# Patient Record
Sex: MALE | Race: WHITE | NOT HISPANIC OR LATINO | Employment: OTHER | URBAN - METROPOLITAN AREA
[De-identification: names, ages, dates, MRNs, and addresses within clinical notes are randomized per-mention and may not be internally consistent; named-entity substitution may affect disease eponyms.]

---

## 2017-01-10 ENCOUNTER — RECORDS - HEALTHEAST (OUTPATIENT)
Dept: LAB | Facility: CLINIC | Age: 82
End: 2017-01-10

## 2017-01-10 LAB
CHOLEST SERPL-MCNC: 151 MG/DL
FASTING STATUS PATIENT QL REPORTED: NO
HDLC SERPL-MCNC: 46 MG/DL
LDLC SERPL CALC-MCNC: 88 MG/DL
TRIGL SERPL-MCNC: 85 MG/DL

## 2018-03-27 ENCOUNTER — RECORDS - HEALTHEAST (OUTPATIENT)
Dept: LAB | Facility: CLINIC | Age: 83
End: 2018-03-27

## 2018-03-27 LAB
ALBUMIN SERPL-MCNC: 3.9 G/DL (ref 3.5–5)
ALP SERPL-CCNC: 76 U/L (ref 45–120)
ALT SERPL W P-5'-P-CCNC: 33 U/L (ref 0–45)
ANION GAP SERPL CALCULATED.3IONS-SCNC: 7 MMOL/L (ref 5–18)
AST SERPL W P-5'-P-CCNC: 23 U/L (ref 0–40)
BILIRUB SERPL-MCNC: 0.5 MG/DL (ref 0–1)
BUN SERPL-MCNC: 20 MG/DL (ref 8–28)
CALCIUM SERPL-MCNC: 9.3 MG/DL (ref 8.5–10.5)
CHLORIDE BLD-SCNC: 109 MMOL/L (ref 98–107)
CHOLEST SERPL-MCNC: 157 MG/DL
CO2 SERPL-SCNC: 25 MMOL/L (ref 22–31)
CREAT SERPL-MCNC: 1.22 MG/DL (ref 0.7–1.3)
FASTING STATUS PATIENT QL REPORTED: NO
GFR SERPL CREATININE-BSD FRML MDRD: 56 ML/MIN/1.73M2
GLUCOSE BLD-MCNC: 103 MG/DL (ref 70–125)
HDLC SERPL-MCNC: 42 MG/DL
LDLC SERPL CALC-MCNC: 87 MG/DL
POTASSIUM BLD-SCNC: 4.8 MMOL/L (ref 3.5–5)
PROT SERPL-MCNC: 6.7 G/DL (ref 6–8)
SODIUM SERPL-SCNC: 141 MMOL/L (ref 136–145)
TRIGL SERPL-MCNC: 138 MG/DL
VIT B12 SERPL-MCNC: 699 PG/ML (ref 213–816)

## 2019-06-13 ENCOUNTER — RECORDS - HEALTHEAST (OUTPATIENT)
Dept: LAB | Facility: HOSPITAL | Age: 84
End: 2019-06-13

## 2019-06-13 LAB
TSH SERPL DL<=0.005 MIU/L-ACNC: 0.59 UIU/ML (ref 0.3–5)
VIT B12 SERPL-MCNC: 629 PG/ML (ref 213–816)

## 2019-06-16 LAB — VIT B1 PYROPHOSHATE BLD-SCNC: 42 NMOL/L (ref 70–180)

## 2019-09-05 ENCOUNTER — HOME CARE/HOSPICE - HEALTHEAST (OUTPATIENT)
Dept: HOME HEALTH SERVICES | Facility: HOME HEALTH | Age: 84
End: 2019-09-05

## 2019-09-06 ENCOUNTER — HOME CARE/HOSPICE - HEALTHEAST (OUTPATIENT)
Dept: HOME HEALTH SERVICES | Facility: HOME HEALTH | Age: 84
End: 2019-09-06

## 2019-10-04 ENCOUNTER — RECORDS - HEALTHEAST (OUTPATIENT)
Dept: LAB | Facility: CLINIC | Age: 84
End: 2019-10-04

## 2019-10-07 LAB — METHYLMALONATE SERPL-SCNC: 0.26 UMOL/L (ref 0–0.4)

## 2019-10-11 ENCOUNTER — RECORDS - HEALTHEAST (OUTPATIENT)
Dept: LAB | Facility: CLINIC | Age: 84
End: 2019-10-11

## 2019-10-15 LAB — VIT B1 PYROPHOSHATE BLD-SCNC: 213 NMOL/L (ref 70–180)

## 2019-12-16 ENCOUNTER — HOSPITAL ENCOUNTER (OUTPATIENT)
Dept: ULTRASOUND IMAGING | Facility: CLINIC | Age: 84
Discharge: HOME | End: 2019-12-16
Attending: FAMILY MEDICINE

## 2019-12-16 ENCOUNTER — OFFICE VISIT - HEALTHEAST (OUTPATIENT)
Dept: FAMILY MEDICINE | Facility: CLINIC | Age: 84
End: 2019-12-16

## 2019-12-16 DIAGNOSIS — M79.89 RIGHT LEG SWELLING: ICD-10-CM

## 2019-12-16 DIAGNOSIS — M79.604 PAIN OF RIGHT LOWER EXTREMITY: ICD-10-CM

## 2019-12-16 DIAGNOSIS — I82.4Y1 DVT, LOWER EXTREMITY, PROXIMAL, ACUTE, RIGHT (H): ICD-10-CM

## 2019-12-20 ENCOUNTER — HOSPITAL ENCOUNTER (OUTPATIENT)
Dept: CT IMAGING | Facility: CLINIC | Age: 84
Discharge: HOME | End: 2019-12-20
Attending: FAMILY MEDICINE

## 2019-12-20 ENCOUNTER — OFFICE VISIT - HEALTHEAST (OUTPATIENT)
Dept: FAMILY MEDICINE | Facility: CLINIC | Age: 84
End: 2019-12-20
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
## 2019-12-20 DIAGNOSIS — S40.022A ARM BRUISE, LEFT, INITIAL ENCOUNTER: ICD-10-CM

## 2020-01-20 ENCOUNTER — DOCUMENTATION ONLY (OUTPATIENT)
Dept: OTHER | Facility: CLINIC | Age: 85
End: 2020-01-20

## 2020-01-20 ENCOUNTER — AMBULATORY - HEALTHEAST (OUTPATIENT)
Dept: OTHER | Facility: CLINIC | Age: 85
End: 2020-01-20

## 2020-01-21 ENCOUNTER — OFFICE VISIT - HEALTHEAST (OUTPATIENT)
Dept: GERIATRICS | Facility: CLINIC | Age: 85
End: 2020-01-21

## 2020-01-21 DIAGNOSIS — G40.909 SEIZURE DISORDER (H): ICD-10-CM

## 2020-01-21 DIAGNOSIS — G93.41 ACUTE METABOLIC ENCEPHALOPATHY: ICD-10-CM

## 2020-01-21 DIAGNOSIS — I82.5Y1 CHRONIC DEEP VEIN THROMBOSIS (DVT) OF PROXIMAL VEIN OF RIGHT LOWER EXTREMITY (H): ICD-10-CM

## 2020-01-21 DIAGNOSIS — N17.9 AKI (ACUTE KIDNEY INJURY) (H): ICD-10-CM

## 2020-01-21 DIAGNOSIS — F03.91 DEMENTIA WITH BEHAVIORAL DISTURBANCE, UNSPECIFIED DEMENTIA TYPE: ICD-10-CM

## 2020-01-23 ENCOUNTER — OFFICE VISIT - HEALTHEAST (OUTPATIENT)
Dept: GERIATRICS | Facility: CLINIC | Age: 85
End: 2020-01-23

## 2020-01-23 DIAGNOSIS — G40.909 SEIZURE DISORDER (H): ICD-10-CM

## 2020-01-23 DIAGNOSIS — F03.91 DEMENTIA WITH BEHAVIORAL DISTURBANCE, UNSPECIFIED DEMENTIA TYPE: ICD-10-CM

## 2020-01-23 DIAGNOSIS — I82.5Y1 CHRONIC DEEP VEIN THROMBOSIS (DVT) OF PROXIMAL VEIN OF RIGHT LOWER EXTREMITY (H): ICD-10-CM

## 2020-01-23 DIAGNOSIS — G93.41 ACUTE METABOLIC ENCEPHALOPATHY: ICD-10-CM

## 2020-01-28 ENCOUNTER — OFFICE VISIT - HEALTHEAST (OUTPATIENT)
Dept: GERIATRICS | Facility: CLINIC | Age: 85
End: 2020-01-28

## 2020-01-28 DIAGNOSIS — I82.5Y1 CHRONIC DEEP VEIN THROMBOSIS (DVT) OF PROXIMAL VEIN OF RIGHT LOWER EXTREMITY (H): ICD-10-CM

## 2020-01-28 DIAGNOSIS — N17.9 AKI (ACUTE KIDNEY INJURY) (H): ICD-10-CM

## 2020-01-28 DIAGNOSIS — F03.91 DEMENTIA WITH BEHAVIORAL DISTURBANCE, UNSPECIFIED DEMENTIA TYPE: ICD-10-CM

## 2020-01-28 DIAGNOSIS — G40.909 SEIZURE DISORDER (H): ICD-10-CM

## 2020-01-30 ENCOUNTER — OFFICE VISIT - HEALTHEAST (OUTPATIENT)
Dept: GERIATRICS | Facility: CLINIC | Age: 85
End: 2020-01-30

## 2020-01-30 DIAGNOSIS — R41.0 TRANSIENT CONFUSION: ICD-10-CM

## 2020-01-30 DIAGNOSIS — Z86.73 HISTORY OF CVA (CEREBROVASCULAR ACCIDENT): ICD-10-CM

## 2020-01-30 DIAGNOSIS — I82.4Y1 DVT, LOWER EXTREMITY, PROXIMAL, ACUTE, RIGHT (H): ICD-10-CM

## 2020-02-03 ENCOUNTER — AMBULATORY - HEALTHEAST (OUTPATIENT)
Dept: GERIATRICS | Facility: CLINIC | Age: 85
End: 2020-02-03

## 2020-02-11 ENCOUNTER — RECORDS - HEALTHEAST (OUTPATIENT)
Dept: LAB | Facility: CLINIC | Age: 85
End: 2020-02-11

## 2020-02-11 LAB
ANION GAP SERPL CALCULATED.3IONS-SCNC: 9 MMOL/L (ref 5–18)
BUN SERPL-MCNC: 23 MG/DL (ref 8–28)
CALCIUM SERPL-MCNC: 9.5 MG/DL (ref 8.5–10.5)
CHLORIDE BLD-SCNC: 112 MMOL/L (ref 98–107)
CO2 SERPL-SCNC: 21 MMOL/L (ref 22–31)
CREAT SERPL-MCNC: 1.15 MG/DL (ref 0.7–1.3)
GFR SERPL CREATININE-BSD FRML MDRD: 60 ML/MIN/1.73M2
GLUCOSE BLD-MCNC: 94 MG/DL (ref 70–125)
POTASSIUM BLD-SCNC: 4.5 MMOL/L (ref 3.5–5)
SODIUM SERPL-SCNC: 142 MMOL/L (ref 136–145)

## 2020-02-12 ENCOUNTER — AMBULATORY - HEALTHEAST (OUTPATIENT)
Dept: ADMINISTRATIVE | Facility: REHABILITATION | Age: 85
End: 2020-02-12

## 2020-02-12 DIAGNOSIS — M25.512 PAIN IN LEFT SHOULDER: ICD-10-CM

## 2020-02-19 ENCOUNTER — OFFICE VISIT - HEALTHEAST (OUTPATIENT)
Dept: PHYSICAL THERAPY | Facility: REHABILITATION | Age: 85
End: 2020-02-19

## 2020-02-19 DIAGNOSIS — M19.011 OSTEOARTHRITIS OF RIGHT SHOULDER, UNSPECIFIED OSTEOARTHRITIS TYPE: ICD-10-CM

## 2020-02-19 DIAGNOSIS — R29.898 WEAKNESS OF SHOULDER: ICD-10-CM

## 2020-02-19 DIAGNOSIS — G89.29 CHRONIC PAIN IN RIGHT SHOULDER: ICD-10-CM

## 2020-02-19 DIAGNOSIS — M25.611 DECREASED RANGE OF MOTION OF RIGHT SHOULDER: ICD-10-CM

## 2020-02-19 DIAGNOSIS — M25.511 CHRONIC PAIN IN RIGHT SHOULDER: ICD-10-CM

## 2020-02-26 ENCOUNTER — OFFICE VISIT - HEALTHEAST (OUTPATIENT)
Dept: PHYSICAL THERAPY | Facility: REHABILITATION | Age: 85
End: 2020-02-26

## 2020-02-26 DIAGNOSIS — G89.29 CHRONIC PAIN IN RIGHT SHOULDER: ICD-10-CM

## 2020-02-26 DIAGNOSIS — M19.011 OSTEOARTHRITIS OF RIGHT SHOULDER, UNSPECIFIED OSTEOARTHRITIS TYPE: ICD-10-CM

## 2020-02-26 DIAGNOSIS — M25.511 CHRONIC PAIN IN RIGHT SHOULDER: ICD-10-CM

## 2020-02-26 DIAGNOSIS — R29.898 WEAKNESS OF SHOULDER: ICD-10-CM

## 2020-02-26 DIAGNOSIS — M25.611 DECREASED RANGE OF MOTION OF RIGHT SHOULDER: ICD-10-CM

## 2020-03-02 ENCOUNTER — OFFICE VISIT - HEALTHEAST (OUTPATIENT)
Dept: PHYSICAL THERAPY | Facility: REHABILITATION | Age: 85
End: 2020-03-02

## 2020-03-02 DIAGNOSIS — G89.29 CHRONIC PAIN IN RIGHT SHOULDER: ICD-10-CM

## 2020-03-02 DIAGNOSIS — M19.011 OSTEOARTHRITIS OF RIGHT SHOULDER, UNSPECIFIED OSTEOARTHRITIS TYPE: ICD-10-CM

## 2020-03-02 DIAGNOSIS — M25.511 CHRONIC PAIN IN RIGHT SHOULDER: ICD-10-CM

## 2020-03-02 DIAGNOSIS — R29.898 WEAKNESS OF SHOULDER: ICD-10-CM

## 2020-03-02 DIAGNOSIS — M25.611 DECREASED RANGE OF MOTION OF RIGHT SHOULDER: ICD-10-CM

## 2020-03-04 ENCOUNTER — OFFICE VISIT - HEALTHEAST (OUTPATIENT)
Dept: PHYSICAL THERAPY | Facility: REHABILITATION | Age: 85
End: 2020-03-04

## 2020-03-04 DIAGNOSIS — R29.898 WEAKNESS OF SHOULDER: ICD-10-CM

## 2020-03-04 DIAGNOSIS — M25.511 CHRONIC PAIN IN RIGHT SHOULDER: ICD-10-CM

## 2020-03-04 DIAGNOSIS — M25.611 DECREASED RANGE OF MOTION OF RIGHT SHOULDER: ICD-10-CM

## 2020-03-04 DIAGNOSIS — M19.011 OSTEOARTHRITIS OF RIGHT SHOULDER, UNSPECIFIED OSTEOARTHRITIS TYPE: ICD-10-CM

## 2020-03-04 DIAGNOSIS — G89.29 CHRONIC PAIN IN RIGHT SHOULDER: ICD-10-CM

## 2020-03-09 ENCOUNTER — OFFICE VISIT - HEALTHEAST (OUTPATIENT)
Dept: PHYSICAL THERAPY | Facility: REHABILITATION | Age: 85
End: 2020-03-09

## 2020-03-09 ENCOUNTER — COMMUNICATION - HEALTHEAST (OUTPATIENT)
Dept: PHYSICAL THERAPY | Facility: REHABILITATION | Age: 85
End: 2020-03-09

## 2020-03-09 DIAGNOSIS — M19.011 OSTEOARTHRITIS OF RIGHT SHOULDER, UNSPECIFIED OSTEOARTHRITIS TYPE: ICD-10-CM

## 2020-03-09 DIAGNOSIS — M25.611 DECREASED RANGE OF MOTION OF RIGHT SHOULDER: ICD-10-CM

## 2020-03-09 DIAGNOSIS — R29.898 WEAKNESS OF SHOULDER: ICD-10-CM

## 2020-03-09 DIAGNOSIS — G89.29 CHRONIC PAIN IN RIGHT SHOULDER: ICD-10-CM

## 2020-03-09 DIAGNOSIS — M25.511 CHRONIC PAIN IN RIGHT SHOULDER: ICD-10-CM

## 2020-03-11 ENCOUNTER — OFFICE VISIT - HEALTHEAST (OUTPATIENT)
Dept: PHYSICAL THERAPY | Facility: REHABILITATION | Age: 85
End: 2020-03-11

## 2020-03-11 DIAGNOSIS — M19.011 OSTEOARTHRITIS OF RIGHT SHOULDER, UNSPECIFIED OSTEOARTHRITIS TYPE: ICD-10-CM

## 2020-03-11 DIAGNOSIS — M25.611 DECREASED RANGE OF MOTION OF RIGHT SHOULDER: ICD-10-CM

## 2020-03-11 DIAGNOSIS — G89.29 CHRONIC PAIN IN RIGHT SHOULDER: ICD-10-CM

## 2020-03-11 DIAGNOSIS — M25.511 CHRONIC PAIN IN RIGHT SHOULDER: ICD-10-CM

## 2020-03-11 DIAGNOSIS — R29.898 WEAKNESS OF SHOULDER: ICD-10-CM

## 2020-03-16 ENCOUNTER — OFFICE VISIT - HEALTHEAST (OUTPATIENT)
Dept: PHYSICAL THERAPY | Facility: REHABILITATION | Age: 85
End: 2020-03-16

## 2020-03-16 DIAGNOSIS — R29.898 WEAKNESS OF SHOULDER: ICD-10-CM

## 2020-03-16 DIAGNOSIS — G89.29 CHRONIC PAIN IN RIGHT SHOULDER: ICD-10-CM

## 2020-03-16 DIAGNOSIS — M25.511 CHRONIC PAIN IN RIGHT SHOULDER: ICD-10-CM

## 2020-03-16 DIAGNOSIS — M25.611 DECREASED RANGE OF MOTION OF RIGHT SHOULDER: ICD-10-CM

## 2020-03-16 DIAGNOSIS — M19.011 OSTEOARTHRITIS OF RIGHT SHOULDER, UNSPECIFIED OSTEOARTHRITIS TYPE: ICD-10-CM

## 2020-03-18 ENCOUNTER — OFFICE VISIT - HEALTHEAST (OUTPATIENT)
Dept: PHYSICAL THERAPY | Facility: REHABILITATION | Age: 85
End: 2020-03-18

## 2020-03-18 DIAGNOSIS — G89.29 CHRONIC PAIN IN RIGHT SHOULDER: ICD-10-CM

## 2020-03-18 DIAGNOSIS — R29.898 WEAKNESS OF SHOULDER: ICD-10-CM

## 2020-03-18 DIAGNOSIS — M25.511 CHRONIC PAIN IN RIGHT SHOULDER: ICD-10-CM

## 2020-03-18 DIAGNOSIS — M25.611 DECREASED RANGE OF MOTION OF RIGHT SHOULDER: ICD-10-CM

## 2020-03-18 DIAGNOSIS — M19.011 OSTEOARTHRITIS OF RIGHT SHOULDER, UNSPECIFIED OSTEOARTHRITIS TYPE: ICD-10-CM

## 2020-04-24 ENCOUNTER — HOSPITAL ENCOUNTER (OUTPATIENT)
Dept: ULTRASOUND IMAGING | Facility: CLINIC | Age: 85
Discharge: HOME | End: 2020-04-24

## 2020-04-24 DIAGNOSIS — I82.4Y1: ICD-10-CM

## 2020-06-05 ENCOUNTER — RECORDS - HEALTHEAST (OUTPATIENT)
Dept: LAB | Facility: CLINIC | Age: 85
End: 2020-06-05

## 2020-06-05 LAB
ANION GAP SERPL CALCULATED.3IONS-SCNC: 9 MMOL/L (ref 5–18)
BUN SERPL-MCNC: 28 MG/DL (ref 8–28)
CALCIUM SERPL-MCNC: 9.2 MG/DL (ref 8.5–10.5)
CHLORIDE BLD-SCNC: 112 MMOL/L (ref 98–107)
CO2 SERPL-SCNC: 21 MMOL/L (ref 22–31)
CREAT SERPL-MCNC: 1.33 MG/DL (ref 0.7–1.3)
GFR SERPL CREATININE-BSD FRML MDRD: 50 ML/MIN/1.73M2
GLUCOSE BLD-MCNC: 93 MG/DL (ref 70–125)
POTASSIUM BLD-SCNC: 4.5 MMOL/L (ref 3.5–5)
SODIUM SERPL-SCNC: 142 MMOL/L (ref 136–145)

## 2020-07-28 ENCOUNTER — RECORDS - HEALTHEAST (OUTPATIENT)
Dept: ADMINISTRATIVE | Facility: OTHER | Age: 85
End: 2020-07-28

## 2020-07-28 ENCOUNTER — RECORDS - HEALTHEAST (OUTPATIENT)
Dept: LAB | Facility: CLINIC | Age: 85
End: 2020-07-28

## 2020-07-28 LAB
ALBUMIN SERPL-MCNC: 3.9 G/DL (ref 3.5–5)
ALP SERPL-CCNC: 81 U/L (ref 45–120)
ALT SERPL W P-5'-P-CCNC: 28 U/L (ref 0–45)
ANION GAP SERPL CALCULATED.3IONS-SCNC: 9 MMOL/L (ref 5–18)
AST SERPL W P-5'-P-CCNC: 22 U/L (ref 0–40)
BILIRUB SERPL-MCNC: 0.5 MG/DL (ref 0–1)
BUN SERPL-MCNC: 21 MG/DL (ref 8–28)
CALCIUM SERPL-MCNC: 9 MG/DL (ref 8.5–10.5)
CHLORIDE BLD-SCNC: 110 MMOL/L (ref 98–107)
CO2 SERPL-SCNC: 22 MMOL/L (ref 22–31)
CREAT SERPL-MCNC: 1.25 MG/DL (ref 0.7–1.3)
GFR SERPL CREATININE-BSD FRML MDRD: 54 ML/MIN/1.73M2
GLUCOSE BLD-MCNC: 95 MG/DL (ref 70–125)
POTASSIUM BLD-SCNC: 4.4 MMOL/L (ref 3.5–5)
PROT SERPL-MCNC: 6.3 G/DL (ref 6–8)
SODIUM SERPL-SCNC: 141 MMOL/L (ref 136–145)
TSH SERPL DL<=0.005 MIU/L-ACNC: 0.82 UIU/ML (ref 0.3–5)

## 2020-08-06 ENCOUNTER — HOSPITAL ENCOUNTER (OUTPATIENT)
Dept: CARDIOLOGY | Facility: CLINIC | Age: 85
Discharge: HOME | End: 2020-08-06

## 2020-08-06 DIAGNOSIS — R60.9 EDEMA: ICD-10-CM

## 2020-08-06 ASSESSMENT — MIFFLIN-ST. JEOR: SCORE: 1282.22

## 2020-08-07 LAB
ASCENDING AORTA: 3.9 CM
BSA FOR ECHO PROCEDURE: 1.77 M2
CV BLOOD PRESSURE: ABNORMAL MMHG
CV ECHO HEIGHT: 68 IN
CV ECHO WEIGHT: 145 LBS
DOP CALC LVOT AREA: 3.14 CM2
DOP CALC LVOT DIAMETER: 2 CM
DOP CALC LVOT PEAK VEL: 124 CM/S
DOP CALC LVOT STROKE VOLUME: 71 CM3
DOP CALCLVOT PEAK VEL VTI: 22.6 CM
EJECTION FRACTION: 61 % (ref 55–75)
FRACTIONAL SHORTENING: 34.6 % (ref 28–44)
INTERVENTRICULAR SEPTUM IN END DIASTOLE: 1.09 CM (ref 0.6–1)
IVS/PW RATIO: 1.1
LA AREA 1: 16.8 CM2
LA AREA 2: 16.3 CM2
LEFT ATRIUM LENGTH: 4.82 CM
LEFT ATRIUM VOLUME INDEX: 27.3 ML/M2
LEFT ATRIUM VOLUME: 48.3 ML
LEFT VENTRICLE DIASTOLIC VOLUME INDEX: 50.8 CM3/M2 (ref 34–74)
LEFT VENTRICLE DIASTOLIC VOLUME: 90 CM3 (ref 62–150)
LEFT VENTRICLE MASS INDEX: 97 G/M2
LEFT VENTRICLE SYSTOLIC VOLUME INDEX: 19.8 CM3/M2 (ref 11–31)
LEFT VENTRICLE SYSTOLIC VOLUME: 35 CM3 (ref 21–61)
LEFT VENTRICULAR INTERNAL DIMENSION IN DIASTOLE: 4.63 CM (ref 4.2–5.8)
LEFT VENTRICULAR INTERNAL DIMENSION IN SYSTOLE: 3.03 CM (ref 2.5–4)
LEFT VENTRICULAR MASS: 171.6 G
LEFT VENTRICULAR OUTFLOW TRACT MEAN GRADIENT: 3 MMHG
LEFT VENTRICULAR OUTFLOW TRACT MEAN VELOCITY: 81.2 CM/S
LEFT VENTRICULAR OUTFLOW TRACT PEAK GRADIENT: 6 MMHG
LEFT VENTRICULAR POSTERIOR WALL IN END DIASTOLE: 1.01 CM (ref 0.6–1)
LV STROKE VOLUME INDEX: 40.1 ML/M2
MITRAL VALVE E/A RATIO: 0.6
MV AVERAGE E/E' RATIO: 7.8 CM/S
MV DECELERATION TIME: 349 MS
MV E'TISSUE VEL-LAT: 7.7 CM/S
MV E'TISSUE VEL-MED: 4.87 CM/S
MV LATERAL E/E' RATIO: 6.4
MV MEDIAL E/E' RATIO: 10
MV PEAK A VELOCITY: 77 CM/S
MV PEAK E VELOCITY: 48.9 CM/S
NUC REST DIASTOLIC VOLUME INDEX: 2320 LBS
NUC REST SYSTOLIC VOLUME INDEX: 68 IN
TRICUSPID REGURGITATION PEAK PRESSURE GRADIENT: 22.3 MMHG
TRICUSPID VALVE ANULAR PLANE SYSTOLIC EXCURSION: 1.7 CM
TRICUSPID VALVE PEAK REGURGITANT VELOCITY: 236 CM/S

## 2020-08-27 ENCOUNTER — RECORDS - HEALTHEAST (OUTPATIENT)
Dept: LAB | Facility: CLINIC | Age: 85
End: 2020-08-27

## 2020-08-27 LAB
CHOLEST SERPL-MCNC: 121 MG/DL
FASTING STATUS PATIENT QL REPORTED: NO
HDLC SERPL-MCNC: 43 MG/DL
LDLC SERPL CALC-MCNC: 60 MG/DL
TRIGL SERPL-MCNC: 90 MG/DL

## 2020-09-08 ENCOUNTER — RECORDS - HEALTHEAST (OUTPATIENT)
Dept: ADMINISTRATIVE | Facility: OTHER | Age: 85
End: 2020-09-08

## 2020-09-08 ENCOUNTER — AMBULATORY - HEALTHEAST (OUTPATIENT)
Dept: CARDIOLOGY | Facility: CLINIC | Age: 85
End: 2020-09-08

## 2020-09-14 ENCOUNTER — COMMUNICATION - HEALTHEAST (OUTPATIENT)
Dept: CARDIOLOGY | Facility: CLINIC | Age: 85
End: 2020-09-14

## 2020-09-15 ENCOUNTER — OFFICE VISIT - HEALTHEAST (OUTPATIENT)
Dept: CARDIOLOGY | Facility: CLINIC | Age: 85
End: 2020-09-15

## 2020-09-15 DIAGNOSIS — I82.5Y1 CHRONIC DEEP VEIN THROMBOSIS (DVT) OF PROXIMAL VEIN OF RIGHT LOWER EXTREMITY (H): ICD-10-CM

## 2020-09-15 ASSESSMENT — MIFFLIN-ST. JEOR: SCORE: 1313.97

## 2020-10-14 ENCOUNTER — RECORDS - HEALTHEAST (OUTPATIENT)
Dept: LAB | Facility: CLINIC | Age: 85
End: 2020-10-14

## 2020-10-14 LAB — LEVETIRACETAM (KEPPRA): 29.3 UG/ML (ref 6–46)

## 2020-12-16 DIAGNOSIS — G40.909 SEIZURE DISORDER (H): Primary | ICD-10-CM

## 2020-12-17 RX ORDER — LEVETIRACETAM 500 MG/1
TABLET ORAL
Qty: 56 TABLET | Refills: 12 | Status: SHIPPED | OUTPATIENT
Start: 2020-12-17 | End: 2023-01-01

## 2021-03-17 ENCOUNTER — RECORDS - HEALTHEAST (OUTPATIENT)
Dept: LAB | Facility: CLINIC | Age: 86
End: 2021-03-17

## 2021-03-17 LAB
ALBUMIN SERPL-MCNC: 3.6 G/DL (ref 3.5–5)
ALP SERPL-CCNC: 100 U/L (ref 45–120)
ALT SERPL W P-5'-P-CCNC: 28 U/L (ref 0–45)
ANION GAP SERPL CALCULATED.3IONS-SCNC: 7 MMOL/L (ref 5–18)
AST SERPL W P-5'-P-CCNC: 23 U/L (ref 0–40)
BILIRUB SERPL-MCNC: 0.3 MG/DL (ref 0–1)
BUN SERPL-MCNC: 20 MG/DL (ref 8–28)
CALCIUM SERPL-MCNC: 8.5 MG/DL (ref 8.5–10.5)
CHLORIDE BLD-SCNC: 109 MMOL/L (ref 98–107)
CHOLEST SERPL-MCNC: 109 MG/DL
CO2 SERPL-SCNC: 23 MMOL/L (ref 22–31)
CREAT SERPL-MCNC: 1.32 MG/DL (ref 0.7–1.3)
FASTING STATUS PATIENT QL REPORTED: NO
GFR SERPL CREATININE-BSD FRML MDRD: 51 ML/MIN/1.73M2
GLUCOSE BLD-MCNC: 103 MG/DL (ref 70–125)
HDLC SERPL-MCNC: 39 MG/DL
LDLC SERPL CALC-MCNC: 55 MG/DL
POTASSIUM BLD-SCNC: 4.8 MMOL/L (ref 3.5–5)
PROT SERPL-MCNC: 6.2 G/DL (ref 6–8)
SODIUM SERPL-SCNC: 139 MMOL/L (ref 136–145)
TRIGL SERPL-MCNC: 73 MG/DL

## 2021-05-24 ENCOUNTER — RECORDS - HEALTHEAST (OUTPATIENT)
Dept: ADMINISTRATIVE | Facility: CLINIC | Age: 86
End: 2021-05-24

## 2021-05-25 ENCOUNTER — RECORDS - HEALTHEAST (OUTPATIENT)
Dept: ADMINISTRATIVE | Facility: CLINIC | Age: 86
End: 2021-05-25

## 2021-05-28 ENCOUNTER — RECORDS - HEALTHEAST (OUTPATIENT)
Dept: ADMINISTRATIVE | Facility: CLINIC | Age: 86
End: 2021-05-28

## 2021-05-29 ENCOUNTER — RECORDS - HEALTHEAST (OUTPATIENT)
Dept: ADMINISTRATIVE | Facility: CLINIC | Age: 86
End: 2021-05-29

## 2021-05-30 ENCOUNTER — RECORDS - HEALTHEAST (OUTPATIENT)
Dept: ADMINISTRATIVE | Facility: CLINIC | Age: 86
End: 2021-05-30

## 2021-06-04 VITALS
SYSTOLIC BLOOD PRESSURE: 119 MMHG | RESPIRATION RATE: 18 BRPM | BODY MASS INDEX: 22.89 KG/M2 | OXYGEN SATURATION: 94 % | HEART RATE: 60 BPM | DIASTOLIC BLOOD PRESSURE: 62 MMHG | TEMPERATURE: 97.7 F | WEIGHT: 155 LBS

## 2021-06-04 VITALS — HEIGHT: 68 IN | WEIGHT: 145 LBS | BODY MASS INDEX: 21.98 KG/M2

## 2021-06-04 VITALS
SYSTOLIC BLOOD PRESSURE: 145 MMHG | BODY MASS INDEX: 22.09 KG/M2 | WEIGHT: 145.3 LBS | DIASTOLIC BLOOD PRESSURE: 76 MMHG | HEART RATE: 64 BPM | TEMPERATURE: 97.1 F

## 2021-06-04 VITALS
WEIGHT: 143 LBS | BODY MASS INDEX: 21.74 KG/M2 | SYSTOLIC BLOOD PRESSURE: 155 MMHG | HEART RATE: 61 BPM | DIASTOLIC BLOOD PRESSURE: 74 MMHG

## 2021-06-04 VITALS
TEMPERATURE: 97.6 F | RESPIRATION RATE: 16 BRPM | DIASTOLIC BLOOD PRESSURE: 64 MMHG | BODY MASS INDEX: 22.45 KG/M2 | WEIGHT: 152 LBS | HEART RATE: 54 BPM | SYSTOLIC BLOOD PRESSURE: 131 MMHG | OXYGEN SATURATION: 98 %

## 2021-06-04 VITALS
BODY MASS INDEX: 23.04 KG/M2 | SYSTOLIC BLOOD PRESSURE: 138 MMHG | DIASTOLIC BLOOD PRESSURE: 60 MMHG | HEIGHT: 68 IN | WEIGHT: 152 LBS | HEART RATE: 57 BPM | RESPIRATION RATE: 12 BRPM

## 2021-06-04 NOTE — PROGRESS NOTES
Assessment and Plan  1. Fall, initial encounter  The patient did have head trauma 2 days ago when he fell and hit his head.  It was not witnessed.  He does not think he had loss of consciousness and has no neurological deficits currently.  However he is on aspirin and Eliquis.  I discussed with him that it is possible that he has a intracranial bleed secondary to his blood thinners in this trauma.  I recommended a head CT.  This was done and is negative.  I discussed with him that he should be seen if he has any further falls.  This fall by report was mechanical in nature.  Would consider following up with his primary care doctor and possibly a referral for Pap fall prevention in the future.  - CT Head Without Contrast; Future    2. Arm bruise, left, initial encounter  The history involving the bruise is not clear.  Per his doctors report from clinic the bruise was not noted.  I reviewed their chart.  An x-ray was done which has not yet been read by a radiologist but the physician who looked at it at [place] thought it was negative.  I think that his bruises likely due to a minor trauma and that he is now on anticoagulation.  His pain is near the insertion of the bicep tendon so I think it is probably a tendinopathy or related to the swelling from the bruise.  I recommended that they use hot or warm compresses over the area 2-3 times per day and return to the clinic if it seems to be getting larger or if he is having more bruising or bleeding.  His circulation to his distal extremity is intact.    Chief complaint:  No chief complaint on file.    HPI:  Kurtis Urban is a 89 y.o. male with a recent history of a DVT that was diagnosed on 12/16.  He was hospitalized and was discharged on Eliquis/apixaban.  After his discharge, he had a fall at home on the evening of 12/17.  He awoke in the middle of the night due to left arm pain.  He thought it would help to stand and walk so he was getting out of bed in the dark  and had a mechanical fall.  He reports that he hit his head on the bed and then was on the ground.  He was unable to stand up for a long time.  This was due to his left arm pain.  He did not have any loss of consciousness.  He did not have headache or neurological change following the fall.  He continued to have pain of his left arm but was able to pull himself to stand.  He continued to have pain of the left arm and was seen at his primary care clinic yesterday.  An x-ray was done at that clinic.  He reports that it was negative and they told him it might of been a rotator cuff.  He was then seen at his dermatologist today and that was the first time he noted a large bruise on his bicep on his left.  He comes in today for evaluation for the bruise.  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  He thinks his movement of the left arm is about at its baseline.  He does not have any weakness or loss of sensation in his hand.  He does not think he had any traumas to the area.  He did have IVs in that arm during his hospitalization for heparin drip.    PMH:   Patient Active Problem List   Diagnosis     Hypercholesteremia     Depression     History of CVA (cerebrovascular accident)     Melanoma of back (H)     Melanoma of face (H)     Hard of hearing     Mild cognitive impairment     Seizure disorder (H)     Transient confusion     TIA (transient ischemic attack)     Dementia with behavioral disturbance (H)     Thiamine deficiency     DVT, lower extremity, proximal, acute, right (H)     Vitamin B12 deficiency (non anemic)       Past Medical History:   Diagnosis Date     Dementia with behavioral disturbance (H) 9/3/2019     Depression 5/28/2014     Hypercholesteremia 5/28/2014     Melanoma of back (H) 5/28/2014    Surgically removed       Melanoma of face (H) 5/28/2014    Surgically removed      Seizure (H) 5/19/2015     Thiamine deficiency 9/3/2019     TIA (transient ischemic  attack)        Current Medications:   Current Outpatient Medications on File Prior to Visit   Medication Sig Dispense Refill     apixaban ANTICOAGULANT 5 mg (74 tabs) DsPk Take 10 mg by mouth 2 (two) times a day for 7 days, THEN 5 mg 2 (two) times a day. 74 tablet 0     aspirin 81 MG EC tablet Take 81 mg by mouth daily.       atorvastatin (LIPITOR) 40 MG tablet Take 40 mg by mouth daily with supper.              CYANOCOBALAMIN, VITAMIN B-12, ORAL Take 2,500 mg by mouth daily.       levETIRAcetam (KEPPRA) 500 MG tablet Take 500 mg by mouth 2 (two) times a day.       No current facility-administered medications on file prior to visit.        Allergies:  is allergic to penicillins; alfuzosin; donepezil; myolin; sulfa (sulfonamide antibiotics); and triazolam.    SH/FH:  Social History and Family History reviewed and updated.   Tobacco Status:  He  reports that he has quit smoking. His smoking use included cigarettes. He has never used smokeless tobacco.    Review of Systems:  A 10 point review of systems was done  No fever  No headache  No visual change  No weakness or numbness  Pain of the left arm      There were no vitals filed for this visit.  Wt Readings from Last 3 Encounters:   12/17/19 151 lb 6 oz (68.7 kg)   12/16/19 155 lb (70.3 kg)   09/03/19 145 lb 9.6 oz (66 kg)       Physical Exam:  GENERAL: Alert, cooperative, well-appearing  HEAD: Normocephalic, atraumatic no abrasion or lesion over his scalp.  EYES: Conjunctiva pink, sclera white, no exudates.  Pupils are equal and reactive.  Extra ocular eye movements are normal.  Neuro: Cranial nerves are intact.  Coordination is slightly impaired to finger-to-nose with some tremor noted on the left side.  Romberg is negative.  Gait is normal.  Speech is normal.  Left upper extremity.  He has swelling and a large bruise over the bicep of his left arm extending from his mid humerus to just proximal of his shoulder.  It is 23 cm in maximum dimension by 8 cm.  He has  normal distal pulses and his hand is warm to palpation.  He has pain on palpation at the bicep insertion.  He does not have other joint tenderness of the shoulder.  He does not have point tenderness over the bruise of the humerus.  His range of motion of abduction is limited to active movement but can be overcome with passive movement.  He has normal strength in his hands.

## 2021-06-04 NOTE — PROGRESS NOTES
[facility] Note                                                        Date of Visit: 12/16/2019     Chief Complaint   Kurtis Urban is a(n) 89 y.o. White or  male who presents to [facility], accompanied by a , with the following complaint(s):  Edema (Right leg) and Rash (Rash all over on the back )       Assessment and Plan   1. DVT, lower extremity, proximal, acute, right (H)    2. Right leg swelling  - US Venous Leg Right; Future    3. Pain of right lower extremity  - US Venous Leg Right; Future    Patient presenting with 5-day history of right lower extremity swelling and pain.  Presentation is concerning for DVT.  No signs/symptoms of pulmonary embolism are noted.  Venous ultrasound was ordered as a STAT study to rule out DVT.  Ultrasound is positive for proximal right lower extremity DVT.  Result was reviewed with the patient, his , and his son Adi by phone (ultrasound was ordered as a hold and call study at [facility]).  Recommend hospitalization for initiation of anticoagulation given patient's advanced age and mild cognitive impairment as well as the fact that he resides alone in his own home.  Patient and his son were agreeable to this.  Case was discussed with Dr. Tapia, Hospitalist at [facility], who graciously accepted the patient as a direct admission for management of DVT.     History of Present Illness   Primary symptom: Edema  Onset: 5 days ago  Location(s): Right leg (from the upper thigh down)  Progression: Persisting  Associated weeping: No  Associated erythema: No  Associated shortness of breath: No  Associated exertional dyspnea: No  Associated orthopnea: No  Associated cough: No  Associated hemoptysis: No  Associated chest pain: No  Associated palpitations: No  History of similar episodes: No  History of congestive heart failure: No  History of coronary artery disease: No, but has cerebrovascular disease.   History of deep vein thrombosis:  No  History of pulmonary embolism: No  Additional pertinent history: Is concerned about lesions on his back, which were treated with cryotherapy by Dermatology last week.      Review of Systems   Review of Systems   All other systems reviewed and are negative.       Physical Exam   Vitals:    12/16/19 1044   BP: 119/62   Patient Site: Right Arm   Patient Position: Sitting   Cuff Size: Adult Regular   Pulse: 60   Resp: 18   Temp: 97.7  F (36.5  C)   TempSrc: Oral   SpO2: 94%   Weight: 155 lb (70.3 kg)     Physical Exam  Vitals signs and nursing note reviewed.   Constitutional:       General: He is not in acute distress.     Appearance: He is well-developed and normal weight. He is not ill-appearing or toxic-appearing.   HENT:      Right Ear: Decreased hearing noted.      Left Ear: Decreased hearing noted.      Mouth/Throat:      Mouth: Mucous membranes are moist.      Pharynx: Oropharynx is clear.   Eyes:      General: Lids are normal.      Conjunctiva/sclera: Conjunctivae normal.   Cardiovascular:      Rate and Rhythm: Normal rate and regular rhythm.      Pulses:           Dorsalis pedis pulses are 2+ on the right side.      Heart sounds: S1 normal and S2 normal. No murmur. No friction rub. No gallop.       Comments: Homans Sign positive on the right, negative on the left.   Pulmonary:      Effort: Pulmonary effort is normal.      Breath sounds: Normal breath sounds. No stridor. No wheezing, rhonchi or rales.   Musculoskeletal:      Right lower leg: He exhibits tenderness. 2+ Edema (from distal thigh to the foot) present.      Left lower leg: No edema.   Skin:     General: Skin is warm and dry.      Coloration: Skin is not pale.      Findings: No rash.      Comments: Multiple scabbed lesions on the back correlating with apparent seborrheic keratoses and / or actinic keratoses that were recently treated with cryotherapy.   Neurological:      General: No focal deficit present.      Mental Status: He is alert and  oriented to person, place, and time.   Psychiatric:         Speech: Speech normal.         Behavior: Behavior is cooperative.          Diagnostic Studies   Laboratory:  N/A    Radiology:  EXAM: US VENOUS LEG RIGHT  LOCATION: [facility]  DATE/TIME: 12/16/2019 12:09 PM     INDICATION: Right leg swelling and pain x 5 days.  COMPARISON: None.  TECHNIQUE: Venous Duplex ultrasound of the right lower extremity with and without compression, augmentation and duplex. Color flow and spectral Doppler with waveform analysis performed.     FINDINGS: Exam includes the common femoral, femoral, popliteal, and contralateral common femoral veins as well as segmentally visualized deep calf veins and greater saphenous vein.      RIGHT: Study is positive for DVT. Specifically, there is occlusive thrombus from the mid right femoral vein through the popliteal vein. The posterior tibial vein is patent. There are nonocclusive and occlusive filling defects and the peroneal veins calf.    No superficial thrombophlebitis. No popliteal cyst.     IMPRESSION:   1.  Study is positive for DVT above the knee in the right lower extremity.     Results called by the technologist at approximately 12:15 PM.     Electrocardiogram:  N/A     Procedure Note   N/A     Pertinent History   The following portions of the patient's history were reviewed and updated as appropriate: allergies, current medications, past family history, past medical history, past social history, past surgical history and problem list.    Patient has Hypercholesteremia; Depression; History of CVA (cerebrovascular accident); Melanoma of back (H); Melanoma of face (H); Hard of hearing; Mild cognitive impairment; Seizure (H); Transient confusion; TIA (transient ischemic attack); Dementia with behavioral disturbance (H); and Thiamine deficiency on their problem list.    Patient has a past medical history of Dementia with behavioral disturbance (H) (9/3/2019), Depression (5/28/2014),  Hypercholesteremia (5/28/2014), Melanoma of back (H) (5/28/2014), Melanoma of face (H) (5/28/2014), Seizure (H) (5/19/2015), Thiamine deficiency (9/3/2019), and TIA (transient ischemic attack).    Patient has a past surgical history that includes Cholecystectomy; Appendectomy; nasal surgeries; meniscal surgery (1993); Hernia repair; Shoulder surgery (Right); and total knee repair (Right).    Patient's family history is not on file but is reportedly negative for DVT/PE.     Patient reports that he has quit smoking. His smoking use included cigarettes. He has never used smokeless tobacco. He reports that he does not drink alcohol or use drugs.     Portions of this note have been dictated using voice recognition software. Any grammatical or context distortions are unintentional and inherent to the software.     Kamran Redmond MD  [facility]

## 2021-06-04 NOTE — PATIENT INSTRUCTIONS - HE
Patient Education   1. The bruise in clinic today is 23 cm by 8 cm.    2. Apply warm compresses on the area at least three times a day  3. If the bruising is getting larger then you should be seen in the clinic  4. If you have any trauma to your head you should be seen in the clinic the day it happens  5.  Follow up with your doctor in clinic in about 1-2 weeks  Bruises (Contusions)  6. I think your shoulder pain is due to an injury to the tendon or muscle.  You can try applying ice to the place of pain, you can take acetaminophen for pain    A contusion is a bruise. A bruise happens when a blow to your body doesn't break the skin but does break blood vessels beneath the skin. Blood leaking from the broken vessels causes redness and swelling. As it heals, your bruise is likely to turn colors like purple, green, and yellow. This is normal. The bruise should fade in 2 or 3 weeks.  Factors that make you more likely to bruise  Almost everyone bruises now and then. Certain people do bruise more easily than others. You're more prone to bruising as you get older. That's because blood vessels become more fragile with age. You're also more likely to bruise if you have a clotting disorder such as hemophilia or take medicines that reduce clotting, including aspirin, non-steriodal anti-inflammatory (NSAIDs) and blood thinning medications. You are also more likely to bruise if you have liver disease and or drink alcohol daily.  When to go to the emergency room (ER)  Bruises almost always heal on their own without special treatment. But for some people, a bad bruise can be serious. Seek medical care if you:    Have a clotting disorder such as hemophilia    Have cirrhosis or other serious liver disease    Take blood-thinning medicines such as heparin or warfarin  What to expect in the ER  A doctor will examine your bruise and ask about any health conditions you have. In some cases, you may have a test to check how well your blood  clots. Other treatment will depend on your needs.  Follow-up care  Sometimes a bruise gets worse instead of better. It may become larger and more swollen. This can occur when your body walls off a small pool of blood under the skin (hematoma). In very rare cases, your doctor may need to drain extra blood from the area.  Tip:  Apply an ice pack or bag of frozen peas to a bruise for 15-20 minutes several times a day. Keep a thin cloth between the ice or frozen peas and your skin. The cold can help reduce redness and swelling.  Date Last Reviewed: 12/1/2016 2000-2019 [employer]. [address] [place] [address], [place] [address]. All rights reserved. This information is not intended as a substitute for professional medical care. Always follow your healthcare professional's instructions.

## 2021-06-05 NOTE — PROGRESS NOTES
[facility] Admission note      Patient: Kurtis Urban  MRN: [id number]  Date of Service: 1/28/2020      [facility] [[id number]]  Reason for Visit     Chief Complaint   Patient presents with     Review Of Multiple Medical Conditions     Code Status     Full Code    Assessment     - acute AMS work-up negative suspected to be part of cognitive decline  - H/O OF MECHANICAL FALL  - H/O OF seizure disorder  - h/o of TIA  - Acute KRIS ; solved with hydration  -History of depression with anxiety    Plan     Patient has been stable.  He has some underlying history of gait instability but is noted to be ambulating.  He does not use any assist device.  He remains pleasantly confused with no short-term recall noted  Cpt 4.4 ; BIMS 15/15  DC blood sugar checks all his blood sugars are under 120  ORAL intake and weights have been adequate as per patient.  Staff is not reporting any significant behaviors however he remains adamant and confused about wanting to leave the facility and using his car  Continue with his current regimen of Zoloft mood has been stable  Awaiting placement in an independent living apartment family working towards that goal    History     Patient is a very pleasant 89 y.o. male who is admitted to TCU after hospitalization for encephalopathy, fall, and KRIS. Despite improvements patient continues to have short term memory issues and trouble with ADL's. PT and OT recommended TCU placement for further evaluation and possible placement.  He has been stable since placement with no significant behaviors he remains pleasantly confused  However short-term recall remains impaired CPT is 4.4      Patient also reports a fall prior to presentation with trauma related to his nasal bridge and face.  He has fallen several times in his home  Unfortunately balance remains poor    He has an underlying history of DVT and continues on Eliquis 5 mg twice a day no bleeding issues reported    He  also has a history of hypertension blood pressures have been stable.  He does have significant gait instability with tremors noted on exam.  This is baseline as per patient.  He is reporting the tremors are impeding his daily life with falls as well as inability to write and sign his documents any longer  He is currently awaiting placement in an independent living apartment    Past Medical History     Active Ambulatory (Non-Hospital) Problems    Diagnosis     Altered mental status, unspecified     Acute metabolic encephalopathy     KRIS (acute kidney injury) (H)     Chronic deep vein thrombosis (DVT) of proximal vein of lower extremity (H)     DVT, lower extremity, proximal, acute, right (H)     Vitamin B12 deficiency (non anemic)     Transient confusion     TIA (transient ischemic attack)     Dementia with behavioral disturbance (H)     Thiamine deficiency     Seizure disorder (H)     Mild cognitive impairment     Hypercholesteremia     Depression     History of CVA (cerebrovascular accident)     Melanoma of back (H)     Melanoma of face (H)     Hard of hearing     Past Medical History:   Diagnosis Date     Dementia with behavioral disturbance (H) 9/3/2019     Depression 2014     Hypercholesteremia 2014     Melanoma of back (H) 2014     Melanoma of face (H) 2014     Seizure (H) 2015     Thiamine deficiency 9/3/2019     TIA (transient ischemic attack)        Past Social History     Reviewed, and he  reports that he has quit smoking. His smoking use included cigarettes. He has never used smokeless tobacco. He reports that he does not drink alcohol or use drugs.    Family History     Reviewed, and includes CA in his mother ; his father  young from MVA    Medication List   Post Discharge Medication Reconciliation Status: discharge medications reconciled, continue medications without change   Current Outpatient Medications on File Prior to Visit   Medication Sig Dispense Refill     aspirin 81  MG EC tablet Take 81 mg by mouth daily.       atorvastatin (LIPITOR) 40 MG tablet Take 40 mg by mouth at bedtime.        cyanocobalamin (VITAMIN B-12) 250 MCG tablet Take 250 mcg by mouth daily.        ELIQUIS 5 mg Tab tablet Take 5 mg by mouth 2 (two) times a day.        levETIRAcetam (KEPPRA) 500 MG tablet Take 500 mg by mouth 2 (two) times a day.       sertraline (ZOLOFT) 50 MG tablet Take 50 mg by mouth daily.       thiamine 50 MG tablet Take 50 mg by mouth daily.       triamcinolone (KENALOG) 0.1 % cream Apply 1 application topically 3 (three) times a day.       No current facility-administered medications on file prior to visit.        Allergies     Allergies   Allergen Reactions     Penicillins Anaphylaxis and Nausea And Vomiting     Alfuzosin Nausea And Vomiting     Donepezil Other (See Comments)     Myolin      Other reaction(s): hives     Sulfa (Sulfonamide Antibiotics) Nausea And Vomiting     Triazolam Nausea And Vomiting       Review of Systems   A comprehensive review of 14 systems was done. Pertinent findings noted here and in history of present illness. All the rest negative.  Constitutional: Negative.  Negative for fever, chills, he has activity change, appetite change and fatigue.   HENT: Negative for congestion   Eyes: Negative for changes in vision ,has glasses  Respiratory: Negative for cough and chest tightness.    Cardiovascular: Negative for chest pain, palpitations and leg swelling.   Gastrointestinal: Negative for nausea, diarrhea, constipation, blood in stool and abdominal distention.   Genitourinary: Negative.    Musculoskeletal: Negative.  Reports falls and difficulty walking due to tremors  Skin: Negative.    Neurological: Negative for dizziness,, syncope, weakness, light-headedness and headaches. Patient has mild tremors   Hematological: Does not bruise/bleed easily.   Psychiatric/Behavioral: Negative.  confusion      Physical Exam     Recent Vitals 1/21/2020   Height -   Weight 143 lbs    BSA (m2) 1.76 m2   /74   Pulse 61   Temp -   Temp src -   SpO2 -   Some recent data might be hidden   T98 saturation 94% on room air    Constitutional: Oriented to person, place, and time and appears well-developed. He is neatly dressed.  HEENT:  Normocephalic and atraumatic.  Eyes: Conjunctivae and EOM are normal. Pupils are equal, round, and reactive to light. No discharge.  No scleral icterus. Nose normal.  [place]    Mouth/Throat: Oropharynx is clear and moist. No oropharyngeal exudate.    NECK: Normal range of motion. Neck supple. No JVD present. No tracheal deviation present. No thyromegaly present.   CARDIOVASCULAR: Normal rate, regular rhythm and intact distal pulses.  Exam reveals no gallop and no friction rub.   PULMONARY: Effort normal and breath sounds normal. No respiratory distress.No Wheezing or rales.  ABDOMEN: Soft. Bowel sounds are normal. No distension and no mass.  There is no tenderness. There is no rebound and no guarding. No HSM.  MUSCULOSKELETAL: Normal range of motion. No edema and no tenderness. Mild kyphosis, no tenderness.  NEUROLOGICAL: Alert and oriented to person, place, and time. Cranial Nerves grossly intact. Mild tremor at baseline. Get up and go normal. Unable to recall recent events  GENITOURINARY: Deferred exam.  SKIN: Skin is warm and dry. No rash noted. No erythema. No pallor.   Facial ecchymoses with a infraorbital ecchymosis and nasal bridge laceration  EXTREMITIES: No cyanosis, no clubbing, no edema. No Deformity.  PSYCHIATRIC: Normal mood, affect and behavior.recall is impaired      Lab Results     Recent Results (from the past 240 hour(s))   POCT Glucose    Collection Time: 01/18/20 12:28 PM   Result Value Ref Range    Glucose 98 70 - 139 mg/dL   ECG 12 lead nursing unit performed    Collection Time: 01/18/20 12:32 PM   Result Value Ref Range    SYSTOLIC BLOOD PRESSURE      DIASTOLIC BLOOD PRESSURE      VENTRICULAR RATE 61 BPM    ATRIAL RATE 61 BPM    P-R INTERVAL 176  ms    QRS DURATION 94 ms    Q-T INTERVAL 426 ms    QTC CALCULATION (BEZET) 428 ms    P Axis 35 degrees    R AXIS -51 degrees    T AXIS 40 degrees    MUSE DIAGNOSIS       Normal sinus rhythm  Left anterior fascicular block  Moderate voltage criteria for LVH, may be normal variant  Abnormal ECG  When compared with ECG of 03-SEP-2019 11:09,  No significant change was found  Confirmed by SEE ED PROVIDER NOTE FOR, ECG INTERPRETATION ([id number]),  JORGE MONTES ([id number]) on 1/18/2020 1:23:50 PM     Comprehensive Metabolic Panel    Collection Time: 01/18/20 12:36 PM   Result Value Ref Range    Sodium 142 136 - 145 mmol/L    Potassium 4.2 3.5 - 5.0 mmol/L    Chloride 110 (H) 98 - 107 mmol/L    CO2 23 22 - 31 mmol/L    Anion Gap, Calculation 9 5 - 18 mmol/L    Glucose 88 70 - 125 mg/dL    BUN 21 8 - 28 mg/dL    Creatinine 1.25 0.70 - 1.30 mg/dL    GFR MDRD Af Amer >60 >60 mL/min/1.73m2    GFR MDRD Non Af Amer 54 (L) >60 mL/min/1.73m2    Bilirubin, Total 0.8 0.0 - 1.0 mg/dL    Calcium 9.6 8.5 - 10.5 mg/dL    Protein, Total 7.1 6.0 - 8.0 g/dL    Albumin 4.2 3.5 - 5.0 g/dL    Alkaline Phosphatase 84 45 - 120 U/L    AST 18 0 - 40 U/L    ALT 27 0 - 45 U/L   Troponin I    Collection Time: 01/18/20 12:36 PM   Result Value Ref Range    Troponin I 0.02 0.00 - 0.29 ng/mL   Thyroid Stimulating Hormone (TSH)    Collection Time: 01/18/20 12:36 PM   Result Value Ref Range    TSH 0.48 0.30 - 5.00 uIU/mL   INR    Collection Time: 01/18/20 12:36 PM   Result Value Ref Range    INR 1.07 0.90 - 1.10   HM1 (CBC with Diff)    Collection Time: 01/18/20 12:36 PM   Result Value Ref Range    WBC 11.8 (H) 4.0 - 11.0 thou/uL    RBC 4.02 (L) 4.40 - 6.20 mill/uL    Hemoglobin 12.7 (L) 14.0 - 18.0 g/dL    Hematocrit 39.8 (L) 40.0 - 54.0 %    MCV 99 80 - 100 fL    MCH 31.6 27.0 - 34.0 pg    MCHC 31.9 (L) 32.0 - 36.0 g/dL    RDW 12.8 11.0 - 14.5 %    Platelets 100 (L) 140 - 440 thou/uL    MPV 13.1 (H) 8.5 - 12.5 fL    Neutrophils % 84 (H) 50 - 70 %     Lymphocytes % 8 (L) 20 - 40 %    Monocytes % 7 2 - 10 %    Eosinophils % 0 0 - 6 %    Basophils % 0 0 - 2 %    Neutrophils Absolute 9.9 (H) 2.0 - 7.7 thou/uL    Lymphocytes Absolute 1.0 0.8 - 4.4 thou/uL    Monocytes Absolute 0.9 0.0 - 0.9 thou/uL    Eosinophils Absolute 0.0 0.0 - 0.4 thou/uL    Basophils Absolute 0.0 0.0 - 0.2 thou/uL   Thyroid Cascade    Collection Time: 01/18/20 12:36 PM   Result Value Ref Range    TSH 0.51 0.30 - 5.00 uIU/mL   Vitamin B12    Collection Time: 01/18/20  4:06 PM   Result Value Ref Range    Vitamin B-12 387 213 - 816 pg/mL   Folate, Serum    Collection Time: 01/18/20  4:06 PM   Result Value Ref Range    Folate 14.3 >=3.5 ng/mL   Ammonia    Collection Time: 01/18/20  4:06 PM   Result Value Ref Range    Ammonia 19 11 - 35 umol/L   Urinalysis-UC if Indicated    Collection Time: 01/18/20  4:50 PM   Result Value Ref Range    Color, UA Yellow Colorless, Yellow, Straw, Light Yellow    Clarity, UA Clear Clear    Glucose, UA Negative Negative    Bilirubin, UA Negative Negative    Ketones, UA Negative Negative    Specific Gravity, UA 1.015 1.001 - 1.030    Blood, UA Negative Negative    pH, UA 6.0 4.5 - 8.0    Protein, UA Negative Negative mg/dL    Urobilinogen, UA <2.0 E.U./dL <2.0 E.U./dL, 2.0 E.U./dL    Nitrite, UA Negative Negative    Leukocytes, UA Negative Negative   Basic metabolic panel    Collection Time: 01/19/20  5:49 AM   Result Value Ref Range    Sodium 140 136 - 145 mmol/L    Potassium 4.4 3.5 - 5.0 mmol/L    Chloride 110 (H) 98 - 107 mmol/L    CO2 23 22 - 31 mmol/L    Anion Gap, Calculation 7 5 - 18 mmol/L    Glucose 97 70 - 125 mg/dL    Calcium 8.8 8.5 - 10.5 mg/dL    BUN 21 8 - 28 mg/dL    Creatinine 1.03 0.70 - 1.30 mg/dL    GFR MDRD Af Amer >60 >60 mL/min/1.73m2    GFR MDRD Non Af Amer >60 >60 mL/min/1.73m2   HM1 (CBC with Diff)    Collection Time: 01/19/20  5:49 AM   Result Value Ref Range    WBC 9.5 4.0 - 11.0 thou/uL    RBC 3.62 (L) 4.40 - 6.20 mill/uL    Hemoglobin 11.5  (L) 14.0 - 18.0 g/dL    Hematocrit 35.3 (L) 40.0 - 54.0 %    MCV 98 80 - 100 fL    MCH 31.8 27.0 - 34.0 pg    MCHC 32.6 32.0 - 36.0 g/dL    RDW 12.6 11.0 - 14.5 %    Platelets 94 (L) 140 - 440 thou/uL    MPV 12.7 (H) 8.5 - 12.5 fL    Neutrophils % 79 (H) 50 - 70 %    Lymphocytes % 11 (L) 20 - 40 %    Monocytes % 9 2 - 10 %    Eosinophils % 1 0 - 6 %    Basophils % 0 0 - 2 %    Neutrophils Absolute 7.5 2.0 - 7.7 thou/uL    Lymphocytes Absolute 1.1 0.8 - 4.4 thou/uL    Monocytes Absolute 0.8 0.0 - 0.9 thou/uL    Eosinophils Absolute 0.1 0.0 - 0.4 thou/uL    Basophils Absolute 0.0 0.0 - 0.2 thou/uL            DEB Mendiola

## 2021-06-05 NOTE — PROGRESS NOTES
[facility] Admission note      Patient: Kurtis Urban  MRN: [id number]  Date of Service: 1/21/2020      [facility] [[id number]]  Reason for Visit     Chief Complaint   Patient presents with     H & P     Code Status     Full Code    Assessment     - acute AMS work-up negative suspected to be part of cognitive decline  - H/O OF MECHANICAL FALL  - H/O OF seizure disorder  - h/o of TIA  - Acute KRIS ; solved with hydration  -History of depression with anxiety    Plan     Dementia  Mild Cognitive Decline: Patient struggling with short term memory. Patient is oriented but unable to recall recent events. Prior to hospitalization patient lived alone and drove.   -Medical work up has been negative including imaging as well as extensive lab work  -Aviod deliriogenic medications/polypharmacy.   -PT and OT  -Continue to monitor; remains pleasantly confused but with limited short-term recall     Seizure Disorder  -Continue levetiracetam 500 mg two times a day. ;  No breakthrough seizures      MDD/Anxiety  -Continue Sertraline 50 mg daily.      HLD  -Continue statin daily.     DVT  -Continue Eliquis 5 mg two times a day.   Patient remained stable though pleasantly confused with limited short-term memory.  He does have gait and balance issues because of baseline tremors and gait instability.  He does seem somewhat of elopement risk and has been requesting that his car be brought to the nursing home so he can keep his multiple appointments.  In spite of his multiple falls and confusion he continues to drive and live independently and plans to do the same.  We will wait for his formal cognitive assessment.  Continue with his PT OT and rehab monitor mood and behaviors closely  Patient was examined independently and in the presence of resident's memory and functional status was assessed.  Suspect he may no longer be capable of living independently and may need alternative placement    Additional  16-minute spent face-to-face reviewing his CODE STATUS with him he is requesting a full code.  His full status has been signed by his son because he states that he cannot sign his paperwork because of tremors but he does agree with it  DEB Mendiola    History     Patient is a very pleasant 89 y.o. male who is admitted to TCU after hospitalization for encephalopathy, fall, and KRIS. Despite improvements patient continues to have short term memory issues and trouble with ADL's. PT and OT recommended TCU placement for further evaluation and possible placement.     Patient states he is here because his son made him come. Patient states he currently lives at home in a one story ranch alone. He is  as of 10 years. Patient has hired help with cleaning and other home chores. He often eats out or heats TV dinners. Patient states he rarely cooks. Patient currently drives but does admits to frequently getting lost. Patient state he eventually finds his way or utilizes his GPS. Patient performs all of his own ADLs. Patient manages all of his own medical care and finances. Patients family has been telling him that its not good to live in a large home alone and are pushing him to consider a new living situation. Patient is frustrated with this. Patient states he likes his home and plans to live there till he passes.   He presented with a altered mental status extensive work-up for infectious and metabolic etiologies were negative.  Labs were normal.  Imaging was negative it is felt to be part of his cognitive decline.  He has been discharged to the TCU    Patient also reports a fall prior to presentation with trauma related to his nasal bridge and face.  He is unable to clearly state how he fell and he told me he slipped and fell and he woke himself up he is not sure if he passed out  When asked about the fall. Patient states he does not know why he fell but felt everyone falls and he is not phased by it. He does not  understand the concern. Patient continued to theorize that he may have slipped on water. Patient denies frequent falls. Patient denies any pain.    Patient was an  at .      History obtained though chart review an via patient. History is limited due to dementia.  He has an underlying history of DVT and continues on Eliquis 5 mg twice a day no bleeding issues reported  He also has a history of hypertension blood pressures have been stable.  He does have significant gait instability with tremors noted on exam.  This is baseline as per patient.  He is reporting the tremors are impeding his daily life with falls as well as inability to write and sign his documents any longer    Past Medical History     Active Ambulatory (Non-Hospital) Problems    Diagnosis     Altered mental status, unspecified     Acute metabolic encephalopathy     KRIS (acute kidney injury) (H)     Chronic deep vein thrombosis (DVT) of proximal vein of lower extremity (H)     DVT, lower extremity, proximal, acute, right (H)     Vitamin B12 deficiency (non anemic)     Transient confusion     TIA (transient ischemic attack)     Dementia with behavioral disturbance (H)     Thiamine deficiency     Seizure disorder (H)     Mild cognitive impairment     Hypercholesteremia     Depression     History of CVA (cerebrovascular accident)     Melanoma of back (H)     Melanoma of face (H)     Hard of hearing     Past Medical History:   Diagnosis Date     Dementia with behavioral disturbance (H) 9/3/2019     Depression 5/28/2014     Hypercholesteremia 5/28/2014     Melanoma of back (H) 5/28/2014     Melanoma of face (H) 5/28/2014     Seizure (H) 5/19/2015     Thiamine deficiency 9/3/2019     TIA (transient ischemic attack)        Past Social History     Reviewed, and he  reports that he has quit smoking. His smoking use included cigarettes. He has never used smokeless tobacco. He reports that he does not drink alcohol or use drugs.    Family History      Reviewed, and includes CA in his mother ; his father  young from MVA    Medication List   Post Discharge Medication Reconciliation Status: discharge medications reconciled, continue medications without change   Current Outpatient Medications on File Prior to Visit   Medication Sig Dispense Refill     aspirin 81 MG EC tablet Take 81 mg by mouth daily.       atorvastatin (LIPITOR) 40 MG tablet Take 40 mg by mouth at bedtime.        cyanocobalamin (VITAMIN B-12) 250 MCG tablet Take 250 mcg by mouth daily.        ELIQUIS 5 mg Tab tablet Take 5 mg by mouth 2 (two) times a day.        levETIRAcetam (KEPPRA) 500 MG tablet Take 500 mg by mouth 2 (two) times a day.       sertraline (ZOLOFT) 50 MG tablet Take 50 mg by mouth daily.       thiamine 50 MG tablet Take 50 mg by mouth daily.       triamcinolone (KENALOG) 0.1 % cream Apply 1 application topically 3 (three) times a day.       No current facility-administered medications on file prior to visit.        Allergies     Allergies   Allergen Reactions     Penicillins Anaphylaxis and Nausea And Vomiting     Alfuzosin Nausea And Vomiting     Donepezil Other (See Comments)     Myolin      Other reaction(s): hives     Sulfa (Sulfonamide Antibiotics) Nausea And Vomiting     Triazolam Nausea And Vomiting       Review of Systems   A comprehensive review of 14 systems was done. Pertinent findings noted here and in history of present illness. All the rest negative.  Constitutional: Negative.  Negative for fever, chills, he has activity change, appetite change and fatigue.   HENT: Negative for congestion   Eyes: Negative for changes in vision ,has glasses  Respiratory: Negative for cough and chest tightness.    Cardiovascular: Negative for chest pain, palpitations and leg swelling.   Gastrointestinal: Negative for nausea, diarrhea, constipation, blood in stool and abdominal distention.   Genitourinary: Negative.    Musculoskeletal: Negative.  Reports falls and difficulty  walking due to tremors  Skin: Negative.    Neurological: Negative for dizziness,, syncope, weakness, light-headedness and headaches. Patient has mild tremors   Hematological: Does not bruise/bleed easily.   Psychiatric/Behavioral: Negative.  confusion      Physical Exam     Recent Vitals 1/21/2020   Height -   Weight 143 lbs   BSA (m2) 1.76 m2   /74   Pulse 61   Temp -   Temp src -   SpO2 -   Some recent data might be hidden       Constitutional: Oriented to person, place, and time and appears well-developed. He is neatly dressed.  HEENT:  Normocephalic and atraumatic.  Eyes: Conjunctivae and EOM are normal. Pupils are equal, round, and reactive to light. No discharge.  No scleral icterus. Nose normal.  [place]    Mouth/Throat: Oropharynx is clear and moist. No oropharyngeal exudate.    NECK: Normal range of motion. Neck supple. No JVD present. No tracheal deviation present. No thyromegaly present.   CARDIOVASCULAR: Normal rate, regular rhythm and intact distal pulses.  Exam reveals no gallop and no friction rub.   PULMONARY: Effort normal and breath sounds normal. No respiratory distress.No Wheezing or rales.  ABDOMEN: Soft. Bowel sounds are normal. No distension and no mass.  There is no tenderness. There is no rebound and no guarding. No HSM.  MUSCULOSKELETAL: Normal range of motion. No edema and no tenderness. Mild kyphosis, no tenderness.  NEUROLOGICAL: Alert and oriented to person, place, and time. Cranial Nerves grossly intact. Mild tremor at baseline. Get up and go normal. Unable to recall recent events  GENITOURINARY: Deferred exam.  SKIN: Skin is warm and dry. No rash noted. No erythema. No pallor.   Facial ecchymoses with a infraorbital ecchymosis and nasal bridge laceration  EXTREMITIES: No cyanosis, no clubbing, no edema. No Deformity.  PSYCHIATRIC: Normal mood, affect and behavior.recall is impaired      Lab Results     Recent Results (from the past 240 hour(s))   POCT Glucose    Collection Time:  01/18/20 12:28 PM   Result Value Ref Range    Glucose 98 70 - 139 mg/dL   ECG 12 lead nursing unit performed    Collection Time: 01/18/20 12:32 PM   Result Value Ref Range    SYSTOLIC BLOOD PRESSURE      DIASTOLIC BLOOD PRESSURE      VENTRICULAR RATE 61 BPM    ATRIAL RATE 61 BPM    P-R INTERVAL 176 ms    QRS DURATION 94 ms    Q-T INTERVAL 426 ms    QTC CALCULATION (BEZET) 428 ms    P Axis 35 degrees    R AXIS -51 degrees    T AXIS 40 degrees    MUSE DIAGNOSIS       Normal sinus rhythm  Left anterior fascicular block  Moderate voltage criteria for LVH, may be normal variant  Abnormal ECG  When compared with ECG of 03-SEP-2019 11:09,  No significant change was found  Confirmed by SEE ED PROVIDER NOTE FOR, ECG INTERPRETATION ([id number]),  JORGE MONTES ([id number]) on 1/18/2020 1:23:50 PM     Comprehensive Metabolic Panel    Collection Time: 01/18/20 12:36 PM   Result Value Ref Range    Sodium 142 136 - 145 mmol/L    Potassium 4.2 3.5 - 5.0 mmol/L    Chloride 110 (H) 98 - 107 mmol/L    CO2 23 22 - 31 mmol/L    Anion Gap, Calculation 9 5 - 18 mmol/L    Glucose 88 70 - 125 mg/dL    BUN 21 8 - 28 mg/dL    Creatinine 1.25 0.70 - 1.30 mg/dL    GFR MDRD Af Amer >60 >60 mL/min/1.73m2    GFR MDRD Non Af Amer 54 (L) >60 mL/min/1.73m2    Bilirubin, Total 0.8 0.0 - 1.0 mg/dL    Calcium 9.6 8.5 - 10.5 mg/dL    Protein, Total 7.1 6.0 - 8.0 g/dL    Albumin 4.2 3.5 - 5.0 g/dL    Alkaline Phosphatase 84 45 - 120 U/L    AST 18 0 - 40 U/L    ALT 27 0 - 45 U/L   Troponin I    Collection Time: 01/18/20 12:36 PM   Result Value Ref Range    Troponin I 0.02 0.00 - 0.29 ng/mL   Thyroid Stimulating Hormone (TSH)    Collection Time: 01/18/20 12:36 PM   Result Value Ref Range    TSH 0.48 0.30 - 5.00 uIU/mL   INR    Collection Time: 01/18/20 12:36 PM   Result Value Ref Range    INR 1.07 0.90 - 1.10   HM1 (CBC with Diff)    Collection Time: 01/18/20 12:36 PM   Result Value Ref Range    WBC 11.8 (H) 4.0 - 11.0 thou/uL    RBC 4.02 (L) 4.40 - 6.20  mill/uL    Hemoglobin 12.7 (L) 14.0 - 18.0 g/dL    Hematocrit 39.8 (L) 40.0 - 54.0 %    MCV 99 80 - 100 fL    MCH 31.6 27.0 - 34.0 pg    MCHC 31.9 (L) 32.0 - 36.0 g/dL    RDW 12.8 11.0 - 14.5 %    Platelets 100 (L) 140 - 440 thou/uL    MPV 13.1 (H) 8.5 - 12.5 fL    Neutrophils % 84 (H) 50 - 70 %    Lymphocytes % 8 (L) 20 - 40 %    Monocytes % 7 2 - 10 %    Eosinophils % 0 0 - 6 %    Basophils % 0 0 - 2 %    Neutrophils Absolute 9.9 (H) 2.0 - 7.7 thou/uL    Lymphocytes Absolute 1.0 0.8 - 4.4 thou/uL    Monocytes Absolute 0.9 0.0 - 0.9 thou/uL    Eosinophils Absolute 0.0 0.0 - 0.4 thou/uL    Basophils Absolute 0.0 0.0 - 0.2 thou/uL   Thyroid Cascade    Collection Time: 01/18/20 12:36 PM   Result Value Ref Range    TSH 0.51 0.30 - 5.00 uIU/mL   Vitamin B12    Collection Time: 01/18/20  4:06 PM   Result Value Ref Range    Vitamin B-12 387 213 - 816 pg/mL   Folate, Serum    Collection Time: 01/18/20  4:06 PM   Result Value Ref Range    Folate 14.3 >=3.5 ng/mL   Ammonia    Collection Time: 01/18/20  4:06 PM   Result Value Ref Range    Ammonia 19 11 - 35 umol/L   Urinalysis-UC if Indicated    Collection Time: 01/18/20  4:50 PM   Result Value Ref Range    Color, UA Yellow Colorless, Yellow, Straw, Light Yellow    Clarity, UA Clear Clear    Glucose, UA Negative Negative    Bilirubin, UA Negative Negative    Ketones, UA Negative Negative    Specific Gravity, UA 1.015 1.001 - 1.030    Blood, UA Negative Negative    pH, UA 6.0 4.5 - 8.0    Protein, UA Negative Negative mg/dL    Urobilinogen, UA <2.0 E.U./dL <2.0 E.U./dL, 2.0 E.U./dL    Nitrite, UA Negative Negative    Leukocytes, UA Negative Negative   Basic metabolic panel    Collection Time: 01/19/20  5:49 AM   Result Value Ref Range    Sodium 140 136 - 145 mmol/L    Potassium 4.4 3.5 - 5.0 mmol/L    Chloride 110 (H) 98 - 107 mmol/L    CO2 23 22 - 31 mmol/L    Anion Gap, Calculation 7 5 - 18 mmol/L    Glucose 97 70 - 125 mg/dL    Calcium 8.8 8.5 - 10.5 mg/dL    BUN 21 8 - 28  mg/dL    Creatinine 1.03 0.70 - 1.30 mg/dL    GFR MDRD Af Amer >60 >60 mL/min/1.73m2    GFR MDRD Non Af Amer >60 >60 mL/min/1.73m2   HM1 (CBC with Diff)    Collection Time: 01/19/20  5:49 AM   Result Value Ref Range    WBC 9.5 4.0 - 11.0 thou/uL    RBC 3.62 (L) 4.40 - 6.20 mill/uL    Hemoglobin 11.5 (L) 14.0 - 18.0 g/dL    Hematocrit 35.3 (L) 40.0 - 54.0 %    MCV 98 80 - 100 fL    MCH 31.8 27.0 - 34.0 pg    MCHC 32.6 32.0 - 36.0 g/dL    RDW 12.6 11.0 - 14.5 %    Platelets 94 (L) 140 - 440 thou/uL    MPV 12.7 (H) 8.5 - 12.5 fL    Neutrophils % 79 (H) 50 - 70 %    Lymphocytes % 11 (L) 20 - 40 %    Monocytes % 9 2 - 10 %    Eosinophils % 1 0 - 6 %    Basophils % 0 0 - 2 %    Neutrophils Absolute 7.5 2.0 - 7.7 thou/uL    Lymphocytes Absolute 1.1 0.8 - 4.4 thou/uL    Monocytes Absolute 0.8 0.0 - 0.9 thou/uL    Eosinophils Absolute 0.1 0.0 - 0.4 thou/uL    Basophils Absolute 0.0 0.0 - 0.2 thou/uL            Imaging Results     Ct Head Without Contrast    Result Date: 1/18/2020  EXAM: CT HEAD WITHOUT CONTRAST LOCATION: [facility] DATE/TIME: 01/18/2020, 1:27 PM INDICATION: Fall with facial trauma. COMPARISON: 12/20/2019 head CT. TECHNIQUE: Routine without IV contrast. Multiplanar reformats. Dose reduction techniques were used. FINDINGS: INTRACRANIAL CONTENTS: No acute intracranial hemorrhage. Stable retrocerebellar arachnoid cyst. No findings to suggest acute infarction. Redemonstrated old left frontal and bilateral parietal cortical/subcortical infarcts. Mild to moderate presumed chronic small vessel ischemic changes. Mild generalized volume loss. No hydrocephalus. VISUALIZED ORBITS/SINUSES/MASTOIDS: No intraorbital abnormality. Mucosal thickening primarily involving the ethmoid air cells. No middle ear or mastoid effusion. BONES/SOFT TISSUES: No acute abnormality. Bilateral temporomandibular joint osteoarthrosis.     1.  Senescent and chronic cerebral small vessel changes and old bilateral high frontoparietal  cortical infarcts. 2.  No acute intracranial process. Specifically, no evidence for acute intracranial hemorrhage.     Ct Cervical Spine Without Contrast    Result Date: 1/18/2020  EXAM: CT CERVICAL SPINE WITHOUT CONTRAST LOCATION: [facility] DATE/TIME: 01/18/2020, 1:27 PM INDICATION: Fall, head injury, confusion. COMPARISON: 09/03/2019 CT angiogram. TECHNIQUE: Routine without IV contrast. Multiplanar reformats. Dose reduction techniques were used. FINDINGS: VERTEBRA: Normal vertebral body heights and alignment. No fracture or posttraumatic subluxation. CANAL/FORAMINA: There is multilevel cervical spondylosis throughout the cervical spine. A posterior disc osteophyte and left uncovertebral spurring at C2-C3 results in mild left foraminal stenosis. Moderate bilateral foraminal stenosis at C3-C4 due to right greater than left uncovertebral spurring and facet hypertrophy. Severe left and moderate right foraminal stenosis at C4-C5 due to uncovertebral spurring and facet hypertrophy. Severe left foraminal stenosis at C5-C6 due to uncovertebral spurring and facet hypertrophy. There is moderate right-sided foraminal stenosis at this level. Moderate right foraminal stenosis at C6-C7 due to marginal disc osteophyte and uncovertebral spurring. PARASPINAL: Heterogeneous nodule within the left thyroid lobe measures 3.8 x 2.3 x 2.8 cm.     1.  No fracture or posttraumatic subluxation. 2.  Multilevel cervical spondylosis with foraminal compromise as described. No high-grade bony spinal canal stenosis. 3.  Redemonstrated heterogeneous left thyroid lobe nodule.       Patient discussed with Dr. Franklin  who are in agreement with the assessment and plan.     Patient examined independently and then in the presence of resident I agree with above assessment plan.  DEB Mendiola

## 2021-06-05 NOTE — PROGRESS NOTES
[facility] Admission note      Patient: Kurtis Urban  MRN: [id number]  Date of Service: 1/23/2020      [facility] [[id number]]  Reason for Visit     Chief Complaint   Patient presents with     Review Of Multiple Medical Conditions     Code Status     Full Code    Assessment     - acute AMS work-up negative suspected to be part of cognitive decline  - H/O OF MECHANICAL FALL  - H/O OF seizure disorder  - h/o of TIA  - Acute KRIS ; solved with hydration  -History of depression with anxiety    Plan     Patient has been stable.  He has some underlying history of gait instability but is noted to be ambulating.  Cpt 4.4 ; BIMS 15/15  Patient examined in the presence of his family friends.  They are taking him out for a family outing.  As per patient he has multiple appointments that he wants to keep and he is again insisting that his car be brought in.  He is finally agreed to move to independent living apartment with services family is pushing for him to move out of his home.  He remains adamant that he will continue driving and his friends are aware of his cognitive decline they told me that he has had several episodes in the last 1 year where he has gotten confused and lost track of his surroundings  Continue with his PT OT and rehab.  Family care conference to discuss placement    History     Patient is a very pleasant 89 y.o. male who is admitted to TCU after hospitalization for encephalopathy, fall, and KRIS. Despite improvements patient continues to have short term memory issues and trouble with ADL's. PT and OT recommended TCU placement for further evaluation and possible placement.  He has been stable since placement with no significant behaviors he remains pleasantly confused    Patient states he is here because his son made him come. Patient states he currently lives at home in a one story ranch alone. He is  as of 10 years. Patient has hired help with cleaning and other  home chores. He often eats out or heats TV dinners. Patient states he rarely cooks. Patient currently drives but does admits to frequently getting lost. Patient state he eventually finds his way or utilizes his GPS. Patient performs all of his own ADLs. Patient manages all of his own medical care and finances. Patients family has been telling him that its not good to live in a large home alone and are pushing him to consider a new living situation. Patient is frustrated with this. Patient states he likes his home and plans to live there till he passes.   He presented with a altered mental status extensive work-up for infectious and metabolic etiologies were negative.  Labs were normal.  Imaging was negative it is felt to be part of his cognitive decline.  He has been discharged to the TCU      Patient also reports a fall prior to presentation with trauma related to his nasal bridge and face.  He was examined today in the presence of family friends who told me that he has fallen several times in his home    He has an underlying history of DVT and continues on Eliquis 5 mg twice a day no bleeding issues reported  He also has a history of hypertension blood pressures have been stable.  He does have significant gait instability with tremors noted on exam.  This is baseline as per patient.  He is reporting the tremors are impeding his daily life with falls as well as inability to write and sign his documents any longer  He was examined in the presence of his family friends who supplemented his history essentially is had acute episodes of confusion in the setting of chronic confusion with progressive decline.    Past Medical History     Active Ambulatory (Non-Hospital) Problems    Diagnosis     Altered mental status, unspecified     Acute metabolic encephalopathy     KRIS (acute kidney injury) (H)     Chronic deep vein thrombosis (DVT) of proximal vein of lower extremity (H)     DVT, lower extremity, proximal, acute, right  (H)     Vitamin B12 deficiency (non anemic)     Transient confusion     TIA (transient ischemic attack)     Dementia with behavioral disturbance (H)     Thiamine deficiency     Seizure disorder (H)     Mild cognitive impairment     Hypercholesteremia     Depression     History of CVA (cerebrovascular accident)     Melanoma of back (H)     Melanoma of face (H)     Hard of hearing     Past Medical History:   Diagnosis Date     Dementia with behavioral disturbance (H) 9/3/2019     Depression 2014     Hypercholesteremia 2014     Melanoma of back (H) 2014     Melanoma of face (H) 2014     Seizure (H) 2015     Thiamine deficiency 9/3/2019     TIA (transient ischemic attack)        Past Social History     Reviewed, and he  reports that he has quit smoking. His smoking use included cigarettes. He has never used smokeless tobacco. He reports that he does not drink alcohol or use drugs.    Family History     Reviewed, and includes CA in his mother ; his father  young from MVA    Medication List   Post Discharge Medication Reconciliation Status: discharge medications reconciled, continue medications without change   Current Outpatient Medications on File Prior to Visit   Medication Sig Dispense Refill     aspirin 81 MG EC tablet Take 81 mg by mouth daily.       atorvastatin (LIPITOR) 40 MG tablet Take 40 mg by mouth at bedtime.        cyanocobalamin (VITAMIN B-12) 250 MCG tablet Take 250 mcg by mouth daily.        ELIQUIS 5 mg Tab tablet Take 5 mg by mouth 2 (two) times a day.        levETIRAcetam (KEPPRA) 500 MG tablet Take 500 mg by mouth 2 (two) times a day.       sertraline (ZOLOFT) 50 MG tablet Take 50 mg by mouth daily.       thiamine 50 MG tablet Take 50 mg by mouth daily.       triamcinolone (KENALOG) 0.1 % cream Apply 1 application topically 3 (three) times a day.       No current facility-administered medications on file prior to visit.        Allergies     Allergies   Allergen Reactions      Penicillins Anaphylaxis and Nausea And Vomiting     Alfuzosin Nausea And Vomiting     Donepezil Other (See Comments)     Myolin      Other reaction(s): hives     Sulfa (Sulfonamide Antibiotics) Nausea And Vomiting     Triazolam Nausea And Vomiting       Review of Systems   A comprehensive review of 14 systems was done. Pertinent findings noted here and in history of present illness. All the rest negative.  Constitutional: Negative.  Negative for fever, chills, he has activity change, appetite change and fatigue.   HENT: Negative for congestion   Eyes: Negative for changes in vision ,has glasses  Respiratory: Negative for cough and chest tightness.    Cardiovascular: Negative for chest pain, palpitations and leg swelling.   Gastrointestinal: Negative for nausea, diarrhea, constipation, blood in stool and abdominal distention.   Genitourinary: Negative.    Musculoskeletal: Negative.  Reports falls and difficulty walking due to tremors  Skin: Negative.    Neurological: Negative for dizziness,, syncope, weakness, light-headedness and headaches. Patient has mild tremors   Hematological: Does not bruise/bleed easily.   Psychiatric/Behavioral: Negative.  confusion      Physical Exam     Recent Vitals 1/21/2020   Height -   Weight 143 lbs   BSA (m2) 1.76 m2   /74   Pulse 61   Temp -   Temp src -   SpO2 -   Some recent data might be hidden       Constitutional: Oriented to person, place, and time and appears well-developed. He is neatly dressed.  HEENT:  Normocephalic and atraumatic.  Eyes: Conjunctivae and EOM are normal. Pupils are equal, round, and reactive to light. No discharge.  No scleral icterus. Nose normal.  [place]    Mouth/Throat: Oropharynx is clear and moist. No oropharyngeal exudate.    NECK: Normal range of motion. Neck supple. No JVD present. No tracheal deviation present. No thyromegaly present.   CARDIOVASCULAR: Normal rate, regular rhythm and intact distal pulses.  Exam reveals no gallop and no  friction rub.   PULMONARY: Effort normal and breath sounds normal. No respiratory distress.No Wheezing or rales.  ABDOMEN: Soft. Bowel sounds are normal. No distension and no mass.  There is no tenderness. There is no rebound and no guarding. No HSM.  MUSCULOSKELETAL: Normal range of motion. No edema and no tenderness. Mild kyphosis, no tenderness.  NEUROLOGICAL: Alert and oriented to person, place, and time. Cranial Nerves grossly intact. Mild tremor at baseline. Get up and go normal. Unable to recall recent events  GENITOURINARY: Deferred exam.  SKIN: Skin is warm and dry. No rash noted. No erythema. No pallor.   Facial ecchymoses with a infraorbital ecchymosis and nasal bridge laceration  EXTREMITIES: No cyanosis, no clubbing, no edema. No Deformity.  PSYCHIATRIC: Normal mood, affect and behavior.recall is impaired      Lab Results     Recent Results (from the past 240 hour(s))   POCT Glucose    Collection Time: 01/18/20 12:28 PM   Result Value Ref Range    Glucose 98 70 - 139 mg/dL   ECG 12 lead nursing unit performed    Collection Time: 01/18/20 12:32 PM   Result Value Ref Range    SYSTOLIC BLOOD PRESSURE      DIASTOLIC BLOOD PRESSURE      VENTRICULAR RATE 61 BPM    ATRIAL RATE 61 BPM    P-R INTERVAL 176 ms    QRS DURATION 94 ms    Q-T INTERVAL 426 ms    QTC CALCULATION (BEZET) 428 ms    P Axis 35 degrees    R AXIS -51 degrees    T AXIS 40 degrees    MUSE DIAGNOSIS       Normal sinus rhythm  Left anterior fascicular block  Moderate voltage criteria for LVH, may be normal variant  Abnormal ECG  When compared with ECG of 03-SEP-2019 11:09,  No significant change was found  Confirmed by SEE ED PROVIDER NOTE FOR, ECG INTERPRETATION ([id number]),  JORGE MONTES ([id number]) on 1/18/2020 1:23:50 PM     Comprehensive Metabolic Panel    Collection Time: 01/18/20 12:36 PM   Result Value Ref Range    Sodium 142 136 - 145 mmol/L    Potassium 4.2 3.5 - 5.0 mmol/L    Chloride 110 (H) 98 - 107 mmol/L    CO2 23 22 - 31 mmol/L     Anion Gap, Calculation 9 5 - 18 mmol/L    Glucose 88 70 - 125 mg/dL    BUN 21 8 - 28 mg/dL    Creatinine 1.25 0.70 - 1.30 mg/dL    GFR MDRD Af Amer >60 >60 mL/min/1.73m2    GFR MDRD Non Af Amer 54 (L) >60 mL/min/1.73m2    Bilirubin, Total 0.8 0.0 - 1.0 mg/dL    Calcium 9.6 8.5 - 10.5 mg/dL    Protein, Total 7.1 6.0 - 8.0 g/dL    Albumin 4.2 3.5 - 5.0 g/dL    Alkaline Phosphatase 84 45 - 120 U/L    AST 18 0 - 40 U/L    ALT 27 0 - 45 U/L   Troponin I    Collection Time: 01/18/20 12:36 PM   Result Value Ref Range    Troponin I 0.02 0.00 - 0.29 ng/mL   Thyroid Stimulating Hormone (TSH)    Collection Time: 01/18/20 12:36 PM   Result Value Ref Range    TSH 0.48 0.30 - 5.00 uIU/mL   INR    Collection Time: 01/18/20 12:36 PM   Result Value Ref Range    INR 1.07 0.90 - 1.10   HM1 (CBC with Diff)    Collection Time: 01/18/20 12:36 PM   Result Value Ref Range    WBC 11.8 (H) 4.0 - 11.0 thou/uL    RBC 4.02 (L) 4.40 - 6.20 mill/uL    Hemoglobin 12.7 (L) 14.0 - 18.0 g/dL    Hematocrit 39.8 (L) 40.0 - 54.0 %    MCV 99 80 - 100 fL    MCH 31.6 27.0 - 34.0 pg    MCHC 31.9 (L) 32.0 - 36.0 g/dL    RDW 12.8 11.0 - 14.5 %    Platelets 100 (L) 140 - 440 thou/uL    MPV 13.1 (H) 8.5 - 12.5 fL    Neutrophils % 84 (H) 50 - 70 %    Lymphocytes % 8 (L) 20 - 40 %    Monocytes % 7 2 - 10 %    Eosinophils % 0 0 - 6 %    Basophils % 0 0 - 2 %    Neutrophils Absolute 9.9 (H) 2.0 - 7.7 thou/uL    Lymphocytes Absolute 1.0 0.8 - 4.4 thou/uL    Monocytes Absolute 0.9 0.0 - 0.9 thou/uL    Eosinophils Absolute 0.0 0.0 - 0.4 thou/uL    Basophils Absolute 0.0 0.0 - 0.2 thou/uL   Thyroid Cascade    Collection Time: 01/18/20 12:36 PM   Result Value Ref Range    TSH 0.51 0.30 - 5.00 uIU/mL   Vitamin B12    Collection Time: 01/18/20  4:06 PM   Result Value Ref Range    Vitamin B-12 387 213 - 816 pg/mL   Folate, Serum    Collection Time: 01/18/20  4:06 PM   Result Value Ref Range    Folate 14.3 >=3.5 ng/mL   Ammonia    Collection Time: 01/18/20  4:06 PM   Result  Value Ref Range    Ammonia 19 11 - 35 umol/L   Urinalysis-UC if Indicated    Collection Time: 01/18/20  4:50 PM   Result Value Ref Range    Color, UA Yellow Colorless, Yellow, Straw, Light Yellow    Clarity, UA Clear Clear    Glucose, UA Negative Negative    Bilirubin, UA Negative Negative    Ketones, UA Negative Negative    Specific Gravity, UA 1.015 1.001 - 1.030    Blood, UA Negative Negative    pH, UA 6.0 4.5 - 8.0    Protein, UA Negative Negative mg/dL    Urobilinogen, UA <2.0 E.U./dL <2.0 E.U./dL, 2.0 E.U./dL    Nitrite, UA Negative Negative    Leukocytes, UA Negative Negative   Basic metabolic panel    Collection Time: 01/19/20  5:49 AM   Result Value Ref Range    Sodium 140 136 - 145 mmol/L    Potassium 4.4 3.5 - 5.0 mmol/L    Chloride 110 (H) 98 - 107 mmol/L    CO2 23 22 - 31 mmol/L    Anion Gap, Calculation 7 5 - 18 mmol/L    Glucose 97 70 - 125 mg/dL    Calcium 8.8 8.5 - 10.5 mg/dL    BUN 21 8 - 28 mg/dL    Creatinine 1.03 0.70 - 1.30 mg/dL    GFR MDRD Af Amer >60 >60 mL/min/1.73m2    GFR MDRD Non Af Amer >60 >60 mL/min/1.73m2   HM1 (CBC with Diff)    Collection Time: 01/19/20  5:49 AM   Result Value Ref Range    WBC 9.5 4.0 - 11.0 thou/uL    RBC 3.62 (L) 4.40 - 6.20 mill/uL    Hemoglobin 11.5 (L) 14.0 - 18.0 g/dL    Hematocrit 35.3 (L) 40.0 - 54.0 %    MCV 98 80 - 100 fL    MCH 31.8 27.0 - 34.0 pg    MCHC 32.6 32.0 - 36.0 g/dL    RDW 12.6 11.0 - 14.5 %    Platelets 94 (L) 140 - 440 thou/uL    MPV 12.7 (H) 8.5 - 12.5 fL    Neutrophils % 79 (H) 50 - 70 %    Lymphocytes % 11 (L) 20 - 40 %    Monocytes % 9 2 - 10 %    Eosinophils % 1 0 - 6 %    Basophils % 0 0 - 2 %    Neutrophils Absolute 7.5 2.0 - 7.7 thou/uL    Lymphocytes Absolute 1.1 0.8 - 4.4 thou/uL    Monocytes Absolute 0.8 0.0 - 0.9 thou/uL    Eosinophils Absolute 0.1 0.0 - 0.4 thou/uL    Basophils Absolute 0.0 0.0 - 0.2 thou/uL            Imaging Results     Ct Head Without Contrast    Result Date: 1/18/2020  EXAM: CT HEAD WITHOUT CONTRAST  LOCATION: [facility] DATE/TIME: 01/18/2020, 1:27 PM INDICATION: Fall with facial trauma. COMPARISON: 12/20/2019 head CT. TECHNIQUE: Routine without IV contrast. Multiplanar reformats. Dose reduction techniques were used. FINDINGS: INTRACRANIAL CONTENTS: No acute intracranial hemorrhage. Stable retrocerebellar arachnoid cyst. No findings to suggest acute infarction. Redemonstrated old left frontal and bilateral parietal cortical/subcortical infarcts. Mild to moderate presumed chronic small vessel ischemic changes. Mild generalized volume loss. No hydrocephalus. VISUALIZED ORBITS/SINUSES/MASTOIDS: No intraorbital abnormality. Mucosal thickening primarily involving the ethmoid air cells. No middle ear or mastoid effusion. BONES/SOFT TISSUES: No acute abnormality. Bilateral temporomandibular joint osteoarthrosis.     1.  Senescent and chronic cerebral small vessel changes and old bilateral high frontoparietal cortical infarcts. 2.  No acute intracranial process. Specifically, no evidence for acute intracranial hemorrhage.     Ct Cervical Spine Without Contrast    Result Date: 1/18/2020  EXAM: CT CERVICAL SPINE WITHOUT CONTRAST LOCATION: [facility] DATE/TIME: 01/18/2020, 1:27 PM INDICATION: Fall, head injury, confusion. COMPARISON: 09/03/2019 CT angiogram. TECHNIQUE: Routine without IV contrast. Multiplanar reformats. Dose reduction techniques were used. FINDINGS: VERTEBRA: Normal vertebral body heights and alignment. No fracture or posttraumatic subluxation. CANAL/FORAMINA: There is multilevel cervical spondylosis throughout the cervical spine. A posterior disc osteophyte and left uncovertebral spurring at C2-C3 results in mild left foraminal stenosis. Moderate bilateral foraminal stenosis at C3-C4 due to right greater than left uncovertebral spurring and facet hypertrophy. Severe left and moderate right foraminal stenosis at C4-C5 due to uncovertebral spurring and facet hypertrophy. Severe left  foraminal stenosis at C5-C6 due to uncovertebral spurring and facet hypertrophy. There is moderate right-sided foraminal stenosis at this level. Moderate right foraminal stenosis at C6-C7 due to marginal disc osteophyte and uncovertebral spurring. PARASPINAL: Heterogeneous nodule within the left thyroid lobe measures 3.8 x 2.3 x 2.8 cm.     1.  No fracture or posttraumatic subluxation. 2.  Multilevel cervical spondylosis with foraminal compromise as described. No high-grade bony spinal canal stenosis. 3.  Redemonstrated heterogeneous left thyroid lobe nodule.       Patient discussed with Dr. Franklin  who are in agreement with the assessment and plan.     Patient examined independently and then in the presence of resident I agree with above assessment plan.  DEB Mendiola

## 2021-06-06 NOTE — PROGRESS NOTES
[facility] Discharge Summary    Patient Name: Kurtis Urban  Date: 4/20/2020  Referring provider: Jemal Zuniga MD  Visit Diagnosis:   1. Chronic pain in right shoulder     2. Osteoarthritis of right shoulder, unspecified osteoarthritis type     3. Decreased range of motion of right shoulder     4. Weakness of shoulder         Goals:  Pt. will demonstrate/verbalize independence in self-management of condition in : 6 weeks;Met  Pt. will be independent with home exercise program in : 6 weeks;Met    Pt will: improve his shoulder flexion and abduction range of motion on the right side to 100 degrees so that he is able to lift his arm higher for functional reaching within 6 weeks; Progressing toward (Progressing towards.)  Pt will: improve his shoulder flexion and abduction strength to 4/5 on the right so that he can lift and reach for objects at home within 6 weeks; Progressing toward (Progressing towards.)      Patient was seen for 8 visits with the last visit on 3/18/20.   The patient met goals and has demonstrated understanding of and independence in the home program for self-care, and progression to next steps.  He will initiate contact if questions or concerns arise.    Therapy will be discontinued at this time.  The patient will need a new referral to resume.    Thank you for your referral.  Olinda JARA Anna  4/20/2020   9:18 AM      [facility] Daily Progress Note    Patient Name: Kurtis Urban  Date: 3/18/2020  Visit #: 8/12  Referring provider: Jemal Zuniga MD  Visit Diagnosis:     ICD-10-CM    1. Chronic pain in right shoulder  M25.511     G89.29    2. Osteoarthritis of right shoulder, unspecified osteoarthritis type  M19.011    3. Decreased range of motion of right shoulder  M25.611    4. Weakness of shoulder  R29.898          Assessment:   Patient returns to PT for his final follow-up for right shoulder and arm pain that has resolved.  Patient has limited active range of  motion ROM and more PROM in both shoulders, but in the right it appears the lack of AROM could potentially be from a rotator cuff tear.  However, pt not reports that despite his limited AROM, with less shoulder pain has improved ability to perform his daily functional tasks.  Patient demonstrates readiness for independent sx management and HEP.      Patient tolerated the exercises with no pain and moderate cueing for technique.    PT also performed MT to the R shoulder musculature for pain mitigation.  Pt notes feeling great after MT,.     PT continued with manual therapy (MT) to the right shoulder for decreased muscle tightness and improved mobility along with pain mitigation.  Pt stated that this felt good.  He had no tenderness or pain today.    HEP/POC compliance is  good .  Patient demonstrates understanding/independence with home program.      Goal Status:  Pt. will demonstrate/verbalize independence in self-management of condition in : 6 weeks;Met  Pt. will be independent with home exercise program in : 6 weeks;Met    Pt will: improve his shoulder flexion and abduction range of motion on the right side to 100 degrees so that he is able to lift his arm higher for functional reaching within 6 weeks; Progressing toward (Progressing towards.)  Pt will: improve his shoulder flexion and abduction strength to 4/5 on the right so that he can lift and reach for objects at home within 6 weeks; Progressing toward (Progressing towards.)      Plan / Patient Education:     Patient to try independent sx management and HEP.  PT will hold the chart for 30 days and then discharge the patient if he does not need to return.     Subjective:     Pain Ratin    Patient reports that his left shoulder is feeling good.  Not giving him much trouble anymore.  He still has some difficulty with reaching, but no pain.      Pt reports that he has been doing his PT exercises.      Pt reports feeling ready for independent sx management and  "HEP.      Objective:     Pt has made the following improvements:    Shoulder/Elbow ROM    Date: 2/19/2020 3/18/20     Shoulder and Elbow ROM ( )    AROM in degrees AROM in degrees    Right Left Right Left   Shoulder Flexion (0-180 ) 65 with mod-sever pain  86 70, no p  120   Shoulder Abduction (0-180 ) 60 with mod pain  85 60, no p   90   Shoulder Extension (0-60 )           Shoulder ER (0-90 ) 45 50 50  50   Shoulder IR (0-70 ) 50 50       Shoulder IR/Ext     T8 T8   Elbow Flexion (150 ) wnl wnl       Elbow Extension (0 ) wnl wnl         PROM in degrees PROM in degrees     Right Left Right Left   Shoulder Flexion (0-180 ) 150 160  160  160   Shoulder Abduction (0-180 ) 130 130 160  160    Shoulder Extension (0-60 )     75  75   Shoulder ER (0-90 )           Shoulder IR (0-70 )     70  70   Elbow Flexion (150 )           Elbow Extension (0 )              Shoulder/Elbow Strength   Date: 2/19/2020  3/18/20   Shoulder/Elbow Strength (/5)  Manual Muscle Test (MMT) MMT MMT    Right Left Right Left   Shoulder Flexion 3/5 3/5  3  4   Supraspinatus           Shoulder Abduction 3/5 no pain 3+/5  3  4   Shoulder Extension           Shoulder External Rotation 3/5 3/5  3 4   Shoulder Internal Rotation 4/5 4/5  5  5    Elbow Flexion           Elbow Extension           Other:           Other:          Palpation:  No palpatory tenderness   Decreased shoulder muscle tightness      Treatment Today       Manual Therapy:   See below    Exercises:  Exercise #1: codmans - all 4 ways  Comment #1: Active shoulder flexion, adbuction, and extension  Exercise #2: Shoulder isometrics into a wall, flexion, ext, and abd (elbow bent) R - reviewed verbally  Comment #2: Shoulder AAROM slides onto a table top with towel  Exercise #3: Scapular retractions x10 with 5\" holds  Comment #3: x5 with 5\" in between pulley sets   Exercise #4: UAE 3 mins gently   Comment #4: pulleys - flexion x 2 min and ABD x2 min   Exercise #5: supine AROM-AAROM of shoulder " flexion Rx5 and ABD in S/L Rx5  Comment #5: Standing bilateral shoulder flexion/adbuction AAROM      TREATMENT MINUTES COMMENTS   Evaluation     Self-care/ Home management     Manual therapy 8 STM/TP to the R UT and general shoulder; Pt supine   Neuromuscular Re-education     Therapeutic Activity     Therapeutic Exercises 20 See above   Gait training     Modality__________________                Total 28    Blank areas are intentional and mean the treatment did not include these items.       Olinda Castillo, PT   3/18/2020

## 2021-06-06 NOTE — PROGRESS NOTES
[facility] Daily Progress Note    Patient Name: Kurtis Urban  Date: 3/2/2020  Visit #: 3/12  Referring provider: Jemal Zuniga MD  Visit Diagnosis:     ICD-10-CM    1. Chronic pain in right shoulder M25.511     G89.29    2. Osteoarthritis of right shoulder, unspecified osteoarthritis type M19.011    3. Decreased range of motion of right shoulder M25.611    4. Weakness of shoulder R29.898          Assessment:   Patient returns to PT for follow-up for right shoulder and arm pain. Patient has very limited active range of motion (ROM and more PROM in both shoulders and this limited range of motion impairs his ability to perform daily functional tasks, such as dressing and combing his hair. Patient presents with impairments in range of motion, strength, and pain. Patient will benefit from skilled therapy in order to improve his range of motion, strength, and decrease his pain. Patient is willing and motivated for PT.     Patient tolerated the exercises with minimal pain and moderate cueing for technique.  Pt cued to decrease the amount of push on the isometrics due to c/o some pain.      Initiated some mild manual therapy (MT) to the right shoulder for decreased muscle tightness and improved mobility along with pain mitigation.  Pt stated that this felt good.      HEP/POC compliance is  good .  Patient demonstrates understanding/independence with home program.  Patient is benefitting from skilled physical therapy and is making steady progress toward functional goals.    Goal Status:  Pt. will demonstrate/verbalize independence in self-management of condition in : 6 weeks  Pt. will be independent with home exercise program in : 6 weeks    Pt will: improve his shoulder flexion and abduction range of motion on the right side to 100 degrees so that he is able to lift his arm higher for functional reaching within 6 weeks.  Pt will: improve his shoulder flexion and abduction strength to 4/5 on the right so  "that he can lift and reach for objects at home within 6 weeks.       Plan / Patient Education:     Progress with home program as tolerated.   Plan for next time: Progress with shoulder AROM, exercises to strengthen the shoulder, and may add a pulley system exercise.     Subjective:     Pain Ratin    Patient reports that his left shoulder is feeling some better.  Pt reports that he has no pain at rest, but pain increases with reaching tasks.      Pt reports that he is busy right now because he is getting ready to move.      Pt reports that he has been doing his PT exercises.      Objective:     Patient with mod- severely impaired shoulder AROM bilaterally into flexion, scaption, and abduction. Patient had decreased overall shoulder pain today.     Treatment Today       Manual Therapy: Bilateral Shoulder PROM-AAROM with patient in supine (shoulder flexion, abduction, ER/IR)    Exercises:  Exercise #1: Shoulder pendulums sideways and circles and forward/backwards in standing with table support  Comment #1: 2 sets of 30 seconds  Exercise #2: Shoulder isometrics into a wall, flexion, ext, and abd (elbow bent) Rx10 with 5\" hold   Comment #2: Shoulder AAROM slides onto a table top with towel Rx10   Exercise #3: Scapular retractions   Comment #3: x5 with 5\" in between pulley sets   Exercise #4: UAE 3 mins gently   Comment #4: pulleys - flexion x 2 min and ABD x2 min       TREATMENT MINUTES COMMENTS   Evaluation     Self-care/ Home management     Manual therapy 8 STM to the R UT and general shoulder; R shoulder flex, ABD, ER and IR ROM and GH distraction   Pt supine    Neuromuscular Re-education     Therapeutic Activity     Therapeutic Exercises 20 See above   Gait training     Modality__________________                Total 28    Blank areas are intentional and mean the treatment did not include these items.       Olinda Castillo, PT   3/2/2020  "

## 2021-06-06 NOTE — PROGRESS NOTES
[facility] Daily Progress Note    Patient Name: Kurtis Urban  Date: 3/16/2020  Visit #: 6/12  Referring provider: Jemal Zuniga MD  Visit Diagnosis:     ICD-10-CM    1. Chronic pain in right shoulder  M25.511     G89.29    2. Osteoarthritis of right shoulder, unspecified osteoarthritis type  M19.011    3. Decreased range of motion of right shoulder  M25.611    4. Weakness of shoulder  R29.898          Assessment:   Patient returns to PT for follow-up for right shoulder and arm pain. Patient has very limited active range of motion (ROM and more PROM in both shoulders and this limited range of motion impairs his ability to perform daily functional tasks, such as dressing and combing his hair. Patient presents with impairments in range of motion, strength, and pain. Patient will benefit from skilled therapy in order to improve his range of motion, strength, and decrease his pain. Patient is willing and motivated for PT.     Patient tolerated the exercises with minimal pain and moderate cueing for technique.  Patient had more active range of motion today with therapy as he was able to move his shoulder through a greater pain free arc in supine than at previous visits.     PT also performed MT to the R shoulder musculature for pain mitigation.  Pt notes feeling great after MT,.      Pt was a little more confused today while in the clinic.  He reported that he had a hard time finding the building today and this was also evident in his exercise performance and need for significant and repeated verbal and manual cueing.       Progressed mild manual therapy (MT) to the right shoulder for decreased muscle tightness and improved mobility along with pain mitigation.  Pt stated that this felt good.  He was mildly tender to STM over the right upper trapezius, but this decreased over time today.      HEP/POC compliance is  good .  Patient demonstrates understanding/independence with home program.  Patient is  "benefitting from skilled physical therapy and is making steady progress toward functional goals.    Goal Status:  Pt. will demonstrate/verbalize independence in self-management of condition in : 6 weeks;Progressing toward  Pt. will be independent with home exercise program in : 6 weeks;Progressing toward    Pt will: improve his shoulder flexion and abduction range of motion on the right side to 100 degrees so that he is able to lift his arm higher for functional reaching within 6 weeks. (Progressing towards.)  Pt will: improve his shoulder flexion and abduction strength to 4/5 on the right so that he can lift and reach for objects at home within 6 weeks.  (Progressing towards.)      Plan / Patient Education:     Progress with home program as tolerated.   Plan for next time: Progress with shoulder AROM, exercises to strengthen the shoulder, and progress pulley system exercise.     Continue x 1 more and then consider independence.     Subjective:     Pain Ratin    Patient reports that his left shoulder is feeling good.  Not giving him much trouble anymore.     Pt reports that he has been doing his PT exercises.  However, he reports that he lost his pictures in the move.    Objective:   Right shoulder flexion AROM measured last session at 70 dgs  Patient with mod- severely impaired , but improving, shoulder AROM bilaterally into flexion, scaption, and abduction. Patient had decreased overall shoulder pain today.     Treatment Today       Manual Therapy:   Patient supine:   Gentle STM and TP release to the upper trapezius and right shoulder musculature.    Exercises:  Exercise #1: codmans - all 4 ways  Comment #1: Active shoulder flexion, adbuction, and extension  Exercise #2: Shoulder isometrics into a wall, flexion, ext, and abd (elbow bent) Rx10 with 5\" hold - not today  Comment #2: Shoulder AAROM slides onto a table top with towel  Exercise #3: Scapular retractions x5 with 5\" holds  Comment #3: x5 with 5\" in " between pulley sets   Exercise #4: UAE 3 mins gently   Comment #4: pulleys - flexion x 2 min and ABD x2 min   Exercise #5: supine AROM-AAROM of shoulder ER/IR and flexion/abduction x5 R  Comment #5: Standing bilateral shoulder flexion/adbuction AAROM      TREATMENT MINUTES COMMENTS   Evaluation     Self-care/ Home management     Manual therapy 8 STM/TP to the R UT and general shoulder;Pt supine    Neuromuscular Re-education     Therapeutic Activity     Therapeutic Exercises 20 See above  Pt given a new handout of his HEP   Gait training     Modality__________________                Total 28    Blank areas are intentional and mean the treatment did not include these items.       Olinda Castillo, PT   3/16/2020

## 2021-06-11 NOTE — TELEPHONE ENCOUNTER
Wellness Screening Tool  Symptom Screening:   Do you have one of the following NEW symptoms:    Fever (subjective or >100.0)?  No    A new cough?  No    Shortness of breath?  No     Chills? No     New loss of taste or smell? No     Generalized body aches? No     New persistent headache? No     New sore throat? No     Nausea, vomiting, or diarrhea?  No    Within the past 2 weeks, have you been exposed to someone with a known positive illness below:    COVID-19 (known or suspected)?  No    Chicken pox?  No    Mealses?  No    Pertussis?  No    Patient notified of visitor policy- They may have one person accompany them to their appointment, but they will need to wear a mask and will be screened upon arrival for symptoms: Yes  Pt informed to wear a mask: Yes  Pt notified if they develop any symptoms listed above, prior to their appointment, they are to call the clinic directly at [phone] for further instructions.  Yes  Patient's appointment status: Patient will be seen in clinic as scheduled on tomorrow with OLIVER

## 2021-06-11 NOTE — PATIENT INSTRUCTIONS - HE
Kurtis Urban,    It was a pleasure to see you today at the [facility].     My recommendations after this visit include:    Continue Eliquis and compressive stockings for lower extremity edema    RYLIE Garrett MD, FACC, [facility]

## 2021-06-16 PROBLEM — I82.5Y9 CHRONIC DEEP VEIN THROMBOSIS (DVT) OF PROXIMAL VEIN OF LOWER EXTREMITY (H): Status: ACTIVE | Noted: 2020-01-18

## 2021-06-16 PROBLEM — I82.4Y1 DVT, LOWER EXTREMITY, PROXIMAL, ACUTE, RIGHT (H): Status: ACTIVE | Noted: 2019-12-16

## 2021-06-16 PROBLEM — R41.82 ALTERED MENTAL STATUS, UNSPECIFIED: Status: ACTIVE | Noted: 2020-01-18

## 2021-06-16 PROBLEM — E53.8 VITAMIN B12 DEFICIENCY (NON ANEMIC): Status: ACTIVE | Noted: 2019-12-16

## 2021-06-16 PROBLEM — G93.41 ACUTE METABOLIC ENCEPHALOPATHY: Status: ACTIVE | Noted: 2020-01-18

## 2021-06-16 PROBLEM — R41.0 TRANSIENT CONFUSION: Status: ACTIVE | Noted: 2019-09-03

## 2021-06-16 PROBLEM — E51.9 THIAMINE DEFICIENCY: Status: ACTIVE | Noted: 2019-09-03

## 2021-06-16 PROBLEM — F03.918 DEMENTIA WITH BEHAVIORAL DISTURBANCE (H): Status: ACTIVE | Noted: 2019-09-03

## 2021-06-16 PROBLEM — G45.9 TIA (TRANSIENT ISCHEMIC ATTACK): Status: ACTIVE | Noted: 2019-09-03

## 2021-06-20 NOTE — LETTER
"Letter by Rose Camarillo CNP at      Author: Rose Camarillo CNP Service: -- Author Type: --    Filed:  Encounter Date: 2020 Status: (Other)         Patient: Kurtis Urban   MR Number: [id number]   YOB: 1929   Date of Visit: 2020     [facility]      NAME:  Kurtis Urban             :  1929  MRN: [id number]  CODE STATUS:  FULL CODE    VISIT TYPE: DISCHARGE SUMMARY  FACILYTY: [facility] [[id number]]   HOSPITALIZATION: [facility]     TO 2020                PRIMARY CARE PROVIDER: Jameson Dukes MD    DISCHARGE DIAGNOSIS:      1. History of CVA (cerebrovascular accident)    2. DVT, lower extremity, proximal, acute, right (H)    3. Transient confusion         DISCHARGE MEDICATIONS:         Medication List          Accurate as of 2020  4:31 PM. If you have any questions, ask your nurse or doctor.            CONTINUE taking these medications    aspirin 81 MG EC tablet     atorvastatin 40 MG tablet  Commonly known as:  LIPITOR     Eliquis 5 mg Tab tablet  Generic drug:  apixaban ANTICOAGULANT     levETIRAcetam 500 MG tablet  Commonly known as:  KEPPRA     sertraline 50 MG tablet  Commonly known as:  ZOLOFT     thiamine 50 MG tablet     triamcinolone 0.1 % cream  Commonly known as:  KENALOG     vitamin B-12 250 MCG tablet  Generic drug:  cyanocobalamin            HISTORY OF PRESENT ILLNESS: Kurtis Urban is a 89 y.o. male being seen for a face to face visit for an anticipated dc on 20. He was on the TCU after hospitalization in a deconditioned state. He is planning on going home with HHA for a month than entering an AL at [place].  with Per  EMR at [facility] \" history of CVA and TIA, seizure disorder, HLD, dementia, and DVT on Eliquis presents with 1-day of acute metabolic encephalopathy after sustaining a mechanical fall 4-days [id number] (2020) and also found to have mild KRIS compared to his " baseline . Seen up ambulatory. He is [place] but able to discuss his needs. Plan is to dc home x1 month and then move into an AL apt at [place]. Continue with [facility]HA services at home for PT/OT.  SKILLED NURSING FACILITY COURSE:  During this TCU stay, patient completed all anticipated goals of therapy.      PHYSICAL EXAMINATION:    Vitals:    01/30/20 1619   BP: 145/76   Pulse: 64   Temp: 97.1  F (36.2  C)   Weight: 145 lb 4.8 oz (65.9 kg)         GENERAL: Awake, Alert, oriented x3, not in any form of acute distress, answers questions appropriately, follows simple commands, conversant  HEENT: Head is normocephalic with balding hair distribution. No evidence of trauma. Ears: No acute purulent discharge. Eyes: Conjunctivae pink with no scleral jaundice. Nose: Normal mucosa and septum. NECK: Supple with no cervical or supraclavicular lymphadenopathy. Trachea is midline.   CHEST: No tenderness or deformity, no crepitus  LUNG: Clear to auscultation with good chest expansion. There are no crackles or wheezes, normal AP diameter.  BACK: No kyphosis of the thoracic spine. Symmetric, no curvature, ROM normal, no CVA tenderness, no spinal tenderness   CVS: There is good S1  S2, there are no murmurs, rubs, gallops, or heaves, rhythm is regular.  ABDOMEN: Globular and soft, nontender to palpation, non distended, no masses, no organomegaly, good bowel sounds, no rebound or guarding, no peritoneal signs.   EXTREMITIES: Atraumatic. Full range of motion on both upper and lower extremities, there is no tenderness to palpation, no pedal edema, no cyanosis or clubbing, no calf tenderness, normal cap refill, no joint swelling.  SKIN: Warm and dry, no erythema noted, no rashes or lesions.  NEUROLOGICAL: The patient is oriented to person, place and time. Strength and sensation are grossly intact. Face is symmetric.      LABS:  All labs reviewed in the nursing home record.        DISCHARGE PLAN: I certify that this patient is under Dr. Franklin's  care, seen by the NP, and had a face-to-face encounter that meets the physician face-to-face encounter requirements.  The encounter was in whole, or part related to the primary reason for home health.  The Patient is homebound due to: Deconditioned state sp hospitalization and  it is  taxing and it will take a considerable amount of effort for patient to leave the home.  She is dependent on others for transportation.  The patient is confined to her home and needs intermittent skilled nursing, PT,OT, RN.  The patient has been under the care of Dr. Franklin/NP and Dr. Franklin  initiated the establishment of the plan of care.        Patient to be followed by home care for physical therapy to eval and treat for strengthening, balance, endurance, and safety with mobility, and ambulation.  Patient to be followed by home care for occupational therapy to eval and treat for strengthening, ADL needs, adaptive equipment, and safety.  Patient to be followed by home care for nursing services for medication set up and teaching, symptom and disease processes monitoring and education.    Patient to be followed by home care for home health aid services for bathing and ADL needs.  Planned discharge.  All therapy goals have been met.  Family will assist with discharge and transportation.        Patient will follow up with PCP within 7 days after discharge for medication mangagment and appropriate lab studies.      Post Discharge Medication Reconciliation Status: discharge medications reconciled and changed, per note/orders (see AVS)      Electronically signed by:  Rose Camarillo CNP  This progress note was completed using Dragon software and there may be grammatical errors.      For documentation purposes, chart review, medication management, and discharge coordination of care was greater than 35 minutes     Attestation signed by Bela Franklin MBBS at 1/30/2020  9:04 PM:  Agree with dc plan

## 2021-06-20 NOTE — LETTER
Letter by Bela Franklin MBBS at      Author: Bela Franklin MBBS Service: -- Author Type: --    Filed:  Encounter Date: 1/21/2020 Status: (Other)         Patient: Kurtis Urban   MR Number: [id number]   YOB: 1929   Date of Visit: 1/21/2020       [facility] Admission note      Patient: Kurtis Urban  MRN: [id number]  Date of Service: 1/21/2020      [facility] [[id number]]  Reason for Visit     Chief Complaint   Patient presents with   ? H & P     Code Status     Full Code    Assessment     - acute AMS work-up negative suspected to be part of cognitive decline  - H/O OF MECHANICAL FALL  - H/O OF seizure disorder  - h/o of TIA  - Acute KRIS ; solved with hydration  -History of depression with anxiety    Plan     Dementia  Mild Cognitive Decline: Patient struggling with short term memory. Patient is oriented but unable to recall recent events. Prior to hospitalization patient lived alone and drove.   -Medical work up has been negative including imaging as well as extensive lab work  -Aviod deliriogenic medications/polypharmacy.   -PT and OT  -Continue to monitor; remains pleasantly confused but with limited short-term recall     Seizure Disorder  -Continue levetiracetam 500 mg two times a day. ;  No breakthrough seizures      MDD/Anxiety  -Continue Sertraline 50 mg daily.      HLD  -Continue statin daily.     DVT  -Continue Eliquis 5 mg two times a day.   Patient remained stable though pleasantly confused with limited short-term memory.  He does have gait and balance issues because of baseline tremors and gait instability.  He does seem somewhat of elopement risk and has been requesting that his car be brought to the nursing home so he can keep his multiple appointments.  In spite of his multiple falls and confusion he continues to drive and live independently and plans to do the same.  We will wait for his formal cognitive assessment.  Continue with his PT OT and rehab  monitor mood and behaviors closely  Patient was examined independently and in the presence of resident's memory and functional status was assessed.  Suspect he may no longer be capable of living independently and may need alternative placement    Additional 16-minute spent face-to-face reviewing his CODE STATUS with him he is requesting a full code.  His full status has been signed by his son because he states that he cannot sign his paperwork because of tremors but he does agree with it  DEB Mendiola    History     Patient is a very pleasant 89 y.o. male who is admitted to TCU after hospitalization for encephalopathy, fall, and KRIS. Despite improvements patient continues to have short term memory issues and trouble with ADL's. PT and OT recommended TCU placement for further evaluation and possible placement.     Patient states he is here because his son made him come. Patient states he currently lives at home in a one story ranch alone. He is  as of 10 years. Patient has hired help with cleaning and other home chores. He often eats out or heats TV dinners. Patient states he rarely cooks. Patient currently drives but does admits to frequently getting lost. Patient state he eventually finds his way or utilizes his GPS. Patient performs all of his own ADLs. Patient manages all of his own medical care and finances. Patients family has been telling him that its not good to live in a large home alone and are pushing him to consider a new living situation. Patient is frustrated with this. Patient states he likes his home and plans to live there till he passes.   He presented with a altered mental status extensive work-up for infectious and metabolic etiologies were negative.  Labs were normal.  Imaging was negative it is felt to be part of his cognitive decline.  He has been discharged to the TCU    Patient also reports a fall prior to presentation with trauma related to his nasal bridge and face.  He is  unable to clearly state how he fell and he told me he slipped and fell and he woke himself up he is not sure if he passed out  When asked about the fall. Patient states he does not know why he fell but felt everyone falls and he is not phased by it. He does not understand the concern. Patient continued to theorize that he may have slipped on water. Patient denies frequent falls. Patient denies any pain.    Patient was an  at .      History obtained though chart review an via patient. History is limited due to dementia.  He has an underlying history of DVT and continues on Eliquis 5 mg twice a day no bleeding issues reported  He also has a history of hypertension blood pressures have been stable.  He does have significant gait instability with tremors noted on exam.  This is baseline as per patient.  He is reporting the tremors are impeding his daily life with falls as well as inability to write and sign his documents any longer    Past Medical History     Active Ambulatory (Non-Hospital) Problems    Diagnosis   ? Altered mental status, unspecified   ? Acute metabolic encephalopathy   ? KRIS (acute kidney injury) (H)   ? Chronic deep vein thrombosis (DVT) of proximal vein of lower extremity (H)   ? DVT, lower extremity, proximal, acute, right (H)   ? Vitamin B12 deficiency (non anemic)   ? Transient confusion   ? TIA (transient ischemic attack)   ? Dementia with behavioral disturbance (H)   ? Thiamine deficiency   ? Seizure disorder (H)   ? Mild cognitive impairment   ? Hypercholesteremia   ? Depression   ? History of CVA (cerebrovascular accident)   ? Melanoma of back (H)   ? Melanoma of face (H)   ? Hard of hearing     Past Medical History:   Diagnosis Date   ? Dementia with behavioral disturbance (H) 9/3/2019   ? Depression 5/28/2014   ? Hypercholesteremia 5/28/2014   ? Melanoma of back (H) 5/28/2014   ? Melanoma of face (H) 5/28/2014   ? Seizure (H) 5/19/2015   ? Thiamine deficiency 9/3/2019   ? TIA  (transient ischemic attack)        Past Social History     Reviewed, and he  reports that he has quit smoking. His smoking use included cigarettes. He has never used smokeless tobacco. He reports that he does not drink alcohol or use drugs.    Family History     Reviewed, and includes CA in his mother ; his father  young from MVA    Medication List   Post Discharge Medication Reconciliation Status: discharge medications reconciled, continue medications without change   Current Outpatient Medications on File Prior to Visit   Medication Sig Dispense Refill   ? aspirin 81 MG EC tablet Take 81 mg by mouth daily.     ? atorvastatin (LIPITOR) 40 MG tablet Take 40 mg by mouth at bedtime.      ? cyanocobalamin (VITAMIN B-12) 250 MCG tablet Take 250 mcg by mouth daily.      ? ELIQUIS 5 mg Tab tablet Take 5 mg by mouth 2 (two) times a day.      ? levETIRAcetam (KEPPRA) 500 MG tablet Take 500 mg by mouth 2 (two) times a day.     ? sertraline (ZOLOFT) 50 MG tablet Take 50 mg by mouth daily.     ? thiamine 50 MG tablet Take 50 mg by mouth daily.     ? triamcinolone (KENALOG) 0.1 % cream Apply 1 application topically 3 (three) times a day.       No current facility-administered medications on file prior to visit.        Allergies     Allergies   Allergen Reactions   ? Penicillins Anaphylaxis and Nausea And Vomiting   ? Alfuzosin Nausea And Vomiting   ? Donepezil Other (See Comments)   ? Myolin      Other reaction(s): hives   ? Sulfa (Sulfonamide Antibiotics) Nausea And Vomiting   ? Triazolam Nausea And Vomiting       Review of Systems   A comprehensive review of 14 systems was done. Pertinent findings noted here and in history of present illness. All the rest negative.  Constitutional: Negative.  Negative for fever, chills, he has activity change, appetite change and fatigue.   HENT: Negative for congestion   Eyes: Negative for changes in vision ,has glasses  Respiratory: Negative for cough and chest tightness.     Cardiovascular: Negative for chest pain, palpitations and leg swelling.   Gastrointestinal: Negative for nausea, diarrhea, constipation, blood in stool and abdominal distention.   Genitourinary: Negative.    Musculoskeletal: Negative.  Reports falls and difficulty walking due to tremors  Skin: Negative.    Neurological: Negative for dizziness,, syncope, weakness, light-headedness and headaches. Patient has mild tremors   Hematological: Does not bruise/bleed easily.   Psychiatric/Behavioral: Negative.  confusion      Physical Exam     Recent Vitals 1/21/2020   Height -   Weight 143 lbs   BSA (m2) 1.76 m2   /74   Pulse 61   Temp -   Temp src -   SpO2 -   Some recent data might be hidden       Constitutional: Oriented to person, place, and time and appears well-developed. He is neatly dressed.  HEENT:  Normocephalic and atraumatic.  Eyes: Conjunctivae and EOM are normal. Pupils are equal, round, and reactive to light. No discharge.  No scleral icterus. Nose normal.  [place] IRA    Mouth/Throat: Oropharynx is clear and moist. No oropharyngeal exudate.    NECK: Normal range of motion. Neck supple. No JVD present. No tracheal deviation present. No thyromegaly present.   CARDIOVASCULAR: Normal rate, regular rhythm and intact distal pulses.  Exam reveals no gallop and no friction rub.   PULMONARY: Effort normal and breath sounds normal. No respiratory distress.No Wheezing or rales.  ABDOMEN: Soft. Bowel sounds are normal. No distension and no mass.  There is no tenderness. There is no rebound and no guarding. No HSM.  MUSCULOSKELETAL: Normal range of motion. No edema and no tenderness. Mild kyphosis, no tenderness.  NEUROLOGICAL: Alert and oriented to person, place, and time. Cranial Nerves grossly intact. Mild tremor at baseline. Get up and go normal. Unable to recall recent events  GENITOURINARY: Deferred exam.  SKIN: Skin is warm and dry. No rash noted. No erythema. No pallor.   Facial ecchymoses with a infraorbital  ecchymosis and nasal bridge laceration  EXTREMITIES: No cyanosis, no clubbing, no edema. No Deformity.  PSYCHIATRIC: Normal mood, affect and behavior.recall is impaired      Lab Results     Recent Results (from the past 240 hour(s))   POCT Glucose    Collection Time: 01/18/20 12:28 PM   Result Value Ref Range    Glucose 98 70 - 139 mg/dL   ECG 12 lead nursing unit performed    Collection Time: 01/18/20 12:32 PM   Result Value Ref Range    SYSTOLIC BLOOD PRESSURE      DIASTOLIC BLOOD PRESSURE      VENTRICULAR RATE 61 BPM    ATRIAL RATE 61 BPM    P-R INTERVAL 176 ms    QRS DURATION 94 ms    Q-T INTERVAL 426 ms    QTC CALCULATION (BEZET) 428 ms    P Axis 35 degrees    R AXIS -51 degrees    T AXIS 40 degrees    MUSE DIAGNOSIS       Normal sinus rhythm  Left anterior fascicular block  Moderate voltage criteria for LVH, may be normal variant  Abnormal ECG  When compared with ECG of 03-SEP-2019 11:09,  No significant change was found  Confirmed by SEE ED PROVIDER NOTE FOR, ECG INTERPRETATION ([id number]),  JORGE MONTES ([id number]) on 1/18/2020 1:23:50 PM     Comprehensive Metabolic Panel    Collection Time: 01/18/20 12:36 PM   Result Value Ref Range    Sodium 142 136 - 145 mmol/L    Potassium 4.2 3.5 - 5.0 mmol/L    Chloride 110 (H) 98 - 107 mmol/L    CO2 23 22 - 31 mmol/L    Anion Gap, Calculation 9 5 - 18 mmol/L    Glucose 88 70 - 125 mg/dL    BUN 21 8 - 28 mg/dL    Creatinine 1.25 0.70 - 1.30 mg/dL    GFR MDRD Af Amer >60 >60 mL/min/1.73m2    GFR MDRD Non Af Amer 54 (L) >60 mL/min/1.73m2    Bilirubin, Total 0.8 0.0 - 1.0 mg/dL    Calcium 9.6 8.5 - 10.5 mg/dL    Protein, Total 7.1 6.0 - 8.0 g/dL    Albumin 4.2 3.5 - 5.0 g/dL    Alkaline Phosphatase 84 45 - 120 U/L    AST 18 0 - 40 U/L    ALT 27 0 - 45 U/L   Troponin I    Collection Time: 01/18/20 12:36 PM   Result Value Ref Range    Troponin I 0.02 0.00 - 0.29 ng/mL   Thyroid Stimulating Hormone (TSH)    Collection Time: 01/18/20 12:36 PM   Result Value Ref Range    TSH  0.48 0.30 - 5.00 uIU/mL   INR    Collection Time: 01/18/20 12:36 PM   Result Value Ref Range    INR 1.07 0.90 - 1.10   HM1 (CBC with Diff)    Collection Time: 01/18/20 12:36 PM   Result Value Ref Range    WBC 11.8 (H) 4.0 - 11.0 thou/uL    RBC 4.02 (L) 4.40 - 6.20 mill/uL    Hemoglobin 12.7 (L) 14.0 - 18.0 g/dL    Hematocrit 39.8 (L) 40.0 - 54.0 %    MCV 99 80 - 100 fL    MCH 31.6 27.0 - 34.0 pg    MCHC 31.9 (L) 32.0 - 36.0 g/dL    RDW 12.8 11.0 - 14.5 %    Platelets 100 (L) 140 - 440 thou/uL    MPV 13.1 (H) 8.5 - 12.5 fL    Neutrophils % 84 (H) 50 - 70 %    Lymphocytes % 8 (L) 20 - 40 %    Monocytes % 7 2 - 10 %    Eosinophils % 0 0 - 6 %    Basophils % 0 0 - 2 %    Neutrophils Absolute 9.9 (H) 2.0 - 7.7 thou/uL    Lymphocytes Absolute 1.0 0.8 - 4.4 thou/uL    Monocytes Absolute 0.9 0.0 - 0.9 thou/uL    Eosinophils Absolute 0.0 0.0 - 0.4 thou/uL    Basophils Absolute 0.0 0.0 - 0.2 thou/uL   Thyroid Cascade    Collection Time: 01/18/20 12:36 PM   Result Value Ref Range    TSH 0.51 0.30 - 5.00 uIU/mL   Vitamin B12    Collection Time: 01/18/20  4:06 PM   Result Value Ref Range    Vitamin B-12 387 213 - 816 pg/mL   Folate, Serum    Collection Time: 01/18/20  4:06 PM   Result Value Ref Range    Folate 14.3 >=3.5 ng/mL   Ammonia    Collection Time: 01/18/20  4:06 PM   Result Value Ref Range    Ammonia 19 11 - 35 umol/L   Urinalysis-UC if Indicated    Collection Time: 01/18/20  4:50 PM   Result Value Ref Range    Color, UA Yellow Colorless, Yellow, Straw, Light Yellow    Clarity, UA Clear Clear    Glucose, UA Negative Negative    Bilirubin, UA Negative Negative    Ketones, UA Negative Negative    Specific Gravity, UA 1.015 1.001 - 1.030    Blood, UA Negative Negative    pH, UA 6.0 4.5 - 8.0    Protein, UA Negative Negative mg/dL    Urobilinogen, UA <2.0 E.U./dL <2.0 E.U./dL, 2.0 E.U./dL    Nitrite, UA Negative Negative    Leukocytes, UA Negative Negative   Basic metabolic panel    Collection Time: 01/19/20  5:49 AM    Result Value Ref Range    Sodium 140 136 - 145 mmol/L    Potassium 4.4 3.5 - 5.0 mmol/L    Chloride 110 (H) 98 - 107 mmol/L    CO2 23 22 - 31 mmol/L    Anion Gap, Calculation 7 5 - 18 mmol/L    Glucose 97 70 - 125 mg/dL    Calcium 8.8 8.5 - 10.5 mg/dL    BUN 21 8 - 28 mg/dL    Creatinine 1.03 0.70 - 1.30 mg/dL    GFR MDRD Af Amer >60 >60 mL/min/1.73m2    GFR MDRD Non Af Amer >60 >60 mL/min/1.73m2   HM1 (CBC with Diff)    Collection Time: 01/19/20  5:49 AM   Result Value Ref Range    WBC 9.5 4.0 - 11.0 thou/uL    RBC 3.62 (L) 4.40 - 6.20 mill/uL    Hemoglobin 11.5 (L) 14.0 - 18.0 g/dL    Hematocrit 35.3 (L) 40.0 - 54.0 %    MCV 98 80 - 100 fL    MCH 31.8 27.0 - 34.0 pg    MCHC 32.6 32.0 - 36.0 g/dL    RDW 12.6 11.0 - 14.5 %    Platelets 94 (L) 140 - 440 thou/uL    MPV 12.7 (H) 8.5 - 12.5 fL    Neutrophils % 79 (H) 50 - 70 %    Lymphocytes % 11 (L) 20 - 40 %    Monocytes % 9 2 - 10 %    Eosinophils % 1 0 - 6 %    Basophils % 0 0 - 2 %    Neutrophils Absolute 7.5 2.0 - 7.7 thou/uL    Lymphocytes Absolute 1.1 0.8 - 4.4 thou/uL    Monocytes Absolute 0.8 0.0 - 0.9 thou/uL    Eosinophils Absolute 0.1 0.0 - 0.4 thou/uL    Basophils Absolute 0.0 0.0 - 0.2 thou/uL            Imaging Results     Ct Head Without Contrast    Result Date: 1/18/2020  EXAM: CT HEAD WITHOUT CONTRAST LOCATION: [facility] DATE/TIME: 01/18/2020, 1:27 PM INDICATION: Fall with facial trauma. COMPARISON: 12/20/2019 head CT. TECHNIQUE: Routine without IV contrast. Multiplanar reformats. Dose reduction techniques were used. FINDINGS: INTRACRANIAL CONTENTS: No acute intracranial hemorrhage. Stable retrocerebellar arachnoid cyst. No findings to suggest acute infarction. Redemonstrated old left frontal and bilateral parietal cortical/subcortical infarcts. Mild to moderate presumed chronic small vessel ischemic changes. Mild generalized volume loss. No hydrocephalus. VISUALIZED ORBITS/SINUSES/MASTOIDS: No intraorbital abnormality. Mucosal thickening  primarily involving the ethmoid air cells. No middle ear or mastoid effusion. BONES/SOFT TISSUES: No acute abnormality. Bilateral temporomandibular joint osteoarthrosis.     1.  Senescent and chronic cerebral small vessel changes and old bilateral high frontoparietal cortical infarcts. 2.  No acute intracranial process. Specifically, no evidence for acute intracranial hemorrhage.     Ct Cervical Spine Without Contrast    Result Date: 1/18/2020  EXAM: CT CERVICAL SPINE WITHOUT CONTRAST LOCATION: [facility] DATE/TIME: 01/18/2020, 1:27 PM INDICATION: Fall, head injury, confusion. COMPARISON: 09/03/2019 CT angiogram. TECHNIQUE: Routine without IV contrast. Multiplanar reformats. Dose reduction techniques were used. FINDINGS: VERTEBRA: Normal vertebral body heights and alignment. No fracture or posttraumatic subluxation. CANAL/FORAMINA: There is multilevel cervical spondylosis throughout the cervical spine. A posterior disc osteophyte and left uncovertebral spurring at C2-C3 results in mild left foraminal stenosis. Moderate bilateral foraminal stenosis at C3-C4 due to right greater than left uncovertebral spurring and facet hypertrophy. Severe left and moderate right foraminal stenosis at C4-C5 due to uncovertebral spurring and facet hypertrophy. Severe left foraminal stenosis at C5-C6 due to uncovertebral spurring and facet hypertrophy. There is moderate right-sided foraminal stenosis at this level. Moderate right foraminal stenosis at C6-C7 due to marginal disc osteophyte and uncovertebral spurring. PARASPINAL: Heterogeneous nodule within the left thyroid lobe measures 3.8 x 2.3 x 2.8 cm.     1.  No fracture or posttraumatic subluxation. 2.  Multilevel cervical spondylosis with foraminal compromise as described. No high-grade bony spinal canal stenosis. 3.  Redemonstrated heterogeneous left thyroid lobe nodule.       Patient discussed with Dr. Franklin  who are in agreement with the assessment and plan.      Patient examined independently and then in the presence of resident I agree with above assessment plan.  DEB Mendiola

## 2021-06-20 NOTE — LETTER
Letter by Bela Franklin MBBS at      Author: Bela Franklin MBBS Service: -- Author Type: --    Filed:  Encounter Date: 1/23/2020 Status: (Other)         Patient: Kurtis Urban   MR Number: [id number]   YOB: 1929   Date of Visit: 1/23/2020       [facility] Admission note      Patient: Kurtis Urban  MRN: [id number]  Date of Service: 1/23/2020      [facility] [[id number]]  Reason for Visit     Chief Complaint   Patient presents with   ? Review Of Multiple Medical Conditions     Code Status     Full Code    Assessment     - acute AMS work-up negative suspected to be part of cognitive decline  - H/O OF MECHANICAL FALL  - H/O OF seizure disorder  - h/o of TIA  - Acute KRIS ; solved with hydration  -History of depression with anxiety    Plan     Patient has been stable.  He has some underlying history of gait instability but is noted to be ambulating.  Cpt 4.4 ; BIMS 15/15  Patient examined in the presence of his family friends.  They are taking him out for a family outing.  As per patient he has multiple appointments that he wants to keep and he is again insisting that his car be brought in.  He is finally agreed to move to independent living apartment with services family is pushing for him to move out of his home.  He remains adamant that he will continue driving and his friends are aware of his cognitive decline they told me that he has had several episodes in the last 1 year where he has gotten confused and lost track of his surroundings  Continue with his PT OT and rehab.  Family care conference to discuss placement    History     Patient is a very pleasant 89 y.o. male who is admitted to TCU after hospitalization for encephalopathy, fall, and KRIS. Despite improvements patient continues to have short term memory issues and trouble with ADL's. PT and OT recommended TCU placement for further evaluation and possible placement.  He has been stable since placement with  no significant behaviors he remains pleasantly confused    Patient states he is here because his son made him come. Patient states he currently lives at home in a one story ranch alone. He is  as of 10 years. Patient has hired help with cleaning and other home chores. He often eats out or heats TV dinners. Patient states he rarely cooks. Patient currently drives but does admits to frequently getting lost. Patient state he eventually finds his way or utilizes his GPS. Patient performs all of his own ADLs. Patient manages all of his own medical care and finances. Patients family has been telling him that its not good to live in a large home alone and are pushing him to consider a new living situation. Patient is frustrated with this. Patient states he likes his home and plans to live there till he passes.   He presented with a altered mental status extensive work-up for infectious and metabolic etiologies were negative.  Labs were normal.  Imaging was negative it is felt to be part of his cognitive decline.  He has been discharged to the TCU      Patient also reports a fall prior to presentation with trauma related to his nasal bridge and face.  He was examined today in the presence of family friends who told me that he has fallen several times in his home    He has an underlying history of DVT and continues on Eliquis 5 mg twice a day no bleeding issues reported  He also has a history of hypertension blood pressures have been stable.  He does have significant gait instability with tremors noted on exam.  This is baseline as per patient.  He is reporting the tremors are impeding his daily life with falls as well as inability to write and sign his documents any longer  He was examined in the presence of his family friends who supplemented his history essentially is had acute episodes of confusion in the setting of chronic confusion with progressive decline.    Past Medical History     Active Ambulatory  (Non-Hospital) Problems    Diagnosis   ? Altered mental status, unspecified   ? Acute metabolic encephalopathy   ? KRIS (acute kidney injury) (H)   ? Chronic deep vein thrombosis (DVT) of proximal vein of lower extremity (H)   ? DVT, lower extremity, proximal, acute, right (H)   ? Vitamin B12 deficiency (non anemic)   ? Transient confusion   ? TIA (transient ischemic attack)   ? Dementia with behavioral disturbance (H)   ? Thiamine deficiency   ? Seizure disorder (H)   ? Mild cognitive impairment   ? Hypercholesteremia   ? Depression   ? History of CVA (cerebrovascular accident)   ? Melanoma of back (H)   ? Melanoma of face (H)   ? Hard of hearing     Past Medical History:   Diagnosis Date   ? Dementia with behavioral disturbance (H) 9/3/2019   ? Depression 2014   ? Hypercholesteremia 2014   ? Melanoma of back (H) 2014   ? Melanoma of face (H) 2014   ? Seizure (H) 2015   ? Thiamine deficiency 9/3/2019   ? TIA (transient ischemic attack)        Past Social History     Reviewed, and he  reports that he has quit smoking. His smoking use included cigarettes. He has never used smokeless tobacco. He reports that he does not drink alcohol or use drugs.    Family History     Reviewed, and includes CA in his mother ; his father  young from MVA    Medication List   Post Discharge Medication Reconciliation Status: discharge medications reconciled, continue medications without change   Current Outpatient Medications on File Prior to Visit   Medication Sig Dispense Refill   ? aspirin 81 MG EC tablet Take 81 mg by mouth daily.     ? atorvastatin (LIPITOR) 40 MG tablet Take 40 mg by mouth at bedtime.      ? cyanocobalamin (VITAMIN B-12) 250 MCG tablet Take 250 mcg by mouth daily.      ? ELIQUIS 5 mg Tab tablet Take 5 mg by mouth 2 (two) times a day.      ? levETIRAcetam (KEPPRA) 500 MG tablet Take 500 mg by mouth 2 (two) times a day.     ? sertraline (ZOLOFT) 50 MG tablet Take 50 mg by mouth daily.      ? thiamine 50 MG tablet Take 50 mg by mouth daily.     ? triamcinolone (KENALOG) 0.1 % cream Apply 1 application topically 3 (three) times a day.       No current facility-administered medications on file prior to visit.        Allergies     Allergies   Allergen Reactions   ? Penicillins Anaphylaxis and Nausea And Vomiting   ? Alfuzosin Nausea And Vomiting   ? Donepezil Other (See Comments)   ? Myolin      Other reaction(s): hives   ? Sulfa (Sulfonamide Antibiotics) Nausea And Vomiting   ? Triazolam Nausea And Vomiting       Review of Systems   A comprehensive review of 14 systems was done. Pertinent findings noted here and in history of present illness. All the rest negative.  Constitutional: Negative.  Negative for fever, chills, he has activity change, appetite change and fatigue.   HENT: Negative for congestion   Eyes: Negative for changes in vision ,has glasses  Respiratory: Negative for cough and chest tightness.    Cardiovascular: Negative for chest pain, palpitations and leg swelling.   Gastrointestinal: Negative for nausea, diarrhea, constipation, blood in stool and abdominal distention.   Genitourinary: Negative.    Musculoskeletal: Negative.  Reports falls and difficulty walking due to tremors  Skin: Negative.    Neurological: Negative for dizziness,, syncope, weakness, light-headedness and headaches. Patient has mild tremors   Hematological: Does not bruise/bleed easily.   Psychiatric/Behavioral: Negative.  confusion      Physical Exam     Recent Vitals 1/21/2020   Height -   Weight 143 lbs   BSA (m2) 1.76 m2   /74   Pulse 61   Temp -   Temp src -   SpO2 -   Some recent data might be hidden       Constitutional: Oriented to person, place, and time and appears well-developed. He is neatly dressed.  HEENT:  Normocephalic and atraumatic.  Eyes: Conjunctivae and EOM are normal. Pupils are equal, round, and reactive to light. No discharge.  No scleral icterus. Nose normal.  [place]    Mouth/Throat:  Oropharynx is clear and moist. No oropharyngeal exudate.    NECK: Normal range of motion. Neck supple. No JVD present. No tracheal deviation present. No thyromegaly present.   CARDIOVASCULAR: Normal rate, regular rhythm and intact distal pulses.  Exam reveals no gallop and no friction rub.   PULMONARY: Effort normal and breath sounds normal. No respiratory distress.No Wheezing or rales.  ABDOMEN: Soft. Bowel sounds are normal. No distension and no mass.  There is no tenderness. There is no rebound and no guarding. No HSM.  MUSCULOSKELETAL: Normal range of motion. No edema and no tenderness. Mild kyphosis, no tenderness.  NEUROLOGICAL: Alert and oriented to person, place, and time. Cranial Nerves grossly intact. Mild tremor at baseline. Get up and go normal. Unable to recall recent events  GENITOURINARY: Deferred exam.  SKIN: Skin is warm and dry. No rash noted. No erythema. No pallor.   Facial ecchymoses with a infraorbital ecchymosis and nasal bridge laceration  EXTREMITIES: No cyanosis, no clubbing, no edema. No Deformity.  PSYCHIATRIC: Normal mood, affect and behavior.recall is impaired      Lab Results     Recent Results (from the past 240 hour(s))   POCT Glucose    Collection Time: 01/18/20 12:28 PM   Result Value Ref Range    Glucose 98 70 - 139 mg/dL   ECG 12 lead nursing unit performed    Collection Time: 01/18/20 12:32 PM   Result Value Ref Range    SYSTOLIC BLOOD PRESSURE      DIASTOLIC BLOOD PRESSURE      VENTRICULAR RATE 61 BPM    ATRIAL RATE 61 BPM    P-R INTERVAL 176 ms    QRS DURATION 94 ms    Q-T INTERVAL 426 ms    QTC CALCULATION (BEZET) 428 ms    P Axis 35 degrees    R AXIS -51 degrees    T AXIS 40 degrees    MUSE DIAGNOSIS       Normal sinus rhythm  Left anterior fascicular block  Moderate voltage criteria for LVH, may be normal variant  Abnormal ECG  When compared with ECG of 03-SEP-2019 11:09,  No significant change was found  Confirmed by SEE ED PROVIDER NOTE FOR, ECG INTERPRETATION ([id number]),   JORGE MONTES ([id number]) on 1/18/2020 1:23:50 PM     Comprehensive Metabolic Panel    Collection Time: 01/18/20 12:36 PM   Result Value Ref Range    Sodium 142 136 - 145 mmol/L    Potassium 4.2 3.5 - 5.0 mmol/L    Chloride 110 (H) 98 - 107 mmol/L    CO2 23 22 - 31 mmol/L    Anion Gap, Calculation 9 5 - 18 mmol/L    Glucose 88 70 - 125 mg/dL    BUN 21 8 - 28 mg/dL    Creatinine 1.25 0.70 - 1.30 mg/dL    GFR MDRD Af Amer >60 >60 mL/min/1.73m2    GFR MDRD Non Af Amer 54 (L) >60 mL/min/1.73m2    Bilirubin, Total 0.8 0.0 - 1.0 mg/dL    Calcium 9.6 8.5 - 10.5 mg/dL    Protein, Total 7.1 6.0 - 8.0 g/dL    Albumin 4.2 3.5 - 5.0 g/dL    Alkaline Phosphatase 84 45 - 120 U/L    AST 18 0 - 40 U/L    ALT 27 0 - 45 U/L   Troponin I    Collection Time: 01/18/20 12:36 PM   Result Value Ref Range    Troponin I 0.02 0.00 - 0.29 ng/mL   Thyroid Stimulating Hormone (TSH)    Collection Time: 01/18/20 12:36 PM   Result Value Ref Range    TSH 0.48 0.30 - 5.00 uIU/mL   INR    Collection Time: 01/18/20 12:36 PM   Result Value Ref Range    INR 1.07 0.90 - 1.10   HM1 (CBC with Diff)    Collection Time: 01/18/20 12:36 PM   Result Value Ref Range    WBC 11.8 (H) 4.0 - 11.0 thou/uL    RBC 4.02 (L) 4.40 - 6.20 mill/uL    Hemoglobin 12.7 (L) 14.0 - 18.0 g/dL    Hematocrit 39.8 (L) 40.0 - 54.0 %    MCV 99 80 - 100 fL    MCH 31.6 27.0 - 34.0 pg    MCHC 31.9 (L) 32.0 - 36.0 g/dL    RDW 12.8 11.0 - 14.5 %    Platelets 100 (L) 140 - 440 thou/uL    MPV 13.1 (H) 8.5 - 12.5 fL    Neutrophils % 84 (H) 50 - 70 %    Lymphocytes % 8 (L) 20 - 40 %    Monocytes % 7 2 - 10 %    Eosinophils % 0 0 - 6 %    Basophils % 0 0 - 2 %    Neutrophils Absolute 9.9 (H) 2.0 - 7.7 thou/uL    Lymphocytes Absolute 1.0 0.8 - 4.4 thou/uL    Monocytes Absolute 0.9 0.0 - 0.9 thou/uL    Eosinophils Absolute 0.0 0.0 - 0.4 thou/uL    Basophils Absolute 0.0 0.0 - 0.2 thou/uL   Thyroid Cascade    Collection Time: 01/18/20 12:36 PM   Result Value Ref Range    TSH 0.51 0.30 - 5.00  uIU/mL   Vitamin B12    Collection Time: 01/18/20  4:06 PM   Result Value Ref Range    Vitamin B-12 387 213 - 816 pg/mL   Folate, Serum    Collection Time: 01/18/20  4:06 PM   Result Value Ref Range    Folate 14.3 >=3.5 ng/mL   Ammonia    Collection Time: 01/18/20  4:06 PM   Result Value Ref Range    Ammonia 19 11 - 35 umol/L   Urinalysis-UC if Indicated    Collection Time: 01/18/20  4:50 PM   Result Value Ref Range    Color, UA Yellow Colorless, Yellow, Straw, Light Yellow    Clarity, UA Clear Clear    Glucose, UA Negative Negative    Bilirubin, UA Negative Negative    Ketones, UA Negative Negative    Specific Gravity, UA 1.015 1.001 - 1.030    Blood, UA Negative Negative    pH, UA 6.0 4.5 - 8.0    Protein, UA Negative Negative mg/dL    Urobilinogen, UA <2.0 E.U./dL <2.0 E.U./dL, 2.0 E.U./dL    Nitrite, UA Negative Negative    Leukocytes, UA Negative Negative   Basic metabolic panel    Collection Time: 01/19/20  5:49 AM   Result Value Ref Range    Sodium 140 136 - 145 mmol/L    Potassium 4.4 3.5 - 5.0 mmol/L    Chloride 110 (H) 98 - 107 mmol/L    CO2 23 22 - 31 mmol/L    Anion Gap, Calculation 7 5 - 18 mmol/L    Glucose 97 70 - 125 mg/dL    Calcium 8.8 8.5 - 10.5 mg/dL    BUN 21 8 - 28 mg/dL    Creatinine 1.03 0.70 - 1.30 mg/dL    GFR MDRD Af Amer >60 >60 mL/min/1.73m2    GFR MDRD Non Af Amer >60 >60 mL/min/1.73m2   HM1 (CBC with Diff)    Collection Time: 01/19/20  5:49 AM   Result Value Ref Range    WBC 9.5 4.0 - 11.0 thou/uL    RBC 3.62 (L) 4.40 - 6.20 mill/uL    Hemoglobin 11.5 (L) 14.0 - 18.0 g/dL    Hematocrit 35.3 (L) 40.0 - 54.0 %    MCV 98 80 - 100 fL    MCH 31.8 27.0 - 34.0 pg    MCHC 32.6 32.0 - 36.0 g/dL    RDW 12.6 11.0 - 14.5 %    Platelets 94 (L) 140 - 440 thou/uL    MPV 12.7 (H) 8.5 - 12.5 fL    Neutrophils % 79 (H) 50 - 70 %    Lymphocytes % 11 (L) 20 - 40 %    Monocytes % 9 2 - 10 %    Eosinophils % 1 0 - 6 %    Basophils % 0 0 - 2 %    Neutrophils Absolute 7.5 2.0 - 7.7 thou/uL    Lymphocytes  Absolute 1.1 0.8 - 4.4 thou/uL    Monocytes Absolute 0.8 0.0 - 0.9 thou/uL    Eosinophils Absolute 0.1 0.0 - 0.4 thou/uL    Basophils Absolute 0.0 0.0 - 0.2 thou/uL            Imaging Results     Ct Head Without Contrast    Result Date: 1/18/2020  EXAM: CT HEAD WITHOUT CONTRAST LOCATION: [facility] DATE/TIME: 01/18/2020, 1:27 PM INDICATION: Fall with facial trauma. COMPARISON: 12/20/2019 head CT. TECHNIQUE: Routine without IV contrast. Multiplanar reformats. Dose reduction techniques were used. FINDINGS: INTRACRANIAL CONTENTS: No acute intracranial hemorrhage. Stable retrocerebellar arachnoid cyst. No findings to suggest acute infarction. Redemonstrated old left frontal and bilateral parietal cortical/subcortical infarcts. Mild to moderate presumed chronic small vessel ischemic changes. Mild generalized volume loss. No hydrocephalus. VISUALIZED ORBITS/SINUSES/MASTOIDS: No intraorbital abnormality. Mucosal thickening primarily involving the ethmoid air cells. No middle ear or mastoid effusion. BONES/SOFT TISSUES: No acute abnormality. Bilateral temporomandibular joint osteoarthrosis.     1.  Senescent and chronic cerebral small vessel changes and old bilateral high frontoparietal cortical infarcts. 2.  No acute intracranial process. Specifically, no evidence for acute intracranial hemorrhage.     Ct Cervical Spine Without Contrast    Result Date: 1/18/2020  EXAM: CT CERVICAL SPINE WITHOUT CONTRAST LOCATION: [facility] DATE/TIME: 01/18/2020, 1:27 PM INDICATION: Fall, head injury, confusion. COMPARISON: 09/03/2019 CT angiogram. TECHNIQUE: Routine without IV contrast. Multiplanar reformats. Dose reduction techniques were used. FINDINGS: VERTEBRA: Normal vertebral body heights and alignment. No fracture or posttraumatic subluxation. CANAL/FORAMINA: There is multilevel cervical spondylosis throughout the cervical spine. A posterior disc osteophyte and left uncovertebral spurring at C2-C3 results in mild  left foraminal stenosis. Moderate bilateral foraminal stenosis at C3-C4 due to right greater than left uncovertebral spurring and facet hypertrophy. Severe left and moderate right foraminal stenosis at C4-C5 due to uncovertebral spurring and facet hypertrophy. Severe left foraminal stenosis at C5-C6 due to uncovertebral spurring and facet hypertrophy. There is moderate right-sided foraminal stenosis at this level. Moderate right foraminal stenosis at C6-C7 due to marginal disc osteophyte and uncovertebral spurring. PARASPINAL: Heterogeneous nodule within the left thyroid lobe measures 3.8 x 2.3 x 2.8 cm.     1.  No fracture or posttraumatic subluxation. 2.  Multilevel cervical spondylosis with foraminal compromise as described. No high-grade bony spinal canal stenosis. 3.  Redemonstrated heterogeneous left thyroid lobe nodule.       Patient discussed with Dr. Franklin  who are in agreement with the assessment and plan.     Patient examined independently and then in the presence of resident I agree with above assessment plan.  DEB Mendiola

## 2021-06-20 NOTE — LETTER
Letter by Bela Franklin MBBS at      Author: Bela Franklin MBBS Service: -- Author Type: --    Filed:  Encounter Date: 1/28/2020 Status: (Other)         Patient: Kurtis Urban   MR Number: [id number]   YOB: 1929   Date of Visit: 1/28/2020       [facility] Admission note      Patient: Kurtis Urban  MRN: [id number]  Date of Service: 1/28/2020      [facility] [[id number]]  Reason for Visit     Chief Complaint   Patient presents with   ? Review Of Multiple Medical Conditions     Code Status     Full Code    Assessment     - acute AMS work-up negative suspected to be part of cognitive decline  - H/O OF MECHANICAL FALL  - H/O OF seizure disorder  - h/o of TIA  - Acute KRIS ; solved with hydration  -History of depression with anxiety    Plan     Patient has been stable.  He has some underlying history of gait instability but is noted to be ambulating.  He does not use any assist device.  He remains pleasantly confused with no short-term recall noted  Cpt 4.4 ; BIMS 15/15  DC blood sugar checks all his blood sugars are under 120  ORAL intake and weights have been adequate as per patient.  Staff is not reporting any significant behaviors however he remains adamant and confused about wanting to leave the facility and using his car  Continue with his current regimen of Zoloft mood has been stable  Awaiting placement in an independent living apartment family working towards that goal    History     Patient is a very pleasant 89 y.o. male who is admitted to TCU after hospitalization for encephalopathy, fall, and KRIS. Despite improvements patient continues to have short term memory issues and trouble with ADL's. PT and OT recommended TCU placement for further evaluation and possible placement.  He has been stable since placement with no significant behaviors he remains pleasantly confused  However short-term recall remains impaired CPT is 4.4      Patient also reports a fall  prior to presentation with trauma related to his nasal bridge and face.  He has fallen several times in his home  Unfortunately balance remains poor    He has an underlying history of DVT and continues on Eliquis 5 mg twice a day no bleeding issues reported    He also has a history of hypertension blood pressures have been stable.  He does have significant gait instability with tremors noted on exam.  This is baseline as per patient.  He is reporting the tremors are impeding his daily life with falls as well as inability to write and sign his documents any longer  He is currently awaiting placement in an independent living apartment    Past Medical History     Active Ambulatory (Non-Hospital) Problems    Diagnosis   ? Altered mental status, unspecified   ? Acute metabolic encephalopathy   ? RKIS (acute kidney injury) (H)   ? Chronic deep vein thrombosis (DVT) of proximal vein of lower extremity (H)   ? DVT, lower extremity, proximal, acute, right (H)   ? Vitamin B12 deficiency (non anemic)   ? Transient confusion   ? TIA (transient ischemic attack)   ? Dementia with behavioral disturbance (H)   ? Thiamine deficiency   ? Seizure disorder (H)   ? Mild cognitive impairment   ? Hypercholesteremia   ? Depression   ? History of CVA (cerebrovascular accident)   ? Melanoma of back (H)   ? Melanoma of face (H)   ? Hard of hearing     Past Medical History:   Diagnosis Date   ? Dementia with behavioral disturbance (H) 9/3/2019   ? Depression 5/28/2014   ? Hypercholesteremia 5/28/2014   ? Melanoma of back (H) 5/28/2014   ? Melanoma of face (H) 5/28/2014   ? Seizure (H) 5/19/2015   ? Thiamine deficiency 9/3/2019   ? TIA (transient ischemic attack)        Past Social History     Reviewed, and he  reports that he has quit smoking. His smoking use included cigarettes. He has never used smokeless tobacco. He reports that he does not drink alcohol or use drugs.    Family History     Reviewed, and includes CA in his mother ; his father   young from MVA    Medication List   Post Discharge Medication Reconciliation Status: discharge medications reconciled, continue medications without change   Current Outpatient Medications on File Prior to Visit   Medication Sig Dispense Refill   ? aspirin 81 MG EC tablet Take 81 mg by mouth daily.     ? atorvastatin (LIPITOR) 40 MG tablet Take 40 mg by mouth at bedtime.      ? cyanocobalamin (VITAMIN B-12) 250 MCG tablet Take 250 mcg by mouth daily.      ? ELIQUIS 5 mg Tab tablet Take 5 mg by mouth 2 (two) times a day.      ? levETIRAcetam (KEPPRA) 500 MG tablet Take 500 mg by mouth 2 (two) times a day.     ? sertraline (ZOLOFT) 50 MG tablet Take 50 mg by mouth daily.     ? thiamine 50 MG tablet Take 50 mg by mouth daily.     ? triamcinolone (KENALOG) 0.1 % cream Apply 1 application topically 3 (three) times a day.       No current facility-administered medications on file prior to visit.        Allergies     Allergies   Allergen Reactions   ? Penicillins Anaphylaxis and Nausea And Vomiting   ? Alfuzosin Nausea And Vomiting   ? Donepezil Other (See Comments)   ? Myolin      Other reaction(s): hives   ? Sulfa (Sulfonamide Antibiotics) Nausea And Vomiting   ? Triazolam Nausea And Vomiting       Review of Systems   A comprehensive review of 14 systems was done. Pertinent findings noted here and in history of present illness. All the rest negative.  Constitutional: Negative.  Negative for fever, chills, he has activity change, appetite change and fatigue.   HENT: Negative for congestion   Eyes: Negative for changes in vision ,has glasses  Respiratory: Negative for cough and chest tightness.    Cardiovascular: Negative for chest pain, palpitations and leg swelling.   Gastrointestinal: Negative for nausea, diarrhea, constipation, blood in stool and abdominal distention.   Genitourinary: Negative.    Musculoskeletal: Negative.  Reports falls and difficulty walking due to tremors  Skin: Negative.    Neurological:  Negative for dizziness,, syncope, weakness, light-headedness and headaches. Patient has mild tremors   Hematological: Does not bruise/bleed easily.   Psychiatric/Behavioral: Negative.  confusion      Physical Exam     Recent Vitals 1/21/2020   Height -   Weight 143 lbs   BSA (m2) 1.76 m2   /74   Pulse 61   Temp -   Temp src -   SpO2 -   Some recent data might be hidden   T98 saturation 94% on room air    Constitutional: Oriented to person, place, and time and appears well-developed. He is neatly dressed.  HEENT:  Normocephalic and atraumatic.  Eyes: Conjunctivae and EOM are normal. Pupils are equal, round, and reactive to light. No discharge.  No scleral icterus. Nose normal.  [place]    Mouth/Throat: Oropharynx is clear and moist. No oropharyngeal exudate.    NECK: Normal range of motion. Neck supple. No JVD present. No tracheal deviation present. No thyromegaly present.   CARDIOVASCULAR: Normal rate, regular rhythm and intact distal pulses.  Exam reveals no gallop and no friction rub.   PULMONARY: Effort normal and breath sounds normal. No respiratory distress.No Wheezing or rales.  ABDOMEN: Soft. Bowel sounds are normal. No distension and no mass.  There is no tenderness. There is no rebound and no guarding. No HSM.  MUSCULOSKELETAL: Normal range of motion. No edema and no tenderness. Mild kyphosis, no tenderness.  NEUROLOGICAL: Alert and oriented to person, place, and time. Cranial Nerves grossly intact. Mild tremor at baseline. Get up and go normal. Unable to recall recent events  GENITOURINARY: Deferred exam.  SKIN: Skin is warm and dry. No rash noted. No erythema. No pallor.   Facial ecchymoses with a infraorbital ecchymosis and nasal bridge laceration  EXTREMITIES: No cyanosis, no clubbing, no edema. No Deformity.  PSYCHIATRIC: Normal mood, affect and behavior.recall is impaired      Lab Results     Recent Results (from the past 240 hour(s))   POCT Glucose    Collection Time: 01/18/20 12:28 PM   Result  Value Ref Range    Glucose 98 70 - 139 mg/dL   ECG 12 lead nursing unit performed    Collection Time: 01/18/20 12:32 PM   Result Value Ref Range    SYSTOLIC BLOOD PRESSURE      DIASTOLIC BLOOD PRESSURE      VENTRICULAR RATE 61 BPM    ATRIAL RATE 61 BPM    P-R INTERVAL 176 ms    QRS DURATION 94 ms    Q-T INTERVAL 426 ms    QTC CALCULATION (BEZET) 428 ms    P Axis 35 degrees    R AXIS -51 degrees    T AXIS 40 degrees    MUSE DIAGNOSIS       Normal sinus rhythm  Left anterior fascicular block  Moderate voltage criteria for LVH, may be normal variant  Abnormal ECG  When compared with ECG of 03-SEP-2019 11:09,  No significant change was found  Confirmed by SEE ED PROVIDER NOTE FOR, ECG INTERPRETATION ([id number]),  JORGE MONTES ([id number]) on 1/18/2020 1:23:50 PM     Comprehensive Metabolic Panel    Collection Time: 01/18/20 12:36 PM   Result Value Ref Range    Sodium 142 136 - 145 mmol/L    Potassium 4.2 3.5 - 5.0 mmol/L    Chloride 110 (H) 98 - 107 mmol/L    CO2 23 22 - 31 mmol/L    Anion Gap, Calculation 9 5 - 18 mmol/L    Glucose 88 70 - 125 mg/dL    BUN 21 8 - 28 mg/dL    Creatinine 1.25 0.70 - 1.30 mg/dL    GFR MDRD Af Amer >60 >60 mL/min/1.73m2    GFR MDRD Non Af Amer 54 (L) >60 mL/min/1.73m2    Bilirubin, Total 0.8 0.0 - 1.0 mg/dL    Calcium 9.6 8.5 - 10.5 mg/dL    Protein, Total 7.1 6.0 - 8.0 g/dL    Albumin 4.2 3.5 - 5.0 g/dL    Alkaline Phosphatase 84 45 - 120 U/L    AST 18 0 - 40 U/L    ALT 27 0 - 45 U/L   Troponin I    Collection Time: 01/18/20 12:36 PM   Result Value Ref Range    Troponin I 0.02 0.00 - 0.29 ng/mL   Thyroid Stimulating Hormone (TSH)    Collection Time: 01/18/20 12:36 PM   Result Value Ref Range    TSH 0.48 0.30 - 5.00 uIU/mL   INR    Collection Time: 01/18/20 12:36 PM   Result Value Ref Range    INR 1.07 0.90 - 1.10   HM1 (CBC with Diff)    Collection Time: 01/18/20 12:36 PM   Result Value Ref Range    WBC 11.8 (H) 4.0 - 11.0 thou/uL    RBC 4.02 (L) 4.40 - 6.20 mill/uL    Hemoglobin 12.7 (L)  14.0 - 18.0 g/dL    Hematocrit 39.8 (L) 40.0 - 54.0 %    MCV 99 80 - 100 fL    MCH 31.6 27.0 - 34.0 pg    MCHC 31.9 (L) 32.0 - 36.0 g/dL    RDW 12.8 11.0 - 14.5 %    Platelets 100 (L) 140 - 440 thou/uL    MPV 13.1 (H) 8.5 - 12.5 fL    Neutrophils % 84 (H) 50 - 70 %    Lymphocytes % 8 (L) 20 - 40 %    Monocytes % 7 2 - 10 %    Eosinophils % 0 0 - 6 %    Basophils % 0 0 - 2 %    Neutrophils Absolute 9.9 (H) 2.0 - 7.7 thou/uL    Lymphocytes Absolute 1.0 0.8 - 4.4 thou/uL    Monocytes Absolute 0.9 0.0 - 0.9 thou/uL    Eosinophils Absolute 0.0 0.0 - 0.4 thou/uL    Basophils Absolute 0.0 0.0 - 0.2 thou/uL   Thyroid Cascade    Collection Time: 01/18/20 12:36 PM   Result Value Ref Range    TSH 0.51 0.30 - 5.00 uIU/mL   Vitamin B12    Collection Time: 01/18/20  4:06 PM   Result Value Ref Range    Vitamin B-12 387 213 - 816 pg/mL   Folate, Serum    Collection Time: 01/18/20  4:06 PM   Result Value Ref Range    Folate 14.3 >=3.5 ng/mL   Ammonia    Collection Time: 01/18/20  4:06 PM   Result Value Ref Range    Ammonia 19 11 - 35 umol/L   Urinalysis-UC if Indicated    Collection Time: 01/18/20  4:50 PM   Result Value Ref Range    Color, UA Yellow Colorless, Yellow, Straw, Light Yellow    Clarity, UA Clear Clear    Glucose, UA Negative Negative    Bilirubin, UA Negative Negative    Ketones, UA Negative Negative    Specific Gravity, UA 1.015 1.001 - 1.030    Blood, UA Negative Negative    pH, UA 6.0 4.5 - 8.0    Protein, UA Negative Negative mg/dL    Urobilinogen, UA <2.0 E.U./dL <2.0 E.U./dL, 2.0 E.U./dL    Nitrite, UA Negative Negative    Leukocytes, UA Negative Negative   Basic metabolic panel    Collection Time: 01/19/20  5:49 AM   Result Value Ref Range    Sodium 140 136 - 145 mmol/L    Potassium 4.4 3.5 - 5.0 mmol/L    Chloride 110 (H) 98 - 107 mmol/L    CO2 23 22 - 31 mmol/L    Anion Gap, Calculation 7 5 - 18 mmol/L    Glucose 97 70 - 125 mg/dL    Calcium 8.8 8.5 - 10.5 mg/dL    BUN 21 8 - 28 mg/dL    Creatinine 1.03 0.70 -  1.30 mg/dL    GFR MDRD Af Amer >60 >60 mL/min/1.73m2    GFR MDRD Non Af Amer >60 >60 mL/min/1.73m2   HM1 (CBC with Diff)    Collection Time: 01/19/20  5:49 AM   Result Value Ref Range    WBC 9.5 4.0 - 11.0 thou/uL    RBC 3.62 (L) 4.40 - 6.20 mill/uL    Hemoglobin 11.5 (L) 14.0 - 18.0 g/dL    Hematocrit 35.3 (L) 40.0 - 54.0 %    MCV 98 80 - 100 fL    MCH 31.8 27.0 - 34.0 pg    MCHC 32.6 32.0 - 36.0 g/dL    RDW 12.6 11.0 - 14.5 %    Platelets 94 (L) 140 - 440 thou/uL    MPV 12.7 (H) 8.5 - 12.5 fL    Neutrophils % 79 (H) 50 - 70 %    Lymphocytes % 11 (L) 20 - 40 %    Monocytes % 9 2 - 10 %    Eosinophils % 1 0 - 6 %    Basophils % 0 0 - 2 %    Neutrophils Absolute 7.5 2.0 - 7.7 thou/uL    Lymphocytes Absolute 1.1 0.8 - 4.4 thou/uL    Monocytes Absolute 0.8 0.0 - 0.9 thou/uL    Eosinophils Absolute 0.1 0.0 - 0.4 thou/uL    Basophils Absolute 0.0 0.0 - 0.2 thou/uL            DEB Mendiola

## 2021-06-28 NOTE — PROGRESS NOTES
Progress Notes by Joselito Nielson PT Student at 3/9/2020 10:30 AM     Author: Joselito Nielson PT Student Service: -- Author Type: PT Student    Filed: 3/9/2020 11:50 AM Encounter Date: 3/9/2020 Status: Attested    : Joselito Nielson PT Student (PT Student) Cosigner: Olinda Castillo PT at 3/9/2020 12:02 PM    Attestation signed by Olinda Castillo PT at 3/9/2020 12:02 PM    I attest that I was present in the room, making skilled assessments and directing the service provided today.  I was responsible for the assessment and treatment of the patient.  During this time, I was not engaged in treating another patient or doing other tasks.    Olinda Castillo PT                [facility] Daily Progress Note    Patient Name: Kurtis Marinohstaedcarlos  Date: 3/9/2020  Visit #: 5/12  Referring provider: Jemal Zuniga MD  Visit Diagnosis:     ICD-10-CM    1. Chronic pain in right shoulder  M25.511     G89.29    2. Osteoarthritis of right shoulder, unspecified osteoarthritis type  M19.011    3. Decreased range of motion of right shoulder  M25.611    4. Weakness of shoulder  R29.898          Assessment:   Patient returns to PT for follow-up for right shoulder and arm pain. Patient has very limited active range of motion (ROM and more PROM in both shoulders and this limited range of motion impairs his ability to perform daily functional tasks, such as dressing and combing his hair. Patient presents with impairments in range of motion, strength, and pain. Patient will benefit from skilled therapy in order to improve his range of motion, strength, and decrease his pain. Patient is willing and motivated for PT.     Patient tolerated the exercises with minimal pain and moderate cueing for technique.  Patient had more active range of motion today with therapy as he was able to move his shoulder through a greater pain free arc in supine than at previous visits. PT measured AROM of right shoulder to be 70 dgs.      Progressed mild manual therapy (MT) to the right shoulder for decreased muscle tightness and improved mobility along with pain mitigation.  Pt stated that this felt good.  He was mildly tender to STM over the right upper trapezius, but this decreased over time today. Patient had a TP of his right infraspinatus today.     HEP/POC compliance is  good .  Patient demonstrates understanding/independence with home program.  Patient is benefitting from skilled physical therapy and is making steady progress toward functional goals.    Goal Status:  Pt. will demonstrate/verbalize independence in self-management of condition in : 6 weeks;Progressing toward  Pt. will be independent with home exercise program in : 6 weeks;Progressing toward    Pt will: improve his shoulder flexion and abduction range of motion on the right side to 100 degrees so that he is able to lift his arm higher for functional reaching within 6 weeks. (Progressing towards.)  Pt will: improve his shoulder flexion and abduction strength to 4/5 on the right so that he can lift and reach for objects at home within 6 weeks.  (Progressing towards.)      Plan / Patient Education:     Progress with home program as tolerated.   Plan for next time: Progress with shoulder AROM, exercises to strengthen the shoulder, and progress pulley system exercise.     Subjective:     Pain Ratin    Patient reports that his left shoulder is feeling some better.  Pt reports that he is doing much better. Patient moved last week and he had trouble walking the next day. Patient felt good too following the soft tissue massage last session.      Pt reports that he has been doing his PT exercises.      Objective:   Right shoulder flexion AROM measured today at 70 dgs  Patient with mod- severely impaired , but improving, shoulder AROM bilaterally into flexion, scaption, and abduction. Patient had decreased overall shoulder pain today.     Treatment Today       Manual Therapy:  "  Patient supine:   Gentle STM and TP release to the upper trapezius and right shoulder musculature.    Exercises:  Exercise #1: Shoulder pendulums sideways and circles and forward/backwards in standing with table support  Comment #1: Active shoulder flexion, adbuction, and extension 2 sets x5 each way each arm.  Exercise #2: Shoulder isometrics into a wall, flexion, ext, and abd (elbow bent) Rx10 with 5\" hold   Comment #2: Shoulder AAROM slides onto a table top with towel Rx10   Exercise #3: Scapular retractions 1x10  Comment #3: x5 with 5\" in between pulley sets   Exercise #4: UAE 3 mins gently   Comment #4: pulleys - flexion x 2 min and ABD x2 min       TREATMENT MINUTES COMMENTS   Evaluation     Self-care/ Home management     Manual therapy 8 STM/TP to the R UT and general shoulder;Pt supine    Neuromuscular Re-education     Therapeutic Activity     Therapeutic Exercises 20 See above   Gait training     Modality__________________                Total 28    Blank areas are intentional and mean the treatment did not include these items.       Joselito Nielson, PT Student   3/9/2020       "

## 2021-06-28 NOTE — PROGRESS NOTES
Progress Notes by Joselito Nielson PT Student at 2/19/2020 11:30 AM     Author: Joselito Nielson PT Student Service: -- Author Type: PT Student    Filed: 2/19/2020  2:55 PM Encounter Date: 2/19/2020 Status: Attested    : Joselito Nielson PT Student (PT Student) Cosigner: Olinda Castillo PT at 2/19/2020  3:00 PM    Attestation signed by Olinda Castillo PT at 2/19/2020  3:00 PM    I attest that I was present in the room, making skilled assessments and directing the service provided today.  I was responsible for the assessment and treatment of the patient.  During this time, I was not engaged in treating another patient or doing other tasks.    Olinda Castillo PT                    [facility] Certification Request    February 19, 2020      Patient: Kurtis Urban  MR Number: [id number]  YOB: 1929  Date of Visit: 2/19/2020      Dear Dr. Zuniga:    Thank you for this referral.   We are seeing Kurtis Urban for Physical Therapy of his right shoulder.    Medicare and/or Medicaid requires physician review and approval of the treatment plan. Please review the plan of care and verify that you agree with the therapy plan of care by co-signing this note.      Plan of Care  Authorization / Certification Start Date: 02/19/20  Authorization / Certification End Date: 05/19/20  Authorization / Certification Number of Visits: Cert required no visit limit  Communication with: Referral Source;Patient Caregiver  Patient Related Instruction: Nature of Condition;Body mechanics;Treatment plan and rationale;Posture;Self Care instruction;Basis of treatment;Expected outcome;Precautions  Times per Week: 2  Number of Weeks: 6  Number of Visits: 12  Discharge Planning: Patient will discharge to his home with independence in home exercise program and having his goals met.   Therapeutic Exercise: ROM;Stretching;Strengthening  Neuromuscular Reeducation: kinesio  tape;posture;balance/proprioception  Manual Therapy: soft tissue mobilization;myofascial release;joint mobilization  Modalities: hot pack;electrical stimulation;TENS;cold pack;iontophoresis      Goals:  Pt. will demonstrate/verbalize independence in self-management of condition in : 6 weeks  Pt. will be independent with home exercise program in : 6 weeks    Pt will: improve his shoulder flexion and abduction range of motion on the right side to 100 degrees so that he is able to lift his arm higher for functional reaching within 6 weeks.  Pt will: improve his shoulder flexion and abduction strength to 4/5 on the right so that he can lift and reach for objects at home within 6 weeks.         If you have any questions or concerns, please don't hesitate to call.    Sincerely,      Olinda Castillo, PT        Physician recommendation:     ___ Follow therapist's recommendation        ___ Modify therapy    X__________________________________________________________________  *Physician co-signature indicates they certify the need for these services furnished within this plan and while under their care.          [facility]   Shoulder Initial Evaluation    Patient Name: Kurtis Urban  Date of evaluation: 2/19/2020  Referral Diagnosis: Pain in left shoulder  Referring provider: Jemal Zuniga MD  Visit Diagnosis:     ICD-10-CM    1. Chronic pain in right shoulder M25.511     G89.29    2. Osteoarthritis of right shoulder, unspecified osteoarthritis type M19.011    3. Decreased range of motion of right shoulder M25.611    4. Weakness of shoulder R29.898        Assessment:    Patient is a 89 year old gentleman who presents to physical therapy for right shoulder and arm pain. Patient has very limited range of motion in both shoulders and this limited range of motion impairs his ability to perform daily functional tasks, such as dressing and combing his hair. Patient presents with impairments in range of motion,  strength, and pain. Patient will benefit from skilled therapy in order to improve his range of motion, strength, and decrease his pain. Patient is willing and motivated for PT.     Pt. is appropriate for skilled PT intervention as outlined in the Plan of Care (POC).    Goals:  Pt. will demonstrate/verbalize independence in self-management of condition in : 6 weeks  Pt. will be independent with home exercise program in : 6 weeks    Pt will: improve his shoulder flexion and abduction range of motion on the right side to 100 degrees so that he is able to lift his arm higher for functional reaching within 6 weeks.  Pt will: improve his shoulder flexion and abduction strength to 4/5 on the right so that he can lift and reach for objects at home within 6 weeks.       Patient's expectations/goals are realistic.    Barriers to Learning or Achieving Goals:  No Barriers.       Plan / Patient Instructions:        Plan of Care:   Authorization / Certification Start Date: 02/19/20  Authorization / Certification End Date: 05/19/20  Authorization / Certification Number of Visits: Cert required no visit limit  Communication with: Referral Source;Patient Caregiver  Patient Related Instruction: Nature of Condition;Body mechanics;Treatment plan and rationale;Posture;Self Care instruction;Basis of treatment;Expected outcome;Precautions  Times per Week: 2  Number of Weeks: 6  Number of Visits: 12  Discharge Planning: Patient will discharge to his home with independence in home exercise program and having his goals met.   Therapeutic Exercise: ROM;Stretching;Strengthening  Neuromuscular Reeducation: kinesio tape;posture;balance/proprioception  Manual Therapy: soft tissue mobilization;myofascial release;joint mobilization  Modalities: hot pack;electrical stimulation;TENS;cold pack;iontophoresis      POC and pathology of condition were reviewed with patient.  Pt. is in agreement with the Plan of Care  A Home Exercise Program (HEP) was  initiated today.  Pt. was instructed in exercises by PT and patient was given a handout with detailed instructions.  Plan for next visit: Progress gentle range of motion and gentle shoulder strengthening exercises in order to reduce the patient's pain, improve his strength, and improve his mobility.   .   Subjective:        History of Present Illness:      Kurtis is a 89 y.o. male who presents to therapy today with complaints of right shoulder pain. Date of onset/duration of symptoms is about 4 weeks ago. Onset was gradual. Symptoms are intermittent. He reports  a previous  history of similar symptoms. He describes their previous level of function as limited with dressing and combing his hair. Patient has a large hematoma on right biceps and stated it occurred because he is on blood thinners. Patient has fallen many times recently but has not injured himself. He describes his pain as being sharp and referring into his elbow and biceps as well.     Pain Ratin  Pain rating at best: 0  Pain rating at worst: 7  Pain description: sharp    Functional limitations are described as occurring with:   lifting    Patient reports benefit from:  rest         Objective:      Note: Items left blank indicates the item was not performed or not indicated at the time of the evaluation.    Patient Outcome Measures :    Shoulder Pain and Disability Index (SPADI) in %: 47     Scores range from 0-100%, where a score of 0% represents minimal pain and maximal function. The minimal clincically important difference is a score reduction of 10%.    Shoulder Examination  1. Chronic pain in right shoulder     2. Osteoarthritis of right shoulder, unspecified osteoarthritis type     3. Decreased range of motion of right shoulder     4. Weakness of shoulder       Involved side: Right  Posture Observation:      General sitting posture is  fair with mild forward shoulders.     Shoulder/Elbow ROM    Date: 2020     Shoulder and Elbow ROM ( )    AROM in degrees AROM in degrees AROM in degrees    Right Left Right Left Right Left   Shoulder Flexion (0-180 ) 65 with mod-sever pain  86       Shoulder Abduction (0-180 ) 60 with mod pain  85       Shoulder Extension (0-60 )         Shoulder ER (0-90 ) 45 50       Shoulder IR (0-70 ) 50 50       Shoulder IR/Ext         Elbow Flexion (150 ) wnl wnl       Elbow Extension (0 ) wnl wnl        PROM in degrees PROM in degrees PROM in degrees    Right Left Right Left Right Left   Shoulder Flexion (0-180 ) 150 160       Shoulder Abduction (0-180 ) 130 130       Shoulder Extension (0-60 )         Shoulder ER (0-90 )         Shoulder IR (0-70 )         Elbow Flexion (150 )         Elbow Extension (0 )           Shoulder/Elbow Strength   Date: 2/19/2020     Shoulder/Elbow Strength (/5)  Manual Muscle Test (MMT) MMT MMT MMT    Right Left Right Left Right Left   Shoulder Flexion 3/5 3/5       Supraspinatus         Shoulder Abduction 3/5 no pain 3+/5       Shoulder Extension         Shoulder External Rotation 3/5 3/5       Shoulder Internal Rotation 4/5 4/5       Elbow Flexion         Elbow Extension         Other:         Other:           Palpation: Mod. tender over the bilateral biceps tendon. Patient reports posterior deltoid pain, but is not tender to palpation over that region. Patient is not tender to the touch over the right elbow.     Joint Assessment:    Acromioclavicular Joint Assessment: WNL  Glenohumeral Joint Assessment:Hypomobile.    Shoulder Special Tests     Impingement Cluster Right (+/-) Left (+/-) Rotator Cuff Tests Right (+/-) Left (+/-)   [place] + NT Drop Arm Sign     Painful Arc   Hornblowers     Infraspinatus Test   ERLS - -   AC Tests Right (+/-) Left (+/-) IRLS     Active Compression   Labral Tests Right (+/-) Left (+/-)   Crossbody Adduction + NT Biceps Load Test II     AC Resisted Extension   Jerk Test     GH Instability Tests Right (+/-) Left (+/-) Selene Test     Sulcus Sign   Biceps Tests  Right (+/-) Left (+/-)   Apprehension   Speed     Relocation   Cherelle     Other:   Other:     Other:   Other:         Treatment Today      Exercises:  Exercise #1: Shoulder pendulums sideways and circles and forward/backwards in standing with table support  Comment #1: 2 sets of 30 seconds  Exercise #2: Shoulder isometrics into a wall, flexion, ext, and abd 1 set of 10 each way (elbow bent)  Comment #2: Shoulder AAROM slides onto a table top with towel 1x10  Exercise #3: Scapular retractions 1x10    TREATMENT MINUTES COMMENTS   Evaluation 30  Low complexity    Self-care/ Home management     Manual therapy     Neuromuscular Re-education     Therapeutic Activity     Therapeutic Exercises 25 See above   Gait training     Modality__________________                Total 55    Blank areas are intentional and mean the treatment did not include these items.     PT Evaluation Code: (Please list factors)  Patient History/Comorbidities:   Patient Active Problem List   Diagnosis   ? Hypercholesteremia   ? Depression   ? History of CVA (cerebrovascular accident)   ? Melanoma of back (H)   ? Melanoma of face (H)   ? Hard of hearing   ? Mild cognitive impairment   ? Seizure disorder (H)   ? Transient confusion   ? TIA (transient ischemic attack)   ? Dementia with behavioral disturbance (H)   ? Thiamine deficiency   ? DVT, lower extremity, proximal, acute, right (H)   ? Vitamin B12 deficiency (non anemic)   ? Altered mental status, unspecified   ? Acute metabolic encephalopathy   ? KRIS (acute kidney injury) (H)   ? Chronic deep vein thrombosis (DVT) of proximal vein of lower extremity (H)       Examination: Included assessment of range of motion, strength, irritability, and functional mobility.   Clinical Presentation: stable and uncomplicated   Clinical Decision Making: low     Patient History/  Comorbidities Examination  (body structures and functions, activity limitations, and/or participation restrictions) Clinical  Presentation Clinical Decision Making (Complexity)   No documented Comorbidities or personal factors 1-2 Elements Stable and/or uncomplicated Low   1-2 documented comorbidities or personal factor 3 Elements Evolving clinical presentation with changing characteristics Moderate   3-4 documented comorbidities or personal factors 4 or more Unstable and unpredictable High        Joselito Nielson, SPT  2/19/2020  11:42 AM

## 2021-06-28 NOTE — PROGRESS NOTES
Progress Notes by Joselito Nielson PT Student at 3/4/2020  9:00 AM     Author: Joselito Nielson PT Student Service: -- Author Type: PT Student    Filed: 3/4/2020 11:05 AM Encounter Date: 3/4/2020 Status: Attested    : Joselito Nielson PT Student (PT Student) Cosigner: Olinda Castillo PT at 3/4/2020 11:18 AM    Attestation signed by Olinda Castillo PT at 3/4/2020 11:18 AM    I attest that I was present in the room, making skilled assessments and directing the service provided today.  I was responsible for the assessment and treatment of the patient.  During this time, I was not engaged in treating another patient or doing other tasks.    Olinda Castillo PT                [facility] Daily Progress Note    Patient Name: Kurtis Cardenasedcarlos  Date: 3/4/2020  Visit #: 4/12  Referring provider: Jemal Zuniga MD  Visit Diagnosis:     ICD-10-CM    1. Chronic pain in right shoulder M25.511     G89.29    2. Osteoarthritis of right shoulder, unspecified osteoarthritis type M19.011    3. Decreased range of motion of right shoulder M25.611    4. Weakness of shoulder R29.898          Assessment:   Patient returns to PT for follow-up for right shoulder and arm pain. Patient has very limited active range of motion (ROM and more PROM in both shoulders and this limited range of motion impairs his ability to perform daily functional tasks, such as dressing and combing his hair. Patient presents with impairments in range of motion, strength, and pain. Patient will benefit from skilled therapy in order to improve his range of motion, strength, and decrease his pain. Patient is willing and motivated for PT.     Patient tolerated the exercises with minimal pain and moderate cueing for technique.  Pt cued to decrease the amount of push on the isometrics due to c/o some pain.  Patient had more active range of motion today with therapy as he was able to move his shoulder through a greater pain free arc in supine than  at previous visits.     Progressed  mild manual therapy (MT) to the right shoulder for decreased muscle tightness and improved mobility along with pain mitigation.  Pt stated that this felt good.  He was mildly tender to STM over the right upper trapezius, but this decreased over time today.    HEP/POC compliance is  good .  Patient demonstrates understanding/independence with home program.  Patient is benefitting from skilled physical therapy and is making steady progress toward functional goals.    Goal Status:  Pt. will demonstrate/verbalize independence in self-management of condition in : 6 weeks;Progressing toward  Pt. will be independent with home exercise program in : 6 weeks;Progressing toward    Pt will: improve his shoulder flexion and abduction range of motion on the right side to 100 degrees so that he is able to lift his arm higher for functional reaching within 6 weeks. (Progressing towards.)  Pt will: improve his shoulder flexion and abduction strength to 4/5 on the right so that he can lift and reach for objects at home within 6 weeks.  (Progressing towards.)      Plan / Patient Education:     Progress with home program as tolerated.   Plan for next time: Progress with shoulder AROM, exercises to strengthen the shoulder, and may add a pulley system exercise.     Subjective:     Pain Ratin    Patient reports that his left shoulder is feeling some better.  Pt reports that he has no pain at rest.     Pt reports that he is moving Friday. Patient states that he felt much better following the soft tissue massage last time.     Pt reports that he has been doing his PT exercises.      Objective:     Patient with mod- severely impaired , but improving, shoulder AROM bilaterally into flexion, scaption, and abduction. Patient had decreased overall shoulder pain today.     Treatment Today       Manual Therapy:   Patient supine:   Gentle STM to the upper trapezius and right shoulder  "musculature.    Exercises:  Exercise #1: Shoulder pendulums sideways and circles and forward/backwards in standing with table support  Comment #1: 2 sets of 30 seconds  Exercise #2: Shoulder isometrics into a wall, flexion, ext, and abd (elbow bent) Rx10 with 5\" hold   Comment #2: Shoulder AAROM slides onto a table top with towel Rx10   Exercise #3: Scapular retractions   Comment #3: x5 with 5\" in between pulley sets   Exercise #4: UAE 3 mins gently   Comment #4: pulleys - flexion x 2 min and ABD x2 min       TREATMENT MINUTES COMMENTS   Evaluation     Self-care/ Home management     Manual therapy 8 STM to the R UT and general shoulder;Pt supine    Neuromuscular Re-education     Therapeutic Activity     Therapeutic Exercises 20 See above   Gait training     Modality__________________                Total 28    Blank areas are intentional and mean the treatment did not include these items.       Joselito Nielson, PT Student   3/4/2020       "

## 2021-06-28 NOTE — PROGRESS NOTES
Progress Notes by Joselito Nielson PT Student at 2/26/2020  8:30 AM     Author: Joselito Nielson PT Student Service: -- Author Type: PT Student    Filed: 2/26/2020 10:50 AM Encounter Date: 2/26/2020 Status: Attested    : Joselito Nielson PT Student (PT Student) Cosigner: Olinda Castillo PT at 2/26/2020 11:16 AM    Attestation signed by Olinda Castillo PT at 2/26/2020 11:16 AM    I attest that I was present in the room, making skilled assessments and directing the service provided today.  I was responsible for the assessment and treatment of the patient.  During this time, I was not engaged in treating another patient or doing other tasks.    Olinda Castillo PT                 [facility] Daily Progress Note    Patient Name: Kurtis Marinohstaedcarlos  Date: 2/26/2020  Visit #: 2/12  Referring provider: Jemal Zuniga MD  Visit Diagnosis:     ICD-10-CM    1. Chronic pain in right shoulder M25.511     G89.29    2. Osteoarthritis of right shoulder, unspecified osteoarthritis type M19.011    3. Decreased range of motion of right shoulder M25.611    4. Weakness of shoulder R29.898          Assessment:     Patient tolerated the exercises well and did not have an increase in pain during the exercises. We went over the shoulder wall isometric holds and corrected the patient's form. Patient also tolerated the PROM-AAROM exercises in supine and was able to actively move his arm through a greater pain free range of motion following this part of the therapy session. Patient is progressing well with therapy.     HEP/POC compliance is  good .  Patient demonstrates understanding/independence with home program.  Patient is benefitting from skilled physical therapy and is making steady progress toward functional goals.    Goal Status:  Pt. will demonstrate/verbalize independence in self-management of condition in : 6 weeks  Pt. will be independent with home exercise program in : 6 weeks    Pt will: improve his  shoulder flexion and abduction range of motion on the right side to 100 degrees so that he is able to lift his arm higher for functional reaching within 6 weeks.  Pt will: improve his shoulder flexion and abduction strength to 4/5 on the right so that he can lift and reach for objects at home within 6 weeks.       Plan / Patient Education:     Progress with home program as tolerated.   Plan for next time: Progress with shoulder AROM, exercises to strengthen the shoulder, and may add a pulley system exercise.     Subjective:     Pain Ratin    Patient states that he does not have pain today. Patient has been completing his exercises everyday and he is moving, and states he does not have time to be in pain. He says the exercises are helping him.       Objective:     Patient with mod- severely impaired shoulder AROM bilaterally into flexion, scaption, and abduction. Patient had decreased overall shoulder pain today.     Treatment Today       Manual Therapy: Bilateral Shoulder PROM-AAROM with patient in supine (shoulder flexion, abduction, ER/IR)    Exercises:  Exercise #1: Shoulder pendulums sideways and circles and forward/backwards in standing with table support  Comment #1: 2 sets of 30 seconds  Exercise #2: Shoulder isometrics into a wall, flexion, ext, and abd 1 set of 10 each way (elbow bent)  Comment #2: Shoulder AAROM slides onto a table top with towel 1x10  Exercise #3: Scapular retractions 1x10    - Reviewed exercises today with patient    TREATMENT MINUTES COMMENTS   Evaluation     Self-care/ Home management     Manual therapy     Neuromuscular Re-education     Therapeutic Activity     Therapeutic Exercises 28 See above   Gait training     Modality__________________                Total     Blank areas are intentional and mean the treatment did not include these items.       Joselito Nielson, PT Student   2020

## 2021-06-28 NOTE — PROGRESS NOTES
Progress Notes by Joselito Nielson PT Student at 3/11/2020  9:30 AM     Author: Joselito Nielson PT Student Service: -- Author Type: PT Student    Filed: 3/11/2020 10:45 AM Encounter Date: 3/11/2020 Status: Attested    : Joselito Nielson PT Student (PT Student) Cosigner: Olinda Castillo PT at 3/11/2020 10:52 AM    Attestation signed by Olinda Castillo PT at 3/11/2020 10:52 AM    I attest that I was present in the room, making skilled assessments and directing the service provided today.  I was responsible for the assessment and treatment of the patient.  During this time, I was not engaged in treating another patient or doing other tasks.    Olinda Castillo PT                [facility] Daily Progress Note    Patient Name: Kurtis Marinohstaedcarlos  Date: 3/11/2020  Visit #: 6/12  Referring provider: Jemal Zuniga MD  Visit Diagnosis:     ICD-10-CM    1. Chronic pain in right shoulder  M25.511     G89.29    2. Osteoarthritis of right shoulder, unspecified osteoarthritis type  M19.011    3. Decreased range of motion of right shoulder  M25.611    4. Weakness of shoulder  R29.898          Assessment:   Patient returns to PT for follow-up for right shoulder and arm pain. Patient has very limited active range of motion (ROM and more PROM in both shoulders and this limited range of motion impairs his ability to perform daily functional tasks, such as dressing and combing his hair. Patient presents with impairments in range of motion, strength, and pain. Patient will benefit from skilled therapy in order to improve his range of motion, strength, and decrease his pain. Patient is willing and motivated for PT.     Patient tolerated the exercises with minimal pain and moderate cueing for technique.  Patient had more active range of motion today with therapy as he was able to move his shoulder through a greater pain free arc in supine than at previous visits. PT assisted patient with AAROM-AROM shoulder  flexion/abduction, and IR/ER in supine. Patient was able to move his right shoulder/arm throughout nearly the entire ROM with gravity eliminated and very mild assistance from PT. Patient is making progress with shoulder mobility and strength with therapy.     Progressed mild manual therapy (MT) to the right shoulder for decreased muscle tightness and improved mobility along with pain mitigation.  Pt stated that this felt good.  He was mildly tender to STM over the right upper trapezius, but this decreased over time today.      HEP/POC compliance is  good .  Patient demonstrates understanding/independence with home program.  Patient is benefitting from skilled physical therapy and is making steady progress toward functional goals.    Goal Status:  Pt. will demonstrate/verbalize independence in self-management of condition in : 6 weeks;Progressing toward  Pt. will be independent with home exercise program in : 6 weeks;Progressing toward    Pt will: improve his shoulder flexion and abduction range of motion on the right side to 100 degrees so that he is able to lift his arm higher for functional reaching within 6 weeks. (Progressing towards.)  Pt will: improve his shoulder flexion and abduction strength to 4/5 on the right so that he can lift and reach for objects at home within 6 weeks.  (Progressing towards.)      Plan / Patient Education:     Progress with home program as tolerated.   Plan for next time: Progress with shoulder AROM, exercises to strengthen the shoulder, and progress pulley system exercise.     Subjective:     Pain Ratin    Patient reports that his left shoulder is feeling fine today. His legs are hurting because he walked a lot the other day. Patient felt well following the soft tissue massage last session.     Pt reports that he has been doing his PT exercises.      Objective:   Right shoulder flexion AROM measured last session at 70 dgs  Patient with mod- severely impaired , but improving,  "shoulder AROM bilaterally into flexion, scaption, and abduction. Patient had decreased overall shoulder pain today.     Treatment Today       Manual Therapy:   Patient supine:   Gentle STM and TP release to the upper trapezius and right shoulder musculature.    Exercises:  Exercise #1: Shoulder pendulums sideways and circles and forward/backwards in standing with table support- not today  Comment #1: Active shoulder flexion, adbuction, and extension 2 sets x5 each way each arm.  Exercise #2: Shoulder isometrics into a wall, flexion, ext, and abd (elbow bent) Rx10 with 5\" hold - not today  Comment #2: Shoulder AAROM slides onto a table top with towel Rx10   Exercise #3: Scapular retractions 1x10- not today  Comment #3: x5 with 5\" in between pulley sets   Exercise #4: UAE 3 mins gently   Comment #4: pulleys - flexion x 2 min and ABD x2 min   Exercise #5: supine AROM-AAROM of shoulder ER/IR and flexion/abduction 1x5-10 each way for right arm - cued to try AROM in supine at home  Comment #5: Standing bilateral shoulder flexion/adbuction AAROM 5 each way      TREATMENT MINUTES COMMENTS   Evaluation     Self-care/ Home management     Manual therapy 8 STM/TP to the R UT and general shoulder;Pt supine    Neuromuscular Re-education     Therapeutic Activity     Therapeutic Exercises 20 See above   Gait training     Modality__________________                Total 28    Blank areas are intentional and mean the treatment did not include these items.       Joselito Nielson, PT Student   3/11/2020       "

## 2021-06-28 NOTE — PROGRESS NOTES
"Progress Notes by Rose Camarillo CNP at 2020  8:36 AM     Author: Rose Camarillo CNP Service: -- Author Type: Nurse Practitioner    Filed: 2020  5:07 PM Encounter Date: 2020 Status: Attested    : Rose Camarillo CNP (Nurse Practitioner) Cosigner: Bela Franklin MBBS at 2020  9:04 PM    Attestation signed by Bela Franklin MBBS at 2020  9:04 PM    Agree with dc plan                [facility]      NAME:  Kurtis Urban             :  1929  MRN: [id number]  CODE STATUS:  FULL CODE    VISIT TYPE: DISCHARGE SUMMARY  FACILYTY: [facility] [[id number]]   HOSPITALIZATION: [facility]     TO 2020                PRIMARY CARE PROVIDER: Jameson Dukes MD    DISCHARGE DIAGNOSIS:      1. History of CVA (cerebrovascular accident)    2. DVT, lower extremity, proximal, acute, right (H)    3. Transient confusion         DISCHARGE MEDICATIONS:         Medication List          Accurate as of 2020  4:31 PM. If you have any questions, ask your nurse or doctor.            CONTINUE taking these medications    aspirin 81 MG EC tablet     atorvastatin 40 MG tablet  Commonly known as:  LIPITOR     Eliquis 5 mg Tab tablet  Generic drug:  apixaban ANTICOAGULANT     levETIRAcetam 500 MG tablet  Commonly known as:  KEPPRA     sertraline 50 MG tablet  Commonly known as:  ZOLOFT     thiamine 50 MG tablet     triamcinolone 0.1 % cream  Commonly known as:  KENALOG     vitamin B-12 250 MCG tablet  Generic drug:  cyanocobalamin            HISTORY OF PRESENT ILLNESS: Kurtis Urban is a 89 y.o. male being seen for a face to face visit for an anticipated dc on 20. He was on the TCU after hospitalization in a deconditioned state. He is planning on going home with A for a month than entering an AL at [place].  with Per  EMR at [facility] \" history of CVA and TIA, seizure disorder, HLD, dementia, and DVT on Eliquis presents with 1-day of acute " metabolic encephalopathy after sustaining a mechanical fall 4-days [id number] (1/14/2020) and also found to have mild KRIS compared to his baseline . Seen up ambulatory. He is [place] but able to discuss his needs. Plan is to dc home x1 month and then move into an AL apt at [place]. Continue with [facility] services at home for PT/OT.  SKILLED NURSING FACILITY COURSE:  During this TCU stay, patient completed all anticipated goals of therapy.      PHYSICAL EXAMINATION:    Vitals:    01/30/20 1619   BP: 145/76   Pulse: 64   Temp: 97.1  F (36.2  C)   Weight: 145 lb 4.8 oz (65.9 kg)         GENERAL: Awake, Alert, oriented x3, not in any form of acute distress, answers questions appropriately, follows simple commands, conversant  HEENT: Head is normocephalic with balding hair distribution. No evidence of trauma. Ears: No acute purulent discharge. Eyes: Conjunctivae pink with no scleral jaundice. Nose: Normal mucosa and septum. NECK: Supple with no cervical or supraclavicular lymphadenopathy. Trachea is midline.   CHEST: No tenderness or deformity, no crepitus  LUNG: Clear to auscultation with good chest expansion. There are no crackles or wheezes, normal AP diameter.  BACK: No kyphosis of the thoracic spine. Symmetric, no curvature, ROM normal, no CVA tenderness, no spinal tenderness   CVS: There is good S1  S2, there are no murmurs, rubs, gallops, or heaves, rhythm is regular.  ABDOMEN: Globular and soft, nontender to palpation, non distended, no masses, no organomegaly, good bowel sounds, no rebound or guarding, no peritoneal signs.   EXTREMITIES: Atraumatic. Full range of motion on both upper and lower extremities, there is no tenderness to palpation, no pedal edema, no cyanosis or clubbing, no calf tenderness, normal cap refill, no joint swelling.  SKIN: Warm and dry, no erythema noted, no rashes or lesions.  NEUROLOGICAL: The patient is oriented to person, place and time. Strength and sensation are grossly intact. Face is  symmetric.      LABS:  All labs reviewed in the nursing home record.        DISCHARGE PLAN: I certify that this patient is under Dr. Franklin's care, seen by the NP, and had a face-to-face encounter that meets the physician face-to-face encounter requirements.  The encounter was in whole, or part related to the primary reason for home health.  The Patient is homebound due to: Deconditioned state sp hospitalization and  it is  taxing and it will take a considerable amount of effort for patient to leave the home.  She is dependent on others for transportation.  The patient is confined to her home and needs intermittent skilled nursing, PT,OT, RN.  The patient has been under the care of Dr. Franklin/NP and Dr. Franklin  initiated the establishment of the plan of care.        Patient to be followed by home care for physical therapy to eval and treat for strengthening, balance, endurance, and safety with mobility, and ambulation.  Patient to be followed by home care for occupational therapy to eval and treat for strengthening, ADL needs, adaptive equipment, and safety.  Patient to be followed by home care for nursing services for medication set up and teaching, symptom and disease processes monitoring and education.    Patient to be followed by home care for home health aid services for bathing and ADL needs.  Planned discharge.  All therapy goals have been met.  Family will assist with discharge and transportation.        Patient will follow up with PCP within 7 days after discharge for medication mangagment and appropriate lab studies.      Post Discharge Medication Reconciliation Status: discharge medications reconciled and changed, per note/orders (see AVS)      Electronically signed by:  Rose Camarillo CNP  This progress note was completed using Dragon software and there may be grammatical errors.      For documentation purposes, chart review, medication management, and discharge coordination of care was greater than 35  minutes

## 2021-06-29 NOTE — PROGRESS NOTES
Progress Notes by Thierno Garrett MD (Ted) at 9/15/2020  1:50 PM     Author: Thierno Garrett MD (Ted) Service: -- Author Type: Physician    Filed: 9/15/2020  2:20 PM Encounter Date: 9/15/2020 Status: Signed    : Thierno Garrett MD (Ted) (Physician)             Thank you, Dr. Zuniga, for asking [facility] to evaluate Mr. Kurtis Urban.      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  Lower extremity edema is related to venous insufficiency only.  He does not have any significant valvular abnormalities on the echo.  Considering his advanced age, dementia and lack of clear-cut cardiac symptoms, no further cardiac evaluation is recommended.       History of Present Illness    Mr. Kurtis Urban is a 89 y.o. male who comes in for cardiac evaluation.  He has been noticing increasing swelling of lower extremities.  He has history of DVT and right knee replacement many years ago.  He was given compressive stockings and Eliquis.  He reports improvement of lower extremity edema.  He denies chest pains or increasing shortness of breath.  He can walk a fair distance without any chest symptoms.  He has no history of known heart disease.  He underwent outpatient echocardiogram and he is here to further discuss the results.    ECG: Personally reviewed.  Normal sinus rhythm left anterior fascicular block    Echocardiogram:     Left ventricle ejection fraction is normal. The calculated left ventricular ejection fraction is 61% without wall motion abnormality.    Normal right ventricular size and systolic function.    Aortic valve sclerosis with mild aortic  insufficiency.    Trace mitral and tricuspid insufficiency. No evidence of pulmonary hypertension.       Physical Examination Review of Systems   Vitals:    09/15/20 1353   BP: 138/60   Pulse: (!) 57   Resp: 12     Body mass index is 23.11 kg/m .  Wt Readings from Last 3 Encounters:   09/15/20 152 lb (68.9 kg)   08/06/20 145 lb (65.8 kg)   01/30/20 145 lb 4.8 oz (65.9 kg)     General Appearance:   Alert, cooperative, no distress, appears stated age   Head/ENT: Normocephalic, without obvious abnormality. Membranes moist      EYES:  no scleral icterus, normal conjunctivae   Neck: Supple, symmetrical, trachea midline, no adenopathy, thyroid: not enlarged, symmetric, no carotid bruit or JVD   Chest/Lungs:   Lungs are clear to auscultation, respirations unlabored. No tenderness or deformity    Cardiovascular:   Regular rhythm, S1, S2 normal, no murmur, rub or gallop.   Abdomen:  Soft, non-tender, bowel sounds active all four quadrants,  no masses, no organomegaly   Extremities: no cyanosis or clubbing. No edema   Skin: Skin color, texture, turgor normal, no rashes or lesions.    Psychiatric: Normal affect, calm   Neurologic: Alert and oriented x 3, moving all four extremities.     General: WNL  Eyes: WNL  Ears/Nose/Throat: Hearing Loss  Lungs: WNL  Heart: Leg Swelling  Stomach: WNL  Bladder: WNL  Muscle/Joints: WNL  Skin: Poor Wound Healing  Nervous System: Dizziness, Loss of Balance  Mental Health: Confusion     Blood: Easy Bleeding, Easy Bruising     Medical History  Surgical History Family History Social History   Past Medical History:   Diagnosis Date   ? Dementia with behavioral disturbance (H) 9/3/2019   ? Depression 5/28/2014   ? Hypercholesteremia 5/28/2014   ? Melanoma of back (H) 5/28/2014    Surgically removed     ? Melanoma of face (H) 5/28/2014    Surgically removed    ? Seizure (H) 5/19/2015   ? Thiamine deficiency 9/3/2019   ? TIA (transient ischemic attack)     Past Surgical History:   Procedure  Laterality Date   ? APPENDECTOMY     ? CHOLECYSTECTOMY     ? HERNIA REPAIR     ? meniscal surgery  1993   ? nasal surgeries     ? SHOULDER SURGERY Right    ? total knee repair Right     no family history of premature coronary artery disease Social History     Socioeconomic History   ? Marital status:      Spouse name: Not on file   ? Number of children: Not on file   ? Years of education: Not on file   ? Highest education level: Not on file   Occupational History   ? Not on file   Social Needs   ? Financial resource strain: Not on file   ? Food insecurity     Worry: Not on file     Inability: Not on file   ? Transportation needs     Medical: Not on file     Non-medical: Not on file   Tobacco Use   ? Smoking status: Former Smoker     Types: Cigarettes   ? Smokeless tobacco: Never Used   Substance and Sexual Activity   ? Alcohol use: Never     Frequency: Never   ? Drug use: Never   ? Sexual activity: Not on file   Lifestyle   ? Physical activity     Days per week: Not on file     Minutes per session: Not on file   ? Stress: Not on file   Relationships   ? Social connections     Talks on phone: Not on file     Gets together: Not on file     Attends Yazidi service: Not on file     Active member of club or organization: Not on file     Attends meetings of clubs or organizations: Not on file     Relationship status: Not on file   ? Intimate partner violence     Fear of current or ex partner: Not on file     Emotionally abused: Not on file     Physically abused: Not on file     Forced sexual activity: Not on file   Other Topics Concern   ? Not on file   Social History Narrative   ? Not on file          Medications  Allergies   Current Outpatient Medications   Medication Sig Dispense Refill   ? aspirin 81 MG EC tablet Take 81 mg by mouth daily.     ? atorvastatin (LIPITOR) 40 MG tablet Take 40 mg by mouth at bedtime.      ? cyanocobalamin (VITAMIN B-12) 250 MCG tablet Take 250 mcg by mouth daily.      ? ELIQUIS 5  mg Tab tablet Take 5 mg by mouth 2 (two) times a day.      ? levETIRAcetam (KEPPRA) 500 MG tablet Take 500 mg by mouth 2 (two) times a day.     ? sertraline (ZOLOFT) 50 MG tablet Take 50 mg by mouth daily.     ? thiamine 50 MG tablet Take 50 mg by mouth daily.     ? triamcinolone (KENALOG) 0.1 % cream Apply 1 application topically 3 (three) times a day.       No current facility-administered medications for this visit.       Allergies   Allergen Reactions   ? Penicillins Anaphylaxis and Nausea And Vomiting   ? Alfuzosin Nausea And Vomiting   ? Donepezil Other (See Comments)   ? Myolin      Other reaction(s): hives   ? Sulfa (Sulfonamide Antibiotics) Nausea And Vomiting   ? Triazolam Nausea And Vomiting         Lab Results    Chemistry/lipid CBC Cardiac Enzymes/BNP/TSH/INR   Lab Results   Component Value Date    CHOL 121 08/27/2020    HDL 43 08/27/2020    LDLCALC 60 08/27/2020    TRIG 90 08/27/2020    CREATININE 1.25 07/28/2020    BUN 21 07/28/2020    K 4.4 07/28/2020     07/28/2020     (H) 07/28/2020    CO2 22 07/28/2020    Lab Results   Component Value Date    WBC 9.5 01/19/2020    HGB 11.5 (L) 01/19/2020    HCT 35.3 (L) 01/19/2020    MCV 98 01/19/2020    PLT 94 (L) 01/19/2020    Lab Results   Component Value Date    CKMB 1 05/19/2015    TROPONINI 0.02 01/18/2020    TSH 0.82 07/28/2020    INR 1.07 01/18/2020

## 2021-07-26 ENCOUNTER — LAB REQUISITION (OUTPATIENT)
Dept: LAB | Facility: CLINIC | Age: 86
End: 2021-07-26
Payer: COMMERCIAL

## 2021-07-26 ENCOUNTER — TRANSFERRED RECORDS (OUTPATIENT)
Dept: HEALTH INFORMATION MANAGEMENT | Facility: CLINIC | Age: 86
End: 2021-07-26
Payer: COMMERCIAL

## 2021-07-26 DIAGNOSIS — R60.0 LOCALIZED EDEMA: ICD-10-CM

## 2021-07-26 DIAGNOSIS — Z01.812 ENCOUNTER FOR PREPROCEDURAL LABORATORY EXAMINATION: ICD-10-CM

## 2021-07-26 LAB
ANION GAP SERPL CALCULATED.3IONS-SCNC: 10 MMOL/L (ref 5–18)
BUN SERPL-MCNC: 16 MG/DL (ref 8–28)
CALCIUM SERPL-MCNC: 9.4 MG/DL (ref 8.5–10.5)
CHLORIDE BLD-SCNC: 107 MMOL/L (ref 98–107)
CO2 SERPL-SCNC: 22 MMOL/L (ref 22–31)
CREAT SERPL-MCNC: 1.15 MG/DL (ref 0.7–1.3)
GFR SERPL CREATININE-BSD FRML MDRD: 55 ML/MIN/1.73M2
GLUCOSE BLD-MCNC: 94 MG/DL (ref 70–125)
POTASSIUM BLD-SCNC: 4.4 MMOL/L (ref 3.5–5)
SODIUM SERPL-SCNC: 139 MMOL/L (ref 136–145)

## 2021-07-26 PROCEDURE — U0005 INFEC AGEN DETEC AMPLI PROBE: HCPCS | Mod: ORL | Performed by: FAMILY MEDICINE

## 2021-07-26 PROCEDURE — 80048 BASIC METABOLIC PNL TOTAL CA: CPT | Mod: ORL | Performed by: FAMILY MEDICINE

## 2021-07-27 LAB — SARS-COV-2 RNA RESP QL NAA+PROBE: NEGATIVE

## 2021-08-03 ENCOUNTER — TRANSITIONAL CARE UNIT VISIT (OUTPATIENT)
Dept: GERIATRICS | Facility: CLINIC | Age: 86
End: 2021-08-03
Payer: COMMERCIAL

## 2021-08-03 VITALS
RESPIRATION RATE: 18 BRPM | TEMPERATURE: 98.4 F | DIASTOLIC BLOOD PRESSURE: 70 MMHG | SYSTOLIC BLOOD PRESSURE: 154 MMHG | WEIGHT: 145 LBS | HEART RATE: 76 BPM | OXYGEN SATURATION: 94 % | HEIGHT: 68 IN | BODY MASS INDEX: 21.98 KG/M2

## 2021-08-03 DIAGNOSIS — R41.89 COGNITIVE IMPAIRMENT: ICD-10-CM

## 2021-08-03 DIAGNOSIS — E78.5 HYPERLIPIDEMIA, UNSPECIFIED HYPERLIPIDEMIA TYPE: ICD-10-CM

## 2021-08-03 DIAGNOSIS — Z96.612 STATUS POST REVERSE TOTAL REPLACEMENT OF LEFT SHOULDER: Primary | ICD-10-CM

## 2021-08-03 DIAGNOSIS — G40.909 SEIZURE DISORDER (H): ICD-10-CM

## 2021-08-03 PROCEDURE — 99305 1ST NF CARE MODERATE MDM 35: CPT | Performed by: FAMILY MEDICINE

## 2021-08-03 RX ORDER — ATORVASTATIN CALCIUM 40 MG/1
40 TABLET, FILM COATED ORAL AT BEDTIME
Status: ON HOLD | COMMUNITY
End: 2023-01-01

## 2021-08-03 RX ORDER — PRIMIDONE 50 MG/1
50 TABLET ORAL 2 TIMES DAILY
COMMUNITY
End: 2023-01-01

## 2021-08-03 RX ORDER — AMOXICILLIN 250 MG
1 CAPSULE ORAL 2 TIMES DAILY PRN
COMMUNITY
End: 2023-01-01

## 2021-08-03 RX ORDER — TRAMADOL HYDROCHLORIDE 50 MG/1
25 TABLET ORAL EVERY 4 HOURS PRN
COMMUNITY
End: 2022-07-08

## 2021-08-03 RX ORDER — ACETAMINOPHEN 500 MG
1000 TABLET ORAL 3 TIMES DAILY
COMMUNITY
Start: 2021-07-30 | End: 2021-08-29

## 2021-08-03 RX ORDER — ASPIRIN 81 MG/1
81 TABLET ORAL 2 TIMES DAILY WITH MEALS
COMMUNITY
Start: 2021-07-30 | End: 2021-08-28

## 2021-08-03 RX ORDER — TRAMADOL HYDROCHLORIDE 50 MG/1
25-50 TABLET ORAL EVERY 4 HOURS PRN
COMMUNITY
End: 2022-07-08

## 2021-08-03 RX ORDER — ASPIRIN 81 MG/1
81 TABLET ORAL DAILY
Status: ON HOLD | COMMUNITY
Start: 2021-08-29 | End: 2023-01-01

## 2021-08-03 ASSESSMENT — MIFFLIN-ST. JEOR: SCORE: 1282.22

## 2021-08-03 NOTE — LETTER
8/5/2021        RE: Kurtis Urban  [address]  [facility] [address]          [facility] SERVICES    [place] Medical Record Number:  [id number]  Place of Service where encounter took place: [facility] (TCU) [[id number]]  CODE STATUS:   CPR/Full code       Chief Complaint/Reason for Visit:  Chief Complaint   Patient presents with     Hospital F/U     Admit note to TCU after left shoulder surgery.        HPI:    Kurtis Urban is a 89 year old male with hx of seizure disorder, HLD, cognitive impairment, hx of DVT, completed course of eliquis, chronic anemia, admitted electively to the hospital on 7/28/2021 for left shoulder reverse total shoulder arthroplasty for OA with rotator cuff arthropathy.     [facility] Discharge Summary    Admit Date: 7/28/2021   Discharge Date: 07/30/2021  Reason for admission: The patient was admitted for the following:Procedure(s) (LRB):  REVERSE TOTAL SHOULDER JOINT REPLACEMENT (Left)     Pre-Op Diagnosis Codes:  * Rotator cuff arthropathy of left shoulder     Following the procedure noted above the patient was transferred to the post-op floor and started on:   Therapy: Physical Therapy  Anticoagulation Medications: ASA 81 mg twice daily x 30 days  Pain Management: Tylenol and Ultram  Weight bearing status: Partial weight bearing LUE  Complications: None  Consultations: PT/OT    Weight Bearing Restrictions   Partial weight bearing with left arm; may remove sling for exercises, daily hygiene, skin inspection and dressing changes. Do not resume full weight bearing on the affected limb(s) until your care provider tells you to do so.     Overall stabilized and discharged to TCU on 7/30/2021 for PT, OT, nursing cares, medical management and monitoring.     Today:  He is in a sling on left, partial weight bearing allowed. He will see ortho in 10-14 days. He reports pain is well controlled. Denies shortness of breath, chest  pain, dizziness. There wear no complications when he was in the hospital. He has seizure disorder on keppra and primidone. He is on aspirin 81 mg BID for 30 days for DVT prevention. It is noted in his record he has a hx of DVT and was on eliquis. It appears this was stopped at his pre-op visit per PMD as he has completed the course and will not need anticoagulation than aspirin. He has good appetite, No abdominal pain, nausea, vomiting, diarrhea or constipation. He is hard of hearing, no new vision concerns. He stated he lives here, [facility] either [facility] or independent living, unclear.       PAST MEDICAL HISTORY:  Past Medical History:   Diagnosis Date     Dementia with behavioral disturbance (H) 9/3/2019     Depression 5/28/2014     Hypercholesteremia 5/28/2014     Melanoma of back (H) 5/28/2014    Surgically removed       Melanoma of face (H) 5/28/2014    Surgically removed      Seizure (H) 5/19/2015     Thiamine deficiency 9/3/2019     TIA (transient ischemic attack)        PAST SURGICAL HISTORY:  Past Surgical History:   Procedure Laterality Date     APPENDECTOMY       CHOLECYSTECTOMY       HERNIA REPAIR       OTHER SURGICAL HISTORY      nasal surgeries     OTHER SURGICAL HISTORY  1993    meniscal surgery     OTHER SURGICAL HISTORY Right     total knee repair     SHOULDER SURGERY Right        FAMILY HISTORY:  Family History   Problem Relation Age of Onset     Cancer Mother      Cerebrovascular Disease Maternal Grandfather        SOCIAL HISTORY:  Social History     Socioeconomic History     Marital status:      Spouse name: Not on file     Number of children: Not on file     Years of education: Not on file     Highest education level: Not on file   Occupational History     Not on file   Tobacco Use     Smoking status: Former Smoker     Types: Cigarettes     Smokeless tobacco: Never Used   Substance and Sexual Activity     Alcohol use: Never     Drug use: Never     Sexual activity: Not on file   Other  Topics Concern     Not on file   Social History Narrative     Not on file     Social Determinants of Health     Financial Resource Strain:      Difficulty of Paying Living Expenses:    Food Insecurity:      Worried About Running Out of Food in the Last Year:      Ran Out of Food in the Last Year:    Transportation Needs:      Lack of Transportation (Medical):      Lack of Transportation (Non-Medical):    Physical Activity:      Days of Exercise per Week:      Minutes of Exercise per Session:    Stress:      Feeling of Stress :    Social Connections:      Frequency of Communication with Friends and Family:      Frequency of Social Gatherings with Friends and Family:      Attends Faith Services:      Active Member of Clubs or Organizations:      Attends Club or Organization Meetings:      Marital Status:    Intimate Partner Violence:      Fear of Current or Ex-Partner:      Emotionally Abused:      Physically Abused:      Sexually Abused:        MEDICATIONS:  Current Outpatient Medications   Medication Sig Dispense Refill     acetaminophen (TYLENOL) 500 MG tablet Take 1,000 mg by mouth 3 times daily FOR 30 DAYS       aspirin 81 MG EC tablet Take 81 mg by mouth 2 times daily (with meals) FOR 30 DAYS. Resume Daily 81 mg aspirin on 8/29/2021       [START ON 8/29/2021] aspirin 81 MG EC tablet Take 81 mg by mouth daily Resume Daily 81 mg aspirin on 8/29/2021       atorvastatin (LIPITOR) 40 MG tablet Take 40 mg by mouth At Bedtime       Cyanocobalamin 2500 MCG TABS Take 2,500 mcg by mouth daily       levETIRAcetam (KEPPRA) 500 MG tablet TAKE 1 TABLET BY MOUTH TWICE DAILY 56 tablet 12     primidone (MYSOLINE) 50 MG tablet Take 50 mg by mouth 2 times daily       senna-docusate (SENOKOT-S/PERICOLACE) 8.6-50 MG tablet Take 1 tablet by mouth 2 times daily as needed for constipation       thiamine 50 MG TABS Take 50 mg by mouth daily       traMADol (ULTRAM) 50 MG tablet Take 25 mg by mouth every 4 hours as needed for severe  "pain For Pain pain level 1 to 5       traMADol (ULTRAM) 50 MG tablet Take 25-50 mg by mouth every 4 hours as needed for severe pain Pain pain level 6 to 10           ALLERGIES:  Allergies   Allergen Reactions     Penicillins Anaphylaxis and Nausea and Vomiting     Alfuzosin Nausea and Vomiting     Donepezil Other (See Comments)     Myolin [Norflex] Unknown     Other reaction(s): hives     Sulfa (Sulfonamide Antibiotics) [Sulfa Drugs] Nausea and Vomiting     Triazolam Nausea and Vomiting       REVIEW OF SYSTEMS:  Pertinent items as noted in HPI.      PHYSICAL EXAM:  General: Patient is alert elderly [place] male, no distress.   Vitals: BP (!) 154/70   Pulse 76   Temp 98.4  F (36.9  C)   Resp 18   Ht 1.727 m (5' 8\")   Wt 65.8 kg (145 lb)   SpO2 94%   BMI 22.05 kg/m    HEENT: Head is NCAT. Eyes show no injection or icterus. Nares negative. Oropharynx well hydrated.  Neck: Supple. No tenderness or adenopathy. No JVD.  Lungs: Non labored respirations.   Back: No spinal or CVA tenderness.  Abdomen: Soft, no tenderness on exam. Bowel sounds present. No guarding rebound or rigidity.  : Deferred.  Extremities: No LE edema is noted.  Musculoskeletal: Sling LUE.  Skin: Surgical bandage left shoulder.   Psych: Mood appears good.      LABS/DIAGNOSTIC DATA:  Ref Range & Units  7/29/2021    Hemoglobin 13.5 - 17.5 g/dL 11.1Low        Ref Range & Units  7/29/2021   Sodium 136 - 145 mmol/L 138        Potassium 3.5 - 5.1 mmol/L 4.4        Chloride 98 - 109 mmol/L 111High         CO2 20 - 29 mmol/L 20        Anion Gap 7 - 16 mmol/L 7        Calcium 8.4 - 10.4 mg/dL 9.0        BUN 7 - 26 mg/dL 21        Creatinine 0.73 - 1.18 mg/dL 1.42High         GFR, Estimated >60 mL/min/1.73m2 43Low         Glucose 70 - 100 mg/dL 84          ASSESSMENT/PLAN:  1. Left shoulder reverse arthroplasty. 7/28/2021. On aspirin BID for 30 days for DVT prevention. Has tramadol prn for pain management, adequate. Follow up with ortho.  2. Seizure disorder. " On Keppra and primidone. Stable.  3. HLD. Continue PTA atorvastatin.  4. Cognitive impairment. At baseline.  5. Hx of DVT. Eliquis had been stopped at his preop visit, he will complete aspirin for DVT prevention.  6. Anemia. Chronic with baseline around 11. Post op hgb at 11.1.  7. Renal insufficiency. Last creatinine 1.42. Recheck with PMD at follow up appt post TCU.  8. Code status is full code.           Electronically signed by: Roselia Vega MD             Sincerely,        Roselia Vega MD

## 2021-08-06 ENCOUNTER — TRANSITIONAL CARE UNIT VISIT (OUTPATIENT)
Dept: GERIATRICS | Facility: CLINIC | Age: 86
End: 2021-08-06
Payer: COMMERCIAL

## 2021-08-06 VITALS
BODY MASS INDEX: 23.3 KG/M2 | DIASTOLIC BLOOD PRESSURE: 57 MMHG | TEMPERATURE: 97.9 F | RESPIRATION RATE: 20 BRPM | SYSTOLIC BLOOD PRESSURE: 135 MMHG | OXYGEN SATURATION: 95 % | HEART RATE: 61 BPM | HEIGHT: 66 IN | WEIGHT: 145 LBS

## 2021-08-06 DIAGNOSIS — M79.89 SWELLING OF HAND, UNSPECIFIED LATERALITY: Primary | ICD-10-CM

## 2021-08-06 PROCEDURE — 99309 SBSQ NF CARE MODERATE MDM 30: CPT | Performed by: NURSE PRACTITIONER

## 2021-08-06 ASSESSMENT — MIFFLIN-ST. JEOR: SCORE: 1250.47

## 2021-08-06 NOTE — PROGRESS NOTES
[facility] GERIATRIC SERVICES  Chief Complaint   Patient presents with     CHALINO     [place] Medical Record Number:  [id number]  Place of Service where encounter took place:  No question data found.  Code Status: Full    HISTORY:      HPI:  Kurtis Urban  is 89 year old (2/23/1929) undergoing physical and occupational therapy.  He is with past medical history dementia, seizure disorder, hypercholesteremia, depression, chronic pain, DVT right lower extremity, TIA, who was admitted to [facility] 7/28/2021 to 7/30/2021 where he underwent a left reverse total shoulder.    Today he has been asked to be seen by the nurse for increased swelling left hand.  Left hand/fingers was noted to have 3+ edema.  He had a 3+ radial pulse.  Full opposition of fingers, cap refill less than 3 seconds.  He reports his pain is controlled.  Scheduled ice was ordered for his hand along with an OT consult for evaluation of an Isotoner glove.  He denied chest pain shortness of breath cough congestion constipation or diarrhea.    ALLERGIES:Penicillins, Alfuzosin, Donepezil, Myolin [norflex], Sulfa (sulfonamide antibiotics) [sulfa drugs], and Triazolam    PAST MEDICAL HISTORY:   Past Medical History:   Diagnosis Date     Dementia with behavioral disturbance (H) 9/3/2019     Depression 5/28/2014     Hypercholesteremia 5/28/2014     Melanoma of back (H) 5/28/2014    Surgically removed       Melanoma of face (H) 5/28/2014    Surgically removed      Seizure (H) 5/19/2015     Thiamine deficiency 9/3/2019     TIA (transient ischemic attack)        PAST SURGICAL HISTORY:   has a past surgical history that includes Cholecystectomy; appendectomy; other surgical history; other surgical history (1993); hernia repair; shoulder surgery (Right); and other surgical history (Right).    FAMILY HISTORY: family history is not on file.    SOCIAL HISTORY:  reports that he has quit smoking. His smoking use included cigarettes. He has never used  "smokeless tobacco. He reports that he does not drink alcohol and does not use drugs.    ROS:  Constitutional: Negative for activity change, appetite change, fatigue and fever.   HENT: Negative for congestion.    Respiratory: Negative for cough, shortness of breath and wheezing.    Cardiovascular: Negative for chest pain and leg swelling.   Gastrointestinal: Negative for abdominal distention, abdominal pain, constipation, diarrhea and nausea.   Genitourinary: Negative for dysuria.   Musculoskeletal: Negative for arthralgia. Negative for back pain.  3+ swelling left fingers and hand, surgical incision covered with Tegaderm left shoulder.  Per nurse Steri-Strips intact no redness or drainage noted  Skin: Negative for color change and wound.   Neurological: Negative for dizziness.   Psychiatric/Behavioral: Negative for agitation, behavioral problems and confusion.     Physical Exam:  Constitutional:       Appearance: Patient is well-developed.   HENT:      Head: Normocephalic.   Eyes:      Conjunctiva/sclera: Conjunctivae normal.   Neck:      Musculoskeletal: Normal range of motion.   Cardiovascular:      Rate and Rhythm: Normal rate and regular rhythm.      Heart sounds: Normal heart sounds. No murmur.   Pulmonary:      Effort: No respiratory distress.      Breath sounds: Normal breath sounds. No wheezing or rales.   Abdominal:      General: Bowel sounds are normal. There is no distension.      Palpations: Abdomen is soft.      Tenderness: There is no abdominal tenderness.   Musculoskeletal:       Normal range of motion.    3+ edema left fingers and hand.  Surgical incision left shoulder covered with a Tegaderm   Skin:General:        Skin is warm.   Neurological:         Mental Status: Patient is alert and oriented to person, place, and time.   Psychiatric:         Behavior: Behavior normal.     Vitals:/57   Pulse 61   Temp 97.9  F (36.6  C)   Resp 20   Ht 1.676 m (5' 6\")   Wt 65.8 kg (145 lb)   SpO2 95%   " BMI 23.40 kg/m   and Body mass index is 23.4 kg/m .    Lab/Diagnostic data:   No results found for this or any previous visit (from the past 240 hour(s)).    MEDICATIONS:     Review of your medicines          Accurate as of August 6, 2021 11:14 AM. If you have any questions, ask your nurse or doctor.            CONTINUE these medicines which have NOT CHANGED      Dose / Directions   acetaminophen 500 MG tablet  Commonly known as: TYLENOL      Dose: 1,000 mg  Take 1,000 mg by mouth 3 times daily FOR 30 DAYS  Refills: 0     * aspirin 81 MG EC tablet      Dose: 81 mg  Take 81 mg by mouth 2 times daily (with meals) FOR 30 DAYS. Resume Daily 81 mg aspirin on 8/29/2021  Refills: 0     * aspirin 81 MG EC tablet      Dose: 81 mg  Start taking on: August 29, 2021  Take 81 mg by mouth daily Resume Daily 81 mg aspirin on 8/29/2021  Refills: 0     atorvastatin 40 MG tablet  Commonly known as: LIPITOR      Dose: 40 mg  Take 40 mg by mouth At Bedtime  Refills: 0     Cyanocobalamin 2500 MCG Tabs      Dose: 2,500 mcg  Take 2,500 mcg by mouth daily  Refills: 0     levETIRAcetam 500 MG tablet  Commonly known as: KEPPRA  Used for: Seizure disorder (H)      TAKE 1 TABLET BY MOUTH TWICE DAILY  Quantity: 56 tablet  Refills: 12     primidone 50 MG tablet  Commonly known as: MYSOLINE      Dose: 50 mg  Take 50 mg by mouth 2 times daily  Refills: 0     senna-docusate 8.6-50 MG tablet  Commonly known as: SENOKOT-S/PERICOLACE      Dose: 1 tablet  Take 1 tablet by mouth 2 times daily as needed for constipation  Refills: 0     thiamine 50 MG Tabs      Dose: 50 mg  Take 50 mg by mouth daily  Refills: 0     * traMADol 50 MG tablet  Commonly known as: ULTRAM      Dose: 25 mg  Take 25 mg by mouth every 4 hours as needed for severe pain For Pain pain level 1 to 5  Refills: 0     * traMADol 50 MG tablet  Commonly known as: ULTRAM      Dose: 50 mg  Take 50 mg by mouth every 4 hours as needed for severe pain Pain pain level 6 to 10  Refills: 0         *  This list has 4 medication(s) that are the same as other medications prescribed for you. Read the directions carefully, and ask your doctor or other care provider to review them with you.                ASSESSMENT/PLAN  Encounter Diagnosis   Name Primary?     Swelling of hand, unspecified laterality Yes     Swelling left fingers and hand OT to evaluate for Isotoner glove, scheduled ice, pain control    Pain control as needed tramadol, scheduled Tylenol    Seizure disorder on Keppra    Anticoagulation management 81 mg aspirin twice daily x30 days then daily  Electronically signed by: Radha Ayoub

## 2021-08-11 ENCOUNTER — LAB REQUISITION (OUTPATIENT)
Dept: LAB | Facility: CLINIC | Age: 86
End: 2021-08-11
Payer: COMMERCIAL

## 2021-08-11 DIAGNOSIS — Z20.828 CONTACT WITH AND (SUSPECTED) EXPOSURE TO OTHER VIRAL COMMUNICABLE DISEASES: ICD-10-CM

## 2021-08-11 PROCEDURE — U0005 INFEC AGEN DETEC AMPLI PROBE: HCPCS | Mod: ORL | Performed by: FAMILY MEDICINE

## 2021-08-12 ENCOUNTER — DISCHARGE SUMMARY NURSING HOME (OUTPATIENT)
Dept: GERIATRICS | Facility: CLINIC | Age: 86
End: 2021-08-12
Payer: COMMERCIAL

## 2021-08-12 VITALS
BODY MASS INDEX: 25.3 KG/M2 | SYSTOLIC BLOOD PRESSURE: 146 MMHG | OXYGEN SATURATION: 94 % | HEIGHT: 66 IN | TEMPERATURE: 97.3 F | HEART RATE: 54 BPM | DIASTOLIC BLOOD PRESSURE: 70 MMHG | RESPIRATION RATE: 18 BRPM | WEIGHT: 157.4 LBS

## 2021-08-12 DIAGNOSIS — R41.82 ALTERED MENTAL STATUS, UNSPECIFIED ALTERED MENTAL STATUS TYPE: ICD-10-CM

## 2021-08-12 DIAGNOSIS — M79.89 SWELLING OF HAND, UNSPECIFIED LATERALITY: Primary | ICD-10-CM

## 2021-08-12 DIAGNOSIS — Z96.612 STATUS POST REVERSE ARTHROPLASTY OF LEFT SHOULDER: ICD-10-CM

## 2021-08-12 LAB — SARS-COV-2 RNA RESP QL NAA+PROBE: NEGATIVE

## 2021-08-12 PROCEDURE — 99315 NF DSCHRG MGMT 30 MIN/LESS: CPT | Performed by: NURSE PRACTITIONER

## 2021-08-12 ASSESSMENT — MIFFLIN-ST. JEOR: SCORE: 1306.71

## 2021-08-12 NOTE — LETTER
8/12/2021        RE: Kurtis Urban  [address]  [facility] [address]        [facility] Geriatric Services    Chief Complaint   Patient presents with     Discharge Summary Nursing Home     [place] Medical Record Number:  [id number]  Place of Service where encounter took place:  [facility] (TCU) [[id number]]  Code Status:  FULL     HISTORY:      HPI:  Kurtis Urban  is 89 year old (2/23/1929) who was undergoing physical and occupational therapy. He is with past medical history dementia, seizure disorder, hypercholesteremia, depression, chronic pain, DVT right lower extremity, TIA, who was admitted to [facility] 7/28/2021 to 7/30/2021 where he underwent a left reverse total shoulder.     Today he is seen to review vital signs, labs, follow-up left shoulder arthroplasty and for a face-to-face for discharge.  Patient will discharge back to [facility] assisted living on Monday August 16, 2021 with current medications and treatments.  He will have [facility] home care services PT OT and home health aide. He reports his pain is controlled.  His left shoulder incision is healing well with no signs or symptoms of infection. He denied chest pain shortness of breath cough congestion constipation or diarrhea.    ALLERGIES:Penicillins, Alfuzosin, Donepezil, Myolin [norflex], Sulfa (sulfonamide antibiotics) [sulfa drugs], and Triazolam    PAST MEDICAL HISTORY:   Past Medical History:   Diagnosis Date     Dementia with behavioral disturbance (H) 9/3/2019     Depression 5/28/2014     Hypercholesteremia 5/28/2014     Melanoma of back (H) 5/28/2014    Surgically removed       Melanoma of face (H) 5/28/2014    Surgically removed      Seizure (H) 5/19/2015     Thiamine deficiency 9/3/2019     TIA (transient ischemic attack)        PAST SURGICAL HISTORY:   has a past surgical history that includes Cholecystectomy; appendectomy; other surgical history; other surgical history  (1993); hernia repair; shoulder surgery (Right); and other surgical history (Right).    FAMILY HISTORY: family history is not on file.    SOCIAL HISTORY:  reports that he has quit smoking. His smoking use included cigarettes. He has never used smokeless tobacco. He reports that he does not drink alcohol and does not use drugs.    ROS:  Constitutional: Decreased activity left upper extremity appetite change, fatigue and fever.   HENT: Negative for congestion.    Respiratory: Negative for cough, shortness of breath and wheezing.    Cardiovascular: Negative for chest pain and leg swelling.   Gastrointestinal: Negative for abdominal distention, abdominal pain, constipation, diarrhea and nausea.   Genitourinary: Negative for dysuria.   Musculoskeletal: Negative for arthralgia. Negative for back pain.   Skin: Negative for color change  surgical incision left shoulder  Neurological: Negative for dizziness.   Psychiatric/Behavioral: Negative for agitation, behavioral problems and confusion.     Physical Exam:  Constitutional:       Appearance: Patient is well-developed.   HENT:      Head: Normocephalic.   Eyes:      Conjunctiva/sclera: Conjunctivae normal.   Neck:      Musculoskeletal: Normal range of motion.   Cardiovascular:      Rate and Rhythm: Normal rate and regular rhythm.      Heart sounds: Normal heart sounds. No murmur.   Pulmonary:      Effort: No respiratory distress.      Breath sounds: Normal breath sounds. No wheezing or rales.   Abdominal:      General: Bowel sounds are normal. There is no distension.      Palpations: Abdomen is soft.      Tenderness: There is no abdominal tenderness.   Musculoskeletal:      Decreased range of motion left upper extremity, left hand swelling   Skin:General:        Skin is warm.   Neurological:         Mental Status: Patient is alert and oriented to person, place, and time.   Psychiatric:         Behavior: Behavior normal.     Vitals:  BP (!) 146/70   Pulse 54   Temp 97.3  F  "(36.3  C)   Resp 18   Ht 1.676 m (5' 6\")   Wt 71.4 kg (157 lb 6.4 oz)   SpO2 94%   BMI 25.41 kg/m    Body mass index is 25.41 kg/m .      MEDICATIONS:     Review of your medicines          Accurate as of August 12, 2021  3:07 PM. If you have any questions, ask your nurse or doctor.            CONTINUE these medicines which have NOT CHANGED      Dose / Directions   acetaminophen 500 MG tablet  Commonly known as: TYLENOL      Dose: 1,000 mg  Take 1,000 mg by mouth 3 times daily FOR 30 DAYS  Refills: 0     * aspirin 81 MG EC tablet      Dose: 81 mg  Take 81 mg by mouth 2 times daily (with meals) FOR 30 DAYS. Resume Daily 81 mg aspirin on 8/29/2021  Refills: 0     * aspirin 81 MG EC tablet      Dose: 81 mg  Start taking on: August 29, 2021  Take 81 mg by mouth daily Resume Daily 81 mg aspirin on 8/29/2021  Refills: 0     atorvastatin 40 MG tablet  Commonly known as: LIPITOR      Dose: 40 mg  Take 40 mg by mouth At Bedtime  Refills: 0     Cyanocobalamin 2500 MCG Tabs      Dose: 2,500 mcg  Take 2,500 mcg by mouth daily  Refills: 0     levETIRAcetam 500 MG tablet  Commonly known as: KEPPRA  Used for: Seizure disorder (H)      TAKE 1 TABLET BY MOUTH TWICE DAILY  Quantity: 56 tablet  Refills: 12     primidone 50 MG tablet  Commonly known as: MYSOLINE      Dose: 50 mg  Take 50 mg by mouth 2 times daily  Refills: 0     senna-docusate 8.6-50 MG tablet  Commonly known as: SENOKOT-S/PERICOLACE      Dose: 1 tablet  Take 1 tablet by mouth 2 times daily as needed for constipation  Refills: 0     thiamine 50 MG Tabs      Dose: 50 mg  Take 50 mg by mouth daily  Refills: 0     * traMADol 50 MG tablet  Commonly known as: ULTRAM      Dose: 25 mg  Take 25 mg by mouth every 4 hours as needed for severe pain For Pain pain level 1 to 5  Refills: 0     * traMADol 50 MG tablet  Commonly known as: ULTRAM      Dose: 25-50 mg  Take 25-50 mg by mouth every 4 hours as needed for severe pain Pain pain level 6 to 10  Refills: 0         * This " list has 4 medication(s) that are the same as other medications prescribed for you. Read the directions carefully, and ask your doctor or other care provider to review them with you.                 DISCHARGE DIAGNOSIS:  Encounter Diagnoses   Name Primary?     Swelling of hand, unspecified laterality Yes     Altered mental status, unspecified altered mental status type      Status post reverse arthroplasty of left shoulder        MEDICAL EQUIPMENT NEEDS:  Patient has own equipment    DISCHARGE PLAN/FACE TO FACE:  I certify that services are/were furnished while this patient was under the care of a physician and that a physician or an allowed non-physician practitioner (NPP), had a face-to-face encounter that meets the physician face-to-face encounter requirements. The encounter was in whole, or in part, related to the primary reason for home health. The patient is confined to his/her home and needs intermittent skilled nursing, physical therapy, speech-language pathology, or the continued need for occupational therapy. A plan of care has been established by a physician and is periodically reviewed by a physician.  Date of Face-to-Face Encounter: 8/12/2021    I certify that, based on my findings, the following services are medically necessary home health services: PT OT home health aide    My clinical findings support the need for the above skilled services because: PT OT for continued strength and endurance following recent hospitalization for left shoulder reverse arthroplasty, home health aide to assist with activities of daily living.    This patient is homebound because: He is deconditioned easily fatigued following recent hospitalization for left shoulder reverse arthroplasty.  Patient is also with cognitive impairment making him unsafe to leave the home unassisted.    The patient is, or has been, under my care and I have initiated the establishment of the plan of care. This patient will be followed by a  physician who will periodically review the plan of care.    Schedule follow up visit with primary care provider within 7 days to reestablish care.    Electronically signed by: Juana Martinez CNP          Sincerely,        Juana Martinez CNP

## 2021-08-12 NOTE — PROGRESS NOTES
[facility]    Chief Complaint   Patient presents with     Discharge Summary Nursing Home     [place] Medical Record Number:  [id number]  Place of Service where encounter took place:  [facility] (TCU) [[id number]]  Code Status:  FULL     HISTORY:      HPI:  Kurtis Urban  is 89 year old (2/23/1929) who was undergoing physical and occupational therapy. He is with past medical history dementia, seizure disorder, hypercholesteremia, depression, chronic pain, DVT right lower extremity, TIA, who was admitted to [facility] 7/28/2021 to 7/30/2021 where he underwent a left reverse total shoulder.     Today he is seen to review vital signs, labs, follow-up left shoulder arthroplasty and for a face-to-face for discharge.  Patient will discharge back to [facility] assisted living on Monday August 16, 2021 with current medications and treatments.  He will have [facility] home care services PT OT and home health aide. He reports his pain is controlled.  His left shoulder incision is healing well with no signs or symptoms of infection. He denied chest pain shortness of breath cough congestion constipation or diarrhea.    ALLERGIES:Penicillins, Alfuzosin, Donepezil, Myolin [norflex], Sulfa (sulfonamide antibiotics) [sulfa drugs], and Triazolam    PAST MEDICAL HISTORY:   Past Medical History:   Diagnosis Date     Dementia with behavioral disturbance (H) 9/3/2019     Depression 5/28/2014     Hypercholesteremia 5/28/2014     Melanoma of back (H) 5/28/2014    Surgically removed       Melanoma of face (H) 5/28/2014    Surgically removed      Seizure (H) 5/19/2015     Thiamine deficiency 9/3/2019     TIA (transient ischemic attack)        PAST SURGICAL HISTORY:   has a past surgical history that includes Cholecystectomy; appendectomy; other surgical history; other surgical history (1993); hernia repair; shoulder surgery (Right); and other surgical history (Right).    FAMILY  "HISTORY: family history is not on file.    SOCIAL HISTORY:  reports that he has quit smoking. His smoking use included cigarettes. He has never used smokeless tobacco. He reports that he does not drink alcohol and does not use drugs.    ROS:  Constitutional: Decreased activity left upper extremity appetite change, fatigue and fever.   HENT: Negative for congestion.    Respiratory: Negative for cough, shortness of breath and wheezing.    Cardiovascular: Negative for chest pain and leg swelling.   Gastrointestinal: Negative for abdominal distention, abdominal pain, constipation, diarrhea and nausea.   Genitourinary: Negative for dysuria.   Musculoskeletal: Negative for arthralgia. Negative for back pain.   Skin: Negative for color change  surgical incision left shoulder  Neurological: Negative for dizziness.   Psychiatric/Behavioral: Negative for agitation, behavioral problems and confusion.     Physical Exam:  Constitutional:       Appearance: Patient is well-developed.   HENT:      Head: Normocephalic.   Eyes:      Conjunctiva/sclera: Conjunctivae normal.   Neck:      Musculoskeletal: Normal range of motion.   Cardiovascular:      Rate and Rhythm: Normal rate and regular rhythm.      Heart sounds: Normal heart sounds. No murmur.   Pulmonary:      Effort: No respiratory distress.      Breath sounds: Normal breath sounds. No wheezing or rales.   Abdominal:      General: Bowel sounds are normal. There is no distension.      Palpations: Abdomen is soft.      Tenderness: There is no abdominal tenderness.   Musculoskeletal:      Decreased range of motion left upper extremity, left hand swelling   Skin:General:        Skin is warm.   Neurological:         Mental Status: Patient is alert and oriented to person, place, and time.   Psychiatric:         Behavior: Behavior normal.     Vitals:  BP (!) 146/70   Pulse 54   Temp 97.3  F (36.3  C)   Resp 18   Ht 1.676 m (5' 6\")   Wt 71.4 kg (157 lb 6.4 oz)   SpO2 94%   BMI " 25.41 kg/m    Body mass index is 25.41 kg/m .      MEDICATIONS:     Review of your medicines          Accurate as of August 12, 2021  3:07 PM. If you have any questions, ask your nurse or doctor.            CONTINUE these medicines which have NOT CHANGED      Dose / Directions   acetaminophen 500 MG tablet  Commonly known as: TYLENOL      Dose: 1,000 mg  Take 1,000 mg by mouth 3 times daily FOR 30 DAYS  Refills: 0     * aspirin 81 MG EC tablet      Dose: 81 mg  Take 81 mg by mouth 2 times daily (with meals) FOR 30 DAYS. Resume Daily 81 mg aspirin on 8/29/2021  Refills: 0     * aspirin 81 MG EC tablet      Dose: 81 mg  Start taking on: August 29, 2021  Take 81 mg by mouth daily Resume Daily 81 mg aspirin on 8/29/2021  Refills: 0     atorvastatin 40 MG tablet  Commonly known as: LIPITOR      Dose: 40 mg  Take 40 mg by mouth At Bedtime  Refills: 0     Cyanocobalamin 2500 MCG Tabs      Dose: 2,500 mcg  Take 2,500 mcg by mouth daily  Refills: 0     levETIRAcetam 500 MG tablet  Commonly known as: KEPPRA  Used for: Seizure disorder (H)      TAKE 1 TABLET BY MOUTH TWICE DAILY  Quantity: 56 tablet  Refills: 12     primidone 50 MG tablet  Commonly known as: MYSOLINE      Dose: 50 mg  Take 50 mg by mouth 2 times daily  Refills: 0     senna-docusate 8.6-50 MG tablet  Commonly known as: SENOKOT-S/PERICOLACE      Dose: 1 tablet  Take 1 tablet by mouth 2 times daily as needed for constipation  Refills: 0     thiamine 50 MG Tabs      Dose: 50 mg  Take 50 mg by mouth daily  Refills: 0     * traMADol 50 MG tablet  Commonly known as: ULTRAM      Dose: 25 mg  Take 25 mg by mouth every 4 hours as needed for severe pain For Pain pain level 1 to 5  Refills: 0     * traMADol 50 MG tablet  Commonly known as: ULTRAM      Dose: 25-50 mg  Take 25-50 mg by mouth every 4 hours as needed for severe pain Pain pain level 6 to 10  Refills: 0         * This list has 4 medication(s) that are the same as other medications prescribed for you. Read the  directions carefully, and ask your doctor or other care provider to review them with you.                 DISCHARGE DIAGNOSIS:  Encounter Diagnoses   Name Primary?     Swelling of hand, unspecified laterality Yes     Altered mental status, unspecified altered mental status type      Status post reverse arthroplasty of left shoulder        MEDICAL EQUIPMENT NEEDS:  Patient has own equipment    DISCHARGE PLAN/FACE TO FACE:  I certify that services are/were furnished while this patient was under the care of a physician and that a physician or an allowed non-physician practitioner (NPP), had a face-to-face encounter that meets the physician face-to-face encounter requirements. The encounter was in whole, or in part, related to the primary reason for home health. The patient is confined to his/her home and needs intermittent skilled nursing, physical therapy, speech-language pathology, or the continued need for occupational therapy. A plan of care has been established by a physician and is periodically reviewed by a physician.  Date of Face-to-Face Encounter: 8/12/2021    I certify that, based on my findings, the following services are medically necessary home health services: PT OT home health aide    My clinical findings support the need for the above skilled services because: PT OT for continued strength and endurance following recent hospitalization for left shoulder reverse arthroplasty, home health aide to assist with activities of daily living.    This patient is homebound because: He is deconditioned easily fatigued following recent hospitalization for left shoulder reverse arthroplasty.  Patient is also with cognitive impairment making him unsafe to leave the home unassisted.    The patient is, or has been, under my care and I have initiated the establishment of the plan of care. This patient will be followed by a physician who will periodically review the plan of care.    Schedule follow up visit with primary  care provider within 7 days to reestablish care.    Electronically signed by: Juana Martinez CNP

## 2021-08-23 NOTE — PROGRESS NOTES
[facility] Medical Record Number:  [id number]  Place of Service where encounter took place: [facility] (TCU) [[id number]]  CODE STATUS:   CPR/Full code       Chief Complaint/Reason for Visit:  Chief Complaint   Patient presents with     Hospital F/U     Admit note to TCU after left shoulder surgery.        HPI:    Kurtis Urban is a 89 year old male with hx of seizure disorder, HLD, cognitive impairment, hx of DVT, completed course of eliquis, chronic anemia, admitted electively to the hospital on 7/28/2021 for left shoulder reverse total shoulder arthroplasty for OA with rotator cuff arthropathy.     [facility] Discharge Summary    Admit Date: 7/28/2021   Discharge Date: 07/30/2021  Reason for admission: The patient was admitted for the following:Procedure(s) (LRB):  REVERSE TOTAL SHOULDER JOINT REPLACEMENT (Left)     Pre-Op Diagnosis Codes:  * Rotator cuff arthropathy of left shoulder     Following the procedure noted above the patient was transferred to the post-op floor and started on:   Therapy: Physical Therapy  Anticoagulation Medications: ASA 81 mg twice daily x 30 days  Pain Management: Tylenol and Ultram  Weight bearing status: Partial weight bearing LUE  Complications: None  Consultations: PT/OT    Weight Bearing Restrictions   Partial weight bearing with left arm; may remove sling for exercises, daily hygiene, skin inspection and dressing changes. Do not resume full weight bearing on the affected limb(s) until your care provider tells you to do so.     Overall stabilized and discharged to TCU on 7/30/2021 for PT, OT, nursing cares, medical management and monitoring.     Today:  He is in a sling on left, partial weight bearing allowed. He will see ortho in 10-14 days. He reports pain is well controlled. Denies shortness of breath, chest pain, dizziness. There wear no complications when he was in the hospital. He has seizure  disorder on keppra and primidone. He is on aspirin 81 mg BID for 30 days for DVT prevention. It is noted in his record he has a hx of DVT and was on eliquis. It appears this was stopped at his pre-op visit per PMD as he has completed the course and will not need anticoagulation than aspirin. He has good appetite, No abdominal pain, nausea, vomiting, diarrhea or constipation. He is hard of hearing, no new vision concerns. He stated he lives here, [facility] either [facility] or independent living, unclear.       PAST MEDICAL HISTORY:  Past Medical History:   Diagnosis Date     Dementia with behavioral disturbance (H) 9/3/2019     Depression 5/28/2014     Hypercholesteremia 5/28/2014     Melanoma of back (H) 5/28/2014    Surgically removed       Melanoma of face (H) 5/28/2014    Surgically removed      Seizure (H) 5/19/2015     Thiamine deficiency 9/3/2019     TIA (transient ischemic attack)        PAST SURGICAL HISTORY:  Past Surgical History:   Procedure Laterality Date     APPENDECTOMY       CHOLECYSTECTOMY       HERNIA REPAIR       OTHER SURGICAL HISTORY      nasal surgeries     OTHER SURGICAL HISTORY  1993    meniscal surgery     OTHER SURGICAL HISTORY Right     total knee repair     SHOULDER SURGERY Right        FAMILY HISTORY:  Family History   Problem Relation Age of Onset     Cancer Mother      Cerebrovascular Disease Maternal Grandfather        SOCIAL HISTORY:  Social History     Socioeconomic History     Marital status:      Spouse name: Not on file     Number of children: Not on file     Years of education: Not on file     Highest education level: Not on file   Occupational History     Not on file   Tobacco Use     Smoking status: Former Smoker     Types: Cigarettes     Smokeless tobacco: Never Used   Substance and Sexual Activity     Alcohol use: Never     Drug use: Never     Sexual activity: Not on file   Other Topics Concern     Not on file   Social History Narrative     Not on file     Social  Determinants of Health     Financial Resource Strain:      Difficulty of Paying Living Expenses:    Food Insecurity:      Worried About Running Out of Food in the Last Year:      Ran Out of Food in the Last Year:    Transportation Needs:      Lack of Transportation (Medical):      Lack of Transportation (Non-Medical):    Physical Activity:      Days of Exercise per Week:      Minutes of Exercise per Session:    Stress:      Feeling of Stress :    Social Connections:      Frequency of Communication with Friends and Family:      Frequency of Social Gatherings with Friends and Family:      Attends [employer] Services:      Active Member of Clubs or Organizations:      Attends Club or Organization Meetings:      Marital Status:    Intimate Partner Violence:      Fear of Current or Ex-Partner:      Emotionally Abused:      Physically Abused:      Sexually Abused:        MEDICATIONS:  Current Outpatient Medications   Medication Sig Dispense Refill     acetaminophen (TYLENOL) 500 MG tablet Take 1,000 mg by mouth 3 times daily FOR 30 DAYS       aspirin 81 MG EC tablet Take 81 mg by mouth 2 times daily (with meals) FOR 30 DAYS. Resume Daily 81 mg aspirin on 8/29/2021       [START ON 8/29/2021] aspirin 81 MG EC tablet Take 81 mg by mouth daily Resume Daily 81 mg aspirin on 8/29/2021       atorvastatin (LIPITOR) 40 MG tablet Take 40 mg by mouth At Bedtime       Cyanocobalamin 2500 MCG TABS Take 2,500 mcg by mouth daily       levETIRAcetam (KEPPRA) 500 MG tablet TAKE 1 TABLET BY MOUTH TWICE DAILY 56 tablet 12     primidone (MYSOLINE) 50 MG tablet Take 50 mg by mouth 2 times daily       senna-docusate (SENOKOT-S/PERICOLACE) 8.6-50 MG tablet Take 1 tablet by mouth 2 times daily as needed for constipation       thiamine 50 MG TABS Take 50 mg by mouth daily       traMADol (ULTRAM) 50 MG tablet Take 25 mg by mouth every 4 hours as needed for severe pain For Pain pain level 1 to 5       traMADol (ULTRAM) 50 MG tablet Take 25-50 mg by  "mouth every 4 hours as needed for severe pain Pain pain level 6 to 10           ALLERGIES:  Allergies   Allergen Reactions     Penicillins Anaphylaxis and Nausea and Vomiting     Alfuzosin Nausea and Vomiting     Donepezil Other (See Comments)     Myolin [Norflex] Unknown     Other reaction(s): hives     Sulfa (Sulfonamide Antibiotics) [Sulfa Drugs] Nausea and Vomiting     Triazolam Nausea and Vomiting       REVIEW OF SYSTEMS:  Pertinent items as noted in HPI.      PHYSICAL EXAM:  General: Patient is alert elderly [place] male, no distress.   Vitals: BP (!) 154/70   Pulse 76   Temp 98.4  F (36.9  C)   Resp 18   Ht 1.727 m (5' 8\")   Wt 65.8 kg (145 lb)   SpO2 94%   BMI 22.05 kg/m    HEENT: Head is NCAT. Eyes show no injection or icterus. Nares negative. Oropharynx well hydrated.  Neck: Supple. No tenderness or adenopathy. No JVD.  Lungs: Non labored respirations.   Back: No spinal or CVA tenderness.  Abdomen: Soft, no tenderness on exam. Bowel sounds present. No guarding rebound or rigidity.  : Deferred.  Extremities: No LE edema is noted.  Musculoskeletal: Sling LUE.  Skin: Surgical bandage left shoulder.   Psych: Mood appears good.      LABS/DIAGNOSTIC DATA:  Ref Range & Units  7/29/2021    Hemoglobin 13.5 - 17.5 g/dL 11.1Low        Ref Range & Units  7/29/2021   Sodium 136 - 145 mmol/L 138        Potassium 3.5 - 5.1 mmol/L 4.4        Chloride 98 - 109 mmol/L 111High         CO2 20 - 29 mmol/L 20        Anion Gap 7 - 16 mmol/L 7        Calcium 8.4 - 10.4 mg/dL 9.0        BUN 7 - 26 mg/dL 21        Creatinine 0.73 - 1.18 mg/dL 1.42High         GFR, Estimated >60 mL/min/1.73m2 43Low         Glucose 70 - 100 mg/dL 84          ASSESSMENT/PLAN:  1. Left shoulder reverse arthroplasty. 7/28/2021. On aspirin BID for 30 days for DVT prevention. Has tramadol prn for pain management, adequate. Follow up with ortho.  2. Seizure disorder. On Keppra and primidone. Stable.  3. HLD. Continue PTA atorvastatin.  4. Cognitive " impairment. At baseline.  5. Hx of DVT. Eliquis had been stopped at his preop visit, he will complete aspirin for DVT prevention.  6. Anemia. Chronic with baseline around 11. Post op hgb at 11.1.  7. Renal insufficiency. Last creatinine 1.42. Recheck with PMD at follow up appt post TCU.  8. Code status is full code.           Electronically signed by: Roselia Vega MD

## 2022-01-13 ENCOUNTER — LAB REQUISITION (OUTPATIENT)
Dept: LAB | Facility: CLINIC | Age: 87
End: 2022-01-13
Payer: COMMERCIAL

## 2022-01-13 DIAGNOSIS — Z20.828 CONTACT WITH AND (SUSPECTED) EXPOSURE TO OTHER VIRAL COMMUNICABLE DISEASES: ICD-10-CM

## 2022-01-14 PROCEDURE — U0003 INFECTIOUS AGENT DETECTION BY NUCLEIC ACID (DNA OR RNA); SEVERE ACUTE RESPIRATORY SYNDROME CORONAVIRUS 2 (SARS-COV-2) (CORONAVIRUS DISEASE [COVID-19]), AMPLIFIED PROBE TECHNIQUE, MAKING USE OF HIGH THROUGHPUT TECHNOLOGIES AS DESCRIBED BY CMS-2020-01-R: HCPCS | Mod: ORL | Performed by: FAMILY MEDICINE

## 2022-01-15 LAB — SARS-COV-2 RNA RESP QL NAA+PROBE: NEGATIVE

## 2022-01-19 ENCOUNTER — LAB REQUISITION (OUTPATIENT)
Dept: LAB | Facility: CLINIC | Age: 87
End: 2022-01-19
Payer: COMMERCIAL

## 2022-01-19 DIAGNOSIS — Z20.828 CONTACT WITH AND (SUSPECTED) EXPOSURE TO OTHER VIRAL COMMUNICABLE DISEASES: ICD-10-CM

## 2022-05-05 ENCOUNTER — TRANSFERRED RECORDS (OUTPATIENT)
Dept: HEALTH INFORMATION MANAGEMENT | Facility: CLINIC | Age: 87
End: 2022-05-05

## 2022-05-12 ENCOUNTER — TRANSFERRED RECORDS (OUTPATIENT)
Dept: HEALTH INFORMATION MANAGEMENT | Facility: CLINIC | Age: 87
End: 2022-05-12

## 2022-05-12 ENCOUNTER — TRANSFERRED RECORDS (OUTPATIENT)
Dept: HEALTH INFORMATION MANAGEMENT | Facility: CLINIC | Age: 87
End: 2022-05-12
Payer: COMMERCIAL

## 2022-05-12 LAB — PHQ9 SCORE: 0

## 2022-05-20 ENCOUNTER — TRANSFERRED RECORDS (OUTPATIENT)
Dept: HEALTH INFORMATION MANAGEMENT | Facility: CLINIC | Age: 87
End: 2022-05-20

## 2022-05-20 ENCOUNTER — TRANSFERRED RECORDS (OUTPATIENT)
Dept: HEALTH INFORMATION MANAGEMENT | Facility: CLINIC | Age: 87
End: 2022-05-20
Payer: COMMERCIAL

## 2022-05-23 ENCOUNTER — MEDICAL CORRESPONDENCE (OUTPATIENT)
Dept: HEALTH INFORMATION MANAGEMENT | Facility: CLINIC | Age: 87
End: 2022-05-23
Payer: COMMERCIAL

## 2022-05-24 ENCOUNTER — TRANSCRIBE ORDERS (OUTPATIENT)
Dept: OTHER | Age: 87
End: 2022-05-24
Payer: COMMERCIAL

## 2022-05-24 DIAGNOSIS — R41.3 MEMORY LOSS: Primary | ICD-10-CM

## 2022-06-22 ENCOUNTER — LAB REQUISITION (OUTPATIENT)
Dept: LAB | Facility: CLINIC | Age: 87
End: 2022-06-22

## 2022-06-22 DIAGNOSIS — Z01.818 ENCOUNTER FOR OTHER PREPROCEDURAL EXAMINATION: ICD-10-CM

## 2022-06-22 LAB
ANION GAP SERPL CALCULATED.3IONS-SCNC: 13 MMOL/L (ref 5–18)
BUN SERPL-MCNC: 17 MG/DL (ref 8–28)
CALCIUM SERPL-MCNC: 9.5 MG/DL (ref 8.5–10.5)
CHLORIDE BLD-SCNC: 107 MMOL/L (ref 98–107)
CO2 SERPL-SCNC: 18 MMOL/L (ref 22–31)
CREAT SERPL-MCNC: 1.18 MG/DL (ref 0.7–1.3)
GFR SERPL CREATININE-BSD FRML MDRD: 58 ML/MIN/1.73M2
GLUCOSE BLD-MCNC: 87 MG/DL (ref 70–125)
POTASSIUM BLD-SCNC: 5 MMOL/L (ref 3.5–5)
SODIUM SERPL-SCNC: 138 MMOL/L (ref 136–145)

## 2022-06-22 PROCEDURE — 80048 BASIC METABOLIC PNL TOTAL CA: CPT | Performed by: STUDENT IN AN ORGANIZED HEALTH CARE EDUCATION/TRAINING PROGRAM

## 2022-06-27 ENCOUNTER — LAB REQUISITION (OUTPATIENT)
Dept: LAB | Facility: CLINIC | Age: 87
End: 2022-06-27
Payer: COMMERCIAL

## 2022-06-27 DIAGNOSIS — Z01.812 ENCOUNTER FOR PREPROCEDURAL LABORATORY EXAMINATION: ICD-10-CM

## 2022-06-27 LAB — SARS-COV-2 RNA RESP QL NAA+PROBE: NEGATIVE

## 2022-06-27 PROCEDURE — U0005 INFEC AGEN DETEC AMPLI PROBE: HCPCS | Mod: ORL | Performed by: STUDENT IN AN ORGANIZED HEALTH CARE EDUCATION/TRAINING PROGRAM

## 2022-07-05 ENCOUNTER — TRANSITIONAL CARE UNIT VISIT (OUTPATIENT)
Dept: GERIATRICS | Facility: CLINIC | Age: 87
End: 2022-07-05
Payer: COMMERCIAL

## 2022-07-05 VITALS
WEIGHT: 149 LBS | BODY MASS INDEX: 23.95 KG/M2 | OXYGEN SATURATION: 95 % | RESPIRATION RATE: 18 BRPM | TEMPERATURE: 97.5 F | DIASTOLIC BLOOD PRESSURE: 71 MMHG | HEART RATE: 83 BPM | SYSTOLIC BLOOD PRESSURE: 132 MMHG | HEIGHT: 66 IN

## 2022-07-05 DIAGNOSIS — D62 ANEMIA DUE TO BLOOD LOSS, ACUTE: ICD-10-CM

## 2022-07-05 DIAGNOSIS — R53.81 PHYSICAL DECONDITIONING: ICD-10-CM

## 2022-07-05 DIAGNOSIS — M12.811 ROTATOR CUFF ARTHROPATHY OF RIGHT SHOULDER: Primary | ICD-10-CM

## 2022-07-05 DIAGNOSIS — G40.909 SEIZURE DISORDER (H): ICD-10-CM

## 2022-07-05 DIAGNOSIS — F03.91 DEMENTIA WITH BEHAVIORAL DISTURBANCE, UNSPECIFIED DEMENTIA TYPE: ICD-10-CM

## 2022-07-05 DIAGNOSIS — E78.5 HYPERLIPIDEMIA, UNSPECIFIED HYPERLIPIDEMIA TYPE: ICD-10-CM

## 2022-07-05 DIAGNOSIS — Z96.611 STATUS POST REVERSE TOTAL REPLACEMENT OF RIGHT SHOULDER: ICD-10-CM

## 2022-07-05 DIAGNOSIS — K59.00 CONSTIPATION, UNSPECIFIED CONSTIPATION TYPE: ICD-10-CM

## 2022-07-05 PROCEDURE — 99310 SBSQ NF CARE HIGH MDM 45: CPT | Performed by: NURSE PRACTITIONER

## 2022-07-05 RX ORDER — ACETAMINOPHEN 500 MG
1000 TABLET ORAL 3 TIMES DAILY
COMMUNITY
End: 2023-01-01

## 2022-07-05 RX ORDER — TRIAMCINOLONE ACETONIDE 1 MG/G
CREAM TOPICAL DAILY PRN
COMMUNITY
End: 2023-01-01

## 2022-07-05 NOTE — PROGRESS NOTES
[facility] GERIATRICS  PRIMARY CARE PROVIDER AND CLINIC:  Juan West MD, [address] [place]   Chief Complaint   Patient presents with     Hospital F/U      [place] Medical Record Number:  [id number]  Place of Service where encounter took place:  [facility] ([facility]) [[id number]]    Kurtis Urban  is a 89 year old  (2/23/1929), admitted to the above facility from  [facility]. Hospital stay 6/29/22 through 7/1/22.     HPI:    This is a 89-year-old male, with a past medical history significant for right DVT, chronic pain, dementia with behavioral disturbance, depression, CVA, Meniere's disease, seizure disorder and rotator cuff arthropathy, who was admitted to [facility] 6/29/22 through 7/1/22 for a right total reverse shoulder joint replacement on 6/29/22. Hemoglobin 12.9 on 6/29/22 -> 10.9 on 6/30/22. A TCU stay was recommended for ongoing physical rehabilitation.    Today, patient is walking out of his bathroom without his pants on. Has right sling in place and is trying to put his shorts on. Requests help pulling shorts all the way up. Says he is going home tomorrow. When asked how he will pull his pants on at home, does not have an answer. Denies pain.     CODE STATUS/ADVANCE DIRECTIVES DISCUSSION:  No Order  CPR/Full code   ALLERGIES:   Allergies   Allergen Reactions     Penicillins Anaphylaxis and Nausea and Vomiting     Alfuzosin Nausea and Vomiting     Donepezil Other (See Comments)     Myolin [Norflex] Unknown     Other reaction(s): hives     Sulfa (Sulfonamide Antibiotics) [Sulfa Drugs] Nausea and Vomiting     Triazolam Nausea and Vomiting      PAST MEDICAL HISTORY:   Past Medical History:   Diagnosis Date     Dementia with behavioral disturbance (H) 9/3/2019     Depression 5/28/2014     Hypercholesteremia 5/28/2014     Melanoma of back (H) 5/28/2014    Surgically removed       Melanoma of face (H) 5/28/2014    Surgically removed       Seizure (H) 5/19/2015     Thiamine deficiency 9/3/2019     TIA (transient ischemic attack)       PAST SURGICAL HISTORY:   has a past surgical history that includes Cholecystectomy; appendectomy; other surgical history; other surgical history (1993); hernia repair; shoulder surgery (Right); and other surgical history (Right).  FAMILY HISTORY: family history includes Cancer in his mother; Cerebrovascular Disease in his maternal grandfather.  SOCIAL HISTORY:   reports that he has quit smoking. His smoking use included cigarettes. He has never used smokeless tobacco. He reports that he does not drink alcohol and does not use drugs.  Patient's living condition: lives alone in independent living    Post Discharge Medication Reconciliation Status: discharge medications reconciled, continue medications without change  Current Outpatient Medications   Medication Sig     acetaminophen (TYLENOL) 500 MG tablet Take 1,000 mg by mouth 3 times daily     aspirin 81 MG EC tablet Take 81 mg by mouth daily Resume Daily 81 mg aspirin on 8/29/2021     atorvastatin (LIPITOR) 40 MG tablet Take 40 mg by mouth At Bedtime     Cyanocobalamin 2500 MCG TABS Take 2,500 mcg by mouth daily     levETIRAcetam (KEPPRA) 500 MG tablet TAKE 1 TABLET BY MOUTH TWICE DAILY     primidone (MYSOLINE) 50 MG tablet Take 50 mg by mouth 2 times daily     senna-docusate (SENOKOT-S/PERICOLACE) 8.6-50 MG tablet Take 1 tablet by mouth 2 times daily as needed for constipation     thiamine 50 MG TABS Take 50 mg by mouth daily     traMADol (ULTRAM) 50 MG tablet Take 25 mg by mouth every 4 hours as needed for severe pain For Pain pain level 1 to 5     traMADol (ULTRAM) 50 MG tablet Take 25-50 mg by mouth every 4 hours as needed for severe pain Pain pain level 6 to 10      triamcinolone (KENALOG) 0.1 % external cream Apply topically 2 times daily     No current facility-administered medications for this visit.     ROS:  4 point ROS including Respiratory, CV, GI and ,  "other than that noted in the HPI,  is negative    Vitals:  /71   Pulse 83   Temp 97.5  F (36.4  C)   Resp 18   Ht 1.676 m (5' 6\")   Wt 67.6 kg (149 lb)   SpO2 95%   BMI 24.05 kg/m    Exam:  GENERAL APPEARANCE:  Alert, in no distress  ENT:  Mouth and posterior oropharynx normal, moist mucous membranes  EYES:  EOM, conjunctivae, lids, pupils and irises normal  RESP:  respiratory effort and palpation of chest normal, lungs clear to auscultation , no respiratory distress  CV:  Palpation and auscultation of heart done , regular rate and rhythm, no murmur, rub, or gallop  ABDOMEN:  normal bowel sounds, soft, nontender, no hepatosplenomegaly or other masses  M/S:   Right upper extremity in sling. CWMS intact in right hand  SKIN:  Did not visualize surgical incision  NEURO:   Cranial nerves 2-12 are normal tested and grossly at patient's baseline  PSYCH:  oriented to person    Lab/Diagnostic data:  Labs done in SNF are in [facility]. Please refer to them using [facility] Everywhere.    ASSESSMENT/PLAN:  Right Rotator Cuff Arthropathy S/P Right Total Reverse Shoulder Replacement on 6/29/22. Follow-up with Ortho in 10-14 days.  Partial weight bearing to RUE. Sling for comfort. Acetaminophen and Tramadol ordered for pain control. Aspirin 81 mg BID x 30 days until 7/29/22. Physical and Occupational Therapy ordered.    Acute Blood Loss Anemia. Secondary to above. Hemoglobin 12.9 on 6/29/22 -> 10.9 on 6/30/22. Repeat CBC on 7/7/22 to ensure stability.     Dementia with Behavioral Disturbance. Occupational Therapy to evaluate cognitive status. Lived in [place] at [facility].    Seizure Disorder. Continue Levetiracetam and Primidone as ordered.    Hyperlipidemia. Continue Atorvastatin as ordered.    Constipation. Continue Senna-S as ordered.    Physical Deconditioning. Secondary to recent surgery and co-morbidities. Physical and Occupational Therapy ordered.    Orders:  CBC on 7/7/22    Total time spent with patient " visit at the skilled nursing facility was 35 min including patient visit and review of past records. Greater than 50% of total time spent with counseling and coordinating care due to review of hospitalization, review of facility orders, review of code status, review of TCU stay, orders written at facility, and review of discharge plan with staff.     Electronically signed by:  EDILMA Junior CNP

## 2022-07-05 NOTE — LETTER
7/5/2022        RE: Kurtis Urban  [address]  [facility] [id number]        [facility] GERIATRICS  PRIMARY CARE PROVIDER AND CLINIC:  Juan West MD, [place] [id number] [facility] [address] [place]   Chief Complaint   Patient presents with     Hospital F/U      [place] Medical Record Number:  [id number]  Place of Service where encounter took place:  [facility] ([facility]) [[id number]]    Kurtis Urban  is a 89 year old  (2/23/1929), admitted to the above facility from  [facility]. Hospital stay 6/29/22 through 7/1/22.     HPI:    This is a 89-year-old male, with a past medical history significant for right DVT, chronic pain, dementia with behavioral disturbance, depression, CVA, Meniere's disease, seizure disorder and rotator cuff arthropathy, who was admitted to [facility] 6/29/22 through 7/1/22 for a right total reverse shoulder joint replacement on 6/29/22. Hemoglobin 12.9 on 6/29/22 -> 10.9 on 6/30/22. A TCU stay was recommended for ongoing physical rehabilitation.    Today, patient is walking out of his bathroom without his pants on. Has right sling in place and is trying to put his shorts on. Requests help pulling shorts all the way up. Says he is going home tomorrow. When asked how he will pull his pants on at home, does not have an answer. Denies pain.     CODE STATUS/ADVANCE DIRECTIVES DISCUSSION:  No Order  CPR/Full code   ALLERGIES:   Allergies   Allergen Reactions     Penicillins Anaphylaxis and Nausea and Vomiting     Alfuzosin Nausea and Vomiting     Donepezil Other (See Comments)     Myolin [Norflex] Unknown     Other reaction(s): hives     Sulfa (Sulfonamide Antibiotics) [Sulfa Drugs] Nausea and Vomiting     Triazolam Nausea and Vomiting      PAST MEDICAL HISTORY:   Past Medical History:   Diagnosis Date     Dementia with behavioral disturbance (H) 9/3/2019     Depression 5/28/2014     Hypercholesteremia 5/28/2014     Melanoma of back (H)  5/28/2014    Surgically removed       Melanoma of face (H) 5/28/2014    Surgically removed      Seizure (H) 5/19/2015     Thiamine deficiency 9/3/2019     TIA (transient ischemic attack)       PAST SURGICAL HISTORY:   has a past surgical history that includes Cholecystectomy; appendectomy; other surgical history; other surgical history (1993); hernia repair; shoulder surgery (Right); and other surgical history (Right).  FAMILY HISTORY: family history includes Cancer in his mother; Cerebrovascular Disease in his maternal grandfather.  SOCIAL HISTORY:   reports that he has quit smoking. His smoking use included cigarettes. He has never used smokeless tobacco. He reports that he does not drink alcohol and does not use drugs.  Patient's living condition: lives alone in independent living    Post Discharge Medication Reconciliation Status: discharge medications reconciled, continue medications without change  Current Outpatient Medications   Medication Sig     acetaminophen (TYLENOL) 500 MG tablet Take 1,000 mg by mouth 3 times daily     aspirin 81 MG EC tablet Take 81 mg by mouth daily Resume Daily 81 mg aspirin on 8/29/2021     atorvastatin (LIPITOR) 40 MG tablet Take 40 mg by mouth At Bedtime     Cyanocobalamin 2500 MCG TABS Take 2,500 mcg by mouth daily     levETIRAcetam (KEPPRA) 500 MG tablet TAKE 1 TABLET BY MOUTH TWICE DAILY     primidone (MYSOLINE) 50 MG tablet Take 50 mg by mouth 2 times daily     senna-docusate (SENOKOT-S/PERICOLACE) 8.6-50 MG tablet Take 1 tablet by mouth 2 times daily as needed for constipation     thiamine 50 MG TABS Take 50 mg by mouth daily     traMADol (ULTRAM) 50 MG tablet Take 25 mg by mouth every 4 hours as needed for severe pain For Pain pain level 1 to 5     traMADol (ULTRAM) 50 MG tablet Take 25-50 mg by mouth every 4 hours as needed for severe pain Pain pain level 6 to 10      triamcinolone (KENALOG) 0.1 % external cream Apply topically 2 times daily     No current  "facility-administered medications for this visit.     ROS:  4 point ROS including Respiratory, CV, GI and , other than that noted in the HPI,  is negative    Vitals:  /71   Pulse 83   Temp 97.5  F (36.4  C)   Resp 18   Ht 1.676 m (5' 6\")   Wt 67.6 kg (149 lb)   SpO2 95%   BMI 24.05 kg/m    Exam:  GENERAL APPEARANCE:  Alert, in no distress  ENT:  Mouth and posterior oropharynx normal, moist mucous membranes  EYES:  EOM, conjunctivae, lids, pupils and irises normal  RESP:  respiratory effort and palpation of chest normal, lungs clear to auscultation , no respiratory distress  CV:  Palpation and auscultation of heart done , regular rate and rhythm, no murmur, rub, or gallop  ABDOMEN:  normal bowel sounds, soft, nontender, no hepatosplenomegaly or other masses  M/S:   Right upper extremity in sling. CWMS intact in right hand  SKIN:  Did not visualize surgical incision  NEURO:   Cranial nerves 2-12 are normal tested and grossly at patient's baseline  PSYCH:  oriented to person    Lab/Diagnostic data:  Labs done in SNF are in [facility]. Please refer to them using [employer]/Care Everywhere.    ASSESSMENT/PLAN:  Right Rotator Cuff Arthropathy S/P Right Total Reverse Shoulder Replacement on 6/29/22. Follow-up with Ortho in 10-14 days.  Partial weight bearing to RUE. Sling for comfort. Acetaminophen and Tramadol ordered for pain control. Aspirin 81 mg BID x 30 days until 7/29/22. Physical and Occupational Therapy ordered.    Acute Blood Loss Anemia. Secondary to above. Hemoglobin 12.9 on 6/29/22 -> 10.9 on 6/30/22. Repeat CBC on 7/7/22 to ensure stability.     Dementia with Behavioral Disturbance. Occupational Therapy to evaluate cognitive status. Lived [place] [facility].    Seizure Disorder. Continue Levetiracetam and Primidone as ordered.    Hyperlipidemia. Continue Atorvastatin as ordered.    Constipation. Continue Senna-S as ordered.    Physical Deconditioning. Secondary to recent surgery and " co-morbidities. Physical and Occupational Therapy ordered.    Orders:  CBC on 7/7/22    Total time spent with patient visit at the skilled nursing facility was 35 min including patient visit and review of past records. Greater than 50% of total time spent with counseling and coordinating care due to review of hospitalization, review of facility orders, review of code status, review of TCU stay, orders written at facility, and review of discharge plan with staff.     Electronically signed by:  EDILMA Junior CNP                     Sincerely,        EDILMA Junior CNP

## 2022-07-06 ENCOUNTER — LAB REQUISITION (OUTPATIENT)
Dept: LAB | Facility: CLINIC | Age: 87
End: 2022-07-06
Payer: COMMERCIAL

## 2022-07-06 DIAGNOSIS — D64.9 ANEMIA, UNSPECIFIED: ICD-10-CM

## 2022-07-07 ENCOUNTER — TELEPHONE (OUTPATIENT)
Dept: GERIATRICS | Facility: CLINIC | Age: 87
End: 2022-07-07

## 2022-07-07 LAB
ANION GAP SERPL CALCULATED.3IONS-SCNC: 8 MMOL/L (ref 7–15)
BUN SERPL-MCNC: 20.8 MG/DL (ref 8–23)
CALCIUM SERPL-MCNC: 9.6 MG/DL (ref 8.2–9.6)
CHLORIDE SERPL-SCNC: 102 MMOL/L (ref 98–107)
CREAT SERPL-MCNC: 1.09 MG/DL (ref 0.67–1.17)
DEPRECATED HCO3 PLAS-SCNC: 25 MMOL/L (ref 22–29)
ERYTHROCYTE [DISTWIDTH] IN BLOOD BY AUTOMATED COUNT: 12.8 % (ref 10–15)
GFR SERPL CREATININE-BSD FRML MDRD: 63 ML/MIN/1.73M2
GLUCOSE SERPL-MCNC: 135 MG/DL (ref 70–99)
HCT VFR BLD AUTO: 37.8 % (ref 40–53)
HGB BLD-MCNC: 12.1 G/DL (ref 13.3–17.7)
MCH RBC QN AUTO: 31.3 PG (ref 26.5–33)
MCHC RBC AUTO-ENTMCNC: 32 G/DL (ref 31.5–36.5)
MCV RBC AUTO: 98 FL (ref 78–100)
PLATELET # BLD AUTO: 181 10E3/UL (ref 150–450)
POTASSIUM SERPL-SCNC: 4.4 MMOL/L (ref 3.4–5.3)
RBC # BLD AUTO: 3.86 10E6/UL (ref 4.4–5.9)
SODIUM SERPL-SCNC: 135 MMOL/L (ref 136–145)
WBC # BLD AUTO: 9.1 10E3/UL (ref 4–11)

## 2022-07-07 PROCEDURE — 85027 COMPLETE CBC AUTOMATED: CPT | Mod: ORL | Performed by: FAMILY MEDICINE

## 2022-07-07 PROCEDURE — 80048 BASIC METABOLIC PNL TOTAL CA: CPT | Mod: ORL | Performed by: FAMILY MEDICINE

## 2022-07-07 PROCEDURE — 36415 COLL VENOUS BLD VENIPUNCTURE: CPT | Mod: ORL | Performed by: FAMILY MEDICINE

## 2022-07-07 PROCEDURE — P9604 ONE-WAY ALLOW PRORATED TRIP: HCPCS | Mod: ORL | Performed by: FAMILY MEDICINE

## 2022-07-07 NOTE — TELEPHONE ENCOUNTER
[facility] Geriatrics Triage Nurse Telephone Encounter    Provider: Bela Franklin MD  Facility: [facility]  Facility Type:  TCU    Caller: Amira  Call Back Number: [phone]    Allergies:    Allergies   Allergen Reactions     Penicillins Anaphylaxis and Nausea and Vomiting     Alfuzosin Nausea and Vomiting     Donepezil Other (See Comments)     Myolin [Norflex] Unknown     Other reaction(s): hives     Sulfa (Sulfonamide Antibiotics) [Sulfa Drugs] Nausea and Vomiting     Triazolam Nausea and Vomiting        Reason for call: Nurse is calling to report Heme 2 and BMP results.  Notable meds:  B12 2500mcg daily, EC ASA 81mg BID, Primidone 50mg BID, Tramadol 25-50mg Q 4 hours PRN.      Verbal Order/Direction given by Provider: No new orders.      Provider giving Order:  EDILMA Davila CNP    Verbal Order given to: Amira Evans RN

## 2022-07-08 RX ORDER — TRAMADOL HYDROCHLORIDE 50 MG/1
25-50 TABLET ORAL EVERY 4 HOURS PRN
Qty: 45 TABLET | Refills: 0 | Status: SHIPPED | OUTPATIENT
Start: 2022-07-08 | End: 2022-07-12

## 2022-07-11 ENCOUNTER — TRANSITIONAL CARE UNIT VISIT (OUTPATIENT)
Dept: GERIATRICS | Facility: CLINIC | Age: 87
End: 2022-07-11
Payer: COMMERCIAL

## 2022-07-11 VITALS
HEIGHT: 66 IN | HEART RATE: 71 BPM | TEMPERATURE: 96.8 F | RESPIRATION RATE: 20 BRPM | BODY MASS INDEX: 23.46 KG/M2 | SYSTOLIC BLOOD PRESSURE: 158 MMHG | OXYGEN SATURATION: 95 % | DIASTOLIC BLOOD PRESSURE: 74 MMHG | WEIGHT: 146 LBS

## 2022-07-11 DIAGNOSIS — R53.81 PHYSICAL DECONDITIONING: ICD-10-CM

## 2022-07-11 DIAGNOSIS — R52 PAIN MANAGEMENT: ICD-10-CM

## 2022-07-11 DIAGNOSIS — M12.811 ROTATOR CUFF ARTHROPATHY OF RIGHT SHOULDER: Primary | ICD-10-CM

## 2022-07-11 PROCEDURE — 99315 NF DSCHRG MGMT 30 MIN/LESS: CPT | Performed by: NURSE PRACTITIONER

## 2022-07-11 NOTE — LETTER
7/11/2022        RE: Kurtis Urban  [address]  [facility] [address]        [facility] Geriatric Services    Chief Complaint   Patient presents with     Discharge Summary Nursing Home     INITIAL     [place] Medical Record Number:  [id number]  Place of Service where encounter took place:  [facility] (SNF) [[id number]]  Code Status:  Full Code     HISTORY:      HPI:  Kurtis Urban  is 89 year old (2/23/1929) undergoing physical and occupational therapy. He is  with a past medical history significant for right DVT, chronic pain, dementia with behavioral disturbance, depression, CVA, Meniere's disease, seizure disorder and rotator cuff arthropathy, who was admitted to [facility] 6/29/22 through 7/1/22 for a right total reverse shoulder joint replacement on 6/29/22. Hemoglobin 12.9 on 6/29/22 -> 10.9 on 6/30/22.     Today he was seen in his room to review vital signs, labs, establish care, follow-up right shoulder surgery and a face-to-face for discharge.  He will discharge back to [facility] assisted living on 7/13/2022 with current medications and treatments.  He will have home health care PT OT.  He denied chest pain shortness of breath cough congestion constipation or diarrhea.  He is with a healed right shoulder incision.  He was found to ambulate in his room independently.  Labs reviewed and hemoglobin on 7/7/2022 was 12.1    ALLERGIES:Penicillins, Alfuzosin, Donepezil, Myolin [norflex], Sulfa (sulfonamide antibiotics) [sulfa drugs], and Triazolam    PAST MEDICAL HISTORY:   Past Medical History:   Diagnosis Date     Dementia with behavioral disturbance (H) 9/3/2019     Depression 5/28/2014     Hypercholesteremia 5/28/2014     Melanoma of back (H) 5/28/2014    Surgically removed       Melanoma of face (H) 5/28/2014    Surgically removed      Seizure (H) 5/19/2015     Thiamine deficiency 9/3/2019     TIA (transient ischemic attack)        PAST SURGICAL  HISTORY:   has a past surgical history that includes Cholecystectomy; appendectomy; other surgical history; other surgical history (1993); hernia repair; shoulder surgery (Right); and other surgical history (Right).    FAMILY HISTORY: family history includes Cancer in his mother; Cerebrovascular Disease in his maternal grandfather.    SOCIAL HISTORY:  reports that he has quit smoking. His smoking use included cigarettes. He has never used smokeless tobacco. He reports that he does not drink alcohol and does not use drugs.    ROS:  Constitutional: Negative for activity change, appetite change, fatigue and fever.   HENT: Negative for congestion.    Respiratory: Negative for cough, shortness of breath and wheezing.    Cardiovascular: Negative for chest pain and leg swelling.   Gastrointestinal: Negative for abdominal distention, abdominal pain, constipation, diarrhea and nausea.   Genitourinary: Negative for dysuria.   Musculoskeletal: Negative for arthralgia. Negative for back pain.   Skin: Negative for color change and wound.   Neurological: Negative for dizziness.   Psychiatric/Behavioral: Negative for agitation, behavioral problems and confusion.     Physical Exam:  Constitutional:       Appearance: Patient is well-developed.   HENT:      Head: Normocephalic.   Eyes:      Conjunctiva/sclera: Conjunctivae normal.   Neck:      Musculoskeletal: Normal range of motion.   Cardiovascular:      Rate and Rhythm: Normal rate and regular rhythm.      Heart sounds: Normal heart sounds. No murmur.   Pulmonary:      Effort: No respiratory distress.      Breath sounds: Normal breath sounds. No wheezing or rales.   Abdominal:      General: Bowel sounds are normal. There is no distension.      Palpations: Abdomen is soft.      Tenderness: There is no abdominal tenderness.   Musculoskeletal:       Normal range of motion.     Healed right shoulder incision  Skin:General:        Skin is warm.   Neurological:         Mental Status:  "Patient is alert and oriented to person, place, and time.   Psychiatric:         Behavior: Behavior normal.     Vitals:  BP (!) 158/74   Pulse 71   Temp 96.8  F (36  C)   Resp 20   Ht 1.676 m (5' 6\")   Wt 66.2 kg (146 lb)   SpO2 95%   BMI 23.57 kg/m    Body mass index is 23.57 kg/m .      MEDICATIONS:     Review of your medicines          Accurate as of July 11, 2022  3:37 PM. If you have any questions, ask your nurse or doctor.            CONTINUE these medicines which have NOT CHANGED      Dose / Directions   acetaminophen 500 MG tablet  Commonly known as: TYLENOL      Dose: 1,000 mg  Take 1,000 mg by mouth 3 times daily  Refills: 0     aspirin 81 MG EC tablet      Dose: 81 mg  Take 81 mg by mouth daily Resume Daily 81 mg aspirin on 8/29/2021  Refills: 0     atorvastatin 40 MG tablet  Commonly known as: LIPITOR      Dose: 40 mg  Take 40 mg by mouth At Bedtime  Refills: 0     Cyanocobalamin 2500 MCG Tabs      Dose: 2,500 mcg  Take 2,500 mcg by mouth daily  Refills: 0     levETIRAcetam 500 MG tablet  Commonly known as: KEPPRA  Used for: Seizure disorder (H)      TAKE 1 TABLET BY MOUTH TWICE DAILY  Quantity: 56 tablet  Refills: 12     primidone 50 MG tablet  Commonly known as: MYSOLINE      Dose: 50 mg  Take 50 mg by mouth 2 times daily  Refills: 0     senna-docusate 8.6-50 MG tablet  Commonly known as: SENOKOT-S/PERICOLACE      Dose: 1 tablet  Take 1 tablet by mouth 2 times daily as needed for constipation  Refills: 0     thiamine 50 MG Tabs      Dose: 50 mg  Take 50 mg by mouth daily  Refills: 0     traMADol 50 MG tablet  Commonly known as: ULTRAM  Used for: Rotator cuff arthropathy of right shoulder      Dose: 25-50 mg  Take 0.5-1 tablets (25-50 mg) by mouth every 4 hours as needed for severe pain Pain pain level 6 to 10  Quantity: 45 tablet  Refills: 0     triamcinolone 0.1 % external cream  Commonly known as: KENALOG      Apply topically daily as needed  Refills: 0             DISCHARGE " DIAGNOSIS:  Encounter Diagnoses   Name Primary?     Rotator cuff arthropathy of right shoulder Yes     Physical deconditioning      Pain management        MEDICAL EQUIPMENT NEEDS:  None    DISCHARGE PLAN/FACE TO FACE:  I certify that services are/were furnished while this patient was under the care of a physician and that a physician or an allowed non-physician practitioner (NPP), had a face-to-face encounter that meets the physician face-to-face encounter requirements. The encounter was in whole, or in part, related to the primary reason for home health. The patient is confined to his/her home and needs intermittent skilled nursing, physical therapy, speech-language pathology, or the continued need for occupational therapy. A plan of care has been established by a physician and is periodically reviewed by a physician.  Date of Face-to-Face Encounter: 7/11/2022    I certify that, based on my findings, the following services are medically necessary home health services: PT OT    My clinical findings support the need for the above skilled services because: PT OT for continued strength and endurance following a recent reverse right shoulder surgery    This patient is homebound because: Patient deconditioned lady due to recent right shoulder surgery and he is also with partial weightbearing right upper extremity    The patient is, or has been, under my care and I have initiated the establishment of the plan of care. This patient will be followed by a physician who will periodically review the plan of care.    Schedule follow up visit with primary care provider within 7 days to reestablish care.    Electronically signed by: Juana Martinez CNP        Sincerely,        Juana Martinez CNP

## 2022-07-11 NOTE — PROGRESS NOTES
[facility] GERIATRIC SERVICES       Patient Kurtis BOBBY Eichstaedt  MRN: [id number]        Reason for Visit     Chief Complaint   Patient presents with     RECHECK     INITIAL       Code Status     CPR/Full code     Assessment     Right rotator cuff arthropathy status post right total shoulder reverse arthroplasty on 6/29/2022.  Pain management  AB LA  Dementia with behavioral disturbances  Seizure disorder  Generalized weakness    Plan     Pt is admitted to TCU for strengthening and rehab.  Patient admitted postprocedure to the TCU.  Nonweightbearing/partial weightbearing on the right upper extremity  Sling for comfort  Pain management optimized using Tylenol along with tramadol.  Denies any pain  For DVT prophylaxis will be on aspirin 81 mg twice daily for 30 days  Recheck hemoglobin in TCU 12.1  At baseline has dementia some continue to monitor mood and behaviors.  SLUMS 23/30  Continue Keppra and primidone for seizure disorder with no breakthrough seizures reported  Recheck labs were done in the nursing home and showed improvement with a hemoglobin of 12.  BMP was normal with mild hyponatremia and a sodium of 135  Continue with PT/OT- NOW AMBULATING WELL AND PLANS TO GO HOME SOON    History     Patient is a very pleasant 89 year old male who is admitted to TCU  Patient was initially admitted to the hospital with underlying history of rotator cuff arthropathy on the right.  He underwent a right total reverse shoulder arthroplasty on 6/29/2022.  Pain management optimized using Tylenol with tramadol.  He had some blood loss anemia but hemoglobin stabilized at 10.9.  At baseline he also has dementia and has some confusion.  He also has a history of seizure disorder with no breakthrough seizures.  Discharge to the TCU for strengthening    Past Medical & Surgical History     PAST MEDICAL HISTORY:   Past Medical History:   Diagnosis Date     Dementia with behavioral disturbance (H) 9/3/2019     Depression 5/28/2014      Hypercholesteremia 5/28/2014     Melanoma of back (H) 5/28/2014    Surgically removed       Melanoma of face (H) 5/28/2014    Surgically removed      Seizure (H) 5/19/2015     Thiamine deficiency 9/3/2019     TIA (transient ischemic attack)       PAST SURGICAL HISTORY:   has a past surgical history that includes Cholecystectomy; appendectomy; other surgical history; other surgical history (1993); hernia repair; shoulder surgery (Right); and other surgical history (Right).      Past Social History     Reviewed,  reports that he has quit smoking. His smoking use included cigarettes. He has never used smokeless tobacco. He reports that he does not drink alcohol and does not use drugs.    Family History     Reviewed, and family history includes Cancer in his mother; Cerebrovascular Disease in his maternal grandfather.    Medication List   Post Discharge Medication Reconciliation Status: Post Discharge Medication Reconciliation Status: discharge medications reconciled and changed, per note/orders.  Current Outpatient Medications   Medication     acetaminophen (TYLENOL) 500 MG tablet     aspirin 81 MG EC tablet     atorvastatin (LIPITOR) 40 MG tablet     Cyanocobalamin 2500 MCG TABS     levETIRAcetam (KEPPRA) 500 MG tablet     primidone (MYSOLINE) 50 MG tablet     senna-docusate (SENOKOT-S/PERICOLACE) 8.6-50 MG tablet     thiamine 50 MG TABS     traMADol (ULTRAM) 50 MG tablet     triamcinolone (KENALOG) 0.1 % external cream     No current facility-administered medications for this visit.          Allergies     Allergies   Allergen Reactions     Penicillins Anaphylaxis and Nausea and Vomiting     Alfuzosin Nausea and Vomiting     Donepezil Other (See Comments)     Myolin [Norflex] Unknown     Other reaction(s): hives     Sulfa (Sulfonamide Antibiotics) [Sulfa Drugs] Nausea and Vomiting     Triazolam Nausea and Vomiting       Review of Systems   A comprehensive review of 14 systems was done. Pertinent findings noted here  "and in history of present illness. All the rest negative.  Constitutional: Negative.  Negative for fever, chills, he has  activity change, appetite change and fatigue.   HENT: Negative for congestion and facial swelling.    Eyes: Negative for photophobia, redness and visual disturbance.   Respiratory: Negative for cough and chest tightness.    Cardiovascular: Negative for chest pain, palpitations and leg swelling.   Gastrointestinal: Negative for nausea, diarrhea, constipation, blood in stool and abdominal distention.   Genitourinary: Negative.    Musculoskeletal: Negative.  He is denying any pain in his shoulder and overall doing well noted to be ambulating.  Skin: Negative.    Neurological: Negative for dizziness, tremors, syncope, weakness, light-headedness and headaches.   Hematological: Does not bruise/bleed easily.   Psychiatric/Behavioral: Negative.  Mood is stable with no confusion episodes      Physical Exam   /72   Pulse 64   Temp (!) 96.3  F (35.7  C)   Resp 18   Ht 1.676 m (5' 6\")   Wt 66.2 kg (146 lb)   SpO2 95%   BMI 23.57 kg/m       Constitutional: Oriented to person, place, and time and appears well-developed.   HEENT:  Normocephalic and atraumatic.  Eyes: Conjunctivae and EOM are normal. Pupils are equal, round, and reactive to light. No discharge.  No scleral icterus. Nose normal. Mouth/Throat: Oropharynx is clear and moist. No oropharyngeal exudate.    NECK: Normal range of motion. Neck supple. No JVD present. No tracheal deviation present. No thyromegaly present.   CARDIOVASCULAR: Normal rate, regular rhythm and intact distal pulses.  Exam reveals no gallop and no friction rub.  Systolic murmur present.  PULMONARY: Effort normal and breath sounds normal. No respiratory distress.No Wheezing or rales.  ABDOMEN: Soft. Bowel sounds are normal. No distension and no mass.  There is no tenderness. There is no rebound and no guarding. No HSM.  MUSCULOSKELETAL: Normal range of motion. No " edema and no tenderness. Mild kyphosis, no tenderness.RT shoulder incision is healed  LYMPH NODES: Has no cervical, supraclavicular, axillary and groin adenopathy.   NEUROLOGICAL: Alert and oriented to person, place, and time. No cranial nerve deficit.  Normal muscle tone. Coordination normal.   GENITOURINARY: Deferred exam.  SKIN: Skin is warm and dry. No rash noted. No erythema. No pallor.   EXTREMITIES: No cyanosis, no clubbing, no edema. No Deformity.  PSYCHIATRIC: Normal mood, affect and behavior.      Lab Results     Recent Results (from the past 240 hour(s))   Basic metabolic panel    Collection Time: 07/07/22  9:26 AM   Result Value Ref Range    Creatinine 1.09 0.67 - 1.17 mg/dL    Sodium 135 (L) 136 - 145 mmol/L    Potassium 4.4 3.4 - 5.3 mmol/L    Urea Nitrogen 20.8 8.0 - 23.0 mg/dL    Chloride 102 98 - 107 mmol/L    Carbon Dioxide (CO2) 25 22 - 29 mmol/L    Anion Gap 8 7 - 15 mmol/L    Glucose 135 (H) 70 - 99 mg/dL    GFR Estimate 63 >60 mL/min/1.73m2    Calcium 9.6 8.2 - 9.6 mg/dL   CBC with platelets    Collection Time: 07/07/22  9:26 AM   Result Value Ref Range    WBC Count 9.1 4.0 - 11.0 10e3/uL    RBC Count 3.86 (L) 4.40 - 5.90 10e6/uL    Hemoglobin 12.1 (L) 13.3 - 17.7 g/dL    Hematocrit 37.8 (L) 40.0 - 53.0 %    MCV 98 78 - 100 fL    MCH 31.3 26.5 - 33.0 pg    MCHC 32.0 31.5 - 36.5 g/dL    RDW 12.8 10.0 - 15.0 %    Platelet Count 181 150 - 450 10e3/uL             Imaging Results     No results found.        Electronically signed by    Bela Franklin MD

## 2022-07-11 NOTE — PROGRESS NOTES
[facility]    Chief Complaint   Patient presents with     Discharge Summary Nursing Home     INITIAL     [place] Medical Record Number:  [id number]  Place of Service where encounter took place:  [facility] (SNF) [[id number]]  Code Status:  Full Code     HISTORY:      HPI:  Kurtis Urban  is 89 year old (2/23/1929) undergoing physical and occupational therapy. He is  with a past medical history significant for right DVT, chronic pain, dementia with behavioral disturbance, depression, CVA, Meniere's disease, seizure disorder and rotator cuff arthropathy, who was admitted to [facility] 6/29/22 through 7/1/22 for a right total reverse shoulder joint replacement on 6/29/22. Hemoglobin 12.9 on 6/29/22 -> 10.9 on 6/30/22.     Today he was seen in his room to review vital signs, labs, establish care, follow-up right shoulder surgery and a face-to-face for discharge.  He will discharge back to [facility] assisted living on 7/13/2022 with current medications and treatments.  He will have home health care PT OT.  He denied chest pain shortness of breath cough congestion constipation or diarrhea.  He is with a healed right shoulder incision.  He was found to ambulate in his room independently.  Labs reviewed and hemoglobin on 7/7/2022 was 12.1    ALLERGIES:Penicillins, Alfuzosin, Donepezil, Myolin [norflex], Sulfa (sulfonamide antibiotics) [sulfa drugs], and Triazolam    PAST MEDICAL HISTORY:   Past Medical History:   Diagnosis Date     Dementia with behavioral disturbance (H) 9/3/2019     Depression 5/28/2014     Hypercholesteremia 5/28/2014     Melanoma of back (H) 5/28/2014    Surgically removed       Melanoma of face (H) 5/28/2014    Surgically removed      Seizure (H) 5/19/2015     Thiamine deficiency 9/3/2019     TIA (transient ischemic attack)        PAST SURGICAL HISTORY:   has a past surgical history that includes Cholecystectomy; appendectomy; other surgical  history; other surgical history (1993); hernia repair; shoulder surgery (Right); and other surgical history (Right).    FAMILY HISTORY: family history includes Cancer in his mother; Cerebrovascular Disease in his maternal grandfather.    SOCIAL HISTORY:  reports that he has quit smoking. His smoking use included cigarettes. He has never used smokeless tobacco. He reports that he does not drink alcohol and does not use drugs.    ROS:  Constitutional: Negative for activity change, appetite change, fatigue and fever.   HENT: Negative for congestion.    Respiratory: Negative for cough, shortness of breath and wheezing.    Cardiovascular: Negative for chest pain and leg swelling.   Gastrointestinal: Negative for abdominal distention, abdominal pain, constipation, diarrhea and nausea.   Genitourinary: Negative for dysuria.   Musculoskeletal: Negative for arthralgia. Negative for back pain.   Skin: Negative for color change and wound.   Neurological: Negative for dizziness.   Psychiatric/Behavioral: Negative for agitation, behavioral problems and confusion.     Physical Exam:  Constitutional:       Appearance: Patient is well-developed.   HENT:      Head: Normocephalic.   Eyes:      Conjunctiva/sclera: Conjunctivae normal.   Neck:      Musculoskeletal: Normal range of motion.   Cardiovascular:      Rate and Rhythm: Normal rate and regular rhythm.      Heart sounds: Normal heart sounds. No murmur.   Pulmonary:      Effort: No respiratory distress.      Breath sounds: Normal breath sounds. No wheezing or rales.   Abdominal:      General: Bowel sounds are normal. There is no distension.      Palpations: Abdomen is soft.      Tenderness: There is no abdominal tenderness.   Musculoskeletal:       Normal range of motion.     Healed right shoulder incision  Skin:General:        Skin is warm.   Neurological:         Mental Status: Patient is alert and oriented to person, place, and time.   Psychiatric:         Behavior: Behavior  "normal.     Vitals:  BP (!) 158/74   Pulse 71   Temp 96.8  F (36  C)   Resp 20   Ht 1.676 m (5' 6\")   Wt 66.2 kg (146 lb)   SpO2 95%   BMI 23.57 kg/m    Body mass index is 23.57 kg/m .      MEDICATIONS:     Review of your medicines          Accurate as of July 11, 2022  3:37 PM. If you have any questions, ask your nurse or doctor.            CONTINUE these medicines which have NOT CHANGED      Dose / Directions   acetaminophen 500 MG tablet  Commonly known as: TYLENOL      Dose: 1,000 mg  Take 1,000 mg by mouth 3 times daily  Refills: 0     aspirin 81 MG EC tablet      Dose: 81 mg  Take 81 mg by mouth daily Resume Daily 81 mg aspirin on 8/29/2021  Refills: 0     atorvastatin 40 MG tablet  Commonly known as: LIPITOR      Dose: 40 mg  Take 40 mg by mouth At Bedtime  Refills: 0     Cyanocobalamin 2500 MCG Tabs      Dose: 2,500 mcg  Take 2,500 mcg by mouth daily  Refills: 0     levETIRAcetam 500 MG tablet  Commonly known as: KEPPRA  Used for: Seizure disorder (H)      TAKE 1 TABLET BY MOUTH TWICE DAILY  Quantity: 56 tablet  Refills: 12     primidone 50 MG tablet  Commonly known as: MYSOLINE      Dose: 50 mg  Take 50 mg by mouth 2 times daily  Refills: 0     senna-docusate 8.6-50 MG tablet  Commonly known as: SENOKOT-S/PERICOLACE      Dose: 1 tablet  Take 1 tablet by mouth 2 times daily as needed for constipation  Refills: 0     thiamine 50 MG Tabs      Dose: 50 mg  Take 50 mg by mouth daily  Refills: 0     traMADol 50 MG tablet  Commonly known as: ULTRAM  Used for: Rotator cuff arthropathy of right shoulder      Dose: 25-50 mg  Take 0.5-1 tablets (25-50 mg) by mouth every 4 hours as needed for severe pain Pain pain level 6 to 10  Quantity: 45 tablet  Refills: 0     triamcinolone 0.1 % external cream  Commonly known as: KENALOG      Apply topically daily as needed  Refills: 0             DISCHARGE DIAGNOSIS:  Encounter Diagnoses   Name Primary?     Rotator cuff arthropathy of right shoulder Yes     Physical " deconditioning      Pain management        MEDICAL EQUIPMENT NEEDS:  None    DISCHARGE PLAN/FACE TO FACE:  I certify that services are/were furnished while this patient was under the care of a physician and that a physician or an allowed non-physician practitioner (NPP), had a face-to-face encounter that meets the physician face-to-face encounter requirements. The encounter was in whole, or in part, related to the primary reason for home health. The patient is confined to his/her home and needs intermittent skilled nursing, physical therapy, speech-language pathology, or the continued need for occupational therapy. A plan of care has been established by a physician and is periodically reviewed by a physician.  Date of Face-to-Face Encounter: 7/11/2022    I certify that, based on my findings, the following services are medically necessary home health services: PT OT    My clinical findings support the need for the above skilled services because: PT OT for continued strength and endurance following a recent reverse right shoulder surgery    This patient is homebound because: Patient deconditioned lady due to recent right shoulder surgery and he is also with partial weightbearing right upper extremity    The patient is, or has been, under my care and I have initiated the establishment of the plan of care. This patient will be followed by a physician who will periodically review the plan of care.    Schedule follow up visit with primary care provider within 7 days to reestablish care.    Electronically signed by: Juana Martinez CNP

## 2022-07-12 ENCOUNTER — TRANSITIONAL CARE UNIT VISIT (OUTPATIENT)
Dept: GERIATRICS | Facility: CLINIC | Age: 87
End: 2022-07-12
Payer: COMMERCIAL

## 2022-07-12 VITALS
HEIGHT: 66 IN | HEART RATE: 64 BPM | BODY MASS INDEX: 23.46 KG/M2 | WEIGHT: 146 LBS | DIASTOLIC BLOOD PRESSURE: 72 MMHG | SYSTOLIC BLOOD PRESSURE: 135 MMHG | TEMPERATURE: 96.3 F | RESPIRATION RATE: 18 BRPM | OXYGEN SATURATION: 95 %

## 2022-07-12 DIAGNOSIS — Z96.611 STATUS POST REVERSE TOTAL REPLACEMENT OF RIGHT SHOULDER: ICD-10-CM

## 2022-07-12 DIAGNOSIS — G40.909 SEIZURE DISORDER (H): ICD-10-CM

## 2022-07-12 DIAGNOSIS — R52 PAIN MANAGEMENT: ICD-10-CM

## 2022-07-12 DIAGNOSIS — F03.91 DEMENTIA WITH BEHAVIORAL DISTURBANCE, UNSPECIFIED DEMENTIA TYPE: ICD-10-CM

## 2022-07-12 DIAGNOSIS — R53.81 PHYSICAL DECONDITIONING: ICD-10-CM

## 2022-07-12 DIAGNOSIS — M12.811 ROTATOR CUFF ARTHROPATHY OF RIGHT SHOULDER: Primary | ICD-10-CM

## 2022-07-12 PROCEDURE — 99305 1ST NF CARE MODERATE MDM 35: CPT | Performed by: FAMILY MEDICINE

## 2022-07-12 NOTE — LETTER
7/12/2022        RE: Kurtis Urban  [address]  [place] [address]        [facility]IATR[facility]       Patient Kurtis Urban  MRN: [id number]        Reason for Visit     Chief Complaint   Patient presents with     RECHECK     INITIAL       Code Status     CPR/Full code     Assessment     Right rotator cuff arthropathy status post right total shoulder reverse arthroplasty on 6/29/2022.  Pain management  AB LA  Dementia with behavioral disturbances  Seizure disorder  Generalized weakness    Plan     Pt is admitted to TCU for strengthening and rehab.  Patient admitted postprocedure to the TCU.  Nonweightbearing/partial weightbearing on the right upper extremity  Sling for comfort  Pain management optimized using Tylenol along with tramadol.  Denies any pain  For DVT prophylaxis will be on aspirin 81 mg twice daily for 30 days  Recheck hemoglobin in TCU 12.1  At baseline has dementia some continue to monitor mood and behaviors.  SLUMS 23/30  Continue Keppra and primidone for seizure disorder with no breakthrough seizures reported  Recheck labs were done in the nursing home and showed improvement with a hemoglobin of 12.  BMP was normal with mild hyponatremia and a sodium of 135  Continue with PT/OT- NOW AMBULATING WELL AND PLANS TO GO HOME SOON    History     Patient is a very pleasant 89 year old male who is admitted to TCU  Patient was initially admitted to the hospital with underlying history of rotator cuff arthropathy on the right.  He underwent a right total reverse shoulder arthroplasty on 6/29/2022.  Pain management optimized using Tylenol with tramadol.  He had some blood loss anemia but hemoglobin stabilized at 10.9.  At baseline he also has dementia and has some confusion.  He also has a history of seizure disorder with no breakthrough seizures.  Discharge to the TCU for strengthening    Past Medical & Surgical History     PAST MEDICAL HISTORY:   Past Medical History:   Diagnosis  Date     Dementia with behavioral disturbance (H) 9/3/2019     Depression 5/28/2014     Hypercholesteremia 5/28/2014     Melanoma of back (H) 5/28/2014    Surgically removed       Melanoma of face (H) 5/28/2014    Surgically removed      Seizure (H) 5/19/2015     Thiamine deficiency 9/3/2019     TIA (transient ischemic attack)       PAST SURGICAL HISTORY:   has a past surgical history that includes Cholecystectomy; appendectomy; other surgical history; other surgical history (1993); hernia repair; shoulder surgery (Right); and other surgical history (Right).      Past Social History     Reviewed,  reports that he has quit smoking. His smoking use included cigarettes. He has never used smokeless tobacco. He reports that he does not drink alcohol and does not use drugs.    Family History     Reviewed, and family history includes Cancer in his mother; Cerebrovascular Disease in his maternal grandfather.    Medication List   Post Discharge Medication Reconciliation Status: Post Discharge Medication Reconciliation Status: discharge medications reconciled and changed, per note/orders.  Current Outpatient Medications   Medication     acetaminophen (TYLENOL) 500 MG tablet     aspirin 81 MG EC tablet     atorvastatin (LIPITOR) 40 MG tablet     Cyanocobalamin 2500 MCG TABS     levETIRAcetam (KEPPRA) 500 MG tablet     primidone (MYSOLINE) 50 MG tablet     senna-docusate (SENOKOT-S/PERICOLACE) 8.6-50 MG tablet     thiamine 50 MG TABS     traMADol (ULTRAM) 50 MG tablet     triamcinolone (KENALOG) 0.1 % external cream     No current facility-administered medications for this visit.          Allergies     Allergies   Allergen Reactions     Penicillins Anaphylaxis and Nausea and Vomiting     Alfuzosin Nausea and Vomiting     Donepezil Other (See Comments)     Myolin [Norflex] Unknown     Other reaction(s): hives     Sulfa (Sulfonamide Antibiotics) [Sulfa Drugs] Nausea and Vomiting     Triazolam Nausea and Vomiting       Review of  "Systems   A comprehensive review of 14 systems was done. Pertinent findings noted here and in history of present illness. All the rest negative.  Constitutional: Negative.  Negative for fever, chills, he has  activity change, appetite change and fatigue.   HENT: Negative for congestion and facial swelling.    Eyes: Negative for photophobia, redness and visual disturbance.   Respiratory: Negative for cough and chest tightness.    Cardiovascular: Negative for chest pain, palpitations and leg swelling.   Gastrointestinal: Negative for nausea, diarrhea, constipation, blood in stool and abdominal distention.   Genitourinary: Negative.    Musculoskeletal: Negative.  He is denying any pain in his shoulder and overall doing well noted to be ambulating.  Skin: Negative.    Neurological: Negative for dizziness, tremors, syncope, weakness, light-headedness and headaches.   Hematological: Does not bruise/bleed easily.   Psychiatric/Behavioral: Negative.  Mood is stable with no confusion episodes      Physical Exam   /72   Pulse 64   Temp (!) 96.3  F (35.7  C)   Resp 18   Ht 1.676 m (5' 6\")   Wt 66.2 kg (146 lb)   SpO2 95%   BMI 23.57 kg/m       Constitutional: Oriented to person, place, and time and appears well-developed.   HEENT:  Normocephalic and atraumatic.  Eyes: Conjunctivae and EOM are normal. Pupils are equal, round, and reactive to light. No discharge.  No scleral icterus. Nose normal. Mouth/Throat: Oropharynx is clear and moist. No oropharyngeal exudate.    NECK: Normal range of motion. Neck supple. No JVD present. No tracheal deviation present. No thyromegaly present.   CARDIOVASCULAR: Normal rate, regular rhythm and intact distal pulses.  Exam reveals no gallop and no friction rub.  Systolic murmur present.  PULMONARY: Effort normal and breath sounds normal. No respiratory distress.No Wheezing or rales.  ABDOMEN: Soft. Bowel sounds are normal. No distension and no mass.  There is no tenderness. There " is no rebound and no guarding. No HSM.  MUSCULOSKELETAL: Normal range of motion. No edema and no tenderness. Mild kyphosis, no tenderness.RT shoulder incision is healed  LYMPH NODES: Has no cervical, supraclavicular, axillary and groin adenopathy.   NEUROLOGICAL: Alert and oriented to person, place, and time. No cranial nerve deficit.  Normal muscle tone. Coordination normal.   GENITOURINARY: Deferred exam.  SKIN: Skin is warm and dry. No rash noted. No erythema. No pallor.   EXTREMITIES: No cyanosis, no clubbing, no edema. No Deformity.  PSYCHIATRIC: Normal mood, affect and behavior.      Lab Results     Recent Results (from the past 240 hour(s))   Basic metabolic panel    Collection Time: 07/07/22  9:26 AM   Result Value Ref Range    Creatinine 1.09 0.67 - 1.17 mg/dL    Sodium 135 (L) 136 - 145 mmol/L    Potassium 4.4 3.4 - 5.3 mmol/L    Urea Nitrogen 20.8 8.0 - 23.0 mg/dL    Chloride 102 98 - 107 mmol/L    Carbon Dioxide (CO2) 25 22 - 29 mmol/L    Anion Gap 8 7 - 15 mmol/L    Glucose 135 (H) 70 - 99 mg/dL    GFR Estimate 63 >60 mL/min/1.73m2    Calcium 9.6 8.2 - 9.6 mg/dL   CBC with platelets    Collection Time: 07/07/22  9:26 AM   Result Value Ref Range    WBC Count 9.1 4.0 - 11.0 10e3/uL    RBC Count 3.86 (L) 4.40 - 5.90 10e6/uL    Hemoglobin 12.1 (L) 13.3 - 17.7 g/dL    Hematocrit 37.8 (L) 40.0 - 53.0 %    MCV 98 78 - 100 fL    MCH 31.3 26.5 - 33.0 pg    MCHC 32.0 31.5 - 36.5 g/dL    RDW 12.8 10.0 - 15.0 %    Platelet Count 181 150 - 450 10e3/uL             Imaging Results     No results found.        Electronically signed by    Bela Franklin MD                             Sincerely,        DEB Mendiola

## 2022-11-09 ENCOUNTER — OFFICE VISIT (OUTPATIENT)
Dept: NEUROLOGY | Facility: CLINIC | Age: 87
End: 2022-11-09
Payer: COMMERCIAL

## 2022-11-09 VITALS — HEART RATE: 62 BPM | DIASTOLIC BLOOD PRESSURE: 61 MMHG | SYSTOLIC BLOOD PRESSURE: 107 MMHG

## 2022-11-09 DIAGNOSIS — G40.909 SEIZURE DISORDER (H): ICD-10-CM

## 2022-11-09 DIAGNOSIS — G25.0 ESSENTIAL TREMOR: Primary | ICD-10-CM

## 2022-11-09 PROCEDURE — 99205 OFFICE O/P NEW HI 60 MIN: CPT | Mod: GC | Performed by: PSYCHIATRY & NEUROLOGY

## 2022-11-09 ASSESSMENT — ACTIVITIES OF DAILY LIVING (ADL)
SOCIAL_IMPACT: (0) NORMAL
WRITING: (3) MODERATELY ABNORMAL. CANNOT WRITE WITHOUT USING STRATEGIES SUCH AS HOLDING THE WRITING HAND WITH THE OTHER HAND, HOLDING PEN DIFFERENTLY OR USING LARGE PEN.
FEEDING_WITH_A_SPOON: (1) SLIGHTLY ABNORMAL. TREMOR IS PRESENT BUT DOES NOT INTERFERE WITH FEEDING WITH A SPOON.
OVERALL_DISABILITY_WITH_THE_MOST_AFFECTED_TASK: (3) MODERATELY ABNORMAL. CAN DO TASK BUT MUST USE STRATEGIES.
WORKING: (1) SLIGHTLY ABNORMAL. TREMOR IS PRESENT BUT DOES NOT AFFECT PERFORMANCE AT WORK OR AT HOME.
POURING: (0) NORMAL
CARRYING_FOOD_TRAYS_PLATES_OR_SIMILAR_ITEMS: (0) NORMAL
USING_KEYS: (0) NORMAL
HYGIENE: (0) NORMAL
DRESS: (3) MODERATELY ABNORMAL. UNABLE TO DO MOST DRESSING UNLESS USES STRATEGY SUCH AS USING VELCRO, BUTTONING SHIRT BEFORE PUTTING IT ON OR AVOIDING SHOES WITH LACES.
OVERALL_DISABILITY_WITH_THE_MOST_AFFECTED_TASK: DRESSING
DRINKING_FROM_A_GLASS: (1) SLIGHTLY ABNORMAL. TREMOR IS PRESENT BUT DOES NOT INTERFERE WITH DRINKING FROM A GLASS.
TOTAL_SCORE: 12
SPEAKING: (0) NORMAL

## 2022-11-09 NOTE — PROGRESS NOTES
"Department of Neurology  Movement Disorders Division     PATIENT: Kurtis Urban  : 1929  JAD: 2022  REASON FOR VISIT: Evaluation of tremor.  REFERRING PROVIDER: Jimmy Walker MD  [facility]  [address]  [place] [address]     HPI: Mr. 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 Limited records available for review in EMR from previous neurologist Dr. Walker at time of this visit. Per previous ED neurology consultation note (2019), \"We have been requested by Dr. Shaw to evaluate Kutris Urban who is a 89 y.o. year old  male for confusion. The patient has a history of mild cognitive impairments, TIA, seizure.  He was getting a haircut with a friend and developed confusion that lasted for about 2 hours gradually back to normal.  No loss of consciousness or falls.  No apparent language, speech difficulty.  No noticeable weakness or sensory difficulty. Patient has a history of TIAs in the past and is listed as having had MRIs and MRAs done in the past in .  His wife passed away in 2015. Reported he went up to a neighbor of his and asking where his wife was in 2019 after she passed away.  He was brought into the emergency room for evaluation and in the emergency room was noted by family members to be staring off into space with chewing motions being made and his not being aware of the surroundings.  He was confused afterwards and spoken in his native Tu[place] language afterwards.  He had EEG study showing background slowing back then.  He was started on Keppra 500 mg twice daily and continued on it.  He transfer the care to Dr. Florence to Dr. Jimmy Walker.  No recurrent seizures since he was started on the medication. He had dementia with the " "initial work-up in 2006 by Dr. Farrell: Bellevue score 20 out of 30 in 2015, 13/30 in 2019.  He has tried Aricept, Namenda and Razadyne in the past. He has essential tremor followed up by Dr. Walker and was started on primidone 50 mg twice daily nightly in 2019 few months ago.  No side effects of medications. He has some unique left shoulder mechanical issues.  At times he has trouble to bring the left arm up but once he get into the space he can repeat and do it without difficulty.\"    He came to the [facility] neurology clinic accompanied by his friend for establishing care fr tremor as referred by Dr. Walker. He presents today to establish care for tremor as his previous neurologist has moved on to another practice. Has had action tremor for at least 10 years. No family hx of tremor. Drinks one glass of wine on Sunday. Hasn't noticed a difference with alcohol. Denies concerns of slowness, rigidity (aside from what he describes as 'normal aging'), hallucination, mood disturbance. Stable memory concerns. Doing well in assisted living per pt and friend.    Right handed (not changed from left handed in school), first language is Sami.    Patient was diagnosed with essential tremor in 2019 by Dr. Walker, and per notes was prescribed primidone. Now on primidone 50mg BID, this has been working well. He's been on this dose for about 5 years. No endorsed side effects. Very pleased with results of this medication.    Additional history:   Driving: not driving  Living situation: independent assisted living  Medication compliance: facility helps with meds  ADL's: the patient is largely independent    Difficulty with buttons and shoelaces.    TETRAS ADL Subscale (48 max)  (0=Normal 1=Slightly abnormal 2=Mildly 3=Moderately 4= Severely)    ADL Sub-Scale 11/9/2022   1. Speaking 0   2. Feeding with a spoon 1   3. Drinking from a glass 1   4. Hygiene 0   5. Dressing 3   6. Pouring 0   7. Carrying food trays, plates or similar items " 0   8. Using keys 0   9. Writing 3   10. Working 1   11. Overall disability with the most affected task 3   Name of most affected task: dressing   12. Social impact 0   ADL TOTAL 12       Pt states no additional problems other than the ones mentioned above & under problem history. 14 pt ROS otherwise negative.      MEDICATIONS:  No outpatient medications have been marked as taking for the 11/9/22 encounter (Appointment) with Danyell Evans DO.       ALLERGIES: Penicillins, Alfuzosin, Donepezil, Myolin [norflex], Sulfa (sulfonamide antibiotics) [sulfa drugs], and Triazolam    CURRENT PROBLEM LIST: does not have any pertinent problems on file.    PMH:  has a past medical history of Dementia with behavioral disturbance (H) (9/3/2019), Depression (5/28/2014), Hypercholesteremia (5/28/2014), Melanoma of back (H) (5/28/2014), Melanoma of face (H) (5/28/2014), Seizure (H) (5/19/2015), Thiamine deficiency (9/3/2019), and TIA (transient ischemic attack).    PSH:  has a past surgical history that includes Cholecystectomy; appendectomy; other surgical history; other surgical history (1993); hernia repair; shoulder surgery (Right); and other surgical history (Right).    FH: family history includes Cancer in his mother; Cerebrovascular Disease in his maternal grandfather.    SH:   Social History     Social History Narrative     Not on file    Wife passed away in 2015.    PHYSICAL EXAM:     Vital Signs:  Blood pressure 107/61, pulse 62.   Physical Exam:  Constitutional: Alert. Sitting in chair comfortably. No acute distress.   Head: Atraumatic, normocephalic.  ENT: Moist mucous membranes. No sinus drainage.  Cardiovascular: Appears warm, well-perfused, and non-toxic.  Respiratory: No increased work of breathing, no accessory muscle use.   Musculoskeletal: Moving all four limbs spontaneously. Limited ROM in bilat shoulders  Skin: No rashes appreciated on visualized skin.   Hematologic/Lymphatic/Immunologic: No bruising  "appreciated on visualized skin.       Neurologic exam  Mental Status: Awake, alert, attention appropriate. Following commands. Interacting appropriately. Speech is fluent, able to comprehend. No dysarthria.  Cranial Nerves: EOMI without nystagmus, eyes move conjugately. Smile and eyebrow raise equal, blinking symmetric, no weakness. Nasolabial folds symmetric. Tongue midline. Shoulder shrug symmetric. Very hard of hearing.  Motor:  No pronator or sensory drift. Hx shoulder replacements somewhat limit strength exam. Strength is 5/5 at elbow flex/ext, FDI, hip flexion, knee flex/ext, dorsi/plantar flexion. Finger tapping is equal frequency and amplitude bilaterally. Foot tapping equal bilaterally.  Reflexes: no ankle clonus  Sensory: intact to light touch in all four extremities.  Coordination: FNF no dysmetria, HTS intact.   Gait: normal base and station. Able to tandem, heel and toe walk. Romberg negative.     Motor (Performance) Sub-Scale 11/9/2022   Assessment Time 10:52 AM   Medication On   DBS - Right Brain None   DBS - Left Brain None   Head 0   Face & Jaw 0.5   Voice 0   Outstretched - RIGHT 1   Outstretched - LEFT 1.5   Wingbeating - RIGHT 0   Wingbeating - LEFT 1.5   Kinetic - RIGHT 1.5   Kinetic - LEFT 1.5   Lower Limb - RIGHT 0   Lower Limb - LEFT 0   Lower Limb (Max) 0   Spiral - RIGHT 1   Spiral - LEFT 2   Handwriting 1   Dot approx - RIGHT 0   Dot approx - LEFT 1   Trunk (Standing) 1   Total Right 3.5   Total Left 7.5   Axial 0.5   TOTAL 13.5     DATA REVIEW:   TSH   Date Value Ref Range Status   07/28/2020 0.82 0.30 - 5.00 uIU/mL Final     Liver Function Studies - Recent Labs   Lab Test 03/17/21  1527   PROTTOTAL 6.2   ALBUMIN 3.6   BILITOTAL 0.3   ALKPHOS 100   AST 23   ALT 28     Imaging:  \"EXAM: MR HEAD LIMITED WO CONTRAST   LOCATION: [facility]   DATE/TIME: 9/3/2019 11:48 AM   FINDINGS:   INTRACRANIAL CONTENTS: No acute or subacute infarct. Stable small chronic cortical infarcts along the " "posterior lateral right and left frontal areas with gliosis and volume loss. Stable small chronic lacunar infarct left basal ganglia. No   intraparenchymal mass, acute hemorrhage, or extra-axial fluid collections. Patchy and confluent nonspecific T2/FLAIR hyperintensities within the cerebral white matter most consistent with moderate chronic microvascular ischemic change. Moderate   generalized volume loss. Ventricles and sulci are normal for age. Normal position of the cerebellar tonsils. Stable developmental retrocerebellar arachnoid cyst.   IMPRESSION:   1.  No evidence for acute or subacute infarct, acute hemorrhage, obstructive hydrocephalus or new intracranial mass.   2.  Stable small chronic cortical and subcortical infarcts along the posterior lateral right and left frontal parietal regions with a stable small chronic lacunar infarct left basal ganglia.   3.  Stable mild to moderate age-related changes.   4.  Stable prominent developmental retrocerebellar arachnoid cyst.   5.  Findings called to Dr. Winston in the [facility]'s ER at 12 noon on 9/3/2019.\"    ASSESSMENT/PLAN:  Mr. Urban is a 89 year old right handed man with a 10-year history of kinetic bilateral hand tremor. Today's exam shows high frequency low amplitude bilateral hand tremor L worse than R. Agree with diagnosis of essential tremor based on exam and history today.    #ET  -continue primidone 50mg PO BID    #MCI vs Major Neurocognitive Disorder  -no driving (he doesn't drive anymore)  -follow up with PCP  -continue vitamin supplementation per PCP    #hx possible focal seizure  -continue pta keppra 500mg PO BID  -follow up with general neurology clinic (referral placed)      Can follow up in movement clinic as needed, but very reasonable for general neurology to continue to prescribe primidone along with his other care.     Patient seen and discussed with attending neurologist Dr. Evans.    Diana Garcia MD  PGY4 Neurology    "

## 2022-11-09 NOTE — LETTER
"    2022         RE: Kurtis Urban  [address]  [place] [id number]        Dear Colleague,    Thank you for referring your patient, Kurtis Urban, to the [facility]. Please see a copy of my visit note below.    Department of Neurology  Movement Disorders Division     PATIENT: Kurtis Urban  : 1929  JAD: 2022  REASON FOR VISIT: Evaluation of tremor.  REFERRING PROVIDER: Jimmy Walker MD  [facility]  [address]  [place] [address]     HPI: Mr. 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 Limited records available for review in EMR from previous neurologist Dr. Walker at time of this visit. Per previous ED neurology consultation note (2019), \"We have been requested by Dr. Shaw to evaluate Kurtis Urban who is a 89 y.o. year old  male for confusion. The patient has a history of mild cognitive impairments, TIA, seizure.  He was getting a haircut with a friend and developed confusion that lasted for about 2 hours gradually back to normal.  No loss of consciousness or falls.  No apparent language, speech difficulty.  No noticeable weakness or sensory difficulty. Patient has a history of TIAs in the past and is listed as having had MRIs and MRAs done in the past in .  His wife passed away in 2015. Reported he went up to a neighbor of his and asking where his wife was in 2019 after she passed away.  He was brought into the emergency room for evaluation and in the emergency room was noted by family members to be staring off into space with chewing motions being made and his not being aware of the surroundings.  He was confused afterwards and spoken in his native [place] language afterwards.  " "He had EEG study showing background slowing back then.  He was started on Keppra 500 mg twice daily and continued on it.  He transfer the care to Dr. Florence to Dr. Jimmy Walker.  No recurrent seizures since he was started on the medication. He had dementia with the initial work-up in 2006 by Dr. Farrell: Barber score 20 out of 30 in 2015, 13/30 in 2019.  He has tried Aricept, Namenda and Razadyne in the past. He has essential tremor followed up by Dr. Walker and was started on primidone 50 mg twice daily nightly in 2019 few months ago.  No side effects of medications. He has some unique left shoulder mechanical issues.  At times he has trouble to bring the left arm up but once he get into the space he can repeat and do it without difficulty.\"    He came to the [facility] neurology clinic accompanied by his friend for establishing care fr tremor as referred by Dr. Walker. He presents today to establish care for tremor as his previous neurologist has moved on to another practice. Has had action tremor for at least 10 years. No family hx of tremor. Drinks one glass of wine on Sunday. Hasn't noticed a difference with alcohol. Denies concerns of slowness, rigidity (aside from what he describes as 'normal aging'), hallucination, mood disturbance. Stable memory concerns. Doing well in assisted living per pt and friend.    Right handed (not changed from left handed in school), first language is Israeli.    Patient was diagnosed with essential tremor in 2019 by Dr. Walker, and per notes was prescribed primidone. Now on primidone 50mg BID, this has been working well. He's been on this dose for about 5 years. No endorsed side effects. Very pleased with results of this medication.    Additional history:   Driving: not driving  Living situation: independent assisted living  Medication compliance: facility helps with meds  ADL's: the patient is largely independent    Difficulty with buttons and shoelaces.    TETRAS ADL Subscale " (48 max)  (0=Normal 1=Slightly abnormal 2=Mildly 3=Moderately 4= Severely)    ADL Sub-Scale 11/9/2022   1. Speaking 0   2. Feeding with a spoon 1   3. Drinking from a glass 1   4. Hygiene 0   5. Dressing 3   6. Pouring 0   7. Carrying food trays, plates or similar items 0   8. Using keys 0   9. Writing 3   10. Working 1   11. Overall disability with the most affected task 3   Name of most affected task: dressing   12. Social impact 0   ADL TOTAL 12       Pt states no additional problems other than the ones mentioned above & under problem history. 14 pt ROS otherwise negative.      MEDICATIONS:  No outpatient medications have been marked as taking for the 11/9/22 encounter (Appointment) with Danyell Evans DO.       ALLERGIES: Penicillins, Alfuzosin, Donepezil, Myolin [norflex], Sulfa (sulfonamide antibiotics) [sulfa drugs], and Triazolam    CURRENT PROBLEM LIST: does not have any pertinent problems on file.    PMH:  has a past medical history of Dementia with behavioral disturbance (H) (9/3/2019), Depression (5/28/2014), Hypercholesteremia (5/28/2014), Melanoma of back (H) (5/28/2014), Melanoma of face (H) (5/28/2014), Seizure (H) (5/19/2015), Thiamine deficiency (9/3/2019), and TIA (transient ischemic attack).    PSH:  has a past surgical history that includes Cholecystectomy; appendectomy; other surgical history; other surgical history (1993); hernia repair; shoulder surgery (Right); and other surgical history (Right).    FH: family history includes Cancer in his mother; Cerebrovascular Disease in his maternal grandfather.    SH:   Social History     Social History Narrative     Not on file    Wife passed away in 2015.    PHYSICAL EXAM:     Vital Signs:  Blood pressure 107/61, pulse 62.   Physical Exam:  Constitutional: Alert. Sitting in chair comfortably. No acute distress.   Head: Atraumatic, normocephalic.  ENT: Moist mucous membranes. No sinus drainage.  Cardiovascular: Appears warm, well-perfused, and  non-toxic.  Respiratory: No increased work of breathing, no accessory muscle use.   Musculoskeletal: Moving all four limbs spontaneously. Limited ROM in bilat shoulders  Skin: No rashes appreciated on visualized skin.   Hematologic/Lymphatic/Immunologic: No bruising appreciated on visualized skin.       Neurologic exam  Mental Status: Awake, alert, attention appropriate. Following commands. Interacting appropriately. Speech is fluent, able to comprehend. No dysarthria.  Cranial Nerves: EOMI without nystagmus, eyes move conjugately. Smile and eyebrow raise equal, blinking symmetric, no weakness. Nasolabial folds symmetric. Tongue midline. Shoulder shrug symmetric. Very hard of hearing.  Motor:  No pronator or sensory drift. Hx shoulder replacements somewhat limit strength exam. Strength is 5/5 at elbow flex/ext, FDI, hip flexion, knee flex/ext, dorsi/plantar flexion. Finger tapping is equal frequency and amplitude bilaterally. Foot tapping equal bilaterally.  Reflexes: no ankle clonus  Sensory: intact to light touch in all four extremities.  Coordination: FNF no dysmetria, HTS intact.   Gait: normal base and station. Able to tandem, heel and toe walk. Romberg negative.     Motor (Performance) Sub-Scale 11/9/2022   Assessment Time 10:52 AM   Medication On   DBS - Right Brain None   DBS - Left Brain None   Head 0   Face & Jaw 0.5   Voice 0   Outstretched - RIGHT 1   Outstretched - LEFT 1.5   Wingbeating - RIGHT 0   Wingbeating - LEFT 1.5   Kinetic - RIGHT 1.5   Kinetic - LEFT 1.5   Lower Limb - RIGHT 0   Lower Limb - LEFT 0   Lower Limb (Max) 0   Spiral - RIGHT 1   Spiral - LEFT 2   Handwriting 1   Dot approx - RIGHT 0   Dot approx - LEFT 1   Trunk (Standing) 1   Total Right 3.5   Total Left 7.5   Axial 0.5   TOTAL 13.5     DATA REVIEW:   TSH   Date Value Ref Range Status   07/28/2020 0.82 0.30 - 5.00 uIU/mL Final     Liver Function Studies - Recent Labs   Lab Test 03/17/21  1527   PROTTOTAL 6.2   ALBUMIN 3.6  "  BILITOTAL 0.3   ALKPHOS 100   AST 23   ALT 28     Imaging:  \"EXAM: MR HEAD LIMITED WO CONTRAST   LOCATION: [facility]   DATE/TIME: 9/3/2019 11:48 AM   FINDINGS:   INTRACRANIAL CONTENTS: No acute or subacute infarct. Stable small chronic cortical infarcts along the posterior lateral right and left frontal areas with gliosis and volume loss. Stable small chronic lacunar infarct left basal ganglia. No   intraparenchymal mass, acute hemorrhage, or extra-axial fluid collections. Patchy and confluent nonspecific T2/FLAIR hyperintensities within the cerebral white matter most consistent with moderate chronic microvascular ischemic change. Moderate   generalized volume loss. Ventricles and sulci are normal for age. Normal position of the cerebellar tonsils. Stable developmental retrocerebellar arachnoid cyst.   IMPRESSION:   1.  No evidence for acute or subacute infarct, acute hemorrhage, obstructive hydrocephalus or new intracranial mass.   2.  Stable small chronic cortical and subcortical infarcts along the posterior lateral right and left frontal parietal regions with a stable small chronic lacunar infarct left basal ganglia.   3.  Stable mild to moderate age-related changes.   4.  Stable prominent developmental retrocerebellar arachnoid cyst.   5.  Findings called to Dr. Winston in the [facility]'s ER at 12 noon on 9/3/2019.\"    ASSESSMENT/PLAN:  Mr. Urban is a 89 year old right handed man with a 10-year history of kinetic bilateral hand tremor. Today's exam shows high frequency low amplitude bilateral hand tremor L worse than R. Agree with diagnosis of essential tremor based on exam and history today.    #ET  -continue primidone 50mg PO BID    #MCI vs Major Neurocognitive Disorder  -no driving (he doesn't drive anymore)  -follow up with PCP  -continue vitamin supplementation per PCP    #hx possible focal seizure  -continue pta keppra 500mg PO BID  -follow up with general neurology clinic (referral " placed)      Can follow up in movement clinic as needed, but very reasonable for general neurology to continue to prescribe primidone along with his other care.     Patient seen and discussed with attending neurologist Dr. Evans.    Diana Garcia MD  PGY4 Neurology      Attestation signed by Danyell Evans DO at 11/11/2022  3:39 PM (Updated):    I, Danyell Evans DO, personally saw this patient with the Neurology resident and agree with Dr. Garcia's findings and plan of care as documented in Dr. Garcia's note. I personally performed salient aspects of the history and neurological examination.     I personally reviewed the medications and labs. I personally viewed the imaging, and agree with the interpretation documented by the resident    Key findings: Patient with longstanding essential tremor well-controlled on primidone 50 mg 1 tab twice daily.  He states that his hand tremors are not a major issue for him and do not affect daily activities.  Tetra score reflects his mild bilateral hand tremor.  He has a remote history of possible focal seizure for which she was started on levetiracetam 500 mg 1 tablet twice daily.  He would like to establish care with a general neurologist who may be able to refill both these prescriptions.  Referral was placed to general neurology to establish care.  For remainder of history, exam, plan, please refer to Dr. Garcia's excellently written note.    Date of Service (when I saw the patient): 11/9/22    Time spent with patient: 40 minutes  60 minutes spent on date of encounter doing chart reviews and exam and documentation and further activities as noted above.     Danyell Evans DO, MA   of Neurology   [facility]               Again, thank you for allowing me to participate in the care of your patient.        Sincerely,        Danyell Evans DO

## 2022-11-09 NOTE — NURSING NOTE
Chief Complaint   Patient presents with     Tremors     Both hands.  Was previously seen by EZRA Zimmerman on 11/9/2022 at 10:08 AM

## 2023-01-01 ENCOUNTER — LAB REQUISITION (OUTPATIENT)
Dept: LAB | Facility: CLINIC | Age: 88
End: 2023-01-01
Payer: COMMERCIAL

## 2023-01-01 ENCOUNTER — APPOINTMENT (OUTPATIENT)
Dept: OCCUPATIONAL THERAPY | Facility: CLINIC | Age: 88
DRG: 689 | End: 2023-01-01
Payer: COMMERCIAL

## 2023-01-01 ENCOUNTER — TELEPHONE (OUTPATIENT)
Dept: GERIATRICS | Facility: CLINIC | Age: 88
End: 2023-01-01
Payer: COMMERCIAL

## 2023-01-01 ENCOUNTER — TRANSITIONAL CARE UNIT VISIT (OUTPATIENT)
Dept: GERIATRICS | Facility: CLINIC | Age: 88
End: 2023-01-01
Payer: COMMERCIAL

## 2023-01-01 ENCOUNTER — APPOINTMENT (OUTPATIENT)
Dept: RADIOLOGY | Facility: CLINIC | Age: 88
DRG: 689 | End: 2023-01-01
Attending: EMERGENCY MEDICINE
Payer: COMMERCIAL

## 2023-01-01 ENCOUNTER — NURSING HOME VISIT (OUTPATIENT)
Dept: GERIATRICS | Facility: CLINIC | Age: 88
End: 2023-01-01
Payer: COMMERCIAL

## 2023-01-01 ENCOUNTER — TELEPHONE (OUTPATIENT)
Dept: GERIATRICS | Facility: CLINIC | Age: 88
End: 2023-01-01

## 2023-01-01 ENCOUNTER — HOSPITAL ENCOUNTER (OUTPATIENT)
Dept: SPEECH THERAPY | Facility: CLINIC | Age: 88
Discharge: HOME | End: 2023-05-10
Attending: FAMILY MEDICINE
Payer: COMMERCIAL

## 2023-01-01 ENCOUNTER — APPOINTMENT (OUTPATIENT)
Dept: PHYSICAL THERAPY | Facility: CLINIC | Age: 88
DRG: 082 | End: 2023-01-01
Attending: INTERNAL MEDICINE
Payer: COMMERCIAL

## 2023-01-01 ENCOUNTER — TELEPHONE (OUTPATIENT)
Dept: NEUROLOGY | Facility: CLINIC | Age: 88
End: 2023-01-01
Payer: COMMERCIAL

## 2023-01-01 ENCOUNTER — HOSPITAL ENCOUNTER (EMERGENCY)
Facility: CLINIC | Age: 88
Discharge: NURSING, REHAB OR LONG-TERM CARE | End: 2023-03-29
Attending: EMERGENCY MEDICINE | Admitting: EMERGENCY MEDICINE
Payer: COMMERCIAL

## 2023-01-01 ENCOUNTER — APPOINTMENT (OUTPATIENT)
Dept: OCCUPATIONAL THERAPY | Facility: CLINIC | Age: 88
DRG: 082 | End: 2023-01-01
Attending: INTERNAL MEDICINE
Payer: COMMERCIAL

## 2023-01-01 ENCOUNTER — DOCUMENTATION ONLY (OUTPATIENT)
Dept: OTHER | Facility: CLINIC | Age: 88
End: 2023-01-01
Payer: COMMERCIAL

## 2023-01-01 ENCOUNTER — DOCUMENTATION ONLY (OUTPATIENT)
Dept: GERIATRICS | Facility: CLINIC | Age: 88
End: 2023-01-01
Payer: COMMERCIAL

## 2023-01-01 ENCOUNTER — APPOINTMENT (OUTPATIENT)
Dept: CT IMAGING | Facility: CLINIC | Age: 88
DRG: 082 | End: 2023-01-01
Attending: STUDENT IN AN ORGANIZED HEALTH CARE EDUCATION/TRAINING PROGRAM
Payer: COMMERCIAL

## 2023-01-01 ENCOUNTER — HOSPITAL ENCOUNTER (OUTPATIENT)
Facility: HOSPITAL | Age: 88
Discharge: NURSING, REHAB OR LONG-TERM CARE | End: 2023-05-18
Attending: UROLOGY | Admitting: UROLOGY
Payer: COMMERCIAL

## 2023-01-01 ENCOUNTER — ANCILLARY PROCEDURE (OUTPATIENT)
Dept: NEUROLOGY | Facility: CLINIC | Age: 88
End: 2023-01-01
Attending: PSYCHIATRY & NEUROLOGY
Payer: COMMERCIAL

## 2023-01-01 ENCOUNTER — MEDICAL CORRESPONDENCE (OUTPATIENT)
Dept: HEALTH INFORMATION MANAGEMENT | Facility: CLINIC | Age: 88
End: 2023-01-01
Payer: COMMERCIAL

## 2023-01-01 ENCOUNTER — APPOINTMENT (OUTPATIENT)
Dept: PHYSICAL THERAPY | Facility: CLINIC | Age: 88
DRG: 689 | End: 2023-01-01
Payer: COMMERCIAL

## 2023-01-01 ENCOUNTER — APPOINTMENT (OUTPATIENT)
Dept: MRI IMAGING | Facility: CLINIC | Age: 88
DRG: 082 | End: 2023-01-01
Attending: STUDENT IN AN ORGANIZED HEALTH CARE EDUCATION/TRAINING PROGRAM
Payer: COMMERCIAL

## 2023-01-01 ENCOUNTER — VIRTUAL VISIT (OUTPATIENT)
Dept: NEUROLOGY | Facility: CLINIC | Age: 88
End: 2023-01-01
Payer: COMMERCIAL

## 2023-01-01 ENCOUNTER — OFFICE VISIT (OUTPATIENT)
Dept: NEUROLOGY | Facility: CLINIC | Age: 88
End: 2023-01-01
Payer: COMMERCIAL

## 2023-01-01 ENCOUNTER — PATIENT OUTREACH (OUTPATIENT)
Dept: CARE COORDINATION | Facility: CLINIC | Age: 88
End: 2023-01-01
Payer: COMMERCIAL

## 2023-01-01 ENCOUNTER — APPOINTMENT (OUTPATIENT)
Dept: PHYSICAL THERAPY | Facility: CLINIC | Age: 88
DRG: 082 | End: 2023-01-01
Payer: COMMERCIAL

## 2023-01-01 ENCOUNTER — TRANSCRIBE ORDERS (OUTPATIENT)
Dept: OTHER | Age: 88
End: 2023-01-01

## 2023-01-01 ENCOUNTER — APPOINTMENT (OUTPATIENT)
Dept: CT IMAGING | Facility: CLINIC | Age: 88
DRG: 082 | End: 2023-01-01
Attending: UROLOGY
Payer: COMMERCIAL

## 2023-01-01 ENCOUNTER — HOSPITAL ENCOUNTER (OUTPATIENT)
Dept: RADIOLOGY | Facility: CLINIC | Age: 88
Discharge: HOME | End: 2023-05-10
Attending: FAMILY MEDICINE
Payer: COMMERCIAL

## 2023-01-01 ENCOUNTER — HOSPITAL ENCOUNTER (OUTPATIENT)
Facility: HOSPITAL | Age: 88
End: 2023-01-01
Attending: UROLOGY | Admitting: UROLOGY
Payer: COMMERCIAL

## 2023-01-01 ENCOUNTER — ANESTHESIA (OUTPATIENT)
Dept: SURGERY | Facility: HOSPITAL | Age: 88
End: 2023-01-01
Payer: COMMERCIAL

## 2023-01-01 ENCOUNTER — DOCUMENTATION ONLY (OUTPATIENT)
Dept: OTHER | Facility: CLINIC | Age: 88
End: 2023-01-01

## 2023-01-01 ENCOUNTER — HOSPITAL ENCOUNTER (INPATIENT)
Facility: CLINIC | Age: 88
LOS: 7 days | Discharge: NURSING, REHAB OR LONG-TERM CARE | DRG: 689 | End: 2023-04-18
Attending: EMERGENCY MEDICINE | Admitting: FAMILY MEDICINE
Payer: COMMERCIAL

## 2023-01-01 ENCOUNTER — APPOINTMENT (OUTPATIENT)
Dept: OCCUPATIONAL THERAPY | Facility: CLINIC | Age: 88
DRG: 082 | End: 2023-01-01
Payer: COMMERCIAL

## 2023-01-01 ENCOUNTER — APPOINTMENT (OUTPATIENT)
Dept: OCCUPATIONAL THERAPY | Facility: HOSPITAL | Age: 88
End: 2023-01-01
Attending: NURSE PRACTITIONER
Payer: COMMERCIAL

## 2023-01-01 ENCOUNTER — APPOINTMENT (OUTPATIENT)
Dept: RADIOLOGY | Facility: CLINIC | Age: 88
DRG: 689 | End: 2023-01-01
Payer: COMMERCIAL

## 2023-01-01 ENCOUNTER — APPOINTMENT (OUTPATIENT)
Dept: SPEECH THERAPY | Facility: CLINIC | Age: 88
DRG: 689 | End: 2023-01-01
Payer: COMMERCIAL

## 2023-01-01 ENCOUNTER — TRANSFERRED RECORDS (OUTPATIENT)
Dept: HEALTH INFORMATION MANAGEMENT | Facility: CLINIC | Age: 88
End: 2023-01-01

## 2023-01-01 ENCOUNTER — TELEPHONE (OUTPATIENT)
Dept: NEUROLOGY | Facility: CLINIC | Age: 88
End: 2023-01-01

## 2023-01-01 ENCOUNTER — HOSPITAL ENCOUNTER (INPATIENT)
Facility: CLINIC | Age: 88
LOS: 4 days | Discharge: NURSING, REHAB OR LONG-TERM CARE | DRG: 082 | End: 2023-03-20
Attending: STUDENT IN AN ORGANIZED HEALTH CARE EDUCATION/TRAINING PROGRAM | Admitting: INTERNAL MEDICINE
Payer: COMMERCIAL

## 2023-01-01 ENCOUNTER — APPOINTMENT (OUTPATIENT)
Dept: PHYSICAL THERAPY | Facility: HOSPITAL | Age: 88
End: 2023-01-01
Attending: NURSE PRACTITIONER
Payer: COMMERCIAL

## 2023-01-01 ENCOUNTER — APPOINTMENT (OUTPATIENT)
Dept: CT IMAGING | Facility: CLINIC | Age: 88
DRG: 689 | End: 2023-01-01
Attending: EMERGENCY MEDICINE
Payer: COMMERCIAL

## 2023-01-01 ENCOUNTER — ANESTHESIA EVENT (OUTPATIENT)
Dept: SURGERY | Facility: HOSPITAL | Age: 88
End: 2023-01-01
Payer: COMMERCIAL

## 2023-01-01 ENCOUNTER — MEDICAL CORRESPONDENCE (OUTPATIENT)
Dept: MEDSURG UNIT | Facility: CLINIC | Age: 88
End: 2023-01-01

## 2023-01-01 ENCOUNTER — MEDICAL CORRESPONDENCE (OUTPATIENT)
Dept: HEALTH INFORMATION MANAGEMENT | Facility: CLINIC | Age: 88
End: 2023-01-01

## 2023-01-01 ENCOUNTER — HOSPITAL ENCOUNTER (EMERGENCY)
Facility: CLINIC | Age: 88
End: 2023-01-01
Payer: COMMERCIAL

## 2023-01-01 VITALS
DIASTOLIC BLOOD PRESSURE: 77 MMHG | HEART RATE: 59 BPM | TEMPERATURE: 98.5 F | HEIGHT: 66 IN | BODY MASS INDEX: 22.82 KG/M2 | OXYGEN SATURATION: 94 % | SYSTOLIC BLOOD PRESSURE: 146 MMHG | RESPIRATION RATE: 16 BRPM | WEIGHT: 141.98 LBS

## 2023-01-01 VITALS
BODY MASS INDEX: 21.18 KG/M2 | DIASTOLIC BLOOD PRESSURE: 62 MMHG | OXYGEN SATURATION: 93 % | WEIGHT: 131.8 LBS | TEMPERATURE: 97.6 F | HEIGHT: 66 IN | RESPIRATION RATE: 18 BRPM | SYSTOLIC BLOOD PRESSURE: 120 MMHG | HEART RATE: 78 BPM

## 2023-01-01 VITALS
HEART RATE: 68 BPM | BODY MASS INDEX: 20.73 KG/M2 | OXYGEN SATURATION: 97 % | RESPIRATION RATE: 18 BRPM | TEMPERATURE: 97.8 F | WEIGHT: 129 LBS | DIASTOLIC BLOOD PRESSURE: 72 MMHG | HEIGHT: 66 IN | SYSTOLIC BLOOD PRESSURE: 123 MMHG

## 2023-01-01 VITALS
OXYGEN SATURATION: 99 % | HEART RATE: 67 BPM | RESPIRATION RATE: 20 BRPM | DIASTOLIC BLOOD PRESSURE: 64 MMHG | BODY MASS INDEX: 20.02 KG/M2 | SYSTOLIC BLOOD PRESSURE: 120 MMHG | HEIGHT: 66 IN | TEMPERATURE: 98.7 F | WEIGHT: 124.56 LBS

## 2023-01-01 VITALS
HEIGHT: 66 IN | OXYGEN SATURATION: 94 % | SYSTOLIC BLOOD PRESSURE: 132 MMHG | WEIGHT: 138 LBS | DIASTOLIC BLOOD PRESSURE: 66 MMHG | RESPIRATION RATE: 16 BRPM | BODY MASS INDEX: 22.18 KG/M2 | HEART RATE: 70 BPM | TEMPERATURE: 97.5 F

## 2023-01-01 VITALS
SYSTOLIC BLOOD PRESSURE: 109 MMHG | DIASTOLIC BLOOD PRESSURE: 58 MMHG | HEART RATE: 67 BPM | HEIGHT: 70 IN | BODY MASS INDEX: 19.64 KG/M2 | WEIGHT: 137.2 LBS | OXYGEN SATURATION: 98 % | RESPIRATION RATE: 16 BRPM | TEMPERATURE: 96.4 F

## 2023-01-01 VITALS
OXYGEN SATURATION: 95 % | TEMPERATURE: 97.3 F | DIASTOLIC BLOOD PRESSURE: 65 MMHG | BODY MASS INDEX: 20.22 KG/M2 | HEIGHT: 66 IN | WEIGHT: 125.8 LBS | SYSTOLIC BLOOD PRESSURE: 115 MMHG | HEART RATE: 69 BPM | RESPIRATION RATE: 18 BRPM

## 2023-01-01 VITALS
HEIGHT: 70 IN | HEART RATE: 63 BPM | OXYGEN SATURATION: 94 % | DIASTOLIC BLOOD PRESSURE: 61 MMHG | RESPIRATION RATE: 18 BRPM | SYSTOLIC BLOOD PRESSURE: 132 MMHG | TEMPERATURE: 97.7 F | BODY MASS INDEX: 18.32 KG/M2 | WEIGHT: 128 LBS

## 2023-01-01 VITALS
RESPIRATION RATE: 18 BRPM | DIASTOLIC BLOOD PRESSURE: 79 MMHG | OXYGEN SATURATION: 94 % | TEMPERATURE: 98.2 F | SYSTOLIC BLOOD PRESSURE: 133 MMHG | HEART RATE: 94 BPM | BODY MASS INDEX: 22.27 KG/M2 | WEIGHT: 138 LBS

## 2023-01-01 VITALS — HEART RATE: 61 BPM | DIASTOLIC BLOOD PRESSURE: 61 MMHG | SYSTOLIC BLOOD PRESSURE: 111 MMHG

## 2023-01-01 VITALS
DIASTOLIC BLOOD PRESSURE: 57 MMHG | TEMPERATURE: 97.1 F | HEIGHT: 66 IN | HEART RATE: 82 BPM | OXYGEN SATURATION: 94 % | SYSTOLIC BLOOD PRESSURE: 106 MMHG | WEIGHT: 138 LBS | BODY MASS INDEX: 22.18 KG/M2

## 2023-01-01 VITALS
RESPIRATION RATE: 16 BRPM | WEIGHT: 138 LBS | HEART RATE: 82 BPM | DIASTOLIC BLOOD PRESSURE: 67 MMHG | SYSTOLIC BLOOD PRESSURE: 114 MMHG | TEMPERATURE: 96.1 F | OXYGEN SATURATION: 96 % | BODY MASS INDEX: 22.27 KG/M2

## 2023-01-01 VITALS
OXYGEN SATURATION: 94 % | HEART RATE: 72 BPM | TEMPERATURE: 97.7 F | RESPIRATION RATE: 18 BRPM | BODY MASS INDEX: 20.73 KG/M2 | DIASTOLIC BLOOD PRESSURE: 60 MMHG | WEIGHT: 129 LBS | HEIGHT: 66 IN | SYSTOLIC BLOOD PRESSURE: 108 MMHG

## 2023-01-01 VITALS
DIASTOLIC BLOOD PRESSURE: 58 MMHG | TEMPERATURE: 98.4 F | RESPIRATION RATE: 14 BRPM | OXYGEN SATURATION: 96 % | HEART RATE: 73 BPM | WEIGHT: 127 LBS | SYSTOLIC BLOOD PRESSURE: 93 MMHG | HEIGHT: 66 IN | BODY MASS INDEX: 20.41 KG/M2

## 2023-01-01 VITALS
RESPIRATION RATE: 18 BRPM | OXYGEN SATURATION: 93 % | WEIGHT: 129 LBS | SYSTOLIC BLOOD PRESSURE: 110 MMHG | HEART RATE: 69 BPM | BODY MASS INDEX: 20.73 KG/M2 | HEIGHT: 66 IN | TEMPERATURE: 97.5 F | DIASTOLIC BLOOD PRESSURE: 65 MMHG

## 2023-01-01 VITALS
DIASTOLIC BLOOD PRESSURE: 64 MMHG | WEIGHT: 144 LBS | OXYGEN SATURATION: 98 % | TEMPERATURE: 97.3 F | RESPIRATION RATE: 20 BRPM | HEART RATE: 85 BPM | SYSTOLIC BLOOD PRESSURE: 121 MMHG | BODY MASS INDEX: 23.14 KG/M2 | HEIGHT: 66 IN

## 2023-01-01 VITALS
OXYGEN SATURATION: 95 % | DIASTOLIC BLOOD PRESSURE: 57 MMHG | WEIGHT: 131.17 LBS | RESPIRATION RATE: 18 BRPM | SYSTOLIC BLOOD PRESSURE: 112 MMHG | BODY MASS INDEX: 21.17 KG/M2 | TEMPERATURE: 98 F | HEART RATE: 78 BPM

## 2023-01-01 VITALS
DIASTOLIC BLOOD PRESSURE: 64 MMHG | HEIGHT: 70 IN | SYSTOLIC BLOOD PRESSURE: 122 MMHG | HEART RATE: 70 BPM | WEIGHT: 133 LBS | TEMPERATURE: 97.6 F | BODY MASS INDEX: 19.04 KG/M2 | OXYGEN SATURATION: 96 % | RESPIRATION RATE: 18 BRPM

## 2023-01-01 VITALS
SYSTOLIC BLOOD PRESSURE: 108 MMHG | RESPIRATION RATE: 16 BRPM | OXYGEN SATURATION: 96 % | WEIGHT: 128 LBS | DIASTOLIC BLOOD PRESSURE: 64 MMHG | BODY MASS INDEX: 18.32 KG/M2 | HEIGHT: 70 IN | TEMPERATURE: 96.4 F | HEART RATE: 71 BPM

## 2023-01-01 VITALS
RESPIRATION RATE: 14 BRPM | BODY MASS INDEX: 22.34 KG/M2 | DIASTOLIC BLOOD PRESSURE: 72 MMHG | HEART RATE: 87 BPM | HEIGHT: 66 IN | OXYGEN SATURATION: 95 % | TEMPERATURE: 98.4 F | WEIGHT: 139 LBS | SYSTOLIC BLOOD PRESSURE: 124 MMHG

## 2023-01-01 VITALS
OXYGEN SATURATION: 94 % | SYSTOLIC BLOOD PRESSURE: 124 MMHG | DIASTOLIC BLOOD PRESSURE: 61 MMHG | WEIGHT: 144 LBS | HEIGHT: 66 IN | HEART RATE: 57 BPM | RESPIRATION RATE: 16 BRPM | BODY MASS INDEX: 23.14 KG/M2 | TEMPERATURE: 97.6 F

## 2023-01-01 DIAGNOSIS — R09.89 CHEST CONGESTION: ICD-10-CM

## 2023-01-01 DIAGNOSIS — A41.9 SEPSIS, UNSPECIFIED ORGANISM (H): ICD-10-CM

## 2023-01-01 DIAGNOSIS — Z79.899 ENCOUNTER FOR MEDICATION REVIEW: ICD-10-CM

## 2023-01-01 DIAGNOSIS — G40.909 SEIZURE DISORDER (H): ICD-10-CM

## 2023-01-01 DIAGNOSIS — C43.59 MELANOMA OF BACK (H): ICD-10-CM

## 2023-01-01 DIAGNOSIS — R41.82 ALTERED MENTAL STATUS, UNSPECIFIED ALTERED MENTAL STATUS TYPE: ICD-10-CM

## 2023-01-01 DIAGNOSIS — U07.1 INFECTION DUE TO 2019 NOVEL CORONAVIRUS: Primary | ICD-10-CM

## 2023-01-01 DIAGNOSIS — R19.7 DIARRHEA, UNSPECIFIED: ICD-10-CM

## 2023-01-01 DIAGNOSIS — E78.5 HYPERLIPIDEMIA, UNSPECIFIED HYPERLIPIDEMIA TYPE: ICD-10-CM

## 2023-01-01 DIAGNOSIS — R53.81 PHYSICAL DECONDITIONING: ICD-10-CM

## 2023-01-01 DIAGNOSIS — G31.84 MILD COGNITIVE IMPAIRMENT: ICD-10-CM

## 2023-01-01 DIAGNOSIS — R33.9 URINARY RETENTION: ICD-10-CM

## 2023-01-01 DIAGNOSIS — R62.7 FAILURE TO THRIVE IN ADULT: ICD-10-CM

## 2023-01-01 DIAGNOSIS — T83.9XXA FOLEY CATHETER PROBLEM, INITIAL ENCOUNTER (H): ICD-10-CM

## 2023-01-01 DIAGNOSIS — G25.0 ESSENTIAL TREMOR: ICD-10-CM

## 2023-01-01 DIAGNOSIS — H91.90 HEARING LOSS, UNSPECIFIED HEARING LOSS TYPE, UNSPECIFIED LATERALITY: ICD-10-CM

## 2023-01-01 DIAGNOSIS — Z51.81 ENCOUNTER FOR THERAPEUTIC DRUG LEVEL MONITORING: ICD-10-CM

## 2023-01-01 DIAGNOSIS — F03.918 DEMENTIA WITH BEHAVIORAL DISTURBANCE (H): ICD-10-CM

## 2023-01-01 DIAGNOSIS — D32.9 MENINGIOMA (H): ICD-10-CM

## 2023-01-01 DIAGNOSIS — F01.50 VASCULAR DEMENTIA WITHOUT BEHAVIORAL DISTURBANCE, PSYCHOTIC DISTURBANCE, MOOD DISTURBANCE, OR ANXIETY, UNSPECIFIED DEMENTIA SEVERITY (H): Primary | ICD-10-CM

## 2023-01-01 DIAGNOSIS — C67.9 MALIGNANT NEOPLASM OF URINARY BLADDER, UNSPECIFIED SITE (H): Primary | ICD-10-CM

## 2023-01-01 DIAGNOSIS — G89.4 CHRONIC PAIN SYNDROME: Primary | ICD-10-CM

## 2023-01-01 DIAGNOSIS — N39.0 URINARY TRACT INFECTION WITHOUT HEMATURIA, SITE UNSPECIFIED: ICD-10-CM

## 2023-01-01 DIAGNOSIS — G93.40 ENCEPHALOPATHY, UNSPECIFIED: ICD-10-CM

## 2023-01-01 DIAGNOSIS — Z86.73 HISTORY OF CVA (CEREBROVASCULAR ACCIDENT): ICD-10-CM

## 2023-01-01 DIAGNOSIS — K59.00 CONSTIPATION, UNSPECIFIED CONSTIPATION TYPE: ICD-10-CM

## 2023-01-01 DIAGNOSIS — N39.0 URINARY TRACT INFECTION, SITE NOT SPECIFIED: ICD-10-CM

## 2023-01-01 DIAGNOSIS — M62.81 GENERALIZED MUSCLE WEAKNESS: ICD-10-CM

## 2023-01-01 DIAGNOSIS — F01.B0 MODERATE VASCULAR DEMENTIA, UNSPECIFIED WHETHER BEHAVIORAL, PSYCHOTIC, OR MOOD DISTURBANCE OR ANXIETY (H): ICD-10-CM

## 2023-01-01 DIAGNOSIS — S06.5XAA BILATERAL SUBDURAL HEMATOMAS (H): Primary | ICD-10-CM

## 2023-01-01 DIAGNOSIS — R52 PAIN MANAGEMENT: ICD-10-CM

## 2023-01-01 DIAGNOSIS — R50.9 FEVER, UNSPECIFIED FEVER CAUSE: Primary | ICD-10-CM

## 2023-01-01 DIAGNOSIS — S37.30XA URETHRAL TRAUMA, INITIAL ENCOUNTER: ICD-10-CM

## 2023-01-01 DIAGNOSIS — J18.9 HEALTHCARE-ASSOCIATED PNEUMONIA: ICD-10-CM

## 2023-01-01 DIAGNOSIS — N18.9 CHRONIC KIDNEY DISEASE, UNSPECIFIED: ICD-10-CM

## 2023-01-01 DIAGNOSIS — G40.909 SEIZURE DISORDER (H): Primary | ICD-10-CM

## 2023-01-01 DIAGNOSIS — T17.908A ASPIRATION INTO AIRWAY: ICD-10-CM

## 2023-01-01 DIAGNOSIS — I82.5Y9 CHRONIC DEEP VEIN THROMBOSIS (DVT) OF PROXIMAL VEIN OF LOWER EXTREMITY, UNSPECIFIED LATERALITY (H): ICD-10-CM

## 2023-01-01 DIAGNOSIS — R13.10 DYSPHAGIA, UNSPECIFIED: Primary | ICD-10-CM

## 2023-01-01 DIAGNOSIS — S06.5XAA BILATERAL SUBDURAL HEMATOMAS (H): ICD-10-CM

## 2023-01-01 DIAGNOSIS — S06.5XAD TRAUMATIC SUBDURAL HEMORRHAGE WITH LOSS OF CONSCIOUSNESS STATUS UNKNOWN, SUBSEQUENT ENCOUNTER: ICD-10-CM

## 2023-01-01 DIAGNOSIS — D69.6 THROMBOCYTOPENIA (H): ICD-10-CM

## 2023-01-01 DIAGNOSIS — R53.81 PHYSICAL DECONDITIONING: Primary | ICD-10-CM

## 2023-01-01 DIAGNOSIS — R53.1 WEAKNESS: ICD-10-CM

## 2023-01-01 DIAGNOSIS — F03.918 DEMENTIA WITH BEHAVIORAL DISTURBANCE (H): Primary | ICD-10-CM

## 2023-01-01 DIAGNOSIS — T14.8XXA OPEN WOUND: ICD-10-CM

## 2023-01-01 DIAGNOSIS — R11.0 NAUSEA: ICD-10-CM

## 2023-01-01 DIAGNOSIS — I62.00 NONTRAUMATIC SUBDURAL HEMORRHAGE, UNSPECIFIED (H): ICD-10-CM

## 2023-01-01 DIAGNOSIS — I82.5Y1 CHRONIC DEEP VEIN THROMBOSIS (DVT) OF PROXIMAL VEIN OF RIGHT LOWER EXTREMITY (H): ICD-10-CM

## 2023-01-01 DIAGNOSIS — I10 BENIGN ESSENTIAL HYPERTENSION: Primary | ICD-10-CM

## 2023-01-01 DIAGNOSIS — C67.9 MALIGNANT NEOPLASM OF URINARY BLADDER, UNSPECIFIED SITE (H): ICD-10-CM

## 2023-01-01 DIAGNOSIS — F32.0 CURRENT MILD EPISODE OF MAJOR DEPRESSIVE DISORDER WITHOUT PRIOR EPISODE (H): ICD-10-CM

## 2023-01-01 DIAGNOSIS — D62 ANEMIA DUE TO BLOOD LOSS, ACUTE: ICD-10-CM

## 2023-01-01 DIAGNOSIS — F32.0 CURRENT MILD EPISODE OF MAJOR DEPRESSIVE DISORDER WITHOUT PRIOR EPISODE (H): Primary | ICD-10-CM
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
## 2023-01-01 DIAGNOSIS — Z85.820 PERSONAL HISTORY OF MALIGNANT MELANOMA OF SKIN: ICD-10-CM

## 2023-01-01 DIAGNOSIS — R13.10 DYSPHAGIA: ICD-10-CM

## 2023-01-01 DIAGNOSIS — R09.81 NASAL CONGESTION: ICD-10-CM

## 2023-01-01 DIAGNOSIS — N17.9 AKI (ACUTE KIDNEY INJURY) (H): ICD-10-CM

## 2023-01-01 DIAGNOSIS — R33.9 RETENTION OF URINE, UNSPECIFIED: ICD-10-CM

## 2023-01-01 LAB
ALBUMIN SERPL BCG-MCNC: 3.3 G/DL (ref 3.5–5.2)
ALBUMIN SERPL-MCNC: 1.5 G/DL (ref 3.5–5)
ALBUMIN SERPL-MCNC: 1.6 G/DL (ref 3.5–5)
ALBUMIN SERPL-MCNC: 1.9 G/DL (ref 3.5–5)
ALBUMIN SERPL-MCNC: 3.6 G/DL (ref 3.5–5)
ALBUMIN UR-MCNC: 100 MG/DL
ALBUMIN UR-MCNC: 30 MG/DL
ALBUMIN UR-MCNC: 30 MG/DL
ALBUMIN UR-MCNC: 50 MG/DL
ALBUMIN UR-MCNC: 70 MG/DL
ALP SERPL-CCNC: 102 U/L (ref 45–120)
ALP SERPL-CCNC: 106 U/L (ref 45–120)
ALP SERPL-CCNC: 107 U/L (ref 45–120)
ALP SERPL-CCNC: 109 U/L (ref 45–120)
ALP SERPL-CCNC: 116 U/L (ref 45–120)
ALP SERPL-CCNC: 118 U/L (ref 45–120)
ALP SERPL-CCNC: 124 U/L (ref 45–120)
ALP SERPL-CCNC: 130 U/L (ref 40–129)
ALP SERPL-CCNC: 143 U/L (ref 45–120)
ALP SERPL-CCNC: 83 U/L (ref 45–120)
ALT SERPL W P-5'-P-CCNC: 121 U/L (ref 0–45)
ALT SERPL W P-5'-P-CCNC: 152 U/L (ref 0–45)
ALT SERPL W P-5'-P-CCNC: 155 U/L (ref 0–45)
ALT SERPL W P-5'-P-CCNC: 155 U/L (ref 0–45)
ALT SERPL W P-5'-P-CCNC: 167 U/L (ref 0–45)
ALT SERPL W P-5'-P-CCNC: 17 U/L (ref 10–50)
ALT SERPL W P-5'-P-CCNC: 188 U/L (ref 0–45)
ALT SERPL W P-5'-P-CCNC: 250 U/L (ref 0–45)
ALT SERPL W P-5'-P-CCNC: 27 U/L (ref 0–45)
ALT SERPL W P-5'-P-CCNC: 84 U/L (ref 0–45)
AMMONIA PLAS-SCNC: 17 UMOL/L (ref 11–35)
ANION GAP SERPL CALCULATED.3IONS-SCNC: 10 MMOL/L (ref 7–15)
ANION GAP SERPL CALCULATED.3IONS-SCNC: 11 MMOL/L (ref 5–18)
ANION GAP SERPL CALCULATED.3IONS-SCNC: 14 MMOL/L (ref 7–15)
ANION GAP SERPL CALCULATED.3IONS-SCNC: 15 MMOL/L (ref 7–15)
ANION GAP SERPL CALCULATED.3IONS-SCNC: 5 MMOL/L (ref 5–18)
ANION GAP SERPL CALCULATED.3IONS-SCNC: 6 MMOL/L (ref 5–18)
ANION GAP SERPL CALCULATED.3IONS-SCNC: 6 MMOL/L (ref 5–18)
ANION GAP SERPL CALCULATED.3IONS-SCNC: 7 MMOL/L (ref 5–18)
ANION GAP SERPL CALCULATED.3IONS-SCNC: 8 MMOL/L (ref 5–18)
ANION GAP SERPL CALCULATED.3IONS-SCNC: 9 MMOL/L (ref 5–18)
ANION GAP SERPL CALCULATED.3IONS-SCNC: 9 MMOL/L (ref 5–18)
APPEARANCE UR: ABNORMAL
APPEARANCE UR: CLEAR
APTT PPP: 29 SECONDS (ref 22–38)
AST SERPL W P-5'-P-CCNC: 132 U/L (ref 0–40)
AST SERPL W P-5'-P-CCNC: 139 U/L (ref 0–40)
AST SERPL W P-5'-P-CCNC: 139 U/L (ref 0–40)
AST SERPL W P-5'-P-CCNC: 186 U/L (ref 0–40)
AST SERPL W P-5'-P-CCNC: 25 U/L (ref 10–50)
AST SERPL W P-5'-P-CCNC: 304 U/L (ref 0–40)
AST SERPL W P-5'-P-CCNC: 45 U/L (ref 0–40)
AST SERPL W P-5'-P-CCNC: 64 U/L (ref 0–40)
AST SERPL W P-5'-P-CCNC: 65 U/L (ref 0–40)
AST SERPL W P-5'-P-CCNC: 99 U/L (ref 0–40)
ATRIAL RATE - MUSE: 69 BPM
ATRIAL RATE - MUSE: 69 BPM
BACTERIA #/AREA URNS HPF: ABNORMAL /HPF
BACTERIA BLD CULT: NO GROWTH
BACTERIA BLD CULT: NO GROWTH
BACTERIA UR CULT: ABNORMAL
BACTERIA UR CULT: NO GROWTH
BASE EXCESS BLDV CALC-SCNC: -6.4 MMOL/L
BASOPHILS # BLD AUTO: 0 10E3/UL (ref 0–0.2)
BASOPHILS # BLD AUTO: 0.1 10E3/UL (ref 0–0.2)
BASOPHILS NFR BLD AUTO: 0 %
BASOPHILS NFR BLD AUTO: 1 %
BASOPHILS NFR BLD AUTO: 1 %
BILIRUB DIRECT SERPL-MCNC: 0.5 MG/DL
BILIRUB SERPL-MCNC: 0.3 MG/DL
BILIRUB SERPL-MCNC: 0.3 MG/DL (ref 0–1)
BILIRUB SERPL-MCNC: 0.4 MG/DL (ref 0–1)
BILIRUB SERPL-MCNC: 1.3 MG/DL (ref 0–1)
BILIRUB UR QL STRIP: NEGATIVE
BUN SERPL-MCNC: 19 MG/DL (ref 8–28)
BUN SERPL-MCNC: 19 MG/DL (ref 8–28)
BUN SERPL-MCNC: 20.8 MG/DL (ref 8–23)
BUN SERPL-MCNC: 22 MG/DL (ref 8–28)
BUN SERPL-MCNC: 22 MG/DL (ref 8–28)
BUN SERPL-MCNC: 22.9 MG/DL (ref 8–23)
BUN SERPL-MCNC: 23 MG/DL (ref 8–28)
BUN SERPL-MCNC: 24 MG/DL (ref 8–28)
BUN SERPL-MCNC: 26 MG/DL (ref 8–28)
BUN SERPL-MCNC: 36 MG/DL (ref 8–28)
BUN SERPL-MCNC: 44 MG/DL (ref 8–28)
BUN SERPL-MCNC: 53 MG/DL (ref 8–28)
BUN SERPL-MCNC: 55.5 MG/DL (ref 8–23)
BUN SERPL-MCNC: 57 MG/DL (ref 8–28)
C DIFF TOX B STL QL: NEGATIVE
C DIFF TOX B STL QL: NEGATIVE
CALCIUM SERPL-MCNC: 7.9 MG/DL (ref 8.5–10.5)
CALCIUM SERPL-MCNC: 8 MG/DL (ref 8.5–10.5)
CALCIUM SERPL-MCNC: 8.1 MG/DL (ref 8.5–10.5)
CALCIUM SERPL-MCNC: 8.3 MG/DL (ref 8.5–10.5)
CALCIUM SERPL-MCNC: 8.4 MG/DL (ref 8.5–10.5)
CALCIUM SERPL-MCNC: 8.4 MG/DL (ref 8.5–10.5)
CALCIUM SERPL-MCNC: 8.5 MG/DL (ref 8.2–9.6)
CALCIUM SERPL-MCNC: 8.5 MG/DL (ref 8.5–10.5)
CALCIUM SERPL-MCNC: 8.6 MG/DL (ref 8.5–10.5)
CALCIUM SERPL-MCNC: 8.6 MG/DL (ref 8.5–10.5)
CALCIUM SERPL-MCNC: 8.7 MG/DL (ref 8.2–9.6)
CALCIUM SERPL-MCNC: 8.7 MG/DL (ref 8.5–10.5)
CALCIUM SERPL-MCNC: 9.3 MG/DL (ref 8.5–10.5)
CALCIUM SERPL-MCNC: 9.4 MG/DL (ref 8.2–9.6)
CHLORIDE BLD-SCNC: 108 MMOL/L (ref 98–107)
CHLORIDE BLD-SCNC: 110 MMOL/L (ref 98–107)
CHLORIDE BLD-SCNC: 111 MMOL/L (ref 98–107)
CHLORIDE BLD-SCNC: 113 MMOL/L (ref 98–107)
CHLORIDE BLD-SCNC: 115 MMOL/L (ref 98–107)
CHLORIDE BLD-SCNC: 118 MMOL/L (ref 98–107)
CHLORIDE BLD-SCNC: 118 MMOL/L (ref 98–107)
CHLORIDE BLD-SCNC: 123 MMOL/L (ref 98–107)
CHLORIDE BLD-SCNC: 123 MMOL/L (ref 98–107)
CHLORIDE SERPL-SCNC: 104 MMOL/L (ref 98–107)
CHLORIDE SERPL-SCNC: 107 MMOL/L (ref 98–107)
CHLORIDE SERPL-SCNC: 108 MMOL/L (ref 98–107)
CK SERPL-CCNC: 1166 U/L (ref 30–190)
CK SERPL-CCNC: 2827 U/L (ref 30–190)
CK SERPL-CCNC: 447 U/L (ref 30–190)
CO2 SERPL-SCNC: 13 MMOL/L (ref 22–31)
CO2 SERPL-SCNC: 14 MMOL/L (ref 22–31)
CO2 SERPL-SCNC: 14 MMOL/L (ref 22–31)
CO2 SERPL-SCNC: 16 MMOL/L (ref 22–31)
CO2 SERPL-SCNC: 17 MMOL/L (ref 22–31)
CO2 SERPL-SCNC: 18 MMOL/L (ref 22–31)
CO2 SERPL-SCNC: 19 MMOL/L (ref 22–31)
CO2 SERPL-SCNC: 21 MMOL/L (ref 22–31)
CO2 SERPL-SCNC: 22 MMOL/L (ref 22–31)
COLOR UR AUTO: ABNORMAL
COLOR UR AUTO: YELLOW
CREAT SERPL-MCNC: 0.93 MG/DL (ref 0.7–1.3)
CREAT SERPL-MCNC: 0.94 MG/DL (ref 0.7–1.3)
CREAT SERPL-MCNC: 0.99 MG/DL (ref 0.7–1.3)
CREAT SERPL-MCNC: 1.02 MG/DL (ref 0.67–1.17)
CREAT SERPL-MCNC: 1.05 MG/DL (ref 0.7–1.3)
CREAT SERPL-MCNC: 1.07 MG/DL (ref 0.7–1.3)
CREAT SERPL-MCNC: 1.08 MG/DL (ref 0.67–1.17)
CREAT SERPL-MCNC: 1.19 MG/DL (ref 0.7–1.3)
CREAT SERPL-MCNC: 1.21 MG/DL (ref 0.7–1.3)
CREAT SERPL-MCNC: 1.23 MG/DL (ref 0.67–1.17)
CREAT SERPL-MCNC: 1.36 MG/DL (ref 0.7–1.3)
CREAT SERPL-MCNC: 1.75 MG/DL (ref 0.7–1.3)
CREAT SERPL-MCNC: 2.03 MG/DL (ref 0.7–1.3)
CREAT SERPL-MCNC: 2.36 MG/DL (ref 0.67–1.17)
CREAT SERPL-MCNC: 2.45 MG/DL (ref 0.7–1.3)
DEPRECATED HCO3 PLAS-SCNC: 16 MMOL/L (ref 22–29)
DEPRECATED HCO3 PLAS-SCNC: 18 MMOL/L (ref 22–29)
DEPRECATED HCO3 PLAS-SCNC: 21 MMOL/L (ref 22–29)
DIASTOLIC BLOOD PRESSURE - MUSE: 59 MMHG
DIASTOLIC BLOOD PRESSURE - MUSE: 73 MMHG
EOSINOPHIL # BLD AUTO: 0.1 10E3/UL (ref 0–0.7)
EOSINOPHIL # BLD AUTO: 0.2 10E3/UL (ref 0–0.7)
EOSINOPHIL # BLD AUTO: 0.3 10E3/UL (ref 0–0.7)
EOSINOPHIL # BLD AUTO: 0.4 10E3/UL (ref 0–0.7)
EOSINOPHIL NFR BLD AUTO: 0 %
EOSINOPHIL NFR BLD AUTO: 1 %
EOSINOPHIL NFR BLD AUTO: 3 %
EOSINOPHIL NFR BLD AUTO: 3 %
ERYTHROCYTE [DISTWIDTH] IN BLOOD BY AUTOMATED COUNT: 12.4 % (ref 10–15)
ERYTHROCYTE [DISTWIDTH] IN BLOOD BY AUTOMATED COUNT: 12.6 % (ref 10–15)
ERYTHROCYTE [DISTWIDTH] IN BLOOD BY AUTOMATED COUNT: 13.1 % (ref 10–15)
ERYTHROCYTE [DISTWIDTH] IN BLOOD BY AUTOMATED COUNT: 13.2 % (ref 10–15)
ERYTHROCYTE [DISTWIDTH] IN BLOOD BY AUTOMATED COUNT: 13.2 % (ref 10–15)
ERYTHROCYTE [DISTWIDTH] IN BLOOD BY AUTOMATED COUNT: 13.3 % (ref 10–15)
ERYTHROCYTE [DISTWIDTH] IN BLOOD BY AUTOMATED COUNT: 13.5 % (ref 10–15)
ERYTHROCYTE [DISTWIDTH] IN BLOOD BY AUTOMATED COUNT: 13.5 % (ref 10–15)
ERYTHROCYTE [DISTWIDTH] IN BLOOD BY AUTOMATED COUNT: 13.6 % (ref 10–15)
ERYTHROCYTE [DISTWIDTH] IN BLOOD BY AUTOMATED COUNT: 13.7 % (ref 10–15)
ERYTHROCYTE [DISTWIDTH] IN BLOOD BY AUTOMATED COUNT: 13.8 % (ref 10–15)
ERYTHROCYTE [DISTWIDTH] IN BLOOD BY AUTOMATED COUNT: 13.8 % (ref 10–15)
ERYTHROCYTE [DISTWIDTH] IN BLOOD BY AUTOMATED COUNT: 14 % (ref 10–15)
ERYTHROCYTE [DISTWIDTH] IN BLOOD BY AUTOMATED COUNT: 14 % (ref 10–15)
ERYTHROCYTE [DISTWIDTH] IN BLOOD BY AUTOMATED COUNT: 14.1 % (ref 10–15)
ERYTHROCYTE [DISTWIDTH] IN BLOOD BY AUTOMATED COUNT: 14.1 % (ref 10–15)
FLUAV RNA SPEC QL NAA+PROBE: NEGATIVE
FLUBV RNA RESP QL NAA+PROBE: NEGATIVE
GFR SERPL CREATININE-BSD FRML MDRD: 24 ML/MIN/1.73M2
GFR SERPL CREATININE-BSD FRML MDRD: 25 ML/MIN/1.73M2
GFR SERPL CREATININE-BSD FRML MDRD: 30 ML/MIN/1.73M2
GFR SERPL CREATININE-BSD FRML MDRD: 36 ML/MIN/1.73M2
GFR SERPL CREATININE-BSD FRML MDRD: 48 ML/MIN/1.73M2
GFR SERPL CREATININE-BSD FRML MDRD: 54 ML/MIN/1.73M2
GFR SERPL CREATININE-BSD FRML MDRD: 55 ML/MIN/1.73M2
GFR SERPL CREATININE-BSD FRML MDRD: 57 ML/MIN/1.73M2
GFR SERPL CREATININE-BSD FRML MDRD: 64 ML/MIN/1.73M2
GFR SERPL CREATININE-BSD FRML MDRD: 64 ML/MIN/1.73M2
GFR SERPL CREATININE-BSD FRML MDRD: 66 ML/MIN/1.73M2
GFR SERPL CREATININE-BSD FRML MDRD: 68 ML/MIN/1.73M2
GFR SERPL CREATININE-BSD FRML MDRD: 71 ML/MIN/1.73M2
GFR SERPL CREATININE-BSD FRML MDRD: 75 ML/MIN/1.73M2
GFR SERPL CREATININE-BSD FRML MDRD: 76 ML/MIN/1.73M2
GLUCOSE BLD-MCNC: 101 MG/DL (ref 70–125)
GLUCOSE BLD-MCNC: 105 MG/DL (ref 70–125)
GLUCOSE BLD-MCNC: 107 MG/DL (ref 70–125)
GLUCOSE BLD-MCNC: 108 MG/DL (ref 70–125)
GLUCOSE BLD-MCNC: 109 MG/DL (ref 70–125)
GLUCOSE BLD-MCNC: 115 MG/DL (ref 70–125)
GLUCOSE BLD-MCNC: 126 MG/DL (ref 70–125)
GLUCOSE BLD-MCNC: 130 MG/DL (ref 70–125)
GLUCOSE BLD-MCNC: 140 MG/DL (ref 70–125)
GLUCOSE BLD-MCNC: 142 MG/DL (ref 70–125)
GLUCOSE BLD-MCNC: 154 MG/DL (ref 70–125)
GLUCOSE BLDC GLUCOMTR-MCNC: 98 MG/DL (ref 70–99)
GLUCOSE SERPL-MCNC: 107 MG/DL (ref 70–99)
GLUCOSE SERPL-MCNC: 109 MG/DL (ref 70–99)
GLUCOSE SERPL-MCNC: 93 MG/DL (ref 70–99)
GLUCOSE UR STRIP-MCNC: NEGATIVE MG/DL
HAV IGM SERPL QL IA: NONREACTIVE
HBV CORE IGM SERPL QL IA: NONREACTIVE
HBV SURFACE AG SERPL QL IA: NONREACTIVE
HCO3 BLDV-SCNC: 20 MMOL/L (ref 24–30)
HCT VFR BLD AUTO: 26.9 % (ref 40–53)
HCT VFR BLD AUTO: 28.3 % (ref 40–53)
HCT VFR BLD AUTO: 29.5 % (ref 40–53)
HCT VFR BLD AUTO: 29.5 % (ref 40–53)
HCT VFR BLD AUTO: 29.7 % (ref 40–53)
HCT VFR BLD AUTO: 30.3 % (ref 40–53)
HCT VFR BLD AUTO: 30.5 % (ref 40–53)
HCT VFR BLD AUTO: 31.2 % (ref 40–53)
HCT VFR BLD AUTO: 31.6 % (ref 40–53)
HCT VFR BLD AUTO: 31.7 % (ref 40–53)
HCT VFR BLD AUTO: 31.8 % (ref 40–53)
HCT VFR BLD AUTO: 32.1 % (ref 40–53)
HCT VFR BLD AUTO: 34.6 % (ref 40–53)
HCT VFR BLD AUTO: 35.7 % (ref 40–53)
HCT VFR BLD AUTO: 35.8 % (ref 40–53)
HCT VFR BLD AUTO: 36.8 % (ref 40–53)
HCT VFR BLD AUTO: 37.3 % (ref 40–53)
HCT VFR BLD AUTO: 39.5 % (ref 40–53)
HCV AB SERPL QL IA: NONREACTIVE
HGB BLD-MCNC: 10.1 G/DL (ref 13.3–17.7)
HGB BLD-MCNC: 10.3 G/DL (ref 13.3–17.7)
HGB BLD-MCNC: 10.7 G/DL (ref 13.3–17.7)
HGB BLD-MCNC: 11.1 G/DL (ref 13.3–17.7)
HGB BLD-MCNC: 11.4 G/DL (ref 13.3–17.7)
HGB BLD-MCNC: 11.8 G/DL (ref 13.3–17.7)
HGB BLD-MCNC: 12.1 G/DL (ref 13.3–17.7)
HGB BLD-MCNC: 12.9 G/DL (ref 13.3–17.7)
HGB BLD-MCNC: 8.4 G/DL (ref 13.3–17.7)
HGB BLD-MCNC: 9.1 G/DL (ref 13.3–17.7)
HGB BLD-MCNC: 9.2 G/DL (ref 13.3–17.7)
HGB BLD-MCNC: 9.3 G/DL (ref 13.3–17.7)
HGB BLD-MCNC: 9.4 G/DL (ref 13.3–17.7)
HGB BLD-MCNC: 9.4 G/DL (ref 13.3–17.7)
HGB BLD-MCNC: 9.5 G/DL (ref 13.3–17.7)
HGB BLD-MCNC: 9.5 G/DL (ref 13.3–17.7)
HGB BLD-MCNC: 9.6 G/DL (ref 13.3–17.7)
HGB BLD-MCNC: 9.9 G/DL (ref 13.3–17.7)
HGB UR QL STRIP: ABNORMAL
HOLD SPECIMEN: NORMAL
IMM GRANULOCYTES # BLD: 0 10E3/UL
IMM GRANULOCYTES # BLD: 0.1 10E3/UL
IMM GRANULOCYTES # BLD: 0.4 10E3/UL
IMM GRANULOCYTES # BLD: 0.6 10E3/UL
IMM GRANULOCYTES NFR BLD: 1 %
IMM GRANULOCYTES NFR BLD: 3 %
IMM GRANULOCYTES NFR BLD: 4 %
INR PPP: 1.22 (ref 0.85–1.15)
INTERPRETATION ECG - MUSE: NORMAL
INTERPRETATION ECG - MUSE: NORMAL
KETONES UR STRIP-MCNC: NEGATIVE MG/DL
LACTATE SERPL-SCNC: 1.6 MMOL/L (ref 0.7–2)
LEUKOCYTE ESTERASE UR QL STRIP: ABNORMAL
LEVETIRACETAM SERPL-MCNC: 20.4 ΜG/ML (ref 10–40)
LEVETIRACETAM SERPL-MCNC: 22.7 ΜG/ML (ref 10–40)
LIPASE SERPL-CCNC: 99 U/L (ref 0–52)
LYMPHOCYTES # BLD AUTO: 0.2 10E3/UL (ref 0.8–5.3)
LYMPHOCYTES # BLD AUTO: 0.7 10E3/UL (ref 0.8–5.3)
LYMPHOCYTES # BLD AUTO: 1 10E3/UL (ref 0.8–5.3)
LYMPHOCYTES # BLD AUTO: 1.3 10E3/UL (ref 0.8–5.3)
LYMPHOCYTES # BLD AUTO: 1.4 10E3/UL (ref 0.8–5.3)
LYMPHOCYTES # BLD AUTO: 1.4 10E3/UL (ref 0.8–5.3)
LYMPHOCYTES # BLD AUTO: 2.1 10E3/UL (ref 0.8–5.3)
LYMPHOCYTES NFR BLD AUTO: 1 %
LYMPHOCYTES NFR BLD AUTO: 11 %
LYMPHOCYTES NFR BLD AUTO: 17 %
LYMPHOCYTES NFR BLD AUTO: 17 %
LYMPHOCYTES NFR BLD AUTO: 24 %
LYMPHOCYTES NFR BLD AUTO: 5 %
LYMPHOCYTES NFR BLD AUTO: 7 %
MAGNESIUM SERPL-MCNC: 1.9 MG/DL (ref 1.8–2.6)
MAGNESIUM SERPL-MCNC: 2.3 MG/DL (ref 1.7–2.3)
MAGNESIUM SERPL-MCNC: 2.6 MG/DL (ref 1.8–2.6)
MCH RBC QN AUTO: 30.1 PG (ref 26.5–33)
MCH RBC QN AUTO: 30.2 PG (ref 26.5–33)
MCH RBC QN AUTO: 30.4 PG (ref 26.5–33)
MCH RBC QN AUTO: 30.5 PG (ref 26.5–33)
MCH RBC QN AUTO: 30.6 PG (ref 26.5–33)
MCH RBC QN AUTO: 30.9 PG (ref 26.5–33)
MCH RBC QN AUTO: 30.9 PG (ref 26.5–33)
MCH RBC QN AUTO: 31 PG (ref 26.5–33)
MCH RBC QN AUTO: 31 PG (ref 26.5–33)
MCH RBC QN AUTO: 31.1 PG (ref 26.5–33)
MCH RBC QN AUTO: 31.1 PG (ref 26.5–33)
MCH RBC QN AUTO: 31.4 PG (ref 26.5–33)
MCH RBC QN AUTO: 31.5 PG (ref 26.5–33)
MCH RBC QN AUTO: 31.6 PG (ref 26.5–33)
MCH RBC QN AUTO: 31.7 PG (ref 26.5–33)
MCH RBC QN AUTO: 31.8 PG (ref 26.5–33)
MCHC RBC AUTO-ENTMCNC: 29.9 G/DL (ref 31.5–36.5)
MCHC RBC AUTO-ENTMCNC: 29.9 G/DL (ref 31.5–36.5)
MCHC RBC AUTO-ENTMCNC: 30.3 G/DL (ref 31.5–36.5)
MCHC RBC AUTO-ENTMCNC: 30.8 G/DL (ref 31.5–36.5)
MCHC RBC AUTO-ENTMCNC: 30.8 G/DL (ref 31.5–36.5)
MCHC RBC AUTO-ENTMCNC: 31 G/DL (ref 31.5–36.5)
MCHC RBC AUTO-ENTMCNC: 31.2 G/DL (ref 31.5–36.5)
MCHC RBC AUTO-ENTMCNC: 31.3 G/DL (ref 31.5–36.5)
MCHC RBC AUTO-ENTMCNC: 31.5 G/DL (ref 31.5–36.5)
MCHC RBC AUTO-ENTMCNC: 31.5 G/DL (ref 31.5–36.5)
MCHC RBC AUTO-ENTMCNC: 31.9 G/DL (ref 31.5–36.5)
MCHC RBC AUTO-ENTMCNC: 32 G/DL (ref 31.5–36.5)
MCHC RBC AUTO-ENTMCNC: 32.1 G/DL (ref 31.5–36.5)
MCHC RBC AUTO-ENTMCNC: 32.1 G/DL (ref 31.5–36.5)
MCHC RBC AUTO-ENTMCNC: 32.4 G/DL (ref 31.5–36.5)
MCHC RBC AUTO-ENTMCNC: 32.5 G/DL (ref 31.5–36.5)
MCHC RBC AUTO-ENTMCNC: 32.7 G/DL (ref 31.5–36.5)
MCHC RBC AUTO-ENTMCNC: 33 G/DL (ref 31.5–36.5)
MCV RBC AUTO: 100 FL (ref 78–100)
MCV RBC AUTO: 101 FL (ref 78–100)
MCV RBC AUTO: 95 FL (ref 78–100)
MCV RBC AUTO: 96 FL (ref 78–100)
MCV RBC AUTO: 96 FL (ref 78–100)
MCV RBC AUTO: 97 FL (ref 78–100)
MCV RBC AUTO: 97 FL (ref 78–100)
MCV RBC AUTO: 98 FL (ref 78–100)
MCV RBC AUTO: 98 FL (ref 78–100)
MCV RBC AUTO: 99 FL (ref 78–100)
MONOCYTES # BLD AUTO: 0.9 10E3/UL (ref 0–1.3)
MONOCYTES # BLD AUTO: 1.2 10E3/UL (ref 0–1.3)
MONOCYTES # BLD AUTO: 1.5 10E3/UL (ref 0–1.3)
MONOCYTES # BLD AUTO: 1.5 10E3/UL (ref 0–1.3)
MONOCYTES # BLD AUTO: 2 10E3/UL (ref 0–1.3)
MONOCYTES NFR BLD AUTO: 10 %
MONOCYTES NFR BLD AUTO: 10 %
MONOCYTES NFR BLD AUTO: 11 %
MONOCYTES NFR BLD AUTO: 11 %
MONOCYTES NFR BLD AUTO: 18 %
MONOCYTES NFR BLD AUTO: 9 %
MONOCYTES NFR BLD AUTO: 9 %
MUCOUS THREADS #/AREA URNS LPF: PRESENT /LPF
NEUTROPHILS # BLD AUTO: 10.8 10E3/UL (ref 1.6–8.3)
NEUTROPHILS # BLD AUTO: 12.9 10E3/UL (ref 1.6–8.3)
NEUTROPHILS # BLD AUTO: 13.2 10E3/UL (ref 1.6–8.3)
NEUTROPHILS # BLD AUTO: 5.5 10E3/UL (ref 1.6–8.3)
NEUTROPHILS # BLD AUTO: 5.6 10E3/UL (ref 1.6–8.3)
NEUTROPHILS # BLD AUTO: 5.6 10E3/UL (ref 1.6–8.3)
NEUTROPHILS # BLD AUTO: 7.4 10E3/UL (ref 1.6–8.3)
NEUTROPHILS NFR BLD AUTO: 61 %
NEUTROPHILS NFR BLD AUTO: 67 %
NEUTROPHILS NFR BLD AUTO: 69 %
NEUTROPHILS NFR BLD AUTO: 70 %
NEUTROPHILS NFR BLD AUTO: 79 %
NEUTROPHILS NFR BLD AUTO: 83 %
NEUTROPHILS NFR BLD AUTO: 87 %
NITRATE UR QL: NEGATIVE
NRBC # BLD AUTO: 0 10E3/UL
NRBC BLD AUTO-RTO: 0 /100
OXYHGB MFR BLDV: 55.1 % (ref 70–75)
P AXIS - MUSE: 60 DEGREES
P AXIS - MUSE: 62 DEGREES
PATH REPORT.COMMENTS IMP SPEC: ABNORMAL
PATH REPORT.COMMENTS IMP SPEC: NORMAL
PATH REPORT.COMMENTS IMP SPEC: NORMAL
PATH REPORT.COMMENTS IMP SPEC: YES
PATH REPORT.FINAL DX SPEC: ABNORMAL
PATH REPORT.FINAL DX SPEC: NORMAL
PATH REPORT.GROSS SPEC: ABNORMAL
PATH REPORT.MICROSCOPIC SPEC OTHER STN: ABNORMAL
PATH REPORT.RELEVANT HX SPEC: ABNORMAL
PATH REPORT.RELEVANT HX SPEC: NORMAL
PCO2 BLDV: 40 MM HG (ref 35–50)
PH BLDV: 7.3 [PH] (ref 7.35–7.45)
PH UR STRIP: 5 [PH] (ref 5–7)
PH UR STRIP: 5 [PH] (ref 5–7)
PH UR STRIP: 5.5 [PH] (ref 5–7)
PHOTO IMAGE: ABNORMAL
PLATELET # BLD AUTO: 102 10E3/UL (ref 150–450)
PLATELET # BLD AUTO: 111 10E3/UL (ref 150–450)
PLATELET # BLD AUTO: 117 10E3/UL (ref 150–450)
PLATELET # BLD AUTO: 129 10E3/UL (ref 150–450)
PLATELET # BLD AUTO: 144 10E3/UL (ref 150–450)
PLATELET # BLD AUTO: 147 10E3/UL (ref 150–450)
PLATELET # BLD AUTO: 160 10E3/UL (ref 150–450)
PLATELET # BLD AUTO: 171 10E3/UL (ref 150–450)
PLATELET # BLD AUTO: 171 10E3/UL (ref 150–450)
PLATELET # BLD AUTO: 186 10E3/UL (ref 150–450)
PLATELET # BLD AUTO: 197 10E3/UL (ref 150–450)
PLATELET # BLD AUTO: 203 10E3/UL (ref 150–450)
PLATELET # BLD AUTO: 222 10E3/UL (ref 150–450)
PLATELET # BLD AUTO: 222 10E3/UL (ref 150–450)
PLATELET # BLD AUTO: 223 10E3/UL (ref 150–450)
PLATELET # BLD AUTO: 225 10E3/UL (ref 150–450)
PLATELET # BLD AUTO: 225 10E3/UL (ref 150–450)
PLATELET # BLD AUTO: 228 10E3/UL (ref 150–450)
PLATELET # BLD AUTO: 236 10E3/UL (ref 150–450)
PLATELET # BLD AUTO: 95 10E3/UL (ref 150–450)
PO2 BLDV: 33 MM HG (ref 25–47)
POTASSIUM BLD-SCNC: 4.4 MMOL/L (ref 3.5–5)
POTASSIUM BLD-SCNC: 4.4 MMOL/L (ref 3.5–5)
POTASSIUM BLD-SCNC: 4.5 MMOL/L (ref 3.5–5)
POTASSIUM BLD-SCNC: 4.6 MMOL/L (ref 3.5–5)
POTASSIUM BLD-SCNC: 4.6 MMOL/L (ref 3.5–5)
POTASSIUM BLD-SCNC: 4.7 MMOL/L (ref 3.5–5)
POTASSIUM BLD-SCNC: 4.7 MMOL/L (ref 3.5–5)
POTASSIUM BLD-SCNC: 5.1 MMOL/L (ref 3.5–5)
POTASSIUM BLD-SCNC: 5.2 MMOL/L (ref 3.5–5)
POTASSIUM BLD-SCNC: 5.3 MMOL/L (ref 3.5–5)
POTASSIUM BLD-SCNC: 5.3 MMOL/L (ref 3.5–5)
POTASSIUM BLD-SCNC: 5.6 MMOL/L (ref 3.5–5)
POTASSIUM SERPL-SCNC: 4.1 MMOL/L (ref 3.4–5.3)
POTASSIUM SERPL-SCNC: 4.4 MMOL/L (ref 3.4–5.3)
POTASSIUM SERPL-SCNC: 4.7 MMOL/L (ref 3.4–5.3)
PR INTERVAL - MUSE: 168 MS
PR INTERVAL - MUSE: 182 MS
PROCALCITONIN SERPL IA-MCNC: 0.22 NG/ML
PROCALCITONIN SERPL-MCNC: 1.59 NG/ML (ref 0–0.49)
PROT SERPL-MCNC: 5 G/DL (ref 6–8)
PROT SERPL-MCNC: 5.2 G/DL (ref 6–8)
PROT SERPL-MCNC: 5.5 G/DL (ref 6–8)
PROT SERPL-MCNC: 5.6 G/DL (ref 6–8)
PROT SERPL-MCNC: 5.7 G/DL (ref 6–8)
PROT SERPL-MCNC: 6 G/DL (ref 6–8)
PROT SERPL-MCNC: 6.3 G/DL (ref 6–8)
PROT SERPL-MCNC: 6.4 G/DL (ref 6–8)
PROT SERPL-MCNC: 6.7 G/DL (ref 6–8)
PROT SERPL-MCNC: 6.9 G/DL (ref 6.4–8.3)
QRS DURATION - MUSE: 100 MS
QRS DURATION - MUSE: 96 MS
QT - MUSE: 398 MS
QT - MUSE: 426 MS
QTC - MUSE: 426 MS
QTC - MUSE: 456 MS
R AXIS - MUSE: -48 DEGREES
R AXIS - MUSE: -64 DEGREES
RBC # BLD AUTO: 2.72 10E6/UL (ref 4.4–5.9)
RBC # BLD AUTO: 2.94 10E6/UL (ref 4.4–5.9)
RBC # BLD AUTO: 2.96 10E6/UL (ref 4.4–5.9)
RBC # BLD AUTO: 3.01 10E6/UL (ref 4.4–5.9)
RBC # BLD AUTO: 3.02 10E6/UL (ref 4.4–5.9)
RBC # BLD AUTO: 3.08 10E6/UL (ref 4.4–5.9)
RBC # BLD AUTO: 3.1 10E6/UL (ref 4.4–5.9)
RBC # BLD AUTO: 3.15 10E6/UL (ref 4.4–5.9)
RBC # BLD AUTO: 3.16 10E6/UL (ref 4.4–5.9)
RBC # BLD AUTO: 3.19 10E6/UL (ref 4.4–5.9)
RBC # BLD AUTO: 3.2 10E6/UL (ref 4.4–5.9)
RBC # BLD AUTO: 3.27 10E6/UL (ref 4.4–5.9)
RBC # BLD AUTO: 3.51 10E6/UL (ref 4.4–5.9)
RBC # BLD AUTO: 3.56 10E6/UL (ref 4.4–5.9)
RBC # BLD AUTO: 3.6 10E6/UL (ref 4.4–5.9)
RBC # BLD AUTO: 3.71 10E6/UL (ref 4.4–5.9)
RBC # BLD AUTO: 3.85 10E6/UL (ref 4.4–5.9)
RBC # BLD AUTO: 4.08 10E6/UL (ref 4.4–5.9)
RBC URINE: 11 /HPF
RBC URINE: 138 /HPF
RBC URINE: 2 /HPF
RBC URINE: 62 /HPF
RBC URINE: 7 /HPF
RETICS # AUTO: 0.05 10E6/UL (ref 0.03–0.1)
RETICS/RBC NFR AUTO: 1.5 % (ref 0.5–2)
RSV RNA SPEC NAA+PROBE: NEGATIVE
SAO2 % BLDV: 56.2 % (ref 70–75)
SARS-COV-2 RNA RESP QL NAA+PROBE: NEGATIVE
SARS-COV-2 RNA RESP QL NAA+PROBE: NEGATIVE
SODIUM SERPL-SCNC: 135 MMOL/L (ref 136–145)
SODIUM SERPL-SCNC: 136 MMOL/L (ref 136–145)
SODIUM SERPL-SCNC: 137 MMOL/L (ref 136–145)
SODIUM SERPL-SCNC: 138 MMOL/L (ref 136–145)
SODIUM SERPL-SCNC: 138 MMOL/L (ref 136–145)
SODIUM SERPL-SCNC: 139 MMOL/L (ref 136–145)
SODIUM SERPL-SCNC: 140 MMOL/L (ref 136–145)
SODIUM SERPL-SCNC: 141 MMOL/L (ref 136–145)
SODIUM SERPL-SCNC: 142 MMOL/L (ref 136–145)
SODIUM SERPL-SCNC: 143 MMOL/L (ref 136–145)
SODIUM SERPL-SCNC: 145 MMOL/L (ref 136–145)
SP GR UR STRIP: 1 (ref 1–1.03)
SP GR UR STRIP: 1.01 (ref 1–1.03)
SP GR UR STRIP: 1.02 (ref 1–1.03)
SYSTOLIC BLOOD PRESSURE - MUSE: 110 MMHG
SYSTOLIC BLOOD PRESSURE - MUSE: 135 MMHG
T AXIS - MUSE: 17 DEGREES
T AXIS - MUSE: 28 DEGREES
TROPONIN I SERPL-MCNC: 0.02 NG/ML (ref 0–0.29)
TROPONIN I SERPL-MCNC: 0.07 NG/ML (ref 0–0.29)
TSH SERPL DL<=0.005 MIU/L-ACNC: 0.72 UIU/ML (ref 0.3–5)
UROBILINOGEN UR STRIP-MCNC: <2 MG/DL
UROBILINOGEN UR STRIP-MCNC: NORMAL MG/DL
UROBILINOGEN UR STRIP-MCNC: NORMAL MG/DL
VANCOMYCIN SERPL-MCNC: 14 MG/L
VENTRICULAR RATE- MUSE: 69 BPM
VENTRICULAR RATE- MUSE: 69 BPM
VIT B1 PYROPHOSHATE BLD-SCNC: 226 NMOL/L
VIT B12 SERPL-MCNC: 2712 PG/ML (ref 232–1245)
WBC # BLD AUTO: 11 10E3/UL (ref 4–11)
WBC # BLD AUTO: 11.1 10E3/UL (ref 4–11)
WBC # BLD AUTO: 11.2 10E3/UL (ref 4–11)
WBC # BLD AUTO: 11.2 10E3/UL (ref 4–11)
WBC # BLD AUTO: 11.6 10E3/UL (ref 4–11)
WBC # BLD AUTO: 11.9 10E3/UL (ref 4–11)
WBC # BLD AUTO: 12.3 10E3/UL (ref 4–11)
WBC # BLD AUTO: 13.1 10E3/UL (ref 4–11)
WBC # BLD AUTO: 13.2 10E3/UL (ref 4–11)
WBC # BLD AUTO: 13.8 10E3/UL (ref 4–11)
WBC # BLD AUTO: 15.2 10E3/UL (ref 4–11)
WBC # BLD AUTO: 15.3 10E3/UL (ref 4–11)
WBC # BLD AUTO: 15.6 10E3/UL (ref 4–11)
WBC # BLD AUTO: 17.6 10E3/UL (ref 4–11)
WBC # BLD AUTO: 18.4 10E3/UL (ref 4–11)
WBC # BLD AUTO: 8 10E3/UL (ref 4–11)
WBC # BLD AUTO: 8.1 10E3/UL (ref 4–11)
WBC # BLD AUTO: 8.8 10E3/UL (ref 4–11)
WBC CLUMPS #/AREA URNS HPF: PRESENT /HPF
WBC CLUMPS #/AREA URNS HPF: PRESENT /HPF
WBC URINE: 11 /HPF
WBC URINE: 20 /HPF
WBC URINE: 34 /HPF
WBC URINE: 47 /HPF
WBC URINE: >182 /HPF

## 2023-01-01 PROCEDURE — 258N000003 HC RX IP 258 OP 636: Performed by: FAMILY MEDICINE

## 2023-01-01 PROCEDURE — 97535 SELF CARE MNGMENT TRAINING: CPT | Mod: GO

## 2023-01-01 PROCEDURE — 99233 SBSQ HOSP IP/OBS HIGH 50: CPT | Mod: GC

## 2023-01-01 PROCEDURE — 99285 EMERGENCY DEPT VISIT HI MDM: CPT | Mod: 25,CS

## 2023-01-01 PROCEDURE — 82550 ASSAY OF CK (CPK): CPT | Performed by: STUDENT IN AN ORGANIZED HEALTH CARE EDUCATION/TRAINING PROGRAM

## 2023-01-01 PROCEDURE — 99305 1ST NF CARE MODERATE MDM 35: CPT | Performed by: FAMILY MEDICINE

## 2023-01-01 PROCEDURE — 258N000003 HC RX IP 258 OP 636: Performed by: STUDENT IN AN ORGANIZED HEALTH CARE EDUCATION/TRAINING PROGRAM

## 2023-01-01 PROCEDURE — 250N000011 HC RX IP 250 OP 636: Performed by: STUDENT IN AN ORGANIZED HEALTH CARE EDUCATION/TRAINING PROGRAM

## 2023-01-01 PROCEDURE — 80053 COMPREHEN METABOLIC PANEL: CPT

## 2023-01-01 PROCEDURE — 97166 OT EVAL MOD COMPLEX 45 MIN: CPT | Mod: GO

## 2023-01-01 PROCEDURE — 85045 AUTOMATED RETICULOCYTE COUNT: CPT | Mod: ORL | Performed by: FAMILY MEDICINE

## 2023-01-01 PROCEDURE — 85049 AUTOMATED PLATELET COUNT: CPT | Performed by: UROLOGY

## 2023-01-01 PROCEDURE — 120N000001 HC R&B MED SURG/OB

## 2023-01-01 PROCEDURE — 710N000009 HC RECOVERY PHASE 1, LEVEL 1, PER MIN: Performed by: UROLOGY

## 2023-01-01 PROCEDURE — 999N000141 HC STATISTIC PRE-PROCEDURE NURSING ASSESSMENT: Performed by: UROLOGY

## 2023-01-01 PROCEDURE — 82805 BLOOD GASES W/O2 SATURATION: CPT | Performed by: EMERGENCY MEDICINE

## 2023-01-01 PROCEDURE — 99215 OFFICE O/P EST HI 40 MIN: CPT | Mod: 95 | Performed by: PSYCHIATRY & NEUROLOGY

## 2023-01-01 PROCEDURE — 36415 COLL VENOUS BLD VENIPUNCTURE: CPT | Performed by: UROLOGY

## 2023-01-01 PROCEDURE — 250N000013 HC RX MED GY IP 250 OP 250 PS 637: Performed by: HOSPITALIST

## 2023-01-01 PROCEDURE — 250N000013 HC RX MED GY IP 250 OP 250 PS 637: Performed by: INTERNAL MEDICINE

## 2023-01-01 PROCEDURE — 250N000013 HC RX MED GY IP 250 OP 250 PS 637: Performed by: STUDENT IN AN ORGANIZED HEALTH CARE EDUCATION/TRAINING PROGRAM

## 2023-01-01 PROCEDURE — 85027 COMPLETE CBC AUTOMATED: CPT | Performed by: EMERGENCY MEDICINE

## 2023-01-01 PROCEDURE — 85027 COMPLETE CBC AUTOMATED: CPT | Mod: ORL | Performed by: NURSE PRACTITIONER

## 2023-01-01 PROCEDURE — 250N000009 HC RX 250: Performed by: UROLOGY

## 2023-01-01 PROCEDURE — 87493 C DIFF AMPLIFIED PROBE: CPT | Mod: ORL | Performed by: NURSE PRACTITIONER

## 2023-01-01 PROCEDURE — 92526 ORAL FUNCTION THERAPY: CPT | Mod: GN | Performed by: SPEECH-LANGUAGE PATHOLOGIST

## 2023-01-01 PROCEDURE — 36415 COLL VENOUS BLD VENIPUNCTURE: CPT | Mod: ORL | Performed by: FAMILY MEDICINE

## 2023-01-01 PROCEDURE — 97116 GAIT TRAINING THERAPY: CPT | Mod: GP

## 2023-01-01 PROCEDURE — 255N000002 HC RX 255 OP 636: Performed by: HOSPITALIST

## 2023-01-01 PROCEDURE — 250N000013 HC RX MED GY IP 250 OP 250 PS 637: Performed by: UROLOGY

## 2023-01-01 PROCEDURE — 87088 URINE BACTERIA CULTURE: CPT | Mod: ORL | Performed by: FAMILY MEDICINE

## 2023-01-01 PROCEDURE — 99309 SBSQ NF CARE MODERATE MDM 30: CPT | Performed by: FAMILY MEDICINE

## 2023-01-01 PROCEDURE — P9604 ONE-WAY ALLOW PRORATED TRIP: HCPCS | Mod: ORL | Performed by: NURSE PRACTITIONER

## 2023-01-01 PROCEDURE — 80048 BASIC METABOLIC PNL TOTAL CA: CPT | Performed by: INTERNAL MEDICINE

## 2023-01-01 PROCEDURE — 250N000012 HC RX MED GY IP 250 OP 636 PS 637: Performed by: HOSPITALIST

## 2023-01-01 PROCEDURE — 97161 PT EVAL LOW COMPLEX 20 MIN: CPT | Mod: GP

## 2023-01-01 PROCEDURE — 99310 SBSQ NF CARE HIGH MDM 45: CPT | Performed by: NURSE PRACTITIONER

## 2023-01-01 PROCEDURE — 250N000011 HC RX IP 250 OP 636: Performed by: ANESTHESIOLOGY

## 2023-01-01 PROCEDURE — 36415 COLL VENOUS BLD VENIPUNCTURE: CPT | Performed by: INTERNAL MEDICINE

## 2023-01-01 PROCEDURE — 250N000013 HC RX MED GY IP 250 OP 250 PS 637: Performed by: FAMILY MEDICINE

## 2023-01-01 PROCEDURE — 36415 COLL VENOUS BLD VENIPUNCTURE: CPT | Performed by: FAMILY MEDICINE

## 2023-01-01 PROCEDURE — 36415 COLL VENOUS BLD VENIPUNCTURE: CPT

## 2023-01-01 PROCEDURE — 74230 X-RAY XM SWLNG FUNCJ C+: CPT

## 2023-01-01 PROCEDURE — G0463 HOSPITAL OUTPT CLINIC VISIT: HCPCS

## 2023-01-01 PROCEDURE — 85025 COMPLETE CBC W/AUTO DIFF WBC: CPT | Mod: ORL | Performed by: FAMILY MEDICINE

## 2023-01-01 PROCEDURE — 82962 GLUCOSE BLOOD TEST: CPT

## 2023-01-01 PROCEDURE — 87088 URINE BACTERIA CULTURE: CPT | Performed by: EMERGENCY MEDICINE

## 2023-01-01 PROCEDURE — 99233 SBSQ HOSP IP/OBS HIGH 50: CPT | Performed by: INTERNAL MEDICINE

## 2023-01-01 PROCEDURE — 85014 HEMATOCRIT: CPT

## 2023-01-01 PROCEDURE — 250N000011 HC RX IP 250 OP 636: Performed by: FAMILY MEDICINE

## 2023-01-01 PROCEDURE — 85025 COMPLETE CBC W/AUTO DIFF WBC: CPT | Mod: ORL | Performed by: NURSE PRACTITIONER

## 2023-01-01 PROCEDURE — 83735 ASSAY OF MAGNESIUM: CPT | Performed by: STUDENT IN AN ORGANIZED HEALTH CARE EDUCATION/TRAINING PROGRAM

## 2023-01-01 PROCEDURE — 85027 COMPLETE CBC AUTOMATED: CPT | Performed by: STUDENT IN AN ORGANIZED HEALTH CARE EDUCATION/TRAINING PROGRAM

## 2023-01-01 PROCEDURE — 99223 1ST HOSP IP/OBS HIGH 75: CPT | Mod: AI | Performed by: STUDENT IN AN ORGANIZED HEALTH CARE EDUCATION/TRAINING PROGRAM

## 2023-01-01 PROCEDURE — 36415 COLL VENOUS BLD VENIPUNCTURE: CPT | Performed by: STUDENT IN AN ORGANIZED HEALTH CARE EDUCATION/TRAINING PROGRAM

## 2023-01-01 PROCEDURE — 51702 INSERT TEMP BLADDER CATH: CPT

## 2023-01-01 PROCEDURE — 84425 ASSAY OF VITAMIN B-1: CPT | Mod: ORL | Performed by: FAMILY MEDICINE

## 2023-01-01 PROCEDURE — 80177 DRUG SCRN QUAN LEVETIRACETAM: CPT | Performed by: INTERNAL MEDICINE

## 2023-01-01 PROCEDURE — 99207 BLOOD MORPHOLOGY PATHOLOGIST REVIEW: CPT | Performed by: PATHOLOGY

## 2023-01-01 PROCEDURE — 84484 ASSAY OF TROPONIN QUANT: CPT | Performed by: EMERGENCY MEDICINE

## 2023-01-01 PROCEDURE — 97116 GAIT TRAINING THERAPY: CPT | Mod: GP | Performed by: PHYSICAL THERAPIST

## 2023-01-01 PROCEDURE — 84132 ASSAY OF SERUM POTASSIUM: CPT | Performed by: FAMILY MEDICINE

## 2023-01-01 PROCEDURE — 82607 VITAMIN B-12: CPT | Mod: ORL | Performed by: NURSE PRACTITIONER

## 2023-01-01 PROCEDURE — 74178 CT ABD&PLV WO CNTR FLWD CNTR: CPT

## 2023-01-01 PROCEDURE — P9603 ONE-WAY ALLOW PRORATED MILES: HCPCS | Mod: ORL | Performed by: NURSE PRACTITIONER

## 2023-01-01 PROCEDURE — 82565 ASSAY OF CREATININE: CPT | Performed by: NURSE PRACTITIONER

## 2023-01-01 PROCEDURE — 80053 COMPREHEN METABOLIC PANEL: CPT | Performed by: STUDENT IN AN ORGANIZED HEALTH CARE EDUCATION/TRAINING PROGRAM

## 2023-01-01 PROCEDURE — 80177 DRUG SCRN QUAN LEVETIRACETAM: CPT | Performed by: FAMILY MEDICINE

## 2023-01-01 PROCEDURE — 81001 URINALYSIS AUTO W/SCOPE: CPT | Performed by: NURSE PRACTITIONER

## 2023-01-01 PROCEDURE — 36415 COLL VENOUS BLD VENIPUNCTURE: CPT | Mod: ORL | Performed by: NURSE PRACTITIONER

## 2023-01-01 PROCEDURE — 370N000017 HC ANESTHESIA TECHNICAL FEE, PER MIN: Performed by: UROLOGY

## 2023-01-01 PROCEDURE — U0005 INFEC AGEN DETEC AMPLI PROBE: HCPCS | Performed by: HOSPITALIST

## 2023-01-01 PROCEDURE — 70450 CT HEAD/BRAIN W/O DYE: CPT

## 2023-01-01 PROCEDURE — 258N000003 HC RX IP 258 OP 636: Performed by: INTERNAL MEDICINE

## 2023-01-01 PROCEDURE — 85025 COMPLETE CBC W/AUTO DIFF WBC: CPT | Performed by: STUDENT IN AN ORGANIZED HEALTH CARE EDUCATION/TRAINING PROGRAM

## 2023-01-01 PROCEDURE — 99309 SBSQ NF CARE MODERATE MDM 30: CPT | Performed by: NURSE PRACTITIONER

## 2023-01-01 PROCEDURE — 82140 ASSAY OF AMMONIA: CPT | Performed by: STUDENT IN AN ORGANIZED HEALTH CARE EDUCATION/TRAINING PROGRAM

## 2023-01-01 PROCEDURE — 250N000011 HC RX IP 250 OP 636: Performed by: INTERNAL MEDICINE

## 2023-01-01 PROCEDURE — 87086 URINE CULTURE/COLONY COUNT: CPT | Mod: ORL | Performed by: FAMILY MEDICINE

## 2023-01-01 PROCEDURE — 92526 ORAL FUNCTION THERAPY: CPT | Mod: GN

## 2023-01-01 PROCEDURE — 97530 THERAPEUTIC ACTIVITIES: CPT | Mod: GP

## 2023-01-01 PROCEDURE — 85610 PROTHROMBIN TIME: CPT | Performed by: STUDENT IN AN ORGANIZED HEALTH CARE EDUCATION/TRAINING PROGRAM

## 2023-01-01 PROCEDURE — 80048 BASIC METABOLIC PNL TOTAL CA: CPT | Mod: ORL | Performed by: NURSE PRACTITIONER

## 2023-01-01 PROCEDURE — 83735 ASSAY OF MAGNESIUM: CPT | Mod: ORL | Performed by: NURSE PRACTITIONER

## 2023-01-01 PROCEDURE — 82550 ASSAY OF CK (CPK): CPT | Performed by: INTERNAL MEDICINE

## 2023-01-01 PROCEDURE — 87637 SARSCOV2&INF A&B&RSV AMP PRB: CPT | Performed by: EMERGENCY MEDICINE

## 2023-01-01 PROCEDURE — 99221 1ST HOSP IP/OBS SF/LOW 40: CPT | Performed by: PHYSICIAN ASSISTANT

## 2023-01-01 PROCEDURE — 71046 X-RAY EXAM CHEST 2 VIEWS: CPT

## 2023-01-01 PROCEDURE — 85027 COMPLETE CBC AUTOMATED: CPT

## 2023-01-01 PROCEDURE — 84443 ASSAY THYROID STIM HORMONE: CPT | Performed by: EMERGENCY MEDICINE

## 2023-01-01 PROCEDURE — 83690 ASSAY OF LIPASE: CPT | Performed by: EMERGENCY MEDICINE

## 2023-01-01 PROCEDURE — P9603 ONE-WAY ALLOW PRORATED MILES: HCPCS | Mod: ORL | Performed by: FAMILY MEDICINE

## 2023-01-01 PROCEDURE — 81001 URINALYSIS AUTO W/SCOPE: CPT | Mod: ORL | Performed by: FAMILY MEDICINE

## 2023-01-01 PROCEDURE — 258N000003 HC RX IP 258 OP 636: Performed by: ANESTHESIOLOGY

## 2023-01-01 PROCEDURE — 96365 THER/PROPH/DIAG IV INF INIT: CPT

## 2023-01-01 PROCEDURE — 250N000009 HC RX 250: Performed by: EMERGENCY MEDICINE

## 2023-01-01 PROCEDURE — 99231 SBSQ HOSP IP/OBS SF/LOW 25: CPT | Performed by: HOSPITALIST

## 2023-01-01 PROCEDURE — 99223 1ST HOSP IP/OBS HIGH 75: CPT | Performed by: INTERNAL MEDICINE

## 2023-01-01 PROCEDURE — 74176 CT ABD & PELVIS W/O CONTRAST: CPT

## 2023-01-01 PROCEDURE — 250N000025 HC SEVOFLURANE, PER MIN: Performed by: UROLOGY

## 2023-01-01 PROCEDURE — 84484 ASSAY OF TROPONIN QUANT: CPT | Performed by: STUDENT IN AN ORGANIZED HEALTH CARE EDUCATION/TRAINING PROGRAM

## 2023-01-01 PROCEDURE — C9803 HOPD COVID-19 SPEC COLLECT: HCPCS

## 2023-01-01 PROCEDURE — 99232 SBSQ HOSP IP/OBS MODERATE 35: CPT | Performed by: HOSPITALIST

## 2023-01-01 PROCEDURE — 80202 ASSAY OF VANCOMYCIN: CPT | Performed by: FAMILY MEDICINE

## 2023-01-01 PROCEDURE — 92611 MOTION FLUOROSCOPY/SWALLOW: CPT | Mod: GN

## 2023-01-01 PROCEDURE — 272N000001 HC OR GENERAL SUPPLY STERILE: Performed by: UROLOGY

## 2023-01-01 PROCEDURE — 99238 HOSP IP/OBS DSCHRG MGMT 30/<: CPT | Performed by: HOSPITALIST

## 2023-01-01 PROCEDURE — P9604 ONE-WAY ALLOW PRORATED TRIP: HCPCS | Mod: ORL | Performed by: FAMILY MEDICINE

## 2023-01-01 PROCEDURE — 85027 COMPLETE CBC AUTOMATED: CPT | Performed by: INTERNAL MEDICINE

## 2023-01-01 PROCEDURE — 71045 X-RAY EXAM CHEST 1 VIEW: CPT

## 2023-01-01 PROCEDURE — 70450 CT HEAD/BRAIN W/O DYE: CPT | Mod: 76

## 2023-01-01 PROCEDURE — 97542 WHEELCHAIR MNGMENT TRAINING: CPT | Mod: GP

## 2023-01-01 PROCEDURE — 82248 BILIRUBIN DIRECT: CPT | Performed by: INTERNAL MEDICINE

## 2023-01-01 PROCEDURE — 97110 THERAPEUTIC EXERCISES: CPT | Mod: GO

## 2023-01-01 PROCEDURE — 87086 URINE CULTURE/COLONY COUNT: CPT | Performed by: STUDENT IN AN ORGANIZED HEALTH CARE EDUCATION/TRAINING PROGRAM

## 2023-01-01 PROCEDURE — 99283 EMERGENCY DEPT VISIT LOW MDM: CPT

## 2023-01-01 PROCEDURE — 85730 THROMBOPLASTIN TIME PARTIAL: CPT | Performed by: STUDENT IN AN ORGANIZED HEALTH CARE EDUCATION/TRAINING PROGRAM

## 2023-01-01 PROCEDURE — 99291 CRITICAL CARE FIRST HOUR: CPT | Mod: 25

## 2023-01-01 PROCEDURE — 258N000003 HC RX IP 258 OP 636: Performed by: EMERGENCY MEDICINE

## 2023-01-01 PROCEDURE — 95816 EEG AWAKE AND DROWSY: CPT | Performed by: PSYCHIATRY & NEUROLOGY

## 2023-01-01 PROCEDURE — 250N000009 HC RX 250: Performed by: STUDENT IN AN ORGANIZED HEALTH CARE EDUCATION/TRAINING PROGRAM

## 2023-01-01 PROCEDURE — 99232 SBSQ HOSP IP/OBS MODERATE 35: CPT | Mod: GC

## 2023-01-01 PROCEDURE — 36415 COLL VENOUS BLD VENIPUNCTURE: CPT | Performed by: EMERGENCY MEDICINE

## 2023-01-01 PROCEDURE — 258N000003 HC RX IP 258 OP 636: Performed by: UROLOGY

## 2023-01-01 PROCEDURE — 93005 ELECTROCARDIOGRAM TRACING: CPT | Performed by: EMERGENCY MEDICINE

## 2023-01-01 PROCEDURE — 84145 PROCALCITONIN (PCT): CPT | Performed by: EMERGENCY MEDICINE

## 2023-01-01 PROCEDURE — 96361 HYDRATE IV INFUSION ADD-ON: CPT

## 2023-01-01 PROCEDURE — 250N000011 HC RX IP 250 OP 636: Performed by: UROLOGY

## 2023-01-01 PROCEDURE — 250N000011 HC RX IP 250 OP 636: Performed by: NURSE PRACTITIONER

## 2023-01-01 PROCEDURE — 80053 COMPREHEN METABOLIC PANEL: CPT | Mod: ORL | Performed by: NURSE PRACTITIONER

## 2023-01-01 PROCEDURE — 71250 CT THORAX DX C-: CPT

## 2023-01-01 PROCEDURE — 81001 URINALYSIS AUTO W/SCOPE: CPT | Performed by: EMERGENCY MEDICINE

## 2023-01-01 PROCEDURE — 84145 PROCALCITONIN (PCT): CPT | Mod: ORL | Performed by: FAMILY MEDICINE

## 2023-01-01 PROCEDURE — 999N000248 HC STATISTIC IV INSERT WITH US BY RN

## 2023-01-01 PROCEDURE — 86705 HEP B CORE ANTIBODY IGM: CPT | Performed by: FAMILY MEDICINE

## 2023-01-01 PROCEDURE — 96374 THER/PROPH/DIAG INJ IV PUSH: CPT

## 2023-01-01 PROCEDURE — 83735 ASSAY OF MAGNESIUM: CPT | Performed by: EMERGENCY MEDICINE

## 2023-01-01 PROCEDURE — 74183 MRI ABD W/O CNTR FLWD CNTR: CPT

## 2023-01-01 PROCEDURE — 97110 THERAPEUTIC EXERCISES: CPT | Mod: GP

## 2023-01-01 PROCEDURE — 81003 URINALYSIS AUTO W/O SCOPE: CPT | Performed by: STUDENT IN AN ORGANIZED HEALTH CARE EDUCATION/TRAINING PROGRAM

## 2023-01-01 PROCEDURE — 97129 THER IVNTJ 1ST 15 MIN: CPT | Mod: GO

## 2023-01-01 PROCEDURE — 88305 TISSUE EXAM BY PATHOLOGIST: CPT | Mod: TC | Performed by: UROLOGY

## 2023-01-01 PROCEDURE — 70553 MRI BRAIN STEM W/O & W/DYE: CPT

## 2023-01-01 PROCEDURE — A9585 GADOBUTROL INJECTION: HCPCS | Performed by: HOSPITALIST

## 2023-01-01 PROCEDURE — 87088 URINE BACTERIA CULTURE: CPT | Performed by: NURSE PRACTITIONER

## 2023-01-01 PROCEDURE — 360N000076 HC SURGERY LEVEL 3, PER MIN: Performed by: UROLOGY

## 2023-01-01 PROCEDURE — 99238 HOSP IP/OBS DSCHRG MGMT 30/<: CPT | Mod: GC

## 2023-01-01 PROCEDURE — 97530 THERAPEUTIC ACTIVITIES: CPT | Mod: GP | Performed by: PHYSICAL THERAPIST

## 2023-01-01 PROCEDURE — 93005 ELECTROCARDIOGRAM TRACING: CPT | Performed by: STUDENT IN AN ORGANIZED HEALTH CARE EDUCATION/TRAINING PROGRAM

## 2023-01-01 PROCEDURE — 96372 THER/PROPH/DIAG INJ SC/IM: CPT | Performed by: UROLOGY

## 2023-01-01 PROCEDURE — 250N000011 HC RX IP 250 OP 636: Performed by: EMERGENCY MEDICINE

## 2023-01-01 PROCEDURE — 83605 ASSAY OF LACTIC ACID: CPT | Performed by: EMERGENCY MEDICINE

## 2023-01-01 PROCEDURE — 92610 EVALUATE SWALLOWING FUNCTION: CPT | Mod: GN

## 2023-01-01 PROCEDURE — A9585 GADOBUTROL INJECTION: HCPCS | Performed by: INTERNAL MEDICINE

## 2023-01-01 PROCEDURE — 88305 TISSUE EXAM BY PATHOLOGIST: CPT | Mod: 26 | Performed by: PATHOLOGY

## 2023-01-01 PROCEDURE — 87040 BLOOD CULTURE FOR BACTERIA: CPT | Performed by: EMERGENCY MEDICINE

## 2023-01-01 PROCEDURE — 2894A EEG AWAKE OR DROWSY ROUTINE: CPT | Performed by: PSYCHIATRY & NEUROLOGY

## 2023-01-01 PROCEDURE — 255N000002 HC RX 255 OP 636: Performed by: INTERNAL MEDICINE

## 2023-01-01 PROCEDURE — 96360 HYDRATION IV INFUSION INIT: CPT

## 2023-01-01 PROCEDURE — 36415 COLL VENOUS BLD VENIPUNCTURE: CPT | Performed by: NURSE PRACTITIONER

## 2023-01-01 PROCEDURE — 250N000009 HC RX 250: Performed by: ANESTHESIOLOGY

## 2023-01-01 PROCEDURE — 99215 OFFICE O/P EST HI 40 MIN: CPT | Performed by: PSYCHIATRY & NEUROLOGY

## 2023-01-01 RX ORDER — ONDANSETRON 4 MG/1
4 TABLET, ORALLY DISINTEGRATING ORAL EVERY 6 HOURS PRN
Status: DISCONTINUED | OUTPATIENT
Start: 2023-01-01 | End: 2023-01-01 | Stop reason: HOSPADM

## 2023-01-01 RX ORDER — GADOBUTROL 604.72 MG/ML
6.5 INJECTION INTRAVENOUS ONCE
Status: COMPLETED | OUTPATIENT
Start: 2023-01-01 | End: 2023-01-01

## 2023-01-01 RX ORDER — DEXAMETHASONE SODIUM PHOSPHATE 4 MG/ML
4 INJECTION, SOLUTION INTRA-ARTICULAR; INTRALESIONAL; INTRAMUSCULAR; INTRAVENOUS; SOFT TISSUE EVERY 8 HOURS
Status: DISCONTINUED | OUTPATIENT
Start: 2023-01-01 | End: 2023-01-01

## 2023-01-01 RX ORDER — ONDANSETRON 2 MG/ML
4 INJECTION INTRAMUSCULAR; INTRAVENOUS EVERY 30 MIN PRN
Status: DISCONTINUED | OUTPATIENT
Start: 2023-01-01 | End: 2023-01-01 | Stop reason: HOSPADM

## 2023-01-01 RX ORDER — LEVETIRACETAM 500 MG/1
500 TABLET ORAL 2 TIMES DAILY
Status: DISCONTINUED | OUTPATIENT
Start: 2023-01-01 | End: 2023-01-01

## 2023-01-01 RX ORDER — GLYCOPYRROLATE 0.2 MG/ML
INJECTION, SOLUTION INTRAMUSCULAR; INTRAVENOUS PRN
Status: DISCONTINUED | OUTPATIENT
Start: 2023-01-01 | End: 2023-01-01

## 2023-01-01 RX ORDER — SODIUM CHLORIDE 9 MG/ML
INJECTION, SOLUTION INTRAVENOUS CONTINUOUS
Status: DISCONTINUED | OUTPATIENT
Start: 2023-01-01 | End: 2023-01-01

## 2023-01-01 RX ORDER — AMLODIPINE BESYLATE 5 MG/1
5 TABLET ORAL DAILY
Status: DISCONTINUED | OUTPATIENT
Start: 2023-01-01 | End: 2023-01-01 | Stop reason: HOSPADM

## 2023-01-01 RX ORDER — ACETAMINOPHEN 325 MG/1
650 TABLET ORAL EVERY 6 HOURS PRN
Status: DISCONTINUED | OUTPATIENT
Start: 2023-01-01 | End: 2023-01-01 | Stop reason: HOSPADM

## 2023-01-01 RX ORDER — CARBOXYMETHYLCELLULOSE SODIUM 5 MG/ML
2 SOLUTION/ DROPS OPHTHALMIC 3 TIMES DAILY PRN
Status: DISCONTINUED | OUTPATIENT
Start: 2023-01-01 | End: 2023-01-01 | Stop reason: HOSPADM

## 2023-01-01 RX ORDER — SODIUM CHLORIDE, SODIUM LACTATE, POTASSIUM CHLORIDE, CALCIUM CHLORIDE 600; 310; 30; 20 MG/100ML; MG/100ML; MG/100ML; MG/100ML
INJECTION, SOLUTION INTRAVENOUS CONTINUOUS
Status: DISCONTINUED | OUTPATIENT
Start: 2023-01-01 | End: 2023-01-01 | Stop reason: HOSPADM

## 2023-01-01 RX ORDER — CEFAZOLIN SODIUM/WATER 2 G/20 ML
2 SYRINGE (ML) INTRAVENOUS
Status: CANCELLED | OUTPATIENT
Start: 2023-01-01

## 2023-01-01 RX ORDER — VANCOMYCIN HYDROCHLORIDE 500 MG/10ML
500 INJECTION, POWDER, LYOPHILIZED, FOR SOLUTION INTRAVENOUS EVERY 24 HOURS
Status: DISCONTINUED | OUTPATIENT
Start: 2023-01-01 | End: 2023-01-01

## 2023-01-01 RX ORDER — HEPARIN SODIUM 5000 [USP'U]/.5ML
5000 INJECTION, SOLUTION INTRAVENOUS; SUBCUTANEOUS EVERY 12 HOURS
Status: DISCONTINUED | OUTPATIENT
Start: 2023-01-01 | End: 2023-01-01

## 2023-01-01 RX ORDER — ONDANSETRON 2 MG/ML
4 INJECTION INTRAMUSCULAR; INTRAVENOUS EVERY 6 HOURS PRN
Status: DISCONTINUED | OUTPATIENT
Start: 2023-01-01 | End: 2023-01-01 | Stop reason: HOSPADM

## 2023-01-01 RX ORDER — LEVETIRACETAM 500 MG/1
500 TABLET ORAL 2 TIMES DAILY
Status: DISCONTINUED | OUTPATIENT
Start: 2023-01-01 | End: 2023-01-01 | Stop reason: HOSPADM

## 2023-01-01 RX ORDER — UREA 10 %
2500 LOTION (ML) TOPICAL DAILY
Status: DISCONTINUED | OUTPATIENT
Start: 2023-01-01 | End: 2023-01-01 | Stop reason: HOSPADM

## 2023-01-01 RX ORDER — FENTANYL CITRATE 50 UG/ML
50 INJECTION, SOLUTION INTRAMUSCULAR; INTRAVENOUS EVERY 5 MIN PRN
Status: DISCONTINUED | OUTPATIENT
Start: 2023-01-01 | End: 2023-01-01 | Stop reason: HOSPADM

## 2023-01-01 RX ORDER — CEFAZOLIN SODIUM/WATER 2 G/20 ML
SYRINGE (ML) INTRAVENOUS PRN
Status: DISCONTINUED | OUTPATIENT
Start: 2023-01-01 | End: 2023-01-01

## 2023-01-01 RX ORDER — NALOXONE HYDROCHLORIDE 0.4 MG/ML
0.2 INJECTION, SOLUTION INTRAMUSCULAR; INTRAVENOUS; SUBCUTANEOUS
Status: DISCONTINUED | OUTPATIENT
Start: 2023-01-01 | End: 2023-01-01 | Stop reason: HOSPADM

## 2023-01-01 RX ORDER — CIPROFLOXACIN 500 MG/5ML
250 KIT ORAL EVERY 12 HOURS SCHEDULED
Status: DISCONTINUED | OUTPATIENT
Start: 2023-01-01 | End: 2023-01-01

## 2023-01-01 RX ORDER — DEXAMETHASONE 4 MG/1
4 TABLET ORAL EVERY 8 HOURS SCHEDULED
Status: DISCONTINUED | OUTPATIENT
Start: 2023-01-01 | End: 2023-01-01

## 2023-01-01 RX ORDER — PRIMIDONE 50 MG/1
50 TABLET ORAL 2 TIMES DAILY
Status: DISCONTINUED | OUTPATIENT
Start: 2023-01-01 | End: 2023-01-01 | Stop reason: HOSPADM

## 2023-01-01 RX ORDER — LEVOFLOXACIN 5 MG/ML
750 INJECTION, SOLUTION INTRAVENOUS
Status: DISCONTINUED | OUTPATIENT
Start: 2023-01-01 | End: 2023-01-01

## 2023-01-01 RX ORDER — TAMSULOSIN HYDROCHLORIDE 0.4 MG/1
0.4 CAPSULE ORAL DAILY
Start: 2023-01-01 | End: 2023-01-01

## 2023-01-01 RX ORDER — LIDOCAINE 40 MG/G
CREAM TOPICAL
Status: DISCONTINUED | OUTPATIENT
Start: 2023-01-01 | End: 2023-01-01 | Stop reason: HOSPADM

## 2023-01-01 RX ORDER — ATORVASTATIN CALCIUM 40 MG/1
40 TABLET, FILM COATED ORAL AT BEDTIME
Status: DISCONTINUED | OUTPATIENT
Start: 2023-01-01 | End: 2023-01-01

## 2023-01-01 RX ORDER — LIDOCAINE HYDROCHLORIDE 20 MG/ML
10 JELLY TOPICAL ONCE
Status: COMPLETED | OUTPATIENT
Start: 2023-01-01 | End: 2023-01-01

## 2023-01-01 RX ORDER — HYDROMORPHONE HYDROCHLORIDE 1 MG/ML
0.4 INJECTION, SOLUTION INTRAMUSCULAR; INTRAVENOUS; SUBCUTANEOUS EVERY 5 MIN PRN
Status: DISCONTINUED | OUTPATIENT
Start: 2023-01-01 | End: 2023-01-01 | Stop reason: HOSPADM

## 2023-01-01 RX ORDER — SODIUM CHLORIDE, SODIUM LACTATE, POTASSIUM CHLORIDE, CALCIUM CHLORIDE 600; 310; 30; 20 MG/100ML; MG/100ML; MG/100ML; MG/100ML
INJECTION, SOLUTION INTRAVENOUS CONTINUOUS
Status: DISCONTINUED | OUTPATIENT
Start: 2023-01-01 | End: 2023-01-01

## 2023-01-01 RX ORDER — POLYETHYLENE GLYCOL 3350 17 G/17G
17 POWDER, FOR SOLUTION ORAL DAILY PRN
Status: DISCONTINUED | OUTPATIENT
Start: 2023-01-01 | End: 2023-01-01 | Stop reason: HOSPADM

## 2023-01-01 RX ORDER — HEPARIN SODIUM 5000 [USP'U]/.5ML
5000 INJECTION, SOLUTION INTRAVENOUS; SUBCUTANEOUS EVERY 8 HOURS
Status: DISCONTINUED | OUTPATIENT
Start: 2023-01-01 | End: 2023-01-01 | Stop reason: HOSPADM

## 2023-01-01 RX ORDER — HYDROMORPHONE HCL IN WATER/PF 6 MG/30 ML
0.1 PATIENT CONTROLLED ANALGESIA SYRINGE INTRAVENOUS
Status: DISCONTINUED | OUTPATIENT
Start: 2023-01-01 | End: 2023-01-01 | Stop reason: HOSPADM

## 2023-01-01 RX ORDER — LIDOCAINE HYDROCHLORIDE 10 MG/ML
INJECTION, SOLUTION INFILTRATION; PERINEURAL PRN
Status: DISCONTINUED | OUTPATIENT
Start: 2023-01-01 | End: 2023-01-01

## 2023-01-01 RX ORDER — ATORVASTATIN CALCIUM 40 MG/1
40 TABLET, FILM COATED ORAL AT BEDTIME
Status: DISCONTINUED | OUTPATIENT
Start: 2023-01-01 | End: 2023-01-01 | Stop reason: HOSPADM

## 2023-01-01 RX ORDER — DEXAMETHASONE 1 MG
1 TABLET ORAL DAILY
Status: DISCONTINUED | OUTPATIENT
Start: 2023-01-01 | End: 2023-01-01 | Stop reason: HOSPADM

## 2023-01-01 RX ORDER — CIPROFLOXACIN 500 MG/5ML
250 KIT ORAL 2 TIMES DAILY
Qty: 35 ML | Refills: 0
Start: 2023-01-01 | End: 2023-01-01

## 2023-01-01 RX ORDER — TAMSULOSIN HYDROCHLORIDE 0.4 MG/1
0.4 CAPSULE ORAL DAILY
Status: DISCONTINUED | OUTPATIENT
Start: 2023-01-01 | End: 2023-01-01 | Stop reason: HOSPADM

## 2023-01-01 RX ORDER — PROCHLORPERAZINE MALEATE 5 MG
5 TABLET ORAL EVERY 6 HOURS PRN
Status: DISCONTINUED | OUTPATIENT
Start: 2023-01-01 | End: 2023-01-01 | Stop reason: HOSPADM

## 2023-01-01 RX ORDER — IOPAMIDOL 755 MG/ML
100 INJECTION, SOLUTION INTRAVASCULAR ONCE
Status: COMPLETED | OUTPATIENT
Start: 2023-01-01 | End: 2023-01-01

## 2023-01-01 RX ORDER — POLYETHYLENE GLYCOL 3350 17 G/17G
17 POWDER, FOR SOLUTION ORAL DAILY
Status: DISCONTINUED | OUTPATIENT
Start: 2023-01-01 | End: 2023-01-01 | Stop reason: HOSPADM

## 2023-01-01 RX ORDER — CIPROFLOXACIN 500 MG/5ML
250 KIT ORAL 2 TIMES DAILY
Qty: 20 ML | Refills: 0
Start: 2023-01-01 | End: 2023-01-01

## 2023-01-01 RX ORDER — PROPOFOL 10 MG/ML
INJECTION, EMULSION INTRAVENOUS PRN
Status: DISCONTINUED | OUTPATIENT
Start: 2023-01-01 | End: 2023-01-01

## 2023-01-01 RX ORDER — ACETAMINOPHEN 650 MG/1
650 SUPPOSITORY RECTAL EVERY 6 HOURS PRN
Status: DISCONTINUED | OUTPATIENT
Start: 2023-01-01 | End: 2023-01-01 | Stop reason: HOSPADM

## 2023-01-01 RX ORDER — MEROPENEM 500 MG/1
500 INJECTION, POWDER, FOR SOLUTION INTRAVENOUS EVERY 12 HOURS
Status: DISCONTINUED | OUTPATIENT
Start: 2023-01-01 | End: 2023-01-01

## 2023-01-01 RX ORDER — HYDROMORPHONE HCL IN WATER/PF 6 MG/30 ML
0.2 PATIENT CONTROLLED ANALGESIA SYRINGE INTRAVENOUS
Status: DISCONTINUED | OUTPATIENT
Start: 2023-01-01 | End: 2023-01-01 | Stop reason: HOSPADM

## 2023-01-01 RX ORDER — LEVETIRACETAM 500 MG/1
500 TABLET ORAL 2 TIMES DAILY
Qty: 56 TABLET | Refills: 12 | Status: SHIPPED | OUTPATIENT
Start: 2023-01-01

## 2023-01-01 RX ORDER — ONDANSETRON 4 MG/1
4 TABLET, ORALLY DISINTEGRATING ORAL EVERY 30 MIN PRN
Status: DISCONTINUED | OUTPATIENT
Start: 2023-01-01 | End: 2023-01-01 | Stop reason: HOSPADM

## 2023-01-01 RX ORDER — ACETAMINOPHEN 325 MG/1
650 TABLET ORAL EVERY 6 HOURS PRN
COMMUNITY

## 2023-01-01 RX ORDER — NITROFURANTOIN MACROCRYSTALS 50 MG/1
50 CAPSULE ORAL EVERY 6 HOURS
Status: ON HOLD | COMMUNITY
Start: 2023-01-01 | End: 2023-01-01

## 2023-01-01 RX ORDER — ACETAMINOPHEN 325 MG/1
650 TABLET ORAL EVERY 4 HOURS PRN
Status: DISCONTINUED | OUTPATIENT
Start: 2023-01-01 | End: 2023-01-01 | Stop reason: HOSPADM

## 2023-01-01 RX ORDER — BARIUM SULFATE 400 MG/ML
SUSPENSION ORAL ONCE
Status: COMPLETED | OUTPATIENT
Start: 2023-01-01 | End: 2023-01-01

## 2023-01-01 RX ORDER — DEXAMETHASONE 4 MG/1
4 TABLET ORAL 2 TIMES DAILY
Status: DISCONTINUED | OUTPATIENT
Start: 2023-01-01 | End: 2023-01-01 | Stop reason: HOSPADM

## 2023-01-01 RX ORDER — GINSENG 100 MG
CAPSULE ORAL 3 TIMES DAILY
Status: DISCONTINUED | OUTPATIENT
Start: 2023-01-01 | End: 2023-01-01 | Stop reason: HOSPADM

## 2023-01-01 RX ORDER — CEFAZOLIN SODIUM/WATER 2 G/20 ML
2 SYRINGE (ML) INTRAVENOUS SEE ADMIN INSTRUCTIONS
Status: CANCELLED | OUTPATIENT
Start: 2023-01-01

## 2023-01-01 RX ORDER — DEXAMETHASONE 1 MG
1 TABLET ORAL 2 TIMES DAILY
Status: DISCONTINUED | OUTPATIENT
Start: 2023-01-01 | End: 2023-01-01 | Stop reason: HOSPADM

## 2023-01-01 RX ORDER — HYDROMORPHONE HYDROCHLORIDE 1 MG/ML
0.2 INJECTION, SOLUTION INTRAMUSCULAR; INTRAVENOUS; SUBCUTANEOUS EVERY 5 MIN PRN
Status: DISCONTINUED | OUTPATIENT
Start: 2023-01-01 | End: 2023-01-01 | Stop reason: HOSPADM

## 2023-01-01 RX ORDER — CIPROFLOXACIN 250 MG/1
250 TABLET, FILM COATED ORAL EVERY 12 HOURS SCHEDULED
Status: DISCONTINUED | OUTPATIENT
Start: 2023-01-01 | End: 2023-01-01 | Stop reason: HOSPADM

## 2023-01-01 RX ORDER — ACETAMINOPHEN 325 MG/1
325-650 TABLET ORAL EVERY 6 HOURS PRN
Qty: 28 TABLET | Refills: 0
Start: 2023-01-01 | End: 2023-01-01

## 2023-01-01 RX ORDER — SODIUM CHLORIDE, SODIUM LACTATE, POTASSIUM CHLORIDE, CALCIUM CHLORIDE 600; 310; 30; 20 MG/100ML; MG/100ML; MG/100ML; MG/100ML
INJECTION, SOLUTION INTRAVENOUS CONTINUOUS PRN
Status: DISCONTINUED | OUTPATIENT
Start: 2023-01-01 | End: 2023-01-01

## 2023-01-01 RX ORDER — CIPROFLOXACIN 250 MG/1
250 TABLET, FILM COATED ORAL 2 TIMES DAILY
Qty: 14 TABLET | Refills: 0
Start: 2023-01-01 | End: 2023-01-01

## 2023-01-01 RX ORDER — NALOXONE HYDROCHLORIDE 0.4 MG/ML
0.4 INJECTION, SOLUTION INTRAMUSCULAR; INTRAVENOUS; SUBCUTANEOUS
Status: DISCONTINUED | OUTPATIENT
Start: 2023-01-01 | End: 2023-01-01 | Stop reason: HOSPADM

## 2023-01-01 RX ORDER — POLYETHYLENE GLYCOL 3350 17 G/17G
1 POWDER, FOR SOLUTION ORAL DAILY PRN
COMMUNITY
Start: 2023-01-01

## 2023-01-01 RX ORDER — LIDOCAINE HYDROCHLORIDE 20 MG/ML
1 JELLY TOPICAL ONCE
Status: COMPLETED | OUTPATIENT
Start: 2023-01-01 | End: 2023-01-01

## 2023-01-01 RX ORDER — BISACODYL 10 MG
10 SUPPOSITORY, RECTAL RECTAL DAILY PRN
Status: DISCONTINUED | OUTPATIENT
Start: 2023-01-01 | End: 2023-01-01 | Stop reason: HOSPADM

## 2023-01-01 RX ORDER — POLYETHYLENE GLYCOL 3350 17 G/17G
17 POWDER, FOR SOLUTION ORAL DAILY
Qty: 510 G
Start: 2023-01-01 | End: 2023-01-01 | Stop reason: DRUGHIGH

## 2023-01-01 RX ORDER — AMLODIPINE BESYLATE 5 MG/1
5 TABLET ORAL DAILY
Qty: 30 TABLET | Refills: 1 | Status: SHIPPED | OUTPATIENT
Start: 2023-01-01 | End: 2023-01-01

## 2023-01-01 RX ORDER — FENTANYL CITRATE 50 UG/ML
25 INJECTION, SOLUTION INTRAMUSCULAR; INTRAVENOUS EVERY 5 MIN PRN
Status: DISCONTINUED | OUTPATIENT
Start: 2023-01-01 | End: 2023-01-01 | Stop reason: HOSPADM

## 2023-01-01 RX ORDER — DEXAMETHASONE 2 MG/1
TABLET ORAL
Refills: 0
Start: 2023-01-01 | End: 2023-01-01

## 2023-01-01 RX ORDER — LEVETIRACETAM 100 MG/ML
500 SOLUTION ORAL 2 TIMES DAILY
Status: DISCONTINUED | OUTPATIENT
Start: 2023-01-01 | End: 2023-01-01 | Stop reason: HOSPADM

## 2023-01-01 RX ORDER — LOPERAMIDE HCL 2 MG
CAPSULE ORAL
COMMUNITY

## 2023-01-01 RX ORDER — ONDANSETRON 2 MG/ML
INJECTION INTRAMUSCULAR; INTRAVENOUS PRN
Status: DISCONTINUED | OUTPATIENT
Start: 2023-01-01 | End: 2023-01-01

## 2023-01-01 RX ORDER — FENTANYL CITRATE 50 UG/ML
INJECTION, SOLUTION INTRAMUSCULAR; INTRAVENOUS PRN
Status: DISCONTINUED | OUTPATIENT
Start: 2023-01-01 | End: 2023-01-01

## 2023-01-01 RX ORDER — MEROPENEM 500 MG/1
500 INJECTION, POWDER, FOR SOLUTION INTRAVENOUS ONCE
Status: COMPLETED | OUTPATIENT
Start: 2023-01-01 | End: 2023-01-01

## 2023-01-01 RX ORDER — DEXAMETHASONE 2 MG/1
2 TABLET ORAL 2 TIMES DAILY
Status: DISCONTINUED | OUTPATIENT
Start: 2023-01-01 | End: 2023-01-01 | Stop reason: HOSPADM

## 2023-01-01 RX ORDER — ACETAMINOPHEN 325 MG/1
975 TABLET ORAL EVERY 8 HOURS
Status: COMPLETED | OUTPATIENT
Start: 2023-01-01 | End: 2023-01-01

## 2023-01-01 RX ORDER — PRIMIDONE 50 MG/1
50 TABLET ORAL 2 TIMES DAILY
Qty: 90 TABLET | Refills: 7 | Status: SHIPPED | OUTPATIENT
Start: 2023-01-01

## 2023-01-01 RX ORDER — AMOXICILLIN 250 MG
1 CAPSULE ORAL 2 TIMES DAILY
Status: DISCONTINUED | OUTPATIENT
Start: 2023-01-01 | End: 2023-01-01 | Stop reason: HOSPADM

## 2023-01-01 RX ORDER — LEVETIRACETAM 500 MG/1
500 TABLET ORAL 2 TIMES DAILY
Qty: 56 TABLET | Refills: 12 | Status: SHIPPED | OUTPATIENT
Start: 2023-01-01 | End: 2023-01-01

## 2023-01-01 RX ORDER — GINSENG 100 MG
CAPSULE ORAL 3 TIMES DAILY
Qty: 28 G | Refills: 0
Start: 2023-01-01 | End: 2023-01-01

## 2023-01-01 RX ORDER — PRIMIDONE 50 MG/1
50 TABLET ORAL 2 TIMES DAILY
Qty: 90 TABLET | Refills: 7 | Status: SHIPPED | OUTPATIENT
Start: 2023-01-01 | End: 2023-01-01

## 2023-01-01 RX ADMIN — PRIMIDONE 50 MG: 50 TABLET ORAL at 20:39

## 2023-01-01 RX ADMIN — LEVETIRACETAM 500 MG: 500 TABLET, FILM COATED ORAL at 08:56

## 2023-01-01 RX ADMIN — LEVETIRACETAM 500 MG: 500 TABLET, FILM COATED ORAL at 21:06

## 2023-01-01 RX ADMIN — SENNOSIDES AND DOCUSATE SODIUM 1 TABLET: 50; 8.6 TABLET ORAL at 09:00

## 2023-01-01 RX ADMIN — LEVETIRACETAM 500 MG: 100 SOLUTION ORAL at 09:17

## 2023-01-01 RX ADMIN — LEVETIRACETAM 500 MG: 100 INJECTION, SOLUTION INTRAVENOUS at 00:33

## 2023-01-01 RX ADMIN — SODIUM CHLORIDE: 9 INJECTION, SOLUTION INTRAVENOUS at 22:04

## 2023-01-01 RX ADMIN — GADOBUTROL 6.5 ML: 604.72 INJECTION INTRAVENOUS at 13:02

## 2023-01-01 RX ADMIN — LEVETIRACETAM 500 MG: 100 INJECTION, SOLUTION INTRAVENOUS at 08:57

## 2023-01-01 RX ADMIN — DEXTROSE AND SODIUM CHLORIDE: 5; 450 INJECTION, SOLUTION INTRAVENOUS at 22:42

## 2023-01-01 RX ADMIN — CIPROFLOXACIN HYDROCHLORIDE 250 MG: 250 TABLET, FILM COATED ORAL at 20:39

## 2023-01-01 RX ADMIN — ACETAMINOPHEN 975 MG: 325 TABLET ORAL at 00:16

## 2023-01-01 RX ADMIN — DEXTROSE AND SODIUM CHLORIDE: 5; 450 INJECTION, SOLUTION INTRAVENOUS at 06:49

## 2023-01-01 RX ADMIN — ACETAMINOPHEN 975 MG: 325 TABLET ORAL at 17:17

## 2023-01-01 RX ADMIN — PRIMIDONE 50 MG: 50 TABLET ORAL at 08:06

## 2023-01-01 RX ADMIN — PRIMIDONE 50 MG: 50 TABLET ORAL at 23:04

## 2023-01-01 RX ADMIN — AMLODIPINE BESYLATE 5 MG: 5 TABLET ORAL at 08:57

## 2023-01-01 RX ADMIN — PRIMIDONE 50 MG: 50 TABLET ORAL at 22:37

## 2023-01-01 RX ADMIN — DEXAMETHASONE 4 MG: 4 TABLET ORAL at 08:07

## 2023-01-01 RX ADMIN — POLYETHYLENE GLYCOL 3350 17 G: 17 POWDER, FOR SOLUTION ORAL at 08:09

## 2023-01-01 RX ADMIN — Medication: at 08:09

## 2023-01-01 RX ADMIN — DEXAMETHASONE 4 MG: 4 TABLET ORAL at 13:20

## 2023-01-01 RX ADMIN — GADOBUTROL 6.5 ML: 604.72 INJECTION INTRAVENOUS at 16:30

## 2023-01-01 RX ADMIN — DEXAMETHASONE SODIUM PHOSPHATE 4 MG: 4 INJECTION, SOLUTION INTRAMUSCULAR; INTRAVENOUS at 20:38

## 2023-01-01 RX ADMIN — SODIUM CHLORIDE 500 ML: 9 INJECTION, SOLUTION INTRAVENOUS at 14:48

## 2023-01-01 RX ADMIN — PRIMIDONE 50 MG: 50 TABLET ORAL at 21:10

## 2023-01-01 RX ADMIN — LEVETIRACETAM 500 MG: 500 TABLET, FILM COATED ORAL at 08:57

## 2023-01-01 RX ADMIN — LEVETIRACETAM 500 MG: 500 TABLET, FILM COATED ORAL at 21:50

## 2023-01-01 RX ADMIN — CIPROFLOXACIN HYDROCHLORIDE 250 MG: 250 TABLET, FILM COATED ORAL at 09:00

## 2023-01-01 RX ADMIN — LEVETIRACETAM 500 MG: 100 INJECTION, SOLUTION INTRAVENOUS at 13:41

## 2023-01-01 RX ADMIN — LEVETIRACETAM 500 MG: 100 INJECTION, SOLUTION INTRAVENOUS at 22:00

## 2023-01-01 RX ADMIN — ACETAMINOPHEN 975 MG: 325 TABLET ORAL at 08:57

## 2023-01-01 RX ADMIN — Medication 50 MG: at 13:40

## 2023-01-01 RX ADMIN — POLYETHYLENE GLYCOL 3350 17 G: 17 POWDER, FOR SOLUTION ORAL at 09:01

## 2023-01-01 RX ADMIN — TAMSULOSIN HYDROCHLORIDE 0.4 MG: 0.4 CAPSULE ORAL at 08:06

## 2023-01-01 RX ADMIN — PRIMIDONE 50 MG: 50 TABLET ORAL at 08:58

## 2023-01-01 RX ADMIN — BARIUM SULFATE 30 ML: 400 SUSPENSION ORAL at 10:44

## 2023-01-01 RX ADMIN — BACITRACIN: 500 OINTMENT TOPICAL at 21:52

## 2023-01-01 RX ADMIN — AMLODIPINE BESYLATE 5 MG: 5 TABLET ORAL at 10:38

## 2023-01-01 RX ADMIN — TAMSULOSIN HYDROCHLORIDE 0.4 MG: 0.4 CAPSULE ORAL at 13:20

## 2023-01-01 RX ADMIN — LEVETIRACETAM 500 MG: 100 INJECTION, SOLUTION INTRAVENOUS at 20:42

## 2023-01-01 RX ADMIN — PRIMIDONE 50 MG: 50 TABLET ORAL at 08:57

## 2023-01-01 RX ADMIN — DEXTROSE AND SODIUM CHLORIDE: 5; 450 INJECTION, SOLUTION INTRAVENOUS at 20:59

## 2023-01-01 RX ADMIN — HEPARIN SODIUM 5000 UNITS: 10000 INJECTION, SOLUTION INTRAVENOUS; SUBCUTANEOUS at 08:57

## 2023-01-01 RX ADMIN — POLYETHYLENE GLYCOL 3350 17 G: 17 POWDER, FOR SOLUTION ORAL at 09:35

## 2023-01-01 RX ADMIN — ONDANSETRON 4 MG: 2 INJECTION INTRAMUSCULAR; INTRAVENOUS at 12:13

## 2023-01-01 RX ADMIN — BACITRACIN: 500 OINTMENT TOPICAL at 13:58

## 2023-01-01 RX ADMIN — LEVETIRACETAM 500 MG: 500 TABLET, FILM COATED ORAL at 21:08

## 2023-01-01 RX ADMIN — HEPARIN SODIUM 5000 UNITS: 5000 INJECTION, SOLUTION INTRAVENOUS; SUBCUTANEOUS at 20:42

## 2023-01-01 RX ADMIN — SODIUM CHLORIDE, POTASSIUM CHLORIDE, SODIUM LACTATE AND CALCIUM CHLORIDE: 600; 310; 30; 20 INJECTION, SOLUTION INTRAVENOUS at 04:09

## 2023-01-01 RX ADMIN — DEXTROSE AND SODIUM CHLORIDE: 5; 450 INJECTION, SOLUTION INTRAVENOUS at 00:27

## 2023-01-01 RX ADMIN — LIDOCAINE HYDROCHLORIDE 5 ML: 10 INJECTION, SOLUTION INFILTRATION; PERINEURAL at 11:46

## 2023-01-01 RX ADMIN — IOPAMIDOL 100 ML: 755 INJECTION, SOLUTION INTRAVENOUS at 19:28

## 2023-01-01 RX ADMIN — POLYETHYLENE GLYCOL 3350 17 G: 17 POWDER, FOR SOLUTION ORAL at 10:36

## 2023-01-01 RX ADMIN — VANCOMYCIN HYDROCHLORIDE 750 MG: 5 INJECTION, POWDER, LYOPHILIZED, FOR SOLUTION INTRAVENOUS at 11:14

## 2023-01-01 RX ADMIN — BACITRACIN: 500 OINTMENT TOPICAL at 14:14

## 2023-01-01 RX ADMIN — DEXAMETHASONE SODIUM PHOSPHATE 4 MG: 4 INJECTION, SOLUTION INTRAMUSCULAR; INTRAVENOUS at 21:06

## 2023-01-01 RX ADMIN — BACITRACIN: 500 OINTMENT TOPICAL at 17:07

## 2023-01-01 RX ADMIN — CIPROFLOXACIN HYDROCHLORIDE 250 MG: 250 TABLET, FILM COATED ORAL at 10:36

## 2023-01-01 RX ADMIN — VANCOMYCIN HYDROCHLORIDE 750 MG: 5 INJECTION, POWDER, LYOPHILIZED, FOR SOLUTION INTRAVENOUS at 10:08

## 2023-01-01 RX ADMIN — HEPARIN SODIUM 5000 UNITS: 5000 INJECTION, SOLUTION INTRAVENOUS; SUBCUTANEOUS at 21:27

## 2023-01-01 RX ADMIN — Medication 2 G: at 11:56

## 2023-01-01 RX ADMIN — DEXTROSE AND SODIUM CHLORIDE: 5; 450 INJECTION, SOLUTION INTRAVENOUS at 13:43

## 2023-01-01 RX ADMIN — LEVETIRACETAM 500 MG: 500 TABLET, FILM COATED ORAL at 20:15

## 2023-01-01 RX ADMIN — LEVETIRACETAM 500 MG: 100 SOLUTION ORAL at 09:48

## 2023-01-01 RX ADMIN — HEPARIN SODIUM 5000 UNITS: 5000 INJECTION, SOLUTION INTRAVENOUS; SUBCUTANEOUS at 09:48

## 2023-01-01 RX ADMIN — LEVETIRACETAM 500 MG: 500 TABLET, FILM COATED ORAL at 20:39

## 2023-01-01 RX ADMIN — CYANOCOBALAMIN TAB 500 MCG 2500 MCG: 500 TAB at 13:40

## 2023-01-01 RX ADMIN — HEPARIN SODIUM 5000 UNITS: 5000 INJECTION, SOLUTION INTRAVENOUS; SUBCUTANEOUS at 10:24

## 2023-01-01 RX ADMIN — AMLODIPINE BESYLATE 5 MG: 5 TABLET ORAL at 14:02

## 2023-01-01 RX ADMIN — AMLODIPINE BESYLATE 5 MG: 5 TABLET ORAL at 09:16

## 2023-01-01 RX ADMIN — ACETAMINOPHEN 975 MG: 325 TABLET ORAL at 09:00

## 2023-01-01 RX ADMIN — PRIMIDONE 50 MG: 50 TABLET ORAL at 20:15

## 2023-01-01 RX ADMIN — SODIUM CHLORIDE 1000 ML: 9 INJECTION, SOLUTION INTRAVENOUS at 13:48

## 2023-01-01 RX ADMIN — PRIMIDONE 50 MG: 50 TABLET ORAL at 10:05

## 2023-01-01 RX ADMIN — DEXAMETHASONE SODIUM PHOSPHATE 4 MG: 4 INJECTION, SOLUTION INTRAMUSCULAR; INTRAVENOUS at 22:47

## 2023-01-01 RX ADMIN — VANCOMYCIN HYDROCHLORIDE 750 MG: 5 INJECTION, POWDER, LYOPHILIZED, FOR SOLUTION INTRAVENOUS at 10:58

## 2023-01-01 RX ADMIN — MEROPENEM 500 MG: 500 INJECTION, POWDER, FOR SOLUTION INTRAVENOUS at 16:39

## 2023-01-01 RX ADMIN — LIDOCAINE HYDROCHLORIDE 1 TUBE: 20 JELLY TOPICAL at 14:16

## 2023-01-01 RX ADMIN — ATORVASTATIN CALCIUM 40 MG: 40 TABLET, FILM COATED ORAL at 22:47

## 2023-01-01 RX ADMIN — VANCOMYCIN HYDROCHLORIDE 500 MG: 500 INJECTION, POWDER, LYOPHILIZED, FOR SOLUTION INTRAVENOUS at 17:41

## 2023-01-01 RX ADMIN — BACITRACIN: 500 OINTMENT TOPICAL at 09:11

## 2023-01-01 RX ADMIN — MEROPENEM 500 MG: 500 INJECTION, POWDER, FOR SOLUTION INTRAVENOUS at 06:57

## 2023-01-01 RX ADMIN — LEVETIRACETAM 500 MG: 500 TABLET, FILM COATED ORAL at 09:01

## 2023-01-01 RX ADMIN — LEVETIRACETAM 500 MG: 100 SOLUTION ORAL at 23:04

## 2023-01-01 RX ADMIN — MEROPENEM 500 MG: 500 INJECTION, POWDER, FOR SOLUTION INTRAVENOUS at 19:34

## 2023-01-01 RX ADMIN — SODIUM CHLORIDE: 9 INJECTION, SOLUTION INTRAVENOUS at 20:39

## 2023-01-01 RX ADMIN — LEVETIRACETAM 500 MG: 100 SOLUTION ORAL at 10:06

## 2023-01-01 RX ADMIN — SODIUM CHLORIDE: 9 INJECTION, SOLUTION INTRAVENOUS at 04:52

## 2023-01-01 RX ADMIN — DEXTROSE AND SODIUM CHLORIDE: 5; 450 INJECTION, SOLUTION INTRAVENOUS at 11:15

## 2023-01-01 RX ADMIN — DEXTROSE AND SODIUM CHLORIDE: 5; 450 INJECTION, SOLUTION INTRAVENOUS at 03:07

## 2023-01-01 RX ADMIN — LEVETIRACETAM 500 MG: 100 INJECTION, SOLUTION INTRAVENOUS at 02:27

## 2023-01-01 RX ADMIN — VANCOMYCIN HYDROCHLORIDE 500 MG: 500 INJECTION, POWDER, LYOPHILIZED, FOR SOLUTION INTRAVENOUS at 18:25

## 2023-01-01 RX ADMIN — PRIMIDONE 50 MG: 50 TABLET ORAL at 10:06

## 2023-01-01 RX ADMIN — Medication 1250 MG: at 17:26

## 2023-01-01 RX ADMIN — AMLODIPINE BESYLATE 5 MG: 5 TABLET ORAL at 09:00

## 2023-01-01 RX ADMIN — ATORVASTATIN CALCIUM 40 MG: 40 TABLET, FILM COATED ORAL at 22:51

## 2023-01-01 RX ADMIN — LEVETIRACETAM 500 MG: 500 TABLET, FILM COATED ORAL at 08:07

## 2023-01-01 RX ADMIN — GLYCOPYRROLATE 0.2 MG: 0.2 INJECTION INTRAMUSCULAR; INTRAVENOUS at 11:46

## 2023-01-01 RX ADMIN — DEXAMETHASONE 4 MG: 4 TABLET ORAL at 21:08

## 2023-01-01 RX ADMIN — LEVETIRACETAM 500 MG: 500 TABLET, FILM COATED ORAL at 09:35

## 2023-01-01 RX ADMIN — LEVETIRACETAM 500 MG: 100 SOLUTION ORAL at 10:39

## 2023-01-01 RX ADMIN — Medication 50 MG: at 08:28

## 2023-01-01 RX ADMIN — AMLODIPINE BESYLATE 5 MG: 5 TABLET ORAL at 10:04

## 2023-01-01 RX ADMIN — SODIUM CHLORIDE: 9 INJECTION, SOLUTION INTRAVENOUS at 05:40

## 2023-01-01 RX ADMIN — SODIUM CHLORIDE: 9 INJECTION, SOLUTION INTRAVENOUS at 16:49

## 2023-01-01 RX ADMIN — HEPARIN SODIUM 5000 UNITS: 5000 INJECTION, SOLUTION INTRAVENOUS; SUBCUTANEOUS at 10:06

## 2023-01-01 RX ADMIN — HEPARIN SODIUM 5000 UNITS: 5000 INJECTION, SOLUTION INTRAVENOUS; SUBCUTANEOUS at 08:57

## 2023-01-01 RX ADMIN — PRIMIDONE 50 MG: 50 TABLET ORAL at 09:47

## 2023-01-01 RX ADMIN — CYANOCOBALAMIN TAB 500 MCG 2500 MCG: 500 TAB at 08:06

## 2023-01-01 RX ADMIN — BACITRACIN: 500 OINTMENT TOPICAL at 09:01

## 2023-01-01 RX ADMIN — MEROPENEM 500 MG: 500 INJECTION, POWDER, FOR SOLUTION INTRAVENOUS at 05:14

## 2023-01-01 RX ADMIN — FENTANYL CITRATE 50 MCG: 50 INJECTION, SOLUTION INTRAMUSCULAR; INTRAVENOUS at 12:10

## 2023-01-01 RX ADMIN — PRIMIDONE 50 MG: 50 TABLET ORAL at 21:23

## 2023-01-01 RX ADMIN — PRIMIDONE 50 MG: 50 TABLET ORAL at 20:41

## 2023-01-01 RX ADMIN — SENNOSIDES AND DOCUSATE SODIUM 1 TABLET: 50; 8.6 TABLET ORAL at 21:51

## 2023-01-01 RX ADMIN — FENTANYL CITRATE 50 MCG: 50 INJECTION, SOLUTION INTRAMUSCULAR; INTRAVENOUS at 12:03

## 2023-01-01 RX ADMIN — PRIMIDONE 50 MG: 50 TABLET ORAL at 21:51

## 2023-01-01 RX ADMIN — MEROPENEM 500 MG: 500 INJECTION, POWDER, FOR SOLUTION INTRAVENOUS at 21:27

## 2023-01-01 RX ADMIN — SENNOSIDES AND DOCUSATE SODIUM 1 TABLET: 50; 8.6 TABLET ORAL at 08:57

## 2023-01-01 RX ADMIN — PRIMIDONE 50 MG: 50 TABLET ORAL at 09:17

## 2023-01-01 RX ADMIN — PRIMIDONE 50 MG: 50 TABLET ORAL at 10:38

## 2023-01-01 RX ADMIN — PRIMIDONE 50 MG: 50 TABLET ORAL at 09:00

## 2023-01-01 RX ADMIN — DEXAMETHASONE SODIUM PHOSPHATE 4 MG: 4 INJECTION, SOLUTION INTRAMUSCULAR; INTRAVENOUS at 13:41

## 2023-01-01 RX ADMIN — DEXTROSE AND SODIUM CHLORIDE: 5; 450 INJECTION, SOLUTION INTRAVENOUS at 04:52

## 2023-01-01 RX ADMIN — DEXAMETHASONE SODIUM PHOSPHATE 4 MG: 4 INJECTION, SOLUTION INTRAMUSCULAR; INTRAVENOUS at 05:25

## 2023-01-01 RX ADMIN — ACETAMINOPHEN 975 MG: 325 TABLET ORAL at 08:56

## 2023-01-01 RX ADMIN — LEVOFLOXACIN 750 MG: 750 INJECTION, SOLUTION INTRAVENOUS at 18:36

## 2023-01-01 RX ADMIN — SODIUM CHLORIDE: 9 INJECTION, SOLUTION INTRAVENOUS at 08:14

## 2023-01-01 RX ADMIN — Medication 50 MG: at 09:35

## 2023-01-01 RX ADMIN — CYANOCOBALAMIN TAB 500 MCG 2500 MCG: 500 TAB at 09:35

## 2023-01-01 RX ADMIN — ATORVASTATIN CALCIUM 40 MG: 40 TABLET, FILM COATED ORAL at 21:11

## 2023-01-01 RX ADMIN — ACETAMINOPHEN 975 MG: 325 TABLET ORAL at 23:44

## 2023-01-01 RX ADMIN — SODIUM CHLORIDE, POTASSIUM CHLORIDE, SODIUM LACTATE AND CALCIUM CHLORIDE: 600; 310; 30; 20 INJECTION, SOLUTION INTRAVENOUS at 15:26

## 2023-01-01 RX ADMIN — VANCOMYCIN HYDROCHLORIDE 750 MG: 5 INJECTION, POWDER, LYOPHILIZED, FOR SOLUTION INTRAVENOUS at 09:56

## 2023-01-01 RX ADMIN — CYANOCOBALAMIN TAB 500 MCG 2500 MCG: 500 TAB at 08:28

## 2023-01-01 RX ADMIN — SENNOSIDES AND DOCUSATE SODIUM 1 TABLET: 50; 8.6 TABLET ORAL at 20:15

## 2023-01-01 RX ADMIN — SODIUM CHLORIDE, POTASSIUM CHLORIDE, SODIUM LACTATE AND CALCIUM CHLORIDE: 600; 310; 30; 20 INJECTION, SOLUTION INTRAVENOUS at 11:34

## 2023-01-01 RX ADMIN — LIDOCAINE HYDROCHLORIDE 10 ML: 20 JELLY TOPICAL at 22:57

## 2023-01-01 RX ADMIN — LEVETIRACETAM 500 MG: 100 SOLUTION ORAL at 10:04

## 2023-01-01 RX ADMIN — LEVETIRACETAM 500 MG: 100 INJECTION, SOLUTION INTRAVENOUS at 08:34

## 2023-01-01 RX ADMIN — HEPARIN SODIUM 5000 UNITS: 5000 INJECTION, SOLUTION INTRAVENOUS; SUBCUTANEOUS at 21:28

## 2023-01-01 RX ADMIN — LEVETIRACETAM 500 MG: 100 INJECTION, SOLUTION INTRAVENOUS at 13:35

## 2023-01-01 RX ADMIN — DEXTROSE AND SODIUM CHLORIDE: 5; 450 INJECTION, SOLUTION INTRAVENOUS at 17:03

## 2023-01-01 RX ADMIN — PRIMIDONE 50 MG: 50 TABLET ORAL at 21:48

## 2023-01-01 RX ADMIN — PRIMIDONE 50 MG: 50 TABLET ORAL at 08:29

## 2023-01-01 RX ADMIN — LEVETIRACETAM 500 MG: 100 INJECTION, SOLUTION INTRAVENOUS at 13:43

## 2023-01-01 RX ADMIN — LEVETIRACETAM 500 MG: 100 SOLUTION ORAL at 21:53

## 2023-01-01 RX ADMIN — DEXAMETHASONE SODIUM PHOSPHATE 4 MG: 4 INJECTION, SOLUTION INTRAMUSCULAR; INTRAVENOUS at 13:35

## 2023-01-01 RX ADMIN — HEPARIN SODIUM 5000 UNITS: 10000 INJECTION, SOLUTION INTRAVENOUS; SUBCUTANEOUS at 17:17

## 2023-01-01 RX ADMIN — BACITRACIN: 500 OINTMENT TOPICAL at 20:40

## 2023-01-01 RX ADMIN — LEVETIRACETAM 500 MG: 100 SOLUTION ORAL at 21:28

## 2023-01-01 RX ADMIN — CIPROFLOXACIN HYDROCHLORIDE 250 MG: 250 TABLET, FILM COATED ORAL at 22:07

## 2023-01-01 RX ADMIN — DEXAMETHASONE SODIUM PHOSPHATE 4 MG: 4 INJECTION, SOLUTION INTRAMUSCULAR; INTRAVENOUS at 05:32

## 2023-01-01 RX ADMIN — DEXAMETHASONE SODIUM PHOSPHATE 4 MG: 4 INJECTION, SOLUTION INTRAMUSCULAR; INTRAVENOUS at 04:46

## 2023-01-01 RX ADMIN — MEROPENEM 500 MG: 500 INJECTION, POWDER, FOR SOLUTION INTRAVENOUS at 05:40

## 2023-01-01 RX ADMIN — PRIMIDONE 50 MG: 50 TABLET ORAL at 09:35

## 2023-01-01 RX ADMIN — LEVETIRACETAM 500 MG: 100 SOLUTION ORAL at 20:39

## 2023-01-01 RX ADMIN — PRIMIDONE 50 MG: 50 TABLET ORAL at 21:28

## 2023-01-01 RX ADMIN — PROPOFOL 150 MG: 10 INJECTION, EMULSION INTRAVENOUS at 11:46

## 2023-01-01 RX ADMIN — Medication 50 MG: at 08:06

## 2023-01-01 RX ADMIN — ATORVASTATIN CALCIUM 40 MG: 40 TABLET, FILM COATED ORAL at 21:06

## 2023-01-01 RX ADMIN — SENNOSIDES AND DOCUSATE SODIUM 1 TABLET: 50; 8.6 TABLET ORAL at 08:56

## 2023-01-01 RX ADMIN — ACETAMINOPHEN 975 MG: 325 TABLET ORAL at 15:43

## 2023-01-01 RX ADMIN — BACITRACIN: 500 OINTMENT TOPICAL at 08:58

## 2023-01-01 RX ADMIN — POLYETHYLENE GLYCOL 3350 17 G: 17 POWDER, FOR SOLUTION ORAL at 08:55

## 2023-01-01 RX ADMIN — ACETAMINOPHEN 975 MG: 325 TABLET ORAL at 16:55

## 2023-01-01 RX ADMIN — SODIUM CHLORIDE: 9 INJECTION, SOLUTION INTRAVENOUS at 09:03

## 2023-01-01 RX ADMIN — ACETAMINOPHEN 975 MG: 325 TABLET ORAL at 00:29

## 2023-01-01 RX ADMIN — AMLODIPINE BESYLATE 5 MG: 5 TABLET ORAL at 08:58

## 2023-01-01 RX ADMIN — BACITRACIN: 500 OINTMENT TOPICAL at 20:15

## 2023-01-01 ASSESSMENT — ACTIVITIES OF DAILY LIVING (ADL)
ADLS_ACUITY_SCORE: 36
ADLS_ACUITY_SCORE: 51
ADLS_ACUITY_SCORE: 55
ADLS_ACUITY_SCORE: 47
ADLS_ACUITY_SCORE: 36
ADLS_ACUITY_SCORE: 34
ADLS_ACUITY_SCORE: 40
ADLS_ACUITY_SCORE: 42
ADLS_ACUITY_SCORE: 55
ADLS_ACUITY_SCORE: 45
ADLS_ACUITY_SCORE: 45
ADLS_ACUITY_SCORE: 51
ADLS_ACUITY_SCORE: 55
ADLS_ACUITY_SCORE: 45
DOING_ERRANDS_INDEPENDENTLY_DIFFICULTY: YES
ADLS_ACUITY_SCORE: 39
ADLS_ACUITY_SCORE: 55
ADLS_ACUITY_SCORE: 51
ADLS_ACUITY_SCORE: 55
ADLS_ACUITY_SCORE: 42
ADLS_ACUITY_SCORE: 55
DEPENDENT_IADLS:: CLEANING;COOKING;MEDICATION MANAGEMENT;TRANSPORTATION
DRESS: 1-->ASSISTANCE (EQUIPMENT/PERSON) NEEDED
ADLS_ACUITY_SCORE: 36
ADLS_ACUITY_SCORE: 55
ADLS_ACUITY_SCORE: 45
ADLS_ACUITY_SCORE: 45
ADLS_ACUITY_SCORE: 35
ADLS_ACUITY_SCORE: 36
ADLS_ACUITY_SCORE: 45
ADLS_ACUITY_SCORE: 53
ADLS_ACUITY_SCORE: 40
ADLS_ACUITY_SCORE: 46
ADLS_ACUITY_SCORE: 36
ADLS_ACUITY_SCORE: 40
ADLS_ACUITY_SCORE: 53
ADLS_ACUITY_SCORE: 35
ADLS_ACUITY_SCORE: 36
ADLS_ACUITY_SCORE: 55
ADLS_ACUITY_SCORE: 35
ADLS_ACUITY_SCORE: 36
VISION_MANAGEMENT: GLASSES
ADLS_ACUITY_SCORE: 42
ADLS_ACUITY_SCORE: 45
ADLS_ACUITY_SCORE: 45
ADLS_ACUITY_SCORE: 35
ADLS_ACUITY_SCORE: 42
ADLS_ACUITY_SCORE: 51
DEPENDENT_IADLS:: CLEANING;COOKING;MEDICATION MANAGEMENT;TRANSPORTATION
ADLS_ACUITY_SCORE: 55
ADLS_ACUITY_SCORE: 42
ADLS_ACUITY_SCORE: 35
ADLS_ACUITY_SCORE: 51
ADLS_ACUITY_SCORE: 40
DRESSING/BATHING_DIFFICULTY: YES
ADLS_ACUITY_SCORE: 45
ADLS_ACUITY_SCORE: 36
ADLS_ACUITY_SCORE: 51
ADLS_ACUITY_SCORE: 35
ADLS_ACUITY_SCORE: 45
ADLS_ACUITY_SCORE: 51
ADLS_ACUITY_SCORE: 34
ADLS_ACUITY_SCORE: 55
ADLS_ACUITY_SCORE: 45
ADLS_ACUITY_SCORE: 51
ADLS_ACUITY_SCORE: 39
ADLS_ACUITY_SCORE: 55
ADLS_ACUITY_SCORE: 51
ADLS_ACUITY_SCORE: 55
ADLS_ACUITY_SCORE: 55
ADLS_ACUITY_SCORE: 40
ADLS_ACUITY_SCORE: 55
ADLS_ACUITY_SCORE: 51
ADLS_ACUITY_SCORE: 45
TOILETING: 0-->INDEPENDENT
ADLS_ACUITY_SCORE: 36
ADLS_ACUITY_SCORE: 40
ADLS_ACUITY_SCORE: 55
ADLS_ACUITY_SCORE: 45
ADLS_ACUITY_SCORE: 42
ADLS_ACUITY_SCORE: 55
WEAR_GLASSES_OR_BLIND: YES
ADLS_ACUITY_SCORE: 34
ADLS_ACUITY_SCORE: 55
ADLS_ACUITY_SCORE: 45
ADLS_ACUITY_SCORE: 55
TRANSFERRING: 1-->ASSISTANCE (EQUIPMENT/PERSON) NEEDED (NOT DEVELOPMENTALLY APPROPRIATE)
ADLS_ACUITY_SCORE: 51
ADLS_ACUITY_SCORE: 51
ADLS_ACUITY_SCORE: 40
DRESSING/BATHING: BATHING DIFFICULTY, ASSISTANCE 1 PERSON
ADLS_ACUITY_SCORE: 42
ADLS_ACUITY_SCORE: 55
ADLS_ACUITY_SCORE: 51
TOILETING_ISSUES: YES;OTHER (SEE COMMENTS)
ADLS_ACUITY_SCORE: 55
ADLS_ACUITY_SCORE: 55
ADLS_ACUITY_SCORE: 34
ADLS_ACUITY_SCORE: 55
ADLS_ACUITY_SCORE: 45
CONCENTRATING,_REMEMBERING_OR_MAKING_DECISIONS_DIFFICULTY: OTHER (SEE COMMENTS)
ADLS_ACUITY_SCORE: 55
ADLS_ACUITY_SCORE: 42
ADLS_ACUITY_SCORE: 55
ADLS_ACUITY_SCORE: 55
ADLS_ACUITY_SCORE: 45
ADLS_ACUITY_SCORE: 45
ADLS_ACUITY_SCORE: 51
ADLS_ACUITY_SCORE: 45
ADLS_ACUITY_SCORE: 55
ADLS_ACUITY_SCORE: 45
ADLS_ACUITY_SCORE: 53
ADLS_ACUITY_SCORE: 39
ADLS_ACUITY_SCORE: 36
ADLS_ACUITY_SCORE: 42
ADLS_ACUITY_SCORE: 36
ADLS_ACUITY_SCORE: 45
ADLS_ACUITY_SCORE: 55
ADLS_ACUITY_SCORE: 40
ADLS_ACUITY_SCORE: 34
ADLS_ACUITY_SCORE: 46
BATHING: 1-->ASSISTANCE NEEDED
ADLS_ACUITY_SCORE: 55
ADLS_ACUITY_SCORE: 39
ADLS_ACUITY_SCORE: 36
ADLS_ACUITY_SCORE: 40
ADLS_ACUITY_SCORE: 55
ADLS_ACUITY_SCORE: 37
ADLS_ACUITY_SCORE: 47
ADLS_ACUITY_SCORE: 35
ADLS_ACUITY_SCORE: 40
ADLS_ACUITY_SCORE: 55
ADLS_ACUITY_SCORE: 48
ADLS_ACUITY_SCORE: 51
ADLS_ACUITY_SCORE: 42
ADLS_ACUITY_SCORE: 47
ADLS_ACUITY_SCORE: 34
ADLS_ACUITY_SCORE: 45
NUMBER_OF_TIMES_PATIENT_HAS_FALLEN_WITHIN_LAST_SIX_MONTHS: 1
ADLS_ACUITY_SCORE: 55
ADLS_ACUITY_SCORE: 55
TOILETING: 1-->ASSISTANCE (EQUIPMENT/PERSON) NEEDED (NOT DEVELOPMENTALLY APPROPRIATE)
ADLS_ACUITY_SCORE: 47
ADLS_ACUITY_SCORE: 37
ADLS_ACUITY_SCORE: 35
ADLS_ACUITY_SCORE: 34
DIFFICULTY_EATING/SWALLOWING: NO
ADLS_ACUITY_SCORE: 55
ADLS_ACUITY_SCORE: 35
ADLS_ACUITY_SCORE: 39
DRESS: 1-->ASSISTANCE (EQUIPMENT/PERSON) NEEDED (NOT DEVELOPMENTALLY APPROPRIATE)
ADLS_ACUITY_SCORE: 55
ADLS_ACUITY_SCORE: 51
ADLS_ACUITY_SCORE: 55
ADLS_ACUITY_SCORE: 45
ADLS_ACUITY_SCORE: 55
ADLS_ACUITY_SCORE: 39
ADLS_ACUITY_SCORE: 47
ADLS_ACUITY_SCORE: 42
ADLS_ACUITY_SCORE: 55
ADLS_ACUITY_SCORE: 45
ADLS_ACUITY_SCORE: 55
ADLS_ACUITY_SCORE: 47
ADLS_ACUITY_SCORE: 39
ADLS_ACUITY_SCORE: 53
TRANSFERRING: 1-->ASSISTANCE (EQUIPMENT/PERSON) NEEDED
ADLS_ACUITY_SCORE: 55
ADLS_ACUITY_SCORE: 36
WALKING_OR_CLIMBING_STAIRS_DIFFICULTY: YES
ADLS_ACUITY_SCORE: 51
ADLS_ACUITY_SCORE: 53
ADLS_ACUITY_SCORE: 51
ADLS_ACUITY_SCORE: 51
ADLS_ACUITY_SCORE: 35
ADLS_ACUITY_SCORE: 55
ADLS_ACUITY_SCORE: 35
ADLS_ACUITY_SCORE: 51
CHANGE_IN_FUNCTIONAL_STATUS_SINCE_ONSET_OF_CURRENT_ILLNESS/INJURY: YES
ADLS_ACUITY_SCORE: 42
ADLS_ACUITY_SCORE: 51
ADLS_ACUITY_SCORE: 47
ADLS_ACUITY_SCORE: 53
ADLS_ACUITY_SCORE: 55
ADLS_ACUITY_SCORE: 40
ADLS_ACUITY_SCORE: 45
WALKING_OR_CLIMBING_STAIRS: AMBULATION DIFFICULTY, REQUIRES EQUIPMENT;AMBULATION DIFFICULTY, ASSISTANCE 1 PERSON
ADLS_ACUITY_SCORE: 51
FALL_HISTORY_WITHIN_LAST_SIX_MONTHS: YES
ADLS_ACUITY_SCORE: 36
ADLS_ACUITY_SCORE: 36
TOILETING_ASSISTANCE: TOILETING DIFFICULTY, ASSISTANCE 1 PERSON

## 2023-01-01 ASSESSMENT — ENCOUNTER SYMPTOMS
NAUSEA: 0
CONFUSION: 1
VOMITING: 0
FEVER: 0

## 2023-01-01 ASSESSMENT — PAIN SCALES - GENERAL: PAINLEVEL: NO PAIN (0)

## 2023-03-16 PROBLEM — M62.81 GENERALIZED MUSCLE WEAKNESS: Status: ACTIVE | Noted: 2023-01-01

## 2023-03-16 PROBLEM — R33.9 URINARY RETENTION: Status: ACTIVE | Noted: 2023-01-01

## 2023-03-16 PROBLEM — S06.5XAA BILATERAL SUBDURAL HEMATOMAS (H): Status: ACTIVE | Noted: 2023-01-01

## 2023-03-16 PROBLEM — R62.7 FAILURE TO THRIVE IN ADULT: Status: ACTIVE | Noted: 2023-01-01
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
## 2023-03-16 NOTE — PLAN OF CARE
Patient up with 2 to the bathroom, unsteady.  Friend at bedside all day, still awaiting arrival of son. Denies pain.  VSS. Complains of rai discomfort. MRI completed.  Repeat head CT and CT urogram to be completed soon. NPO maintained.    Problem: Risk for Delirium  Goal: Optimal Coping  Outcome: Progressing     Problem: Fall Injury Risk  Goal: Absence of Fall and Fall-Related Injury  Outcome: Progressing  Intervention: Identify and Manage Contributors  Recent Flowsheet Documentation  Taken 3/16/2023 1730 by Mi Hinson, RN  Medication Review/Management: medications reviewed  Intervention: Promote Injury-Free Environment  Recent Flowsheet Documentation  Taken 3/16/2023 1730 by Mi Hinson, RN  Safety Promotion/Fall Prevention: patient and family education   Goal Outcome Evaluation:

## 2023-03-16 NOTE — ED TRIAGE NOTES
Pt arrives via EMS from assisted living facility where the pt lives independently with complaints of a fall. Pt found this morning by staff and stated he fell around 8pm last night. Per EMS pt reported mid to low back pain but pt denies pain in Triage. No thinners. EMS B.      Triage Assessment     Row Name [id number]       Triage Assessment (Adult)    Airway WDL WDL       Respiratory WDL    Respiratory WDL WDL       Skin Circulation/Temperature WDL    Skin Circulation/Temperature WDL X    Skin Circulation --  Bilateral groin, R shoulder erythema, skin tear to right elbow       Cardiac WDL    Cardiac WDL WDL       Peripheral/Neurovascular WDL    Peripheral Neurovascular WDL WDL       Cognitive/Neuro/Behavioral WDL    Cognitive/Neuro/Behavioral WDL WDL

## 2023-03-16 NOTE — H&P
Admission History & Physical  Kurtis Urban, 2/23/1929, [id number]    [facility]  [place], [phone]    Assessment and Plan:  89 year old male past medical history significant for dementia lives in a independent living facility, ambulates with a walker, seizure disorder, CVA, TIA, dyslipidemia brought into the emergency room with complaints of unwitnessed fall at care facility and unable to get up for about 10 to 12 hours being admitted for acute on chronic subdural hematoma, possible meningioma, early rhabdomyolysis, abnormal appearing urothelial tract CT.      Acute on chronic likely subdural hematoma  Unwitnessed fall  Possible meningioma  Appreciate neurosurgery recommendations  Continue Keppra 500 mg twice daily  Plan for MRI brain, repeat CT head in 6 hours  Goal systolic blood pressure is less than 140  Keep head of bed greater than 30 degrees  Neurochecks  Continue close monitoring  We will address goals of care with his son    Early rhabdomyolysis  CK elevated up to 2800  Creatinine so far normal  IV fluid hydration  Monitor BMP  Avoid nephrotoxic medications    Urinary retention  Abnormal CT with urothelial abnormalities  Retana placed in the ED.  Urology consult    Dementia  At risk for delirium  Monitor    Seizure disorder  Continue home Keppra    DVT prophylaxis SCDs  Code: Full    Expected length of stay : anticipate hospitalization more than 2 days.     Chief Complaint: Fall    HPI:     Kurtis Urban is a 89 year old male past medical history significant for dementia lives in a independent living facility, ambulates with a walker, seizure disorder, CVA, TIA, dyslipidemia brought into the emergency room with complaints of unwitnessed fall at care facility and unable to get up for about 10 to 12 hours.  He was not wearing his life alert button.  He had a fall about 2 weeks ago.  He is currently alert and oriented x2, unable to remember the details surrounding  the fall.  He has a friend in the room, help providing some of the history.  He might have bent down to  something subsequently had a fall according to him.  But was not unable to Get up until the staff found him this morning.  No other prodromal symptoms, shortness of breath, chest pain, urinary or bowel complaints.  No fevers chills.  He denied any other neurological symptoms such as headache vision changes, focal weakness tingling numbness.  Complains of hip pain.     In ED, CT head revealed mixed attenuating subdural collections concerning for acute on chronic subdural hemorrhage, rounded lesion in the anterior middle cranial fossa concerning for possible meningioma, mildly low attenuating changes in the left temporal lobe compatible with edema.  Neurosurgery recommended MRI brain, repeat CT head in 6 hours, if worsening they recommend transfer in case patient and family wishes to proceed with any further intervention.  His son is flying in from [place].   CT chest abdomen pelvis also revealed bilateral renal cysts,?  Hemorrhage, asymmetric left-sided urothelial thickening and right-sided collecting system dilation, polypoid lesion in the bladder.  Had Retana catheter placed in the emergency room as he was not able to void, with return of about 800 mL so far.  Also has elevated CK consistent with mild rhabdo.  Creatinine so far normal.    Medical History  Past Medical History:   Diagnosis Date     Dementia with behavioral disturbance 9/3/2019     Depression 5/28/2014     Hypercholesteremia 5/28/2014     Melanoma of back (H) 5/28/2014    Surgically removed       Melanoma of face (H) 5/28/2014    Surgically removed      Seizure (H) 5/19/2015     Thiamine deficiency 9/3/2019     TIA (transient ischemic attack)       Patient Active Problem List    Diagnosis Date Noted     Urinary retention 03/16/2023     Priority: Medium     Generalized muscle weakness 03/16/2023     Priority: Medium     Failure to thrive in  adult 03/16/2023     Priority: Medium     Bilateral subdural hematomas 03/16/2023     Priority: Medium     Fall, initial encounter 03/16/2023     Priority: Medium     Altered mental status, unspecified 01/18/2020     Priority: Medium     Acute metabolic encephalopathy 01/18/2020     Priority: Medium     KRIS (acute kidney injury) (H) 01/18/2020     Priority: Medium     Chronic deep vein thrombosis (DVT) of proximal vein of lower extremity (H) 01/18/2020     Priority: Medium     DVT, lower extremity, proximal, acute, right (H) 12/16/2019     Priority: Medium     Vitamin B12 deficiency (non anemic) 12/16/2019     Priority: Medium     Transient confusion 09/03/2019     Priority: Medium     TIA (transient ischemic attack) 09/03/2019     Priority: Medium     Dementia with behavioral disturbance 09/03/2019     Priority: Medium     Thiamine deficiency 09/03/2019     Priority: Medium     Seizure disorder (H) 05/19/2015     Priority: Medium     Mild cognitive impairment 06/14/2014     Priority: Medium     Hypercholesteremia 05/28/2014     Priority: Medium     Depression 05/28/2014     Priority: Medium     History of CVA (cerebrovascular accident) 05/28/2014     Priority: Medium     With several instances of TIAs therafter         Melanoma of back (H) 05/28/2014     Priority: Medium     Surgically removed          Melanoma of face (H) 05/28/2014     Priority: Medium     Surgically removed         Hard of hearing 05/28/2014     Priority: Medium     Left ear            Surgical History  He  has a past surgical history that includes Cholecystectomy; appendectomy; other surgical history; other surgical history (1993); hernia repair; shoulder surgery (Right); and other surgical history (Right).     Past Surgical History:   Procedure Laterality Date     APPENDECTOMY       CHOLECYSTECTOMY       HERNIA REPAIR       OTHER SURGICAL HISTORY      nasal surgeries     OTHER SURGICAL HISTORY  1993    meniscal surgery     OTHER SURGICAL  "HISTORY Right     total knee repair     SHOULDER SURGERY Right        Allergies  Allergies   Allergen Reactions     Penicillins Anaphylaxis and Nausea and Vomiting     Alfuzosin Nausea and Vomiting     Donepezil Other (See Comments)     Myolin [Norflex] Unknown     Other reaction(s): hives     Sulfa (Sulfonamide Antibiotics) [Sulfa Drugs] Nausea and Vomiting     Triazolam Nausea and Vomiting       Prior to Admission Medications   (Not in a hospital admission)      Social History  Reviewed, and he  reports that he has quit smoking. His smoking use included cigarettes. He has never used smokeless tobacco. He reports that he does not drink alcohol and does not use drugs.  Social History     Tobacco Use     Smoking status: Former     Types: Cigarettes     Smokeless tobacco: Never   Substance Use Topics     Alcohol use: Never       Family History  Reviewed, and I have reviewed this patient's family history and updated it with pertinent information if needed.  Family History   Problem Relation Age of Onset     Cancer Mother      Cerebrovascular Disease Maternal Grandfather           Review Of Systems  Complete As per admission HPI, all other systems reviewed and negative.     Physical Exam:  Vital signs:  Temp: 97.8  F (36.6  C) Temp src: Oral BP: (!) 144/71 Pulse: 78   Resp: 20 SpO2: 98 % O2 Device: None (Room air)   Height: 167.6 cm (5' 6\") Weight: 68.5 kg (151 lb)  Estimated body mass index is 24.37 kg/m  as calculated from the following:    Height as of this encounter: 1.676 m (5' 6\").    Weight as of this encounter: 68.5 kg (151 lb).    General Appearance:  Awake Alert, orientedx3, not in any apparent distress   Head:  Normocephalic, without obvious abnormality   Eyes:  PERRL, conjunctiva/corneas clear   Throat: Oral mucosa moist   Neck: Supple,  no JVD   Lungs:   Clear to auscultation bilaterally, respirations unlabored   Chest Wall:  No tenderness   Heart:  Regular rate and rhythm, S1, S2 normal,no murmur "   Abdomen:   Soft, non-tender, bowel sounds present,  no guarding, rigidity    Extremities:  no edema, no joint swelling   Skin:  Erythema hip area on both sides, appears like a diaper rash, erythema over the left elbow,  behind the left knee   Neurologic: Alert and oriented to place, person, he knew the month and the year did not know the date.  Cranial nerves II through XII are intact, power 5 out of 5 in upper extremities, 4 out of 5 in lower extremities, sensation is intact.  Gait not assessed.     Results:  Labs Ordered and Resulted from Time of ED Arrival to Time of ED Departure   CBC WITH PLATELETS - Abnormal       Result Value    WBC Count 11.0      RBC Count 4.08 (*)     Hemoglobin 12.9 (*)     Hematocrit 39.5 (*)     MCV 97      MCH 31.6      MCHC 32.7      RDW 12.4      Platelet Count 102 (*)    BASIC METABOLIC PANEL - Abnormal    Sodium 141      Potassium 4.4      Chloride 111 (*)     Carbon Dioxide (CO2) 22      Anion Gap 8      Urea Nitrogen 19      Creatinine 1.07      Calcium 9.3      Glucose 108      GFR Estimate 64     CK TOTAL - Abnormal    CK 2,827 (*)    ROUTINE UA WITH MICROSCOPIC REFLEX TO CULTURE - Abnormal    Color Urine Yellow      Appearance Urine Clear      Glucose Urine Negative      Bilirubin Urine Negative      Ketones Urine Negative      Specific Gravity Urine 1.016      Blood Urine 0.1 mg/dL (*)     pH Urine 5.5      Protein Albumin Urine 50 (*)     Urobilinogen Urine <2.0      Nitrite Urine Negative      Leukocyte Esterase Urine 25 Bipin/uL (*)     Mucus Urine Present (*)     RBC Urine 7 (*)     WBC Urine 34 (*)    INR - Abnormal    INR 1.22 (*)    MAGNESIUM - Normal    Magnesium 1.9     TROPONIN I - Normal    Troponin I 0.07     PARTIAL THROMBOPLASTIN TIME - Normal    aPTT 29     KEPPRA (LEVETIRACETAM) LEVEL   HEPATIC FUNCTION PANEL   URINE CULTURE      EKG:  EKG: Sinus rhythm, 61/min, left anterior fascicular block which is old no other acute ST-T wave changes.    Imaging:   CT  Chest Abdomen Pelvis w/o Contrast    Result Date: 3/16/2023  EXAM: CT CHEST ABDOMEN PELVIS W/O CONTRAST LOCATION: [facility] DATE/TIME: 3/16/2023 1:18 PM INDICATION: unwitnessed fall, generalized weakness, unable to get self up of ground COMPARISON: None. TECHNIQUE: CT scan of the chest, abdomen, and pelvis was performed without IV contrast. Multiplanar reformats were obtained. Dose reduction techniques were used. CONTRAST: None. FINDINGS: LUNGS AND PLEURA: Dependent atelectasis. A few tiny nodules in the left upper lobe measuring 0.3 cm (e.g. 4/74) MEDIASTINUM/AXILLAE: No enlarged thoracic lymph nodes. 2.9 x 2.3 cm left thyroid nodule with peripheral calcification (3/25). No thoracic aortic aneurysm. CORONARY ARTERY CALCIFICATION: Mild. HEPATOBILIARY: Cholecystectomy. Liver and bile ducts are unremarkable. PANCREAS: Normal. SPLEEN: Normal. ADRENAL GLANDS: Normal. KIDNEYS/BLADDER: Cortical and parapelvic cysts bilaterally, some of which are high attenuation in the left kidney. Asymmetric mild left urothelial thickening and mild-moderate right renal collecting system dilation. Punctate nonobstructing calculus in the left upper pole. 2.1 x 1.2 cm polypoid lesion along the right lateral bladder wall. BOWEL: Diverticulosis of the colon. No acute inflammatory change. No obstruction. LYMPH NODES: Normal. VASCULATURE: Mild diffuse atherosclerotic calcification. No aneurysm. PELVIC ORGANS: Prostatomegaly. MUSCULOSKELETAL: Mild dependent body wall edema. No fluid collection or hematoma. Surgical hardware in both shoulders. No acute fracture.     IMPRESSION: 1.  No acute or traumatic finding in the chest, abdomen, or pelvis, within the limitations of noncontrast technique. 2.  Bilateral renal cysts, some of which are high attenuation indicating hemorrhagic/proteinaceous content. There is also asymmetric left-sided urothelial thickening and right-sided collecting system dilation, as well as a polypoid  lesion in the bladder,  which could represent urothelial carcinoma. Recommend further evaluation with CT urogram. 3.  Left thyroid nodule measuring 2.9 cm. This could be further evaluated with thyroid ultrasound, if not already performed. 4.  Tiny pulmonary nodules. Follow-up recommendations below: REFERENCE: Guidelines for Management of Incidental Pulmonary Nodules Detected on CT Images: From the Fleischner Society 2017. Guidelines apply to incidental nodules in patients who are 35 years or older. Guidelines do not apply to lung cancer screening, patients with immunosuppression, or patients with known primary cancer. MULTIPLE NODULES Nodule size <6 mm Low-risk patients: No follow-up needed. High-risk patients: Optional follow-up at 12 months.     Head CT w/o contrast    Result Date: 3/16/2023  EXAM: CT HEAD W/O CONTRAST LOCATION: [facility] DATE/TIME: 3/16/2023 1:02 PM INDICATION: Unwitnessed fall. Generalized weakness. COMPARISON: CT head without contrast 01/18/2020. TECHNIQUE: Routine CT Head without IV contrast. Multiplanar reformats. Dose reduction techniques were used. FINDINGS: INTRACRANIAL CONTENTS: Mixed density subdural collections over the bilateral cerebral convexities. The right-sided subdural collection measures 7 mm in maximal radial thickness and the left-sided subdural collection measures 8 mm in maximal radial thickness. Mild to moderate presumed chronic small vessel ischemic changes. Mild to moderate generalized volume loss. No hydrocephalus. Retrocerebellar arachnoid cyst. There is a 1.7 x 1.4 cm lesion in the left anterior middle cranial fossa. There are low attenuating changes in the anterior left temporal lobe. An extra-axial lesion was seen along the left anterior cranial fossa on prior MRI dated 09/03/2019. VISUALIZED ORBITS/SINUSES/MASTOIDS: No intraorbital abnormality. No paranasal sinus mucosal disease. No middle ear or mastoid effusion. BONES/SOFT TISSUES: No  acute abnormality.     IMPRESSION: 1.  Mixed attenuating subdural collections over the bilateral cerebral convexities with higher attenuating components suggesting more recent hemorrhage. 2.  Rounded lesion in the left anterior middle cranial fossa is most compatible with meningioma when comparing with MRI of the brain dated 09/03/2019. There is mild low attenuating changes in the adjacent left temporal lobe, most compatible with edema. MRI of the brain with and without contrast is recommended for further evaluation. Results discussed with Dr. Strickland on 3/16/2023 1:18 PM.        > 75 MINUTES SPENT BY ME on the date of service doing chart review, history, exam, documentation & further activities per the note.      Xiomara Gamez M.D  [facility]  Internal Medicine    3/16/2023  3:31 PM

## 2023-03-16 NOTE — CONSULTS
[facility] CONSULT (PRELIMINARY)    Type of consult: inpatient  Place of service: [facility]   Reason for consult: Abnormal CT with ? bladder mass  Requested by: Dr. Gamez    History of Present Illness:   Kurtis Urban is a 89 year old male with hx of dementia, seizure disorder, CVA, TIA, dyslipidemia; Admitted through the ED after unwitnessed fall at assisted living facility with bilateral subdural hematomas, mild rhabdo, UR.  A urology consult was placed for further evaluation and recommendations possible bladder mass noted on the CT 3/16. History obtained through RN and  and chart review. Pt is poor historian 2/2 dementia/SDH.     CT Chest/Abd/Pelvis 3/16 showed distended bladder with 2.1 x 2.1 polypoid lesion along right bladder wall, bilateral renal cysts (some high attenuation indicating hemorrhagic or proteinaceous content), asymmetric mild left kidney urothelial thickening and mild to moderate right kidney dilation and punctate non-obstructing stone in left upper pole.     UA mildly concerning for infection. UC pending. WBC 11.0. Afebrile. Creatinine 1.07.     Retana catheter placed in ED 3/16 after 700 ml bladder scan, uncertain initial volume returned.     Pt does not take medication for enlarged prostate.     Past Medical History  Past Medical History:   Diagnosis Date     Dementia with behavioral disturbance 9/3/2019     Depression 5/28/2014     Hypercholesteremia 5/28/2014     Melanoma of back (H) 5/28/2014    Surgically removed       Melanoma of face (H) 5/28/2014    Surgically removed      Seizure (H) 5/19/2015     Thiamine deficiency 9/3/2019     TIA (transient ischemic attack)        Past Surgical History  Past Surgical History:   Procedure Laterality Date     APPENDECTOMY       CHOLECYSTECTOMY       HERNIA REPAIR       OTHER SURGICAL HISTORY      nasal surgeries     OTHER SURGICAL HISTORY  1993    meniscal surgery     OTHER SURGICAL HISTORY Right     total knee repair      SHOULDER SURGERY Right        Social History  Social History     Socioeconomic History     Marital status:      Spouse name: Not on file     Number of children: Not on file     Years of education: Not on file     Highest education level: Not on file   Occupational History     Not on file   Tobacco Use     Smoking status: Former     Types: Cigarettes     Smokeless tobacco: Never   Substance and Sexual Activity     Alcohol use: Never     Drug use: Never     Sexual activity: Not on file   Other Topics Concern     Not on file   Social History Narrative     Not on file     Social Determinants of Health     Financial Resource Strain: Not on file   Food Insecurity: Not on file   Transportation Needs: Not on file   Physical Activity: Not on file   Stress: Not on file   Social Connections: Not on file   Intimate Partner Violence: Not on file   Housing Stability: Not on file       Medications  Current Facility-Administered Medications   Medication     acetaminophen (TYLENOL) tablet 650 mg    Or     acetaminophen (TYLENOL) Suppository 650 mg     atorvastatin (LIPITOR) tablet 40 mg     cyanocobalamin (VITAMIN B-12) tablet 2,500 mcg     gadobutrol (GADAVIST) injection 6.5 mL     levETIRAcetam (KEPPRA) 500 mg in sodium chloride 0.9 % 100 mL intermittent infusion     lidocaine (LMX4) cream     lidocaine 1 % 0.1-1 mL     melatonin tablet 1 mg     ondansetron (ZOFRAN ODT) ODT tab 4 mg    Or     ondansetron (ZOFRAN) injection 4 mg     polyethylene glycol (MIRALAX) Packet 17 g     primidone (MYSOLINE) tablet 50 mg     sodium chloride (PF) 0.9% PF flush 3 mL     sodium chloride (PF) 0.9% PF flush 3 mL     sodium chloride 0.9% infusion     thiamine tablet 50 mg     Current Outpatient Medications   Medication     aspirin 81 MG EC tablet     atorvastatin (LIPITOR) 40 MG tablet     Cyanocobalamin 2500 MCG TABS     levETIRAcetam (KEPPRA) 500 MG tablet     primidone (MYSOLINE) 50 MG tablet     thiamine 50 MG TABS  "      Allergies  Allergies   Allergen Reactions     Penicillins Anaphylaxis and Nausea and Vomiting     Alfuzosin Nausea and Vomiting     Donepezil Other (See Comments)     Myolin [Norflex] Unknown     Other reaction(s): hives     Sulfa (Sulfonamide Antibiotics) [Sulfa Drugs] Nausea and Vomiting     Triazolam Nausea and Vomiting       ROS:   A full 12 point review of systems was taken and is negative aside from what is noted above in the HPI.    Physical exam  BP (!) 144/71   Pulse 78   Temp 97.8  F (36.6  C) (Oral)   Resp 20   Ht 1.676 m (5' 6\")   Wt 68.5 kg (151 lb)   SpO2 98%   BMI 24.37 kg/m    Patient at MRI, not examined.     Labs  Lab Results   Component Value Date    WBC 11.0 03/16/2023    HGB 12.9 (L) 03/16/2023    HCT 39.5 (L) 03/16/2023     (L) 03/16/2023    CHOL 109 03/17/2021    TRIG 73 03/17/2021    HDL 39 (L) 03/17/2021    ALT 27 03/16/2023    AST 65 (H) 03/16/2023     03/16/2023    BUN 19 03/16/2023    CO2 22 03/16/2023    TSH 0.82 07/28/2020    INR 1.22 (H) 03/16/2023     UA RESULTS:  Recent Labs   Lab Test 03/16/23  1410 01/18/20  1650   COLOR Yellow Yellow   APPEARANCE Clear Clear   URINEGLC Negative Negative   URINEBILI Negative Negative   URINEKETONE Negative Negative   SG 1.016 1.015   UBLD 0.1 mg/dL* Negative   URINEPH 5.5 6.0   PROTEIN 50* Negative   UROBILINOGEN  --  <2.0 E.U./dL   NITRITE Negative Negative   LEUKEST 25 Bipin/uL* Negative   RBCU 7*  --    WBCU 34*  --      Cultures:  Urine Culture: pending     Lab Results: personally reviewed     Imaging:  EXAM: CT CHEST ABDOMEN PELVIS W/O CONTRAST  LOCATION: [facility]  DATE/TIME: 3/16/2023 1:18 PM     INDICATION: unwitnessed fall, generalized weakness, unable to get self up of ground  COMPARISON: None.  TECHNIQUE: CT scan of the chest, abdomen, and pelvis was performed without IV contrast. Multiplanar reformats were obtained. Dose reduction techniques were used.   CONTRAST: None.     FINDINGS: "   LUNGS AND PLEURA: Dependent atelectasis. A few tiny nodules in the left upper lobe measuring 0.3 cm (e.g. 4/74)     MEDIASTINUM/AXILLAE: No enlarged thoracic lymph nodes. 2.9 x 2.3 cm left thyroid nodule with peripheral calcification (3/25). No thoracic aortic aneurysm.     CORONARY ARTERY CALCIFICATION: Mild.     HEPATOBILIARY: Cholecystectomy. Liver and bile ducts are unremarkable.     PANCREAS: Normal.     SPLEEN: Normal.     ADRENAL GLANDS: Normal.     KIDNEYS/BLADDER: Cortical and parapelvic cysts bilaterally, some of which are high attenuation in the left kidney. Asymmetric mild left urothelial thickening and mild-moderate right renal collecting system dilation. Punctate nonobstructing calculus in   the left upper pole. 2.1 x 1.2 cm polypoid lesion along the right lateral bladder wall.     BOWEL: Diverticulosis of the colon. No acute inflammatory change. No obstruction.      LYMPH NODES: Normal.     VASCULATURE: Mild diffuse atherosclerotic calcification. No aneurysm.     PELVIC ORGANS: Prostatomegaly.     MUSCULOSKELETAL: Mild dependent body wall edema. No fluid collection or hematoma. Surgical hardware in both shoulders. No acute fracture.                                                                      IMPRESSION:  1.  No acute or traumatic finding in the chest, abdomen, or pelvis, within the limitations of noncontrast technique.     2.  Bilateral renal cysts, some of which are high attenuation indicating hemorrhagic/proteinaceous content. There is also asymmetric left-sided urothelial thickening and right-sided collecting system dilation, as well as a polypoid lesion in the bladder,   which could represent urothelial carcinoma. Recommend further evaluation with CT urogram.     3.  Left thyroid nodule measuring 2.9 cm. This could be further evaluated with thyroid ultrasound, if not already performed.     4.  Tiny pulmonary nodules. Follow-up recommendations below:     REFERENCE:  Guidelines for  Management of Incidental Pulmonary Nodules Detected on CT Images: From the Fleischner Society 2017.   Guidelines apply to incidental nodules in patients who are 35 years or older.  Guidelines do not apply to lung cancer screening, patients with immunosuppression, or patients with known primary cancer.     MULTIPLE NODULES  Nodule size <6 mm  Low-risk patients: No follow-up needed.  High-risk patients: Optional follow-up at 12 months.      I have personally reviewed the imaging reports above.       Assessment/plan  Kurtis Urban is being seen by [facility] for bladder lesion on CT    89 yr old male with dementia, hx CVA, now bilateral subdural hematomas and mild rhabdo 2/2 prolonged down after a fall; Incidental CT findings of distended bladder with 2.1 x 2.1 cm polypoid lesion along right bladder wall, bilateral renal cysts (some high attenuation indicating hemorrhagic or proteinaceous content), asymmetric mild left kidney urothelial thickening and mild to moderate right kidney dilation and punctate non-obstructing stone in left upper pole.     - UA is mildly suggestive of infection. UC pending. WBC WNL. Afebrile. Recommend antibiotic coverage given right hydro and adjust as needed pending cultures/sensitivities, paged Dr. Gamez.    - Retana placed in ED for bladder scan 700 ml. Recommend renal US in 48 hours to assess for residual right hydro.   - Prostate enlarged on CT. Consider tamsulosin initiation 3/17 if BP stable.   - Creatinine is WNL.   - If patient develops fever, leukocytosis, right flank pain or tachycardia/hypotension, please notify [facility] as may need to consider draining right kidney.   - Asked Dr Daniel to review CT, determine if CT Urogram beneficial for further eval of bladder/left kidney. Anticipate outpatient cystoscopy.   - [facility]  - Unable to examine as patient in MRI. Urology will see patient 3/17 to complete consult.     This case was discussed  with: Dr Donn Daniel      Thank you for consulting [facility] regarding this patient's care. Please contact us with questions or concerns.     EDILMA Rosen, CNP  [facility]  Office Phone: #[phone]    ADDENDUM:  Antibiotic discussed with Dr. Gamez d/t PCN allergy and age. Dr. Gamez recommends Levaquin. Discussed dosing with Lazara, inpatient pharmacist. D/t age/renal dosing and complicated UTI, ordered 750 mg every other day.     Bernardino Nixon, CNP

## 2023-03-16 NOTE — ED PROVIDER NOTES
EMERGENCY DEPARTMENT ENCOUNTER       ED Course & Medical Decision Making     11:59 AM I met with the patient, obtained history, performed an initial exam, and discussed options and plan for diagnostics and treatment here in the ED.  1:06 PM Head CT Imaging shows bilateral subdural hematoma. Spoke with Dr. Sutherland, neurosurgery regarding imaging.  1:21 PM Spoke with Ena, neurosurgery.  1:29 PM Spoke with patients son Adi by phone, Matty Perkins (is another contact, authorized to give information) patient's son Adi is on his way from [place] via airplane, should be here at about 6:30 PM  1:37 PM I updated patient and his friend Matty, at bedside.  2:18 PM Spoke with Dr. Gamez, hosptialist, who accepts patient for admission.    Final Impression  89 year old male brought in by EMS for evaluation after an unwitnessed fall at his assisted living facility.  Sounds like he fell sometime last evening, potentially around 8 PM, was found on the ground by staff at about 10:30 AM today, laying prone.  Patient does have some erythema and signs of skin breakdown on his knees, hips, shoulders, skin tear on his right elbow.  No obvious long bone injuries, no obvious head injuries externally.  Patient extremely hard of hearing, though is able to communicate and follow commands appropriately.  Does appear to have some dementia, though is otherwise very pleasant and cooperative, answers questions appropriately.  No midline back pain on exam.  CT head returned showing bilateral acute on chronic subdurals.  Patient is not on blood thinners, INR returned at 1.22.  He is on a daily baby aspirin.  Spoke with neurosurgery, after discussing with patient's family (patient's, son Adi, friend Matty at bedside) it sounds like they would not be interested in a surgical intervention like a craniotomy or bur hole procedure thus patient could be managed here at [facility] as it sounds like you will be managed nonoperatively per family preference.   Additionally, given that there is a chronic component to this my suspicion that he will have significant change on the subsequent CT in 6 hours somewhat low, suspect these are somewhat stable.  Did confirm with family that patient would like to be full code, would like to be intubated and have chest compressions if there was thought to be a reversible cause.  CK of 2800, likely mild rhabdo, will start normal saline fluid bolus.  Patient also had some urinary retention with 700 mL of urine in his bladder, ultimately had a Retana catheter placed for urinary retention.  CT chest and pelvis does not show any acute traumatic injuries, though does show some changes in his urinary collecting system that could represent urothelial carcinoma, radiology recommending CT urogram for further evaluation, this was discussed with hospitalist and can be followed up on an inpatient basis.    Prior to making a final disposition on this patient the results of patient's tests and other diagnostic studies were discussed with the patient. All questions were answered. Patient expressed understanding of the plan and was amenable to it.    Medical Decision Making    History:    Supplemental history from: EMS    External Record(s) reviewed: Documented in chart, if applicable.    Work Up:    Chart documentation includes differential considered and any EKGs or imaging independently interpreted by provider, where specified.  o Intracranial hemorrhage, stroke, intracranial tumor, rhabdomyolysis, electrolyte abnormality, anemia    In additional to work up documented, I considered the following work up: Documented in chart, if applicable.    External consultation:    Discussion of management with another provider: Neurosurgery and Radiology (regarding imaging)    Complicating factors:    Care impacted by chronic illness: Cerebrovascular Disease, Chronic Kidney Disease, Chronic Pain, Dementia and Heart Disease    Care affected by social determinants  of health: N/A    Disposition considerations: Admit.      Medications   levETIRAcetam (KEPPRA) 500 mg in sodium chloride 0.9 % 100 mL intermittent infusion (0 mg Intravenous Stopped 3/16/23 1416)   0.9% sodium chloride BOLUS (1,000 mLs Intravenous $New Bag 3/16/23 1348)   lidocaine (XYLOCAINE) 2 % external gel 1 Tube (1 Tube Urethral $Given 3/16/23 1416)       New Prescriptions    No medications on file       Final Impression     1. Bilateral subdural hematomas    2. Fall, initial encounter    3. Generalized muscle weakness    4. Failure to thrive in adult        Critical Care     Performed by: Dr Sam Strickland  Authorized by: Dr Sam Strickland  Total critical care time: 45 minutes  Critical care was necessary to treat or prevent imminent or life-threatening deterioration of the following conditions:  Bilateral subdural hematomas  Critical care was time spent personally by me on the following activities: development of treatment plan with patient or surrogate, discussions with consultants, examination of patient, evaluation of patient's response to treatment, obtaining history from patient or surrogate, ordering and performing treatments and interventions, ordering and review of laboratory studies, ordering and review of radiographic studies, re-evaluation of patient's condition and monitoring for potential decompensation.  Critical care time was exclusive of separately billable procedures and treating other patients.      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    Per EMS, patient lives independently at assisted living, and had a mechanical yesterday at  8:00 pm. He was on the ground until the nursing staff found him today at 10:30 AM (~14 hours on the floor). The patient was found lying on his stomach. They notice redness to his right shoulder, bilateral hips and knees. He also has a skin tear on his right elbow that was dressed upon arrival.    Patient endorses a mechanical fall yesterday evening and states that he was too weak to get up on his own. Patient reports that when he tried to get up, he felt his heart racing. He has middle back pain. Denies cough, fevers, and any other symptoms at this time. Patient is not anticoagulated.    I, Marine Wang am serving as a scribe to document services personally performed by Dr. Sam Strickland MD, based on my observation and the provider's statements to me. I, Dr. Sam Strickland MD attest that Marine Wang is acting in a scribe capacity, has observed my performance of the services and has documented them in accordance with my direction.    Past Medical History     Past Medical History:   Diagnosis Date     Dementia with behavioral disturbance 9/3/2019     Depression 5/28/2014     Hypercholesteremia 5/28/2014     Melanoma of back (H) 5/28/2014     Melanoma of face (H) 5/28/2014     Seizure (H) 5/19/2015     Thiamine deficiency 9/3/2019     TIA (transient ischemic attack)      Past Surgical History:   Procedure Laterality Date     APPENDECTOMY       CHOLECYSTECTOMY       HERNIA REPAIR       OTHER SURGICAL HISTORY      nasal surgeries     OTHER SURGICAL HISTORY  1993    meniscal surgery     OTHER SURGICAL HISTORY Right     total knee repair     SHOULDER SURGERY Right      Family History   Problem Relation Age of Onset     Cancer Mother      Cerebrovascular Disease Maternal Grandfather       Social History     Tobacco Use     Smoking status: Former     Types: Cigarettes     Smokeless tobacco: Never   Substance Use Topics     Alcohol use: Never     Drug use: Never     Allergies   Allergen Reactions     Penicillins  "Anaphylaxis and Nausea and Vomiting     Alfuzosin Nausea and Vomiting     Donepezil Other (See Comments)     Myolin [Norflex] Unknown     Other reaction(s): hives     Sulfa (Sulfonamide Antibiotics) [Sulfa Drugs] Nausea and Vomiting     Triazolam Nausea and Vomiting       Relevant past medical, surgical, family and social history as documented above, has been reviewed and discussed with patient. No changes or additions, unless otherwise noted in the HPI.    Current Medications     acetaminophen (TYLENOL) 500 MG tablet  aspirin 81 MG EC tablet  atorvastatin (LIPITOR) 40 MG tablet  Cyanocobalamin 2500 MCG TABS  levETIRAcetam (KEPPRA) 500 MG tablet  primidone (MYSOLINE) 50 MG tablet  senna-docusate (SENOKOT-S/PERICOLACE) 8.6-50 MG tablet  thiamine 50 MG TABS  triamcinolone (KENALOG) 0.1 % external cream          Physical Exam     /73   Pulse 69   Temp 97.8  F (36.6  C) (Oral)   Resp 19   Ht 1.676 m (5' 6\")   Wt 68.5 kg (151 lb)   SpO2 96%   BMI 24.37 kg/m    Constitutional: Awake, alert, in no acute distress.  Head: Normocephalic, atraumatic.  ENT: Mucous membranes fairly dry  Eyes: Conjunctiva normal.  Respiratory: Respirations even, unlabored, in no acute respiratory distress.  Cardiovascular: Regular rate and rhythm. Good peripheral perfusion.  GI: Abdomen soft, non-tender.  Musculoskeletal: Moves all 4 extremities equally.  Globally weak.  Good range of motion of all 4 extremities.  No midline cervical, thoracic, or lumbar spine tenderness.  Integument: Warm, dry. 2x2 cm skin tear over right elbow. 4x8 cm erythema/early skin breakdown below left knee. 8x12 cm erythema/early skin breakdown to bilateral hips.   Neurologic: Alert, pleasant, able to converse, though very hard of hearing. Normal speech. Grossly normal motor and sensory function. No focal deficits noted.    Labs & Imaging     Imaging reviewed and independently interpreted as below;   CT head images reviewed, does appear to have acute on " chronic bilateral subdurals, no midline shift.  Also low-attenuation area behind his left eye of uncertain etiology.    Results for orders placed or performed during the hospital encounter of 03/16/23   Head CT w/o contrast    Impression    IMPRESSION:  1.  Mixed attenuating subdural collections over the bilateral cerebral convexities with higher attenuating components suggesting more recent hemorrhage.  2.  Rounded lesion in the left anterior middle cranial fossa is most compatible with meningioma when comparing with MRI of the brain dated 09/03/2019. There is mild low attenuating changes in the adjacent left temporal lobe, most compatible with edema.   MRI of the brain with and without contrast is recommended for further evaluation.    Results discussed with Dr. Strickland on 3/16/2023 1:18 PM.   CT Chest Abdomen Pelvis w/o Contrast    Impression    IMPRESSION:  1.  No acute or traumatic finding in the chest, abdomen, or pelvis, within the limitations of noncontrast technique.    2.  Bilateral renal cysts, some of which are high attenuation indicating hemorrhagic/proteinaceous content. There is also asymmetric left-sided urothelial thickening and right-sided collecting system dilation, as well as a polypoid lesion in the bladder,   which could represent urothelial carcinoma. Recommend further evaluation with CT urogram.    3.  Left thyroid nodule measuring 2.9 cm. This could be further evaluated with thyroid ultrasound, if not already performed.    4.  Tiny pulmonary nodules. Follow-up recommendations below:    REFERENCE:  Guidelines for Management of Incidental Pulmonary Nodules Detected on CT Images: From the Fleischner Society 2017.   Guidelines apply to incidental nodules in patients who are 35 years or older.  Guidelines do not apply to lung cancer screening, patients with immunosuppression, or patients with known primary cancer.    MULTIPLE NODULES  Nodule size <6 mm  Low-risk patients: No follow-up  needed.  High-risk patients: Optional follow-up at 12 months.     CBC (+ platelets, no diff)   Result Value Ref Range    WBC Count 11.0 4.0 - 11.0 10e3/uL    RBC Count 4.08 (L) 4.40 - 5.90 10e6/uL    Hemoglobin 12.9 (L) 13.3 - 17.7 g/dL    Hematocrit 39.5 (L) 40.0 - 53.0 %    MCV 97 78 - 100 fL    MCH 31.6 26.5 - 33.0 pg    MCHC 32.7 31.5 - 36.5 g/dL    RDW 12.4 10.0 - 15.0 %    Platelet Count 102 (L) 150 - 450 10e3/uL   Basic metabolic panel   Result Value Ref Range    Sodium 141 136 - 145 mmol/L    Potassium 4.4 3.5 - 5.0 mmol/L    Chloride 111 (H) 98 - 107 mmol/L    Carbon Dioxide (CO2) 22 22 - 31 mmol/L    Anion Gap 8 5 - 18 mmol/L    Urea Nitrogen 19 8 - 28 mg/dL    Creatinine 1.07 0.70 - 1.30 mg/dL    Calcium 9.3 8.5 - 10.5 mg/dL    Glucose 108 70 - 125 mg/dL    GFR Estimate 64 >60 mL/min/1.73m2   Result Value Ref Range    CK 2,827 (HH) 30 - 190 U/L   Result Value Ref Range    Magnesium 1.9 1.8 - 2.6 mg/dL   Troponin I (now)   Result Value Ref Range    Troponin I 0.07 0.00 - 0.29 ng/mL   Result Value Ref Range    INR 1.22 (H) 0.85 - 1.15   Partial thromboplastin time   Result Value Ref Range    aPTT 29 22 - 38 Seconds       EKG     EKG reviewed and independently interpreted as below;  Sinus rhythm, rate 69.  .  .  QTc 456.  No STEMI.     Sam Strickland MD  03/16/23 1455       Sam Strickland MD  03/16/23 1454

## 2023-03-16 NOTE — PROGRESS NOTES
Neurosurgery plan of care:    Plan:  1. Keppra 500mg BID  2. Hold anticoagulation. Obtain coags, reverse any abnormals  3. Repeat head CT 6 hrs   4. SBP <140   5. HOB > 30 degrees  6. Clarify goals of care with family  7. Consider transfer if repeat head CT shows increasing subdural. There is no cranial surgery at [facility]    Dr Blas neurosurgeon on call    HPI  89yo patient hx dementia, cva, on asa 81mg who had a mechanical fall at 8pm last night at his independent assisted living facility. Found on the ground this morning at 10:30am by staff. Brought by EMS to  ED. Per Dr Strickland the patient appears generally deconditioned/weak but is otherwise neuro intact. He had a head CT which demonstrates bilateral mixed density subdural collections. R sided subdural measures 7mm, L sided subdural measures 8mm. He has a 1.7 x 1.4cm lesion in the left anterior middle cranial fossa lesion consistent with meningioma with low attenuating changes compatible with edema in the anterior left temporal lobe. Lesion was present in 2019. could consider MRI for more detail to see if it has changed.    Patient fell last night, now has mixed french subdurals 12+hrs later. No comparison. Recommend repeat head CT in 6hrs to gauge stability.   Discussed with Dr Strickland to clarify with family if they would wish to pursue any surgery for this patient of quite advanced age. If so, he would require transfer to higher level of care such as [facility] or out of system. If prefer palliative/supportive care, could stay at [facility]  O. [phone]

## 2023-03-16 NOTE — PROGRESS NOTES
Pharmacy Note - Admission Medication History    Pertinent Provider Information:     ______________________________________________________________________    Prior To Admission (PTA) med list completed and updated in EMR.       PTA Med List   Medication Sig Last Dose     aspirin 81 MG EC tablet Take 81 mg by mouth daily Resume Daily 81 mg aspirin on 8/29/2021 3/15/2023     atorvastatin (LIPITOR) 40 MG tablet Take 40 mg by mouth At Bedtime 3/15/2023 at PM     Cyanocobalamin 2500 MCG TABS Take 2,500 mcg by mouth daily 3/15/2023     levETIRAcetam (KEPPRA) 500 MG tablet TAKE 1 TABLET BY MOUTH TWICE DAILY 3/15/2023 at PM     primidone (MYSOLINE) 50 MG tablet Take 50 mg by mouth 2 times daily 3/15/2023 at PM     thiamine 50 MG TABS Take 50 mg by mouth daily 3/15/2023       Information source(s): Patient, Family member and [facility]  Method of interview communication: in-person    Summary of Changes to PTA Med List  New:  Discontinued: acetaminophen, senna-docusate, triamcinolone cream  Changed:    Patient was asked about OTC/herbal products specifically.  PTA med list reflects this.    In the past week, patient estimated taking medication this percent of the time:  greater than 90%.    Medication Affordability:  Not including over the counter (OTC) medications, was there a time in the past 12 months when you did not take your medications as prescribed because of cost?: Unable to Assess    Allergies were reviewed, assessed, and updated with the patient.      Patient does not use any multi-dose medications prior to admission.    The information provided in this note is only as accurate as the sources available at the time of the update(s).    Thank you for the opportunity to participate in the care of this patient.    Damian Paulino  3/16/2023 2:20 PM

## 2023-03-17 NOTE — CONSULTS
[facility]    Neurosurgery Consultation     Date of Admission:  3/16/2023  Date of Consult (When I saw the patient): 03/17/23    Assessment & Plan   Kurtis Urban is a 89 year old male who was admitted on 3/16/2023. I was asked to see the patient for unwitnessed fall at his living facility of which he was unable to get off the of the floor for 10-12 hours until someone found him the following morning. Noted to have bilateral SDH and increase of left anterior middle cranial fossa meningoma when compared to noncontrasted MRI 9/2019.     Principal Problem:    Urinary retention  Active Problems:    Generalized muscle weakness    Failure to thrive in adult    Bilateral subdural hematomas with brain compression     Fall, initial encounter      Plan:   No current neurosurgical intervention presently indicated   Will sign off please re-consult if indicated   Could have outpatient follow up in 4 weeks with repeat head CT if patient and family wishes to continue to monitor SDH   Discussed with patient and son at bedside that with the noted growth of the meningoma could offer radiation therapy of which both patient and son state that they would not want any treatment with his current age of either the subdural or the meningoma         I have discussed the following assessment and plan with Dr. Blas who is in agreement with initial plan and will follow up with further consultation recommendations.    Rosemary Holloway PA-C  [facility]  O: [phone]        Code Status    Full Code    Reason for Consult   Reason for consult: I was asked by Dr. Gamez to evaluate this patient for bilateral SDH and meningoma.    Primary Care Physician   Juan West    Chief Complaint   Unwitnessed fall     History is obtained from the patient and patient son     History of Present Illness   Kurtis Urban is a 89 year old male who presents with history significant for dementia lives in a  independent living facility, ambulates with a walker, seizure disorder, CVA, TIA, dyslipidemia brought into the emergency room after unwitnessed fall at his living facility. He is unsure of the exact events of his fall but states that he was walking in his kitchen and may of went to pick something up and fell forward he believes. He is unsure if there was LOC and states that he attempted for a while to get up and his the alert buttons on the wall but was not able to get to them. Currently he denies any headaches, nausea, emesis, or visual changes. He denies any neck or back pain. Denies any radicular upper or lower extremity pain numbness tingling or weakness. He notes that he had a fall 2 weeks ago but was able to get up and kept going without thinking anything of it. He denies any balance or coordination difficulty. He denies changes in bowel or bladder habits. He notes that since his fall his legs are very stiff and cant not move them. Was found to have acute on chronic subdural hematoma, with increased size meningioma and early rhabdomyolysis.     Past Medical History   I have reviewed this patient's medical history and updated it with pertinent information if needed.   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  Past Surgical History:   Procedure Laterality Date     APPENDECTOMY       CHOLECYSTECTOMY       HERNIA REPAIR       OTHER SURGICAL HISTORY      nasal surgeries     OTHER SURGICAL HISTORY  1993    meniscal surgery     OTHER SURGICAL HISTORY Right     total knee  repair     SHOULDER SURGERY Right        Prior to Admission Medications   Prior to Admission Medications   Prescriptions Last Dose Informant Patient Reported? Taking?   Cyanocobalamin 2500 MCG TABS 3/15/2023  Yes Yes   Sig: Take 2,500 mcg by mouth daily   aspirin 81 MG EC tablet 3/15/2023  Yes Yes   Sig: Take 81 mg by mouth daily   atorvastatin (LIPITOR) 40 MG tablet 3/15/2023 at PM  Yes Yes   Sig: Take 40 mg by mouth At Bedtime   levETIRAcetam (KEPPRA) 500 MG tablet 3/15/2023 at PM  No Yes   Sig: TAKE 1 TABLET BY MOUTH TWICE DAILY   primidone (MYSOLINE) 50 MG tablet 3/15/2023 at PM  Yes Yes   Sig: Take 50 mg by mouth 2 times daily   thiamine 50 MG TABS 3/15/2023  Yes Yes   Sig: Take 50 mg by mouth daily      Facility-Administered Medications: None     Allergies   Allergies   Allergen Reactions     Penicillins Anaphylaxis and Nausea and Vomiting     Alfuzosin Nausea and Vomiting     Donepezil Other (See Comments)     Myolin [Norflex] Unknown     Other reaction(s): hives     Sulfa (Sulfonamide Antibiotics) [Sulfa Drugs] Nausea and Vomiting     Triazolam Nausea and Vomiting       Social History   I have reviewed this patient's social history and updated it with pertinent information if needed. Kurtis Marinojerrodcarlos  reports that he has quit smoking. His smoking use included cigarettes. He has never used smokeless tobacco. He reports that he does not drink alcohol and does not use drugs.    Family History   I have reviewed this patient's family history and updated it with pertinent information if needed.   Family History   Problem Relation Age of Onset     Cancer Mother      Cerebrovascular Disease Maternal Grandfather        Review of Systems   14 point review of systems negative with exception to HPI     Physical Exam   Temp: 97.6  F (36.4  C) Temp src: Oral BP: 113/65 Pulse: 71   Resp: 16 SpO2: 96 % O2 Device: None (Room air)    Vital Signs with Ranges  Temp:  [97.6  F (36.4  C)-98  F (36.7  C)] 97.6  F (36.4  " C)  Pulse:  [64-78] 71  Resp:  [16-27] 16  BP: (113-154)/(65-80) 113/65  SpO2:  [94 %-99 %] 96 %  141 lbs 15.62 oz     , Blood pressure 113/65, pulse 71, temperature 97.6  F (36.4  C), temperature source Oral, resp. rate 16, height 1.676 m (5' 6\"), weight 64.4 kg (141 lb 15.6 oz), SpO2 96 %.  141 lbs 15.62 oz  HEENT:  Normocephalic, atraumatic.  PERRLA.  EOM s intact.   Neck:  Supple, non-tender, without lymphadenopathy.  Heart:  No peripheral edema  Lungs:  No SOB  Abdomen:  Soft, non-tender, non-distended.  Normal bowel sounds.  Skin:  Warm and dry, good capillary refill.  Extremities:  Good radial and dorsalis pedis pulses bilaterally, no edema, cyanosis or clubbing.    NEUROLOGICAL EXAMINATION:   Mental status:  Alert and Oriented x 3, speech is fluent.  Cranial nerves:  II-XII intact.   Motor:  Strength is 5/5 throughout the upper with weakness of lower extremities bilaterally unable to lift legs off of bed but good distal strength bilaterally   Deltoids:  Right: 5/5   Left:  5/5  Biceps:  Right: 5/5   Left:  5/5  Triceps: Right: 5/5   Left:  5/5                       Wrist Extensors: Right: 5/5   Left:  5/5        Wrist Flexors:Right: 5/5   Left:  5/5             Hand : Right: 5/5   Left:  5/5         Hip Flexor: Right: 3-/5   Left:  3-/5                 Knee extension:Right: 3/5   Left:  3/5               Knee flexion: Right: 3/5   Left:  3/5              Planter flexion:Right: 5/5   Left:  5/5        dorsiflexion:Right: 5/5   Left:  5/5                        EHL:Right: 5/5   Left:  5/5                     Sensation:  Intact to LT throughout   Reflexes:  Negative Babinski.  Negative Clonus.    Coordination:  Smooth finger to nose testing.   Negative pronator drift.   Gait:  Not tested     Cervical examination reveals good range of motion.  No tenderness to palpation of the cervical spine or paraspinous muscles bilaterally.     Lumbar examination reveals no tenderness of the spine or paraspinous muscles. " Straight leg raise is negative bilaterally.     Images reviewed personally stable appearance of bilateral SDH on repeat head CT                                                                    IMPRESSION:repeat head CT   1.  No significant change in bilateral mixed density subdural hematomas and left temporal lobe edema associated with extra-axial mass better characterized on same-day MRI.     IMPRESSION: brain MRI   1.  Unchanged size of bilateral cerebral convexity subdural hematomas since same-day CT, which contain blood products of differing ages, suspect acute on subacute.  2.  Reduced diffusivity at the anterior component of the left subdural collection can be seen with hematoma of this age as well as in the context of infection/empyema - correlate clinically.  3.  Enhancing extra-axial mass in the left middle cranial fossa compatible with meningioma. Associated focal mass effect exerted upon left temporal pole with parenchymal edema.  4.  Mild rightward shift of midline structures by approximately 0.3 cm.                                                                     IMPRESSION: head CT   1.  Mixed attenuating subdural collections over the bilateral cerebral convexities with higher attenuating components suggesting more recent hemorrhage.  2.  Rounded lesion in the left anterior middle cranial fossa is most compatible with meningioma when comparing with MRI of the brain dated 09/03/2019. There is mild low attenuating changes in the adjacent left temporal lobe, most compatible with edema.   MRI of the brain with and without contrast is recommended for further evaluation.     Data     CBC RESULTS:   Recent Labs   Lab Test 03/17/23  0545   WBC 11.1*   RBC 3.85*   HGB 12.1*   HCT 37.3*   MCV 97   MCH 31.4   MCHC 32.4   RDW 12.6   PLT 95*     Basic Metabolic Panel:  Lab Results   Component Value Date     03/17/2023      Lab Results   Component Value Date    POTASSIUM 4.5 03/17/2023     Lab Results    Component Value Date    CHLORIDE 113 03/17/2023     Lab Results   Component Value Date    MARICRUZ 8.6 03/17/2023     Lab Results   Component Value Date    CO2 18 03/17/2023     Lab Results   Component Value Date    BUN 19 03/17/2023     Lab Results   Component Value Date    CR 0.93 03/17/2023     Lab Results   Component Value Date     03/17/2023     INR:  Lab Results   Component Value Date    INR 1.22 03/16/2023    INR 1.07 01/18/2020    INR 1.09 12/16/2019    INR 1.04 09/03/2019

## 2023-03-17 NOTE — PLAN OF CARE
Problem: Muscle Strength Impairment  Goal: Improved Muscle Strength  Outcome: Progressing     Able to ambulate in room and to recliner with therapy. IVF running. Patient denies pain. Retana in place. Output red/pink in color. Alarms on for safety.

## 2023-03-17 NOTE — PROGRESS NOTES
[facility] Medicine PROGRESS NOTE      Identification/Summary:   89 year old male past medical history significant for dementia lives in a independent living facility, ambulates with a walker, seizure disorder, CVA, TIA, dyslipidemia brought into the emergency room with complaints of unwitnessed fall at care facility and unable to get up for about 10 to 12 hours being admitted for acute on subacute trauamatic subdural hematoma, enlarging meningioma with mass effect, elevated CK/early rhabdomyolysis, urinary retention, abnormal appearing urothelial tract CT.    Traumatic acute on subacute subdural hematoma  Unwitnessed fall  Enlarging meningioma  History of seizure disorder  Appreciate neurosurgery recommendations  Continue home Keppra 500 mg twice daily  Repeat CT head, MRI brain 3/16 stable  Started on dexamethasone for meningioma with mass effect  Discussed with the patient's son, they would like to continue with conservative management of her meningioma, hematoma at this time.  May consider switching to Medrol Dosepak at discharge  Follow-up in clinic with repeat CT head in 4 weeks  PT KYLE nieves.     Early rhabdomyolysis  CK elevated up to [id number]  Creatinine so far normal  IV fluid hydration  Monitor BMP  Avoid nephrotoxic medications  Urine appears tea colored     Urinary retention  Abnormal CT with urothelial abnormalities  Bladder mass, left kidney kidney lesion  Retana placed in the ED.  Urology consulted   Started on empiric Levaquin  Urine culture negative, likely can be discontinued  Likely will need cystoscopy at some point.    Dementia  At risk for delirium  Monitor     Seizure disorder  Continue home Keppra     DVT prophylaxis SCDs  Code: Full  Anticipated possible discharge in 1-2 days once clinical improvement milestones are met.    Interval History/Subjective:  Appears stable.  He is alert oriented x3.  Son at bedside.  Denied any other new focal weakness tingling numbness, speech or vision  "changes.  No other overnight events reported.    Physical Exam/Objective:  Vitals I/O   Vital signs:  Temp: 97.8  F (36.6  C) Temp src: Oral BP: 119/68 Pulse: 63   Resp: 16 SpO2: 94 % O2 Device: None (Room air)   Height: 167.6 cm (5' 6\") Weight: 64.4 kg (141 lb 15.6 oz)  Estimated body mass index is 22.92 kg/m  as calculated from the following:    Height as of this encounter: 1.676 m (5' 6\").    Weight as of this encounter: 64.4 kg (141 lb 15.6 oz). I/O last 3 completed shifts:  In: 100 [IV Piggyback:100]  Out: 1500 [Urine:1500]     Body mass index is 22.92 kg/m .    General Appearance:  Alert, cooperative, no distress   Head:  Normocephalic, atraumatic   Eyes:  PERRL    Throat:  mucosa; moist   Neck: No JVD, thyromegaly   Lungs:   Clear to auscultation bilaterally, respirations unlabored   Chest Wall:  No tenderness or deformity   Heart:  Regular rate and rhythm, S1, S2 normal,no murmur   Abdomen:   Soft, non tender, non distended, bowel sounds present, no guarding or rigidity  Retana catheter with tea colored urine.    Extremities: No edema, no joint swelling   Skin:  Erythema over both elbows, behind left knee, bilateral groin   Neurologic: Alert and oriented X 3, Moves all 4 extremities       Medications:   Personally Reviewed.    atorvastatin  40 mg Oral At Bedtime     cyanocobalamin  2,500 mcg Oral Daily     dexamethasone  4 mg Intravenous Q8H     levETIRAcetam  500 mg Intravenous Q12H     levofloxacin  750 mg Intravenous Q48H     polyethylene glycol  17 g Oral Daily     primidone  50 mg Oral BID     sodium chloride (PF)  3 mL Intracatheter Q8H     thiamine  50 mg Oral Daily       Data reviewed today: I personally reviewed all new medications, labs, imaging/diagnostics reports over the past 24 hours. Pertinent findings include    Labs:  Most Recent 3 CBC's:Recent Labs   Lab Test 03/17/23  0545 03/16/23  1229 07/07/22  0926   WBC 11.1* 11.0 9.1   HGB 12.1* 12.9* 12.1*   MCV 97 97 98   PLT 95* 102* 181     Most " Recent 3 BMP's:Recent Labs   Lab Test 03/17/23  0545 03/16/23  1229 07/07/22  0926    141 135*   POTASSIUM 4.5 4.4 4.4   CHLORIDE 113* 111* 102   CO2 18* 22 25   BUN 19 19 20.8   CR 0.93 1.07 1.09   ANIONGAP 8 8 8   MARICRUZ 8.6 9.3 9.6    108 135*     Most Recent 2 LFT's:Recent Labs   Lab Test 03/16/23  1229 03/17/21  1527   AST 65* 23   ALT 27 28   ALKPHOS 83 100   BILITOTAL 1.3* 0.3     Most Recent 3 INR's:Recent Labs   Lab Test 03/16/23  1321 01/18/20  1236 12/16/19  1559   INR 1.22* 1.07 1.09       Imaging:   Recent Results (from the past 24 hour(s))   Head CT w/o contrast    Narrative    EXAM: CT HEAD W/O CONTRAST  LOCATION: [facility]  DATE/TIME: 3/16/2023 1:02 PM    INDICATION: Unwitnessed fall. Generalized weakness.  COMPARISON: CT head without contrast 01/18/2020.  TECHNIQUE: Routine CT Head without IV contrast. Multiplanar reformats. Dose reduction techniques were used.    FINDINGS:  INTRACRANIAL CONTENTS: Mixed density subdural collections over the bilateral cerebral convexities. The right-sided subdural collection measures 7 mm in maximal radial thickness and the left-sided subdural collection measures 8 mm in maximal radial   thickness. Mild to moderate presumed chronic small vessel ischemic changes. Mild to moderate generalized volume loss. No hydrocephalus. Retrocerebellar arachnoid cyst.     There is a 1.7 x 1.4 cm lesion in the left anterior middle cranial fossa. There are low attenuating changes in the anterior left temporal lobe. An extra-axial lesion was seen along the left anterior cranial fossa on prior MRI dated 09/03/2019.    VISUALIZED ORBITS/SINUSES/MASTOIDS: No intraorbital abnormality. No paranasal sinus mucosal disease. No middle ear or mastoid effusion.    BONES/SOFT TISSUES: No acute abnormality.      Impression    IMPRESSION:  1.  Mixed attenuating subdural collections over the bilateral cerebral convexities with higher attenuating components  suggesting more recent hemorrhage.  2.  Rounded lesion in the left anterior middle cranial fossa is most compatible with meningioma when comparing with MRI of the brain dated 09/03/2019. There is mild low attenuating changes in the adjacent left temporal lobe, most compatible with edema.   MRI of the brain with and without contrast is recommended for further evaluation.    Results discussed with Dr. Strickland on 3/16/2023 1:18 PM.   CT Chest Abdomen Pelvis w/o Contrast    Narrative    EXAM: CT CHEST ABDOMEN PELVIS W/O CONTRAST  LOCATION: [facility]  DATE/TIME: 3/16/2023 1:18 PM    INDICATION: unwitnessed fall, generalized weakness, unable to get self up of ground  COMPARISON: None.  TECHNIQUE: CT scan of the chest, abdomen, and pelvis was performed without IV contrast. Multiplanar reformats were obtained. Dose reduction techniques were used.   CONTRAST: None.    FINDINGS:   LUNGS AND PLEURA: Dependent atelectasis. A few tiny nodules in the left upper lobe measuring 0.3 cm (e.g. 4/74)    MEDIASTINUM/AXILLAE: No enlarged thoracic lymph nodes. 2.9 x 2.3 cm left thyroid nodule with peripheral calcification (3/25). No thoracic aortic aneurysm.    CORONARY ARTERY CALCIFICATION: Mild.    HEPATOBILIARY: Cholecystectomy. Liver and bile ducts are unremarkable.    PANCREAS: Normal.    SPLEEN: Normal.    ADRENAL GLANDS: Normal.    KIDNEYS/BLADDER: Cortical and parapelvic cysts bilaterally, some of which are high attenuation in the left kidney. Asymmetric mild left urothelial thickening and mild-moderate right renal collecting system dilation. Punctate nonobstructing calculus in   the left upper pole. 2.1 x 1.2 cm polypoid lesion along the right lateral bladder wall.    BOWEL: Diverticulosis of the colon. No acute inflammatory change. No obstruction.     LYMPH NODES: Normal.    VASCULATURE: Mild diffuse atherosclerotic calcification. No aneurysm.    PELVIC ORGANS: Prostatomegaly.    MUSCULOSKELETAL: Mild  dependent body wall edema. No fluid collection or hematoma. Surgical hardware in both shoulders. No acute fracture.      Impression    IMPRESSION:  1.  No acute or traumatic finding in the chest, abdomen, or pelvis, within the limitations of noncontrast technique.    2.  Bilateral renal cysts, some of which are high attenuation indicating hemorrhagic/proteinaceous content. There is also asymmetric left-sided urothelial thickening and right-sided collecting system dilation, as well as a polypoid lesion in the bladder,   which could represent urothelial carcinoma. Recommend further evaluation with CT urogram.    3.  Left thyroid nodule measuring 2.9 cm. This could be further evaluated with thyroid ultrasound, if not already performed.    4.  Tiny pulmonary nodules. Follow-up recommendations below:    REFERENCE:  Guidelines for Management of Incidental Pulmonary Nodules Detected on CT Images: From the Fleischner Society 2017.   Guidelines apply to incidental nodules in patients who are 35 years or older.  Guidelines do not apply to lung cancer screening, patients with immunosuppression, or patients with known primary cancer.    MULTIPLE NODULES  Nodule size <6 mm  Low-risk patients: No follow-up needed.  High-risk patients: Optional follow-up at 12 months.     MR Brain w/o & w Contrast    Narrative    EXAM: MR BRAIN W/O and W CONTRAST  LOCATION: [facility]  DATE/TIME: 3/16/2023 4:30 PM    INDICATION: Follow-up intracranial hemorrhage and question mass in the left middle cranial fossa  COMPARISON: CT head same day 1252 hours.  CONTRAST: 6.5 ml Gadavist given  TECHNIQUE: Routine multiplanar multisequence head MRI without and with intravenous contrast.    FINDINGS:  INTRACRANIAL CONTENTS: No acute infarct. Avidly enhancing extra-axial dural based mass within the left middle cranial fossa measuring up to approximately 1.5 x 2.0 x 1.6 cm (AP, ML, CC) with broad dural attachment/dural tail  compatible with meningioma.   There is T2/FLAIR hyperintense signal in the adjacent left temporal pole compatible with vasogenic edema most likely related to focal mass effect. Redemonstrated bilateral cerebral convexity subdural collections exhibiting predominantly hyperintense   signal on T1 and T2-weighted sequences with superimposed intermediate T1 signal compatible with acute/subacute blood products measuring up to 1.0 cm on the left and 0.9 cm on the right. There is copresent susceptibility, particularly anteriorly on the   left surrounding the region of reduced diffusivity compatible with acute/subacute blood products. Approximately 0.3 cm rightward shift of midline structures measured at the septum pellucidum. Patchy nonspecific T2/FLAIR hyperintensities within the   cerebral white matter most consistent with chronic microvascular ischemic change. Small chronic lacunar infarcts in the cerebellum. Proportional prominence of the ventricles and sulci is compatible with diffuse cerebral volume loss. Prominent   retroperitoneal cerebellar CSF space. No cerebellar tonsillar ectopia. No pathologic intra-axial enhancement.    SELLA: No abnormality accounting for technique.    OSSEOUS STRUCTURES/SOFT TISSUES: Normal marrow signal. The major intracranial vascular flow voids are maintained.    ORBITS: No visible intraorbital abnormality.     SINUSES/MASTOIDS: Mild mucosal thickening scattered about the paranasal sinuses. No middle ear or mastoid effusion.      Impression    IMPRESSION:  1.  Unchanged size of bilateral cerebral convexity subdural hematomas since same-day CT, which contain blood products of differing ages, suspect acute on subacute.  2.  Reduced diffusivity at the anterior component of the left subdural collection can be seen with hematoma of this age as well as in the context of infection/empyema - correlate clinically.  3.  Enhancing extra-axial mass in the left middle cranial fossa compatible with  meningioma. Associated focal mass effect exerted upon left temporal pole with parenchymal edema.  4.  Mild rightward shift of midline structures by approximately 0.3 cm.       CT Urogram wo & w Contrast    Narrative    EXAM: CT UROGRAM WO and W CONTRAST  LOCATION: [facility]  DATE/TIME: 3/16/2023 8:11 PM    INDICATION: bladder mass, ureteral enhancement  COMPARISON: 03/16/2023, 1253 hours  TECHNIQUE: CT scan of the abdomen and pelvis using urogram technique with pre contrast, post contrast, and delayed images. Multiplanar reformats were obtained. Dose reduction techniques were used.   CONTRAST: Isovue 370 100mL    FINDINGS:   LOWER CHEST: Dependent atelectasis.    HEPATOBILIARY: Cholecystectomy. Liver and bile ducts unremarkable.    PANCREAS: Normal.    SPLEEN: Normal.    ADRENAL GLANDS: Normal.    RIGHT KIDNEY/URETER: Simple cortical and parapelvic cysts, which do not require further follow-up. Mild-moderate renal pelvis and ureteral dilation with relative sparing of the calyces. No urothelial thickening, filling defects, urothelial enhancement,   or stricture.    LEFT KIDNEY/URETER: 1.3 cm endophytic lesion in the upper pole is high attenuation on precontrast images and demonstrates equivocal enhancement (15-20 Hounsfield units). Other simple and parapelvic and cortical cysts do not demonstrate enhancement. The   urothelial thickening that was seen on the same day noncontrast CT does not persist on nephrographic or excretory phase images. There is mild fullness of the renal pelvis and ureter, but no filling defects, urothelial enhancement, or stricture    BLADDER: Enhancing polypoid mass along the right lateral wall measuring 2.9 x 2.1 cm (12/135). No extravesical soft tissue. Retana catheter in the lumen.    BOWEL: Diverticulosis of the colon. No acute inflammatory change. No obstruction.     LYMPH NODES: Normal.    VASCULATURE: Mild diffuse atherosclerotic calcification. No  aneurysm.    PELVIC ORGANS: Prostatomegaly.    MUSCULOSKELETAL: Mild subcutaneous body wall edema.      Impression    IMPRESSION:  1.  Enhancing polypoid soft tissue lesion along the right lateral bladder wall. Recommend further evaluation with cystoscopy. No evidence of upper tract disease.    2.  Patulous mildly dilated ureters bilaterally, right greater than left, but no calyceal dilation. This is most suggestive of sequelae of vesicoureteral reflux or a prior obstruction.     3.  High attenuation lesion in the upper pole left kidney demonstrates equivocal enhancement, which is probably technical due to endophytic location. Renal MRI is suggested to exclude true enhancement.   Head CT w/o contrast    Narrative    EXAM: CT HEAD W/O CONTRAST  LOCATION: [facility]  DATE/TIME: 3/16/2023 8:12 PM    INDICATION: follow up SDH  COMPARISON: CT and MR head same day.  TECHNIQUE: Routine CT Head without IV contrast. Multiplanar reformats. Dose reduction techniques were used.    FINDINGS:  INTRACRANIAL CONTENTS: Redemonstrated confluent edema in the left anterior temporal lobe most likely related to mass effect from adjacent extra-axial mass better characterized on same-day comparison MRI. Redemonstrated mixed density bilateral   frontoparietal convexity collections not substantially changed in size measuring up to approximately 8 mm bilaterally. No additional new intracranial hemorrhage. No acute transcortical infarct. Similar approximately 2 mm rightward shift of septum   pellucidum. Patchy white matter hypodensities are, while nonspecific, most compatible with chronic microvascular ischemic changes. Normal ventricles and sulci.    VISUALIZED ORBITS/SINUSES/MASTOIDS: No visible intraorbital abnormality. Paranasal sinuses are clear. No middle ear or mastoid effusion.    BONES/SOFT TISSUES: No acute abnormality.      Impression    IMPRESSION:  1.  No significant change in bilateral mixed density  subdural hematomas and left temporal lobe edema associated with extra-axial mass better characterized on same-day MRI.           > 50 MINUTES SPENT BY ME on the date of service doing chart review, history, exam, documentation & further activities per the note.    Xiomara Gamez MD  Hospitalist  [facility]

## 2023-03-17 NOTE — PLAN OF CARE
Problem: Plan of Care - These are the overarching goals to be used throughout the patient stay.    Goal: Absence of Hospital-Acquired Illness or Injury  Intervention: Identify and Manage Fall Risk  Recent Flowsheet Documentation  Taken 3/17/2023 0500 by Juliette Barbosa RN  Safety Promotion/Fall Prevention: safety round/check completed  Taken 3/17/2023 0305 by Juliette Barbosa RN  Safety Promotion/Fall Prevention:   activity supervised   safety round/check completed  Taken 3/17/2023 0130 by Juliette Barbosa RN  Safety Promotion/Fall Prevention: safety round/check completed  Taken 3/16/2023 2330 by Juliette Barbosa RN  Safety Promotion/Fall Prevention:   activity supervised   assistive device/personal items within reach   bed alarm on   clutter free environment maintained   fall prevention program maintained   increased rounding and observation   increase visualization of patient   lighting adjusted   mobility aid in reach   nonskid shoes/slippers when out of bed   patient and family education   room door open   room near nurse's station   room organization consistent   safety round/check completed   supervised activity  Taken 3/16/2023 2200 by Juliette Barbosa RN  Safety Promotion/Fall Prevention: safety round/check completed  Taken 3/16/2023 2100 by Juliette Barbosa RN  Safety Promotion/Fall Prevention: safety round/check completed  Taken 3/16/2023 2000 by Juliette Barbosa RN  Safety Promotion/Fall Prevention: safety round/check completed     Problem: Plan of Care - These are the overarching goals to be used throughout the patient stay.    Goal: Absence of Hospital-Acquired Illness or Injury  Intervention: Prevent Skin Injury  Recent Flowsheet Documentation  Taken 3/17/2023 0444 by Juliette Barbosa RN  Body Position:   left   turned   side-lying 30 degrees   tilted   weight shifting  Taken 3/16/2023 2200 by Juliette Barbosa RN  Body Position:   turned   tilted   weight shifting  Taken  3/16/2023 2000 by Juliette Barbosa RN  Body Position:   position maintained   weight shifting     Problem: Risk for Delirium  Goal: Improved Attention and Thought Clarity  Intervention: Maximize Cognitive Function  Recent Flowsheet Documentation  Taken 3/16/2023 2330 by Juliette Barbosa RN  Sensory Stimulation Regulation:   care clustered   lighting decreased   quiet environment promoted  Reorientation Measures:   clock in view   glasses use encouraged   hearing device use encouraged   reorientation provided   Goal Outcome Evaluation:    VSS on RA. Disoriented to situation and place at times and very forgetful. Pt grew up in [place] and will sometimes start speaking Maltese but does understand English and will speak English, but needs to be reminded sometimes. Pt very unsteady w/ assist of 2 w/ walker and gait belt. Pt rai in place w/ red output (possibly injury from placing rai due to enlarged prostate). Q2hr turns. Tele initiated and vitals Q4hrs. Regular diet maintained. Rt IV in AC w/ NS at 75 ml/hr. Urology consult today. CT done last night. Q2 hr neuros. Son Adi melendez in from [place]. Discharge pending medical recovery

## 2023-03-17 NOTE — UTILIZATION REVIEW
Admission Status; Secondary Review Determination       Under the authority of the Utilization Management Committee, the utilization review process indicated a secondary review on the above patient. The review outcome is based on review of the medical records, discussions with staff, and applying clinical experience noted on the date of the review.     (x) Inpatient Status Appropriate - This patient's medical care is consistent with medical management for inpatient care and reasonable inpatient medical practice.     RATIONALE FOR DETERMINATION     Mr. Urban is a 89 yo male with a PMH of dementia, CVA and HLD who presents to the ED with mechanical fall and inability to get up for many hours.  CT imaging revealed bilateral subdural hematomas. Neurosurgery consult requested; he was kept on close neuro checks.  Repeat head CT with stable findings and close outpt f/up recommended. Remaining on IV keppra bid and IV decadron q8hr for meningioma with mass effect noted on imaging.      He continues to have elevated CK and remains in the hospital for ongoing IVF and close monitoring of I/O's for continued treatment of acute  Rhabdomyolysis.      At the time of admission with the information available to the attending physician more than 2 nights Hospital complex care was anticipated, based on patient risk of adverse outcome if treated as outpatient and complex care required. Inpatient admission is appropriate based on the Medicare guidelines.    The information on this document is developed by the utilization review team in order for the business office to ensure compliance. This only denotes the appropriateness of proper admission status and does not reflect the quality of care rendered.   The definitions of Inpatient Status and Observation Status used in making the determination above are those provided in the CMS Coverage Manual, Chapter 1 and Chapter 6, section 70.4.         Sincerely,     Marcelina Cook,  DO  Utilization Review  Physician Advisor  [facility].

## 2023-03-17 NOTE — CONSULTS
[facility] CONSULT     Type of consult: inpatient  Place of service: [facility]   Reason for consult: Abnormal CT with bladder mass and left renal lesion  Requested by: Dr. Gamez    History of Present Illness:   Kurtis Urban is a 89 year old male with hx of dementia, seizure disorder, CVA, TIA, dyslipidemia; Admitted through the ED after unwitnessed fall at assisted living facility with bilateral subdural hematomas, mild rhabdo, UR.  A urology consult was placed for further evaluation and recommendations possible bladder mass noted on the CT 3/16. History obtained through patient and chart review. Pt is semi-poor historian due to dementia/SDH.     CT Chest/Abd/Pelvis 3/16 showed distended bladder with 2.1 x 2.1 polypoid lesion along right bladder wall, bilateral renal cysts (some high attenuation indicating hemorrhagic or proteinaceous content), asymmetric mild left kidney urothelial thickening and mild to moderate right kidney dilation and punctate non-obstructing stone in left upper pole.     Rai catheter placed in ED 3/16 after 700 ml bladder scan, uncertain initial volume returned. Pt does not take medication for enlarged prostate. UA mildly concerning for infection. UC pending. WBC 11.0. Afebrile. Creatinine 1.07.     Patient this morning was accompanied by his good friends, Matty and Cinthya. Patient son, Missael, is out of town currently otherwise is usually very involved with patient's care. Patient is tolerating rai catheter. He denies any urinary complaints at this time He denies abdominal pain and flank pain. He reports at baseline he has urinary incontinence. Denies rUTIs, kidney stones, or gross hematuria in the past.  Denies constipation currently but does often struggle with constipation.     Past Medical History  Past Medical History:   Diagnosis Date     Dementia with behavioral disturbance 9/3/2019     Depression 5/28/2014     Hypercholesteremia 5/28/2014     Melanoma of back  (H) 5/28/2014    Surgically removed       Melanoma of face (H) 5/28/2014    Surgically removed      Seizure (H) 5/19/2015     Thiamine deficiency 9/3/2019     TIA (transient ischemic attack)        Past Surgical History  Past Surgical History:   Procedure Laterality Date     APPENDECTOMY       CHOLECYSTECTOMY       HERNIA REPAIR       OTHER SURGICAL HISTORY      nasal surgeries     OTHER SURGICAL HISTORY  1993    meniscal surgery     OTHER SURGICAL HISTORY Right     total knee repair     SHOULDER SURGERY Right        Social History  Social History     Socioeconomic History     Marital status:      Spouse name: Not on file     Number of children: Not on file     Years of education: Not on file     Highest education level: Not on file   Occupational History     Not on file   Tobacco Use     Smoking status: Former     Types: Cigarettes     Smokeless tobacco: Never   Substance and Sexual Activity     Alcohol use: Never     Drug use: Never     Sexual activity: Not on file   Other Topics Concern     Not on file   Social History Narrative     Not on file     Social Determinants of Health     Financial Resource Strain: Not on file   Food Insecurity: Not on file   Transportation Needs: Not on file   Physical Activity: Not on file   Stress: Not on file   Social Connections: Not on file   Intimate Partner Violence: Not on file   Housing Stability: Not on file       Medications  Current Facility-Administered Medications   Medication     acetaminophen (TYLENOL) tablet 650 mg    Or     acetaminophen (TYLENOL) Suppository 650 mg     atorvastatin (LIPITOR) tablet 40 mg     cyanocobalamin (VITAMIN B-12) tablet 2,500 mcg     dexamethasone (DECADRON) injection 4 mg     levETIRAcetam (KEPPRA) 500 mg in sodium chloride 0.9 % 100 mL intermittent infusion     levofloxacin (LEVAQUIN) infusion 750 mg     lidocaine (LMX4) cream     lidocaine 1 % 0.1-1 mL     melatonin tablet 1 mg     ondansetron (ZOFRAN ODT) ODT tab 4 mg    Or      "ondansetron (ZOFRAN) injection 4 mg     polyethylene glycol (MIRALAX) Packet 17 g     primidone (MYSOLINE) tablet 50 mg     sodium chloride (PF) 0.9% PF flush 3 mL     sodium chloride (PF) 0.9% PF flush 3 mL     sodium chloride 0.9% infusion     thiamine tablet 50 mg     zinc oxide (DESITIN) 40 % paste       Allergies  Allergies   Allergen Reactions     Penicillins Anaphylaxis and Nausea and Vomiting     Alfuzosin Nausea and Vomiting     Donepezil Other (See Comments)     Myolin [Norflex] Unknown     Other reaction(s): hives     Sulfa (Sulfonamide Antibiotics) [Sulfa Drugs] Nausea and Vomiting     Triazolam Nausea and Vomiting       ROS:   A full 12 point review of systems was taken and is negative aside from what is noted above in the HPI.    Physical exam  /68 (BP Location: Left arm)   Pulse 63   Temp 97.8  F (36.6  C) (Oral)   Resp 16   Ht 1.676 m (5' 6\")   Wt 64.4 kg (141 lb 15.6 oz)   SpO2 94%   BMI 22.92 kg/m    Exam:  General: NAD, alert, cooperative  Head: normocephalic, without abnormality / atraumatic   Abdomen: soft, non- tender,  Non- distended. No suprapubic fullness or tenderness. No bilateral CVA tenderness.   Geniturinary: rai in place draining blood tinged urine without sediment or clot.   Skin: no rashes or lesions  Musculoskeletal: moves all extremities equally  Psychological: alert and oriented, answers questions appropriately      Labs  Lab Results   Component Value Date    WBC 11.1 (H) 03/17/2023    HGB 12.1 (L) 03/17/2023    HCT 37.3 (L) 03/17/2023    PLT 95 (L) 03/17/2023    CHOL 109 03/17/2021    TRIG 73 03/17/2021    HDL 39 (L) 03/17/2021    ALT 27 03/16/2023    AST 65 (H) 03/16/2023     03/17/2023    BUN 19 03/17/2023    CO2 18 (L) 03/17/2023    TSH 0.82 07/28/2020    INR 1.22 (H) 03/16/2023     UA RESULTS:  Recent Labs   Lab Test 03/16/23  1410 01/18/20  1650   COLOR Yellow Yellow   APPEARANCE Clear Clear   URINEGLC Negative Negative   URINEBILI Negative Negative "   URINEKETONE Negative Negative   SG 1.016 1.015   UBLD 0.1 mg/dL* Negative   URINEPH 5.5 6.0   PROTEIN 50* Negative   UROBILINOGEN  --  <2.0 E.U./dL   NITRITE Negative Negative   LEUKEST 25 Bipin/uL* Negative   RBCU 7*  --    WBCU 34*  --        Intake/Output Summary (Last 24 hours) at 3/17/2023 1247  Last data filed at 3/17/2023 0814  Gross per 24 hour   Intake 1150 ml   Output 1500 ml   Net -350 ml     Cultures:  Urine Culture: pending, NGTD on preliminary     Lab Results: personally reviewed     Imaging:  EXAM: CT CHEST ABDOMEN PELVIS W/O CONTRAST. LOCATION: [facility]. DATE/TIME: 3/16/2023 1:18 PM. INDICATION: unwitnessed fall, generalized weakness, unable to get self up of ground.C OMPARISON: None.TECHNIQUE: CT scan of the chest, abdomen, and pelvis was performed without IV contrast. Multiplanar reformats were obtained. Dose reduction techniques were used. CONTRAST: None. FINDINGS:   LUNGS AND PLEURA: Dependent atelectasis. A few tiny nodules in the left upper lobe measuring 0.3 cm (e.g. 4/74)  MEDIASTINUM/AXILLAE: No enlarged thoracic lymph nodes. 2.9 x 2.3 cm left thyroid nodule with peripheral calcification (3/25). No thoracic aortic aneurysm.  CORONARY ARTERY CALCIFICATION: Mild.  HEPATOBILIARY: Cholecystectomy. Liver and bile ducts are unremarkable.  PANCREAS: Normal.  SPLEEN: Normal.  ADRENAL GLANDS: Normal.  KIDNEYS/BLADDER: Cortical and parapelvic cysts bilaterally, some of which are high attenuation in the left kidney. Asymmetric mild left urothelial thickening and mild-moderate right renal collecting system dilation. Punctate nonobstructing calculus in   the left upper pole. 2.1 x 1.2 cm polypoid lesion along the right lateral bladder wall.  BOWEL: Diverticulosis of the colon. No acute inflammatory change. No obstruction.   LYMPH NODES: Normal.  VASCULATURE: Mild diffuse atherosclerotic calcification. No aneurysm.  PELVIC ORGANS: Prostatomegaly.  MUSCULOSKELETAL: Mild  dependent body wall edema. No fluid collection or hematoma. Surgical hardware in both shoulders. No acute fracture.                                                       IMPRESSION:  1.  No acute or traumatic finding in the chest, abdomen, or pelvis, within the limitations of noncontrast technique.  2.  Bilateral renal cysts, some of which are high attenuation indicating hemorrhagic/proteinaceous content. There is also asymmetric left-sided urothelial thickening and right-sided collecting system dilation, as well as a polypoid lesion in the bladder,   which could represent urothelial carcinoma. Recommend further evaluation with CT urogram.  3.  Left thyroid nodule measuring 2.9 cm. This could be further evaluated with thyroid ultrasound, if not already performed.  4.  Tiny pulmonary nodules. Follow-up recommendations below:         EXAM: CT UROGRAM WO and W CONTRAST. LOCATION: [facility]. DATE/TIME: 3/16/2023 8:11 PM. INDICATION: bladder mass, ureteral enhancement COMPARISON: 03/16/2023, 1253 hours. TECHNIQUE: CT scan of the abdomen and pelvis using urogram technique with pre contrast, post contrast, and delayed images. Multiplanar reformats were obtained. Dose reduction techniques were used. CONTRAST: Isovue 370 100mL. FINDINGS:   LOWER CHEST: Dependent atelectasis.  HEPATOBILIARY: Cholecystectomy. Liver and bile ducts unremarkable.  PANCREAS: Normal.  SPLEEN: Normal.  ADRENAL GLANDS: Normal.  RIGHT KIDNEY/URETER: Simple cortical and parapelvic cysts, which do not require further follow-up. Mild-moderate renal pelvis and ureteral dilation with relative sparing of the calyces. No urothelial thickening, filling defects, urothelial enhancement,   or stricture.  LEFT KIDNEY/URETER: 1.3 cm endophytic lesion in the upper pole is high attenuation on precontrast images and demonstrates equivocal enhancement (15-20 Hounsfield units). Other simple and parapelvic and cortical cysts do not demonstrate  enhancement. The   urothelial thickening that was seen on the same day noncontrast CT does not persist on nephrographic or excretory phase images. There is mild fullness of the renal pelvis and ureter, but no filling defects, urothelial enhancement, or stricture  BLADDER: Enhancing polypoid mass along the right lateral wall measuring 2.9 x 2.1 cm (12/135). No extravesical soft tissue. Retana catheter in the lumen.  BOWEL: Diverticulosis of the colon. No acute inflammatory change. No obstruction.   LYMPH NODES: Normal.  VASCULATURE: Mild diffuse atherosclerotic calcification. No aneurysm.  PELVIC ORGANS: Prostatomegaly.  MUSCULOSKELETAL: Mild subcutaneous body wall edema.                                                                 IMPRESSION:  1.  Enhancing polypoid soft tissue lesion along the right lateral bladder wall. Recommend further evaluation with cystoscopy. No evidence of upper tract disease.  2.  Patulous mildly dilated ureters bilaterally, right greater than left, but no calyceal dilation. This is most suggestive of sequelae of vesicoureteral reflux or a prior obstruction.   3.  High attenuation lesion in the upper pole left kidney demonstrates equivocal enhancement, which is probably technical due to endophytic location. Renal MRI is suggested to exclude true enhancement.    I have personally reviewed the imaging reports above.       Assessment/plan  Kurtis Urban is being seen by [facility] for bladder lesion on CT    89 yr old male with dementia, hx CVA, now bilateral subdural hematomas and mild rhabdo 2/2 prolonged down after a fall; Incidental CT findings of distended bladder with 2.1 x 2.1 cm polypoid lesion along right bladder wall, bilateral renal cysts (some high attenuation indicating hemorrhagic or proteinaceous content), asymmetric mild left kidney urothelial thickening and mild to moderate right kidney dilation and punctate non-obstructing stone in left upper pole.     - UC  preliminary NGTD. WBC is WNL. Afebrile. Continue antibiotic coverage given right hydro and adjust as needed pending cultures/sensitivities.   - Rai placed in ED yesterday for bladder scan 700 ml. Maintain rai catheter. We will plan for outpatient TOV in a few weeks along with outpatient cystoscopy.   - Hgb 12.9 to 12.1 today. Stable. Monitor with patients gross hematuria. Hematuria likely secondary to anticoagulation and traumatic rai insertion. Hold anticoagulation as able. Okay for nurses to hand irrigate into rai catheter once a shift and as needed for worsening hematuria, patient discomfort or catheter not draining well.   - Prostatomegaly noted on CT. Recommend tamsulosin 0.4 mg, if no contraindications, BP stable.   - Creatinine is still WNL, improved with rai placement, 0.93 today from 1.07 yesterday.   - Dr Daniel reviewed non-contrast CT yesterday and ordered follow up CTU. Discussed findings with Dr. Daniel today. CTU showed enhancing polypoid soft tissue lesion along the right lateral bladder wall. Patient would likely benefit from TURBT in the future.   - CTU also showed patulous mildly dilated ureters bilaterally, R>L with no calyceal dilation with a 1.3 cm endophytic lesion in the upper pole of left kidney. Discussed with Dr. Daniel who agree's with renal MR tomorrow morning. Orders placed.   - Will see patient again tomorrow.     This case was discussed with: Dr. Daniel.     Thank you for consulting [facility] regarding this patient's care. Please contact us with questions or concerns.     Dayana Flanagan PA-C  [facility]  Office Phone: #[phone]

## 2023-03-17 NOTE — CONSULTS
Care Management Initial Consult    General Information  Assessment completed with: Children, Adi  Type of CM/SW Visit: Initial Assessment    Primary Care Provider verified and updated as needed: Yes   Readmission within the last 30 days: no previous admission in last 30 days      Reason for Consult: discharge planning  Advance Care Planning:            Communication Assessment  Patient's communication style: spoken language (English or Bilingual) (Azeri first lang. but can understand and speak English well)    Hearing Difficulty or Deaf: yes   Wear Glasses or Blind: yes    Cognitive  Cognitive/Neuro/Behavioral: .WDL except, orientation, speech  Level of Consciousness: confused, alert  Arousal Level: opens eyes spontaneously  Orientation: disoriented to, place, situation  Mood/Behavior: calm, cooperative  Best Language: 0 - No aphasia  Speech: hesitant, illogical, garbled    Living Environment:   People in home: alone     Current living Arrangements: independent living facility      Able to return to prior arrangements: yes       Family/Social Support:  Care provided by: self  Provides care for: no one  Marital Status:   Children (friends)          Description of Support System: Supportive, Involved         Current Resources:   Patient receiving home care services: No     Community Resources: None  Equipment currently used at home: grab bar, toilet, grab bar, tub/shower, walker, standard, shower chair  Supplies currently used at home: None    Employment/Financial:  Employment Status: retired        Financial Concerns: No concerns identified   Referral to Financial Worker: No       Lifestyle & Psychosocial Needs:  Social Determinants of Health     Tobacco Use: Medium Risk     Smoking Tobacco Use: Former     Smokeless Tobacco Use: Never     Passive Exposure: Not on file   Alcohol Use: Not on file   Financial Resource Strain: Not on file   Food Insecurity: Not on file   Transportation Needs: Not on file    Physical Activity: Not on file   Stress: Not on file   Social Connections: Not on file   Intimate Partner Violence: Not on file   Depression: Not at risk     PHQ-2 Score: 0   Housing Stability: Not on file       Functional Status:  Prior to admission patient needed assistance:   Dependent ADLs:: Ambulation-walker  Dependent IADLs:: Cleaning, Cooking, Medication Management, Transportation       Mental Health Status:  Mental Health Status: No Current Concerns       Chemical Dependency Status:  Chemical Dependency Status: No Current Concerns             Values/Beliefs:  Spiritual, Cultural Beliefs, Anabaptist Practices, Values that affect care: no               Additional Information:  KENDALL introduced self and CM services to Pt and son, Adi, who was present at bedside.  Assessment completed with Adi.  Pt lives at [facility] in independent living.  Pt uses a walker at baseline and receives assistance with cleaning, medication set up / checking in to ensure medications have been taken, and meals.  Adi reports ability to add daily wellness checks if needed.  [place]i [place] of state and reports minimal family support within [place].  Pt does have friends in [place] who he sees weekly who take him grocery shopping and out in the community.  Adi will transport Pt home at discharge.     PT and OT consults pending.  CM will follow up as necessary on recommendations.      TIFFANIE Bolaños

## 2023-03-17 NOTE — PROGRESS NOTES
03/17/23 1345   Appointment Info   Signing Clinician's Name / Credentials (OT) Beatrice Bennett OTR/L OTD   Living Environment   People in Home alone   Current Living Arrangements independent living facility   Home Accessibility no concerns   Living Environment Comments Walk in shower with grab bars - shower chair, grab bars by toilet   Self-Care   Fall history within last six months yes   Number of times patient has fallen within last six months 1   Activity/Exercise/Self-Care Comment Pt typically ind with ADLs, gets assist for IADLs - facility provides assist for medication mgmt and daily checks. Friends assist with grocery shopping and community outings   General Information   Onset of Illness/Injury or Date of Surgery 03/16/23   Referring Physician Xiomara Gamez MD   Patient/Family Therapy Goal Statement (OT) To get stronger   Additional Occupational Profile Info/Pertinent History of Current Problem 89 year old male past medical history significant for dementia lives in a independent living facility, ambulates with a walker, seizure disorder, CVA, TIA, dyslipidemia brought into the emergency room with complaints of unwitnessed fall at care facility and unable to get up for about 10 to 12 hours being admitted for acute on chronic subdural hematoma, possible meningioma, early rhabdomyolysis, abnormal appearing urothelial tract CT.   Existing Precautions/Restrictions fall   Limitations/Impairments hearing  ([place] - hearing aids)   Cognitive Status Examination   Orientation Status orientation to person, place and time   Cognitive Status Comments Monitor throughout admission'   Visual Perception   Visual Impairment/Limitations corrective lenses full-time   Posture   Posture not impaired   Range of Motion Comprehensive   General Range of Motion bilateral upper extremity ROM WFL   Comment, General Range of Motion Shoulder deficits d/t 2 previous TSA surgeries   Strength Comprehensive (MMT)   General Manual Muscle Testing  (MMT) Assessment upper extremity strength deficits identified   Comment, General Manual Muscle Testing (MMT) Assessment Generalized weakness   Bed Mobility   Bed Mobility supine-sit   Supine-Sit [place] (Bed Mobility) moderate assist (50% patient effort)   Assistive Device (Bed Mobility) bed rails;draw sheet   Transfers   Transfers sit-stand transfer;toilet transfer   Sit-Stand Transfer   Sit-Stand [place] (Transfers) moderate assist (50% patient effort)   Toilet Transfer   [place] Level (Toilet Transfer) minimum assist (75% patient effort)   Balance   Balance Assessment standing balance: dynamic   Balance Comments Fair with short fxl ambulation using FWW   Activities of Daily Living   BADL Assessment/Intervention lower body dressing;toileting   Lower Body Dressing Assessment/Training   [place] Level (Lower Body Dressing) moderate assist (50% patient effort)   Toileting   Assistive Devices (Toileting) commode chair   [place] Level (Toileting) minimum assist (75% patient effort)   Clinical Impression   Criteria for Skilled Therapeutic Interventions Met (OT) Yes, treatment indicated   OT Diagnosis Decreased ind with ADLs and safety   Influenced by the following impairments Failure to thrive, fall   OT Problem List-Impairments impacting ADL activity tolerance impaired;balance;cognition;strength;mobility   Assessment of Occupational Performance 3-5 Performance Deficits   Identified Performance Deficits dressing, toileting, bed mobility, activity tolerance   Planned Therapy Interventions (OT) ADL retraining;bed mobility training;strengthening   Clinical Decision Making Complexity (OT) moderate complexity   Risk & Benefits of therapy have been explained evaluation/treatment results reviewed;care plan/treatment goals reviewed;risks/benefits reviewed;current/potential barriers reviewed;patient   OT Total Evaluation Time   OT Eval, Moderate Complexity Minutes (53600) 10   OT Goals   Therapy Frequency  (OT) Daily   OT Predicted Duration/Target Date for Goal Attainment 03/24/23   OT Goals Hygiene/Grooming;Lower Body Dressing;Toilet Transfer/Toileting;Cognition   OT: Hygiene/Grooming supervision/stand-by assist;while standing   OT: Lower Body Dressing Supervision/stand-by assist   OT: Toilet Transfer/Toileting Supervision/stand-by assist;toilet transfer;cleaning and garment management   OT: Cognitive Patient/caregiver will verbalize understanding of cognitive assessment results/recommendations as needed for safe discharge planning   Interventions   Interventions Quick Adds Self-Care/Home Management   Self-Care/Home Management   Self-Care/Home Mgmt/ADL, Compensatory, Meal Prep Minutes (15910) 24   Symptoms Noted During/After Treatment (Meal Preparation/Planning Training) fatigue   Treatment Detail/Skilled Intervention Extended time provided for bed mobility, mod cueing for hand placement and body mechanics to assist with scooting to EOB and lifting buttocks from bed, completed with mod A. Extended time for rest breaks seated d/t fatigue. STS from EOB min A x1 2 attempts, able to tolerate standing ~ 30 second bouts. Commode transfer min A x1, mod A to assist to commode d/t fear and fatigue. Donned brief with mod A x1 for threading of catheter STS min A x1, pericares and garment mgmt max A. Able to take short steps to bedside chair, min A for lowering, cueing for hand placement to increase control with descent.   OT Discharge Planning   OT Plan G/h seated or standing with chair behind, UE exer, progress transfers, toileting, dressing, cognition   OT Discharge Recommendation (DC Rec) Transitional Care Facility   OT Rationale for DC Rec Pt currently requires A x1 for all ADLs and mobility, pt demos weakness and limited by low activity tolerance, recommend TCU to increase strength and ind with ADLs for ind living   OT Brief overview of current status Min-mod A transfers, max A toileting   Total Session Time   Timed Code  Treatment Minutes 24   Total Session Time (sum of timed and untimed services) 34

## 2023-03-18 NOTE — PROGRESS NOTES
[facility]     Reason for follow up: Abnormal CT with bladder mass and left renal lesion    SUBJECTIVE:  Events: no acute events overnight    Patient reports feeling well. Patient is tolerating rai catheter. He denies any urinary complaints at this time. He denies abdominal pain and flank pain.    OBJECTIVE:  PHYSICAL EXAM:  Temp: 97.3  F (36.3  C) Temp src: Oral BP: 120/67 Pulse: 60   Resp: 16 SpO2: 96 % O2 Device: None (Room air)    General: NAD, alert, cooperative  Head: normocephalic, without abnormality / atraumatic  Abdomen: soft, non tender, non distended. non suprapubic fullness, no suprapubic tenderness. no CVA tenderness,   Genitourinary: rai in place draining clear yellow urine without sediment or clot  Skin: No rashes or lesions  Musculoskeletal: moves all four extremities equally; no calf edema or tenderness  Psychological: alert and oriented, answers questions appropriately    LABS:  Creatinine   Date Value Ref Range Status   03/18/2023 0.94 0.70 - 1.30 mg/dL Final     WBC Count   Date Value Ref Range Status   03/17/2023 11.1 (H) 4.0 - 11.0 10e3/uL Final     Hemoglobin   Date Value Ref Range Status   03/17/2023 12.1 (L) 13.3 - 17.7 g/dL Final   ]  Platelet Count   Date Value Ref Range Status   03/17/2023 95 (L) 150 - 450 10e3/uL Final       Intake/Output Summary (Last 24 hours) at 3/18/2023 1338  Last data filed at 3/18/2023 0700  Gross per 24 hour   Intake 1956 ml   Output 1500 ml   Net 456 ml       Cultures:  Urine 3/16: no growth    Lab Results: personally reviewed.     ASSESSMENT/PLAN:  Kurtis Urban is being seen by [facility] for incidental CT findings of distended bladder with 2.1 x 2.1 cm polypoid lesion along right bladder wall, bilateral renal cysts (some high attenuation indicating hemorrhagic or proteinaceous content), asymmetric mild left kidney urothelial thickening and mild to moderate right kidney dilation and punctate non-obstructing stone  in left upper pole.     - Patient gross hematuria has resolved. Maintain rai catheter. We will plan for outpatient TOV in a few weeks along with outpatient cystoscopy.    - Recommend tamsulosin 0.4 mg, if no contraindications.  - Creatinine is 0.94 today, stable  - CTU 3/16 showed right lateral bladder wall lesion, patulous mildly dilated ureters bilaterally, R>L with no calyceal dilation with a 1.3 cm endophytic lesion in the upper pole of left kidney.    - Renal MR obtained this afternoon, 3/18. Will follow up on left renal lesion in outpatient setting.   - PT recommending patient discharge to TCU/ long term care facility. Okay for discharge from urologic standpoint with rai in place.   - Patient would likely benefit from TURBT in the future, to be discussed more with outpatient follow up.   - Urology will sign off. Email has been sent to our office, orders placed in the dc navigator. Please do not hesitate to call or re consult with urological concerns.     Discussed with Dr. Umair Flanagan PA-C  [facility]   [phone]

## 2023-03-18 NOTE — PROGRESS NOTES
03/18/23 0945   Appointment Info   Signing Clinician's Name / Credentials (PT) Prasad Coughlin DPT   Living Environment   People in Home alone   Current Living Arrangements independent living facility   Home Accessibility no concerns   Transportation Anticipated family or friend will provide   Living Environment Comments Lives in independent living at [facility]   Self-Care   Equipment Currently Used at Home walker, rolling   Fall history within last six months yes   Number of times patient has fallen within last six months 6   Activity/Exercise/Self-Care Comment Pt independent with ADLs and mobility. Does receive help with meds and schedule. Per son, about 6 falls in the pst six months, with falls increasingly due to lack of insight, lack of safety awareness   General Information   Onset of Illness/Injury or Date of Surgery 03/16/23   Referring Physician Go Oviedo MD   Patient/Family Therapy Goals Statement (PT) patient would like to return to his apartment at [facility], per son, Adi, family is considering a higher level of care or increase in services.   Pertinent History of Current Problem (include personal factors and/or comorbidities that impact the POC) mechanical fall in kitchen, found on floor the next day,  Noted     Urinary retention 3/16/2023    Generalized muscle weakness 3/16/2023    Failure to thrive in adult 3/16/2023    Bilateral subdural hematomas 3/16/2023   Existing Precautions/Restrictions fall   Cognition   Affect/Mental Status (Cognition) flat/blunted affect;confused   Orientation Status (Cognition) verbal cues/prompts needed for orientation   Follows Commands (Cognition) 75-90% accuracy   Safety Deficit (Cognition) moderate deficit;awareness of need for assistance;insight into deficits/self-awareness;judgment;problem-solving;safety precautions awareness   Posture    Posture Protracted shoulders;Forward head position;Kyphosis;Lordosis   Strength (Manual Muscle Testing)    Strength Comments generalized weakness   Bed Mobility   Bed Mobility rolling left;supine-sit   Rolling Left Jones (Bed Mobility) modified independence   Supine-Sit Jones (Bed Mobility) minimum assist (75% patient effort);verbal cues;nonverbal cues (demo/gesture)   Bed Mobility Limitations cognitive deficits;decreased ability to use arms for pushing/pulling;decreased ability to use legs for bridging/pushing;impaired ability to control trunk for mobility   Impairments Contributing to Impaired Bed Mobility abnormal muscle tone;impaired postural control;decreased ROM;decreased strength   Assistive Device (Bed Mobility) bed rails   Transfers   Transfers sit-stand transfer   Transfer Safety Concerns Noted losing balance backward   Impairments Contributing to Impaired Transfers decreased strength;impaired postural control;impaired motor control;impaired balance   Sit-Stand Transfer   Sit-Stand Jones (Transfers) moderate assist (50% patient effort)   Assistive Device (Sit-Stand Transfers) walker, front-wheeled   Gait/Stairs (Locomotion)   Jones Level (Gait) contact guard   Assistive Device (Gait) walker, front-wheeled   Distance in Feet 250   Distance in Feet (Gait) 250   Deviations/Abnormal Patterns (Gait) base of support, wide;rowena decreased;gait speed decreased;stride length decreased;weight shifting decreased   Balance   Balance Comments poor standing balance   Clinical Impression   Criteria for Skilled Therapeutic Intervention Yes, treatment indicated   PT Diagnosis (PT) impaired functional mobility, at increased risk for falls and inujury   Influenced by the following impairments weakness, balance impairment, cognitive impairment   Functional limitations due to impairments all functional mobility   Clinical Presentation (PT Evaluation Complexity) Evolving/Changing   Clinical Presentation Rationale presents as diagnosed   Clinical Decision Making (Complexity) moderate complexity    Planned Therapy Interventions (PT) balance training;gait training;transfer training   Risk & Benefits of therapy have been explained evaluation/treatment results reviewed;care plan/treatment goals reviewed;risks/benefits reviewed;current/potential barriers reviewed;participants voiced agreement with care plan;participants included;patient;son   PT Total Evaluation Time   PT Eval, Moderate Complexity Minutes (98038) 15   Physical Therapy Goals   PT Frequency Daily   PT Predicted Duration/Target Date for Goal Attainment 03/24/23   PT Goals Bed Mobility;Gait;Transfers;PT Goal 1   PT: Bed Mobility Independent;Supine to/from sit   PT: Transfers Modified independent;Sit to/from stand;Assistive device   PT: Gait Modified independent;Rolling walker;150 feet   PT: Goal 1 I with balance HEP   Interventions   Interventions Quick Adds Gait Training;Therapeutic Activity   Therapeutic Activity   Therapeutic Activities: dynamic activities to improve functional performance Minutes (22489) 15   Treatment Detail/Skilled Intervention Supine to sit Kell with bedrails, VCs for procedure. HOB elevated to help with the transfer. STS with FWW From EOB at home height, Kell, with VCS for safe hand position. Later stood from recliner and required ModA for unclear reasons, perhaps confusion.   Gait Training   Gait Training Minutes (84423) 15   Treatment Detail/Skilled Intervention ambulated in hernandez with chair follow, CGA throughout, slow, steady gait, but showing lack of obstavcle and safety awareness, requiring VCS for path fidelity   Bruner Level (Gait Training) contact guard   Assistive Device (Gait Training) rolling walker   PT Discharge Planning   PT Plan gait, balance, bed mobility   PT Discharge Recommendation (DC Rec) (S)  Transitional Care Facility;Long term care facility   PT Rationale for DC Rec Patient improving with mobility but requires physical assistance with all mobility at evaluation today. Recommending TCU or  long-term care/memory care. Return to [place] at [facility] perhaps possible with increased services, but patient's extensive fall history suggests higher level of care is prudent at this time.   Total Session Time   Timed Code Treatment Minutes 30   Total Session Time (sum of timed and untimed services) 45

## 2023-03-18 NOTE — PLAN OF CARE
"Goal Outcome Evaluation:      Plan of Care Reviewed With: patient    Overall Patient Progress: improvingOverall Patient Progress: improving    At the beginning of the shift, patient was alert and oriented x4, but can be forgetful, then later in the shift, had one occurrence of being fearful after being woken up, spoke in Swedish, confused, and shouted \"please let me die,\" reorientation and reassurance provided, was able to calm patient, [place], rai in place, urine becoming less red and more yellow with small clots, irrigated bladder this shift, on telemetry, read bradycardic x2 then NSR, denied SOB and chest pain/pressure, IVF running at 100 ml/hr, up with one gb/walker.    "

## 2023-03-18 NOTE — PLAN OF CARE
Goal: Optimal Comfort and Wellbeing  Outcome: Progressing     Patient denies pain. Renal MRI completed. Participated in PT/OT. Retana patent and draining, irrigated per order. Family at bedside, VSS.

## 2023-03-18 NOTE — PROGRESS NOTES
[facility]    Medicine Progress Note - Hospitalist Service    Date of Admission:  3/16/2023    Assessment & Plan   Kurtis Urban is a 89 year old male admitted s/p fall with traumatic SDH.  Overall clinically stable.  There is no currently rhabdomyolysis give CK <5000 and no associated renal dysfunction.  Urine cx negative, abx discontinued.  Plan to reassess need for rai tomorrow.    # Traumatic subdural hematoma  # Hx seizure disorder  - PO levetiracetam    # Urinary retention, bladder mass  - rai in place  - outpatient urology followup       Diet: Regular Diet Adult    Rai Catheter: PRESENT, indication: Retention  Lines: None     Cardiac Monitoring: ACTIVE order. Indication: Electrolyte Imbalance (24 hours)- Magnesium <1.3 mg/ml; Potassium < =2.8 or > 5.5 mg/ml  Code Status: Full Code      Clinically Significant Risk Factors                # Thrombocytopenia: Lowest platelets = 95 in last 2 days, will monitor for bleeding                 Disposition Plan      Expected Discharge Date: 03/19/2023      Destination: home  Discharge Comments: TCU vs LTC          Go Oviedo MD  Hospitalist Service  [facility]  Securely message with [facility] (more info)  Text page via [employer] Paging/Directory   ______________________________________________________________________    Interval History   Denies chest pain/pressure, SOB, or abdominal pain.    Physical Exam   Vital Signs: Temp: 97.3  F (36.3  C) Temp src: Oral BP: 120/67 Pulse: 60   Resp: 16 SpO2: 96 % O2 Device: None (Room air)    Weight: 141 lbs 15.62 oz    Gen: sitting comfortably in chair, nad  CV:  Nl rate, regular rhythm  Pulm:  No acute resp distress, ctab anteriorly  GI:  Abdomen soft, NTTP  : koolaid colored urine in bag    Medical Decision Making       15 MINUTES SPENT BY ME on the date of service doing chart review, history, exam, documentation & further activities per the note.      Data   Urine  cx 3/16 - no growth    Na 139  K 4.4  BUN 24  Cr 0.9        Renal MRI  IMPRESSION:  Hemorrhagic cyst accounts for the abnormality in the left kidney which was indeterminate on CT. No further follow-up is necessary.

## 2023-03-19 NOTE — PLAN OF CARE
Goal Outcome Evaluation:      Plan of Care Reviewed With: patient    Overall Patient Progress: improving    VSS on RA, alert and oriented x3, disoriented to situation, able to make needs known, denied pain, SWAT placed new IV, Retana in place and draining, urine improving in color, small clots noted, irrigated per order, slept comfortably throughout the night, up one with gb/walker.

## 2023-03-19 NOTE — PROGRESS NOTES
[facility]    Medicine Progress Note - Hospitalist Service    Date of Admission:  3/16/2023    Assessment & Plan   Kurtis Urban  is a 89 year old male admitted s/p fall with traumatic SDH.  Overall clinically stable.  Wean down steroids.  Spoke to urology team today, they recommend maintaining rai for three weeks until urology followup to allow decompression.  Medically ready for discharge.    # Traumatic subdural hematoma  # Hx seizure disorder  - PO levetiracetam  - dexamethasone taper     # Urinary retention, bladder mass  - rai to remain in place  - outpatient urology followup          Diet: Regular Diet Adult    Rai Catheter: PRESENT, indication: Retention  Lines: None     Cardiac Monitoring: None  Code Status: Full Code      Clinically Significant Risk Factors                                Disposition Plan     Expected Discharge Date: 03/20/2023      Destination: home  Discharge Comments: TCU vs LTC          Go Oviedo MD  Hospitalist Service  [facility]  Securely message with [employer] (more info)  Text page via [employer] Paging/Directory   ______________________________________________________________________    Interval History   Denies chest pain/pressure, SOB, or abdominal pain.    Physical Exam   Vital Signs: Temp: 98.7  F (37.1  C) Temp src: Oral BP: (!) 140/78 Pulse: 62   Resp: 16 SpO2: 92 % O2 Device: None (Room air)    Weight: 141 lbs 15.62 oz    Gen:  Sitting comfortably in chair, nad  CV: bradycardia, regular rhythm  Pulm:  No acute resp distress, ctab  GI:  Abdomen soft, NTTP  :  Yellow urine in bag    Medical Decision Making       15 MINUTES SPENT BY ME on the date of service doing chart review, history, exam, documentation & further activities per the note.      Data

## 2023-03-19 NOTE — PLAN OF CARE
Assumed care around 11:30 am. Previous RN completed full assessment. No acute issues over the last four hours. Up in chair, ate lunch, walked in hallway with PT, took scheduled meds then back to bed for a rest this afternoon. Retana remains in place. Son at bedside.

## 2023-03-20 NOTE — PLAN OF CARE
Goal Outcome Evaluation:      Plan of Care Reviewed With: patient    Overall Patient Progress: improvingOverall Patient Progress: improving    Alert and oriented x4, able to make needs known, [place], denied pain, slept comfortably throughout the night, rai in place and patent, urine appeared yellow, up one with gb/walker, PIV saline locked, plan is TCU placement

## 2023-03-20 NOTE — PROGRESS NOTES
Care Management Discharge Note    Discharge Date: 03/20/2023       Discharge Disposition: Transitional Care    Discharge Services: TCU    Discharge DME:  (per treatment team)    Discharge Transportation: family or friend will provide    Private pay costs discussed: Not applicable    PAS Confirmation Code:  ([id number])  Patient/family educated on Medicare website which has current facility and service quality ratings: yes    Education Provided on the Discharge Plan:  yes  Persons Notified of Discharge Plans: pt and son   Patient/Family in Agreement with the Plan:  yes    Handoff Referral Completed: yes     Additional Information:    9:38 AM  KENDALL reviewed chart.  Pt from [facility].  PT and OT both recommending TCU.  KENDALL met with Pt and sonAdi.  Pt/Family in agreement with TCU, with hopes of discharging to [facility].  KENDALL in contact with admissions coordinator who confirms bed is available today if insurance authorization is back. KENDALL initiated [employer] authorization for TCU (request ID: [id number]).  Pt's son Adi has questions regarding rai / follow up with urology.  KENDALL updated MD.     11:01 AM  [employer] authorization approved ([id number]).    12:04 PM  [facility] can accept Pt anytime this afternoon.  KENDALL discussed transportation with son, Adi, who reports he will transport.  Adi will be available to transport after 1430.    1:32 PM  KENDALL faxed signed orders to [facility].     TIFFANIE Bolaños

## 2023-03-20 NOTE — PROGRESS NOTES
[facility]    Medicine Progress Note - Hospitalist Service    Date of Admission:  3/16/2023    Assessment & Plan   Kurtis Urban is a 89 year old male admitted s/p fall with traumatic SDH.  Overall clinically stable.  Medically ready for discharge when appropriate disposition arranged.  Appears to have nasal congestion, start saline spray.    # Traumatic subdural hematoma  # Hx seizure disorder  - PO levetiracetam  - dexamethasone taper     # Urinary retention, bladder mass  - rai to remain in place  - outpatient urology followup    # Nasal congestion  - saline spray       Diet: Regular Diet Adult    Rai Catheter: PRESENT, indication: Retention  Lines: None     Cardiac Monitoring: None  Code Status: Full Code      Clinically Significant Risk Factors                                Disposition Plan      Expected Discharge Date: 03/20/2023      Destination: home  Discharge Comments: TCU vs LTC          Go Oviedo MD  Hospitalist Service  [facility]  Securely message with [employer] (more info)  Text page via [employer] Paging/Directory   ______________________________________________________________________    Interval History   Denies chest pain/pressure, SOB, or abdominal pain.  Describes needing a spray for his nose.    Physical Exam   Vital Signs: Temp: 98.5  F (36.9  C) Temp src: Oral BP: (!) 146/77 Pulse: 59   Resp: 16 SpO2: 94 % O2 Device: None (Room air)    Weight: 141 lbs 15.62 oz    Gen:  Lying comfortably in bed, nad  CV: nl rate, regular rhythm  Pulm: no acute resp distress, ctab anteriorly  GI:  Abdomen soft, NTTP  Neuro: alert, conversant    Medical Decision Making             Data

## 2023-03-20 NOTE — PLAN OF CARE
Problem: Plan of Care - These are the overarching goals to be used throughout the patient stay.    Goal: Optimal Comfort and Wellbeing  Outcome: Progressing   Goal Outcome Evaluation:    VSS. Up to chair for meals. Retana draining clear urine, no clots. Not irrigated this shift, did pass on to night shift RN during end of shift report. Able to make needs known. Alert to self and place, forgetful with situation. Adequate appetite.

## 2023-03-20 NOTE — PROGRESS NOTES
Occupational Therapy Discharge Summary    Reason for therapy discharge:    Discharged to transitional care facility.    Progress towards therapy goal(s). See goals on Care Plan in [facility] electronic health record for goal details.  Goals not met.  Barriers to achieving goals:   discharge from facility.    Therapy recommendation(s):    Continued therapy is recommended.  Rationale/Recommendations:  OT for adls and cognition.

## 2023-03-20 NOTE — PROGRESS NOTES
Physical Therapy Discharge Summary    Reason for therapy discharge:    Discharged to transitional care facility.  All goals and outcomes met, no further needs identified.    Progress towards therapy goal(s). See goals on Care Plan in [facility] electronic health record for goal details.  Goals met    Therapy recommendation(s):    Continued therapy is recommended.  Rationale/Recommendations:  Continue at facility.

## 2023-03-20 NOTE — PROGRESS NOTES
Up in chair, ate lunch Son at bedside. Patient Crow with hearing aid. rai remains in place and draining, urine appears yellow , no clots noted, irrigated per order.  Covid Test Negative. Awaiting TCU placement.

## 2023-03-21 NOTE — PROGRESS NOTES
[facility]    Background: Transitional Care Management program identified per system criteria and reviewed by [facility] Resource [facility] team for possible outreach.    Assessment: Upon chart review, CCRC Team member will not proceed with patient outreach related to this episode of Transitional Care Management program due to reason below:    Non-[facility] TCU: CCRC team member noted patient discharged to TCU/ARU/LTACH. Patient is not established with a [facility] currently supported by [facility] Primary Care-Care Coordination therefore handoff to Primary Care-Care Coordination is not appropriate at this time.    Plan: Transitional Care Management episode addressed appropriately per reason noted above.      XIOMY Manzo  Connected Care Resource [place], [facility]    *Connected Care Resource Team does NOT follow patient ongoing. Referrals are identified based on internal discharge reports and the outreach is to ensure patient has an understanding of their discharge instructions.

## 2023-03-21 NOTE — PROGRESS NOTES
[facility] GERIATRIC SERVICES  Chief Complaint   Patient presents with     [facility] F/U     [place] Medical Record Number:  [id number]  Place of Service where encounter took place:  No question data found.  Code Status:  Full Code    HISTORY:      HPI:  Kurtis Urban  is 89 year old (2/23/1929) undergoing physical and occupational therapy. He is with past medical history significant for dementia lives in a independent living facility, ambulates with a walker, seizure disorder, CVA, TIA, dyslipidemia brought into the emergency room with complaints of unwitnessed fall at care facility and unable to get up for about 10 to 12 hours being admitted for acute on chronic subdural hematoma, possible meningioma, early rhabdomyolysis, abnormal appearing urothelial tract CT.  Excerpted from records  In ED, CT head revealed mixed attenuating subdural collections concerning for acute on chronic subdural hemorrhage, rounded lesion in the anterior middle cranial fossa concerning for possible meningioma, mildly low attenuating changes in the left temporal lobe compatible with edema.  Neurosurgery recommended MRI brain, repeat CT head in 6 hours, if worsening they recommend transfer in case patient and family wishes to proceed with any further intervention.  His son is flying in from [place].   CT chest abdomen pelvis also revealed bilateral renal cysts,?  Hemorrhage, asymmetric left-sided urothelial thickening and right-sided collecting system dilation, polypoid lesion in the bladder.    Today he is seen to review vital signs, labs, routine visit and to establish care.  He denied chest pain shortness of breath cough congestion constipation or diarrhea.  He denied headaches dizziness.  He does have a Retana catheter with clear yellow urine he will follow-up with urology on 3/30/2023.  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  Hearing aid left ear only.  He tells writer he has no pain anywhere.  Labs reviewed CK down to 447 from  2827 5 days prior, WBC 11.1 hemoglobin 12.1 he will follow-up with neurology new visit on 5/1/2023.  Labs pending for 3/27/2023    ALLERGIES:Penicillins, Alfuzosin, Donepezil, Myolin [norflex], Sulfa (sulfonamide antibiotics) [sulfa drugs], and Triazolam    PAST MEDICAL HISTORY:   Past Medical History:   Diagnosis Date     Dementia with behavioral disturbance 9/3/2019     Depression 5/28/2014     Hypercholesteremia 5/28/2014     Melanoma of back (H) 5/28/2014    Surgically removed       Melanoma of face (H) 5/28/2014    Surgically removed      Seizure (H) 5/19/2015     Thiamine deficiency 9/3/2019     TIA (transient ischemic attack)        PAST SURGICAL HISTORY:   has a past surgical history that includes Cholecystectomy; appendectomy; other surgical history; other surgical history (1993); hernia repair; shoulder surgery (Right); and other surgical history (Right).    FAMILY HISTORY: family history includes Cancer in his mother; Cerebrovascular Disease in his maternal grandfather.    SOCIAL HISTORY:  reports that he has quit smoking. His smoking use included cigarettes. He has never used smokeless tobacco. He reports that he does not drink alcohol and does not use drugs.    ROS:  Constitutional: Negative for activity change, appetite change, fatigue and fever.   HENT: Negative for congestion.    Hearing aid left ear  Respiratory: Negative for cough, shortness of breath and wheezing.    Cardiovascular: Negative for chest pain and leg swelling.   Gastrointestinal: Negative for abdominal distention, abdominal pain, constipation, diarrhea and nausea.   Genitourinary: Negative for dysuria.   Musculoskeletal: Negative for arthralgia. Negative for back pain.   Skin: Negative for color change and wound.   Neurological: Negative for dizziness.   Psychiatric/Behavioral: Negative for agitation, behavioral problems and confusion.     Physical Exam:  Constitutional:       Appearance: Patient is well-developed.   HENT:      Head:  Normocephalic.   Eyes:      Conjunctiva/sclera: Conjunctivae normal.   Neck:      Musculoskeletal: Normal range of motion.   Cardiovascular:      Rate and Rhythm: Normal rate and regular rhythm.      Heart sounds: Normal heart sounds. No murmur.   Pulmonary:      Effort: No respiratory distress.      Breath sounds: Normal breath sounds. No wheezing or rales.   Abdominal:      General: Bowel sounds are normal. There is no distension.      Palpations: Abdomen is soft.      Tenderness: There is no abdominal tenderness.   Musculoskeletal:       Normal range of motion.     Skin:General:        Skin is warm.   Neurological:         Mental Status: Patient is alert and oriented to person, place, and time.   Psychiatric:         Behavior: Behavior normal.     Vitals:/61   Pulse 57   Temp 97.6  F (36.4  C)   Resp 16   Wt 65.3 kg (144 lb)   SpO2 94%   BMI 23.24 kg/m   and Body mass index is 23.24 kg/m .    Lab/Diagnostic data:   Recent Results (from the past 240 hour(s))   CBC (+ platelets, no diff)    Collection Time: 03/16/23 12:29 PM   Result Value Ref Range    WBC Count 11.0 4.0 - 11.0 10e3/uL    RBC Count 4.08 (L) 4.40 - 5.90 10e6/uL    Hemoglobin 12.9 (L) 13.3 - 17.7 g/dL    Hematocrit 39.5 (L) 40.0 - 53.0 %    MCV 97 78 - 100 fL    MCH 31.6 26.5 - 33.0 pg    MCHC 32.7 31.5 - 36.5 g/dL    RDW 12.4 10.0 - 15.0 %    Platelet Count 102 (L) 150 - 450 10e3/uL   Basic metabolic panel    Collection Time: 03/16/23 12:29 PM   Result Value Ref Range    Sodium 141 136 - 145 mmol/L    Potassium 4.4 3.5 - 5.0 mmol/L    Chloride 111 (H) 98 - 107 mmol/L    Carbon Dioxide (CO2) 22 22 - 31 mmol/L    Anion Gap 8 5 - 18 mmol/L    Urea Nitrogen 19 8 - 28 mg/dL    Creatinine 1.07 0.70 - 1.30 mg/dL    Calcium 9.3 8.5 - 10.5 mg/dL    Glucose 108 70 - 125 mg/dL    GFR Estimate 64 >60 mL/min/1.73m2   CK total    Collection Time: 03/16/23 12:29 PM   Result Value Ref Range    CK 2,827 (HH) 30 - 190 U/L   Magnesium    Collection Time:  03/16/23 12:29 PM   Result Value Ref Range    Magnesium 1.9 1.8 - 2.6 mg/dL   Troponin I (now)    Collection Time: 03/16/23 12:29 PM   Result Value Ref Range    Troponin I 0.07 0.00 - 0.29 ng/mL   Hepatic panel    Collection Time: 03/16/23 12:29 PM   Result Value Ref Range    Bilirubin Total 1.3 (H) 0.0 - 1.0 mg/dL    Bilirubin Direct 0.5 <=0.5 mg/dL    Protein Total 6.4 6.0 - 8.0 g/dL    Albumin 3.6 3.5 - 5.0 g/dL    Alkaline Phosphatase 83 45 - 120 U/L    AST 65 (H) 0 - 40 U/L    ALT 27 0 - 45 U/L   INR    Collection Time: 03/16/23  1:21 PM   Result Value Ref Range    INR 1.22 (H) 0.85 - 1.15   Partial thromboplastin time    Collection Time: 03/16/23  1:21 PM   Result Value Ref Range    aPTT 29 22 - 38 Seconds   ECG 12-Lead with MUSE - [facility]    Collection Time: 03/16/23  1:31 PM   Result Value Ref Range    Systolic Blood Pressure 135 mmHg    Diastolic Blood Pressure 73 mmHg    Ventricular Rate 69 BPM    Atrial Rate 69 BPM    NV Interval 182 ms    QRS Duration 100 ms     ms    QTc 456 ms    P Axis 62 degrees    R AXIS -64 degrees    T Axis 17 degrees    Interpretation ECG       Sinus rhythm  Left anterior fascicular block  Minimal voltage criteria for LVH, may be normal variant  Septal infarct , age undetermined  Abnormal ECG  When compared with ECG of 18-JAN-2020 12:32,  No significant change was found  Confirmed by SEE ED PROVIDER NOTE FOR, ECG INTERPRETATION (4000),  KAYE PACKER ([id number]) on 3/16/2023 7:47:30 PM     UA with Microscopic reflex to Culture    Collection Time: 03/16/23  2:10 PM    Specimen: Urine, Catheter   Result Value Ref Range    Color Urine Yellow Colorless, Straw, Light Yellow, Yellow    Appearance Urine Clear Clear    Glucose Urine Negative Negative mg/dL    Bilirubin Urine Negative Negative    Ketones Urine Negative Negative mg/dL    Specific Gravity Urine 1.016 1.001 - 1.030    Blood Urine 0.1 mg/dL (A) Negative    pH Urine 5.5 5.0 - 7.0    Protein Albumin Urine 50 (A)  Negative mg/dL    Urobilinogen Urine <2.0 <2.0 mg/dL    Nitrite Urine Negative Negative    Leukocyte Esterase Urine 25 Bipin/uL (A) Negative    Mucus Urine Present (A) None Seen /LPF    RBC Urine 7 (H) <=2 /HPF    WBC Urine 34 (H) <=5 /HPF   Urine Culture    Collection Time: 03/16/23  2:10 PM    Specimen: Urine, Catheter   Result Value Ref Range    Culture No Growth    Keppra (Levetiracetam) Level    Collection Time: 03/16/23  4:41 PM   Result Value Ref Range    Keppra (Levetiracetam) Level 20.4 10.0 - 40.0  g/mL   Basic metabolic panel    Collection Time: 03/17/23  5:45 AM   Result Value Ref Range    Sodium 139 136 - 145 mmol/L    Potassium 4.5 3.5 - 5.0 mmol/L    Chloride 113 (H) 98 - 107 mmol/L    Carbon Dioxide (CO2) 18 (L) 22 - 31 mmol/L    Anion Gap 8 5 - 18 mmol/L    Urea Nitrogen 19 8 - 28 mg/dL    Creatinine 0.93 0.70 - 1.30 mg/dL    Calcium 8.6 8.5 - 10.5 mg/dL    Glucose 115 70 - 125 mg/dL    GFR Estimate 76 >60 mL/min/1.73m2   CBC with platelets    Collection Time: 03/17/23  5:45 AM   Result Value Ref Range    WBC Count 11.1 (H) 4.0 - 11.0 10e3/uL    RBC Count 3.85 (L) 4.40 - 5.90 10e6/uL    Hemoglobin 12.1 (L) 13.3 - 17.7 g/dL    Hematocrit 37.3 (L) 40.0 - 53.0 %    MCV 97 78 - 100 fL    MCH 31.4 26.5 - 33.0 pg    MCHC 32.4 31.5 - 36.5 g/dL    RDW 12.6 10.0 - 15.0 %    Platelet Count 95 (L) 150 - 450 10e3/uL   CK total    Collection Time: 03/17/23  5:45 AM   Result Value Ref Range    CK 1,166 (HH) 30 - 190 U/L   Basic metabolic panel    Collection Time: 03/18/23  7:56 AM   Result Value Ref Range    Sodium 139 136 - 145 mmol/L    Potassium 4.4 3.5 - 5.0 mmol/L    Chloride 115 (H) 98 - 107 mmol/L    Carbon Dioxide (CO2) 19 (L) 22 - 31 mmol/L    Anion Gap 5 5 - 18 mmol/L    Urea Nitrogen 24 8 - 28 mg/dL    Creatinine 0.94 0.70 - 1.30 mg/dL    Calcium 8.3 (L) 8.5 - 10.5 mg/dL    Glucose 140 (H) 70 - 125 mg/dL    GFR Estimate 75 >60 mL/min/1.73m2   CK total    Collection Time: 03/18/23  7:56 AM   Result Value  Ref Range     (H) 30 - 190 U/L   Extra Purple Top Tube    Collection Time: 03/18/23  7:56 AM   Result Value Ref Range    Hold Specimen JIC    Symptomatic COVID-19 Virus (Coronavirus) by PCR Nose    Collection Time: 03/20/23  9:57 AM    Specimen: Nose; Swab   Result Value Ref Range    SARS CoV2 PCR Negative Negative     MEDICATIONS:     Review of your medicines          Accurate as of March 21, 2023  6:09 PM. If you have any questions, ask your nurse or doctor.            CONTINUE these medicines which have NOT CHANGED      Dose / Directions   atorvastatin 40 MG tablet  Commonly known as: LIPITOR      Dose: 40 mg  Take 40 mg by mouth At Bedtime  Refills: 0     Cyanocobalamin 2500 MCG Tabs      Dose: 2,500 mcg  Take 2,500 mcg by mouth daily  Refills: 0     dexamethasone 2 MG tablet  Commonly known as: DECADRON  Used for: Meningioma (H)      Start taking on: March 20, 2023  Take 2 tablets (4 mg) by mouth 2 times daily for 1 day, THEN 1 tablet (2 mg) 2 times daily for 2 days, THEN 0.5 tablets (1 mg) 2 times daily for 2 days, THEN 0.5 tablets (1 mg) daily (with breakfast) for 2 days.  Refills: 0     levETIRAcetam 500 MG tablet  Commonly known as: KEPPRA  Used for: Seizure disorder (H)      TAKE 1 TABLET BY MOUTH TWICE DAILY  Quantity: 56 tablet  Refills: 12     polyethylene glycol 17 GM/Dose powder  Commonly known as: MIRALAX  Used for: Constipation, unspecified constipation type      Dose: 17 g  Take 17 g by mouth daily  Quantity: 510 g  Refills: 0     primidone 50 MG tablet  Commonly known as: MYSOLINE      Dose: 50 mg  Take 50 mg by mouth 2 times daily  Refills: 0     sodium chloride 0.65 % nasal spray  Commonly known as: OCEAN  Used for: Nasal congestion      Dose: 1 spray  Spray 1 spray into both nostrils daily as needed for congestion  Refills: 0     tamsulosin 0.4 MG capsule  Commonly known as: FLOMAX  Used for: Urinary retention      Dose: 0.4 mg  Take 1 capsule (0.4 mg) by mouth daily  Refills: 0      thiamine 50 MG Tabs      Dose: 50 mg  Take 50 mg by mouth daily  Refills: 0            ASSESSMENT/PLAN  Encounter Diagnoses   Name Primary?     Physical deconditioning Yes     Seizure disorder (H)      Physical deconditioning continue PT OT    Seizure disorder on Keppra and primidone    Possible meningioma currently on dexamethasone taper    HDL continue atorvastatin  Electronically signed by: Juana Martinez CNP

## 2023-03-22 PROBLEM — Z79.01 LONG TERM CURRENT USE OF ANTICOAGULANT THERAPY: Status: ACTIVE | Noted: 2021-07-28

## 2023-03-22 PROBLEM — H81.09 MENIERE'S DISEASE: Status: ACTIVE | Noted: 2021-07-28

## 2023-03-22 PROBLEM — M19.019 LOCALIZED, PRIMARY OSTEOARTHRITIS OF SHOULDER REGION: Status: ACTIVE | Noted: 2021-07-28

## 2023-03-22 PROBLEM — G89.29 CHRONIC PAIN: Status: ACTIVE | Noted: 2021-07-28

## 2023-03-22 NOTE — PROGRESS NOTES
[facility] GERIATRIC SERVICES       Patient Kurtis Marinohstaedt  MRN: [id number]        Reason for Visit     Chief Complaint   Patient presents with     RECHECK     INITIAL       Code Status     CPR/Full code     Assessment     History of traumatic subdural hematoma  History of a seizure disorder  Urinary retention requiring Retana catheter  Dementia  hld  [place]  Generalized weakness    Plan     Pt is admitted to TCU for strengthening and rehab.  Patient was admitted to the hospital and seen by neurosurgery as well as urology  Neurosurgery does not recommend surgical intervention  Aspirin has been discontinued and no anticoagulation recommended  On a steroid taper  Keppra 500 twice daily for seizure prevention added to his regimen  Urology recommended placement of Retana catheter  Started on Flomax  Left renal lesion to be worked up as an outpatient  Suspected he may need TURP  Monitor for ongoing hematuria  He has a history of unwitnessed fall and has been discharged to the TCU for strengthening and rehab  Recheck labs  Continue with PT/OT-reports weakness and falls  BIMS 13/15    History     Patient is a very pleasant 89 year old male who is admitted to TCU  Patient was admitted with traumatic subdural hematoma  Seen by neurosurgery no surgical intervention planned  Also saw urology because imaging revealed a distended bladder with a lesion noted on the bladder wall and renal cyst noted  Urology consulted and a Retana catheter was placed  Discharged to the TCU      Past Medical & Surgical History     PAST MEDICAL HISTORY:   Past Medical History:   Diagnosis Date     Dementia with behavioral disturbance 9/3/2019     Depression 5/28/2014     Hypercholesteremia 5/28/2014     Melanoma of back (H) 5/28/2014    Surgically removed       Melanoma of face (H) 5/28/2014    Surgically removed      Seizure (H) 5/19/2015     Thiamine deficiency 9/3/2019     TIA (transient ischemic attack)       PAST SURGICAL HISTORY:   has a past  surgical history that includes Cholecystectomy; appendectomy; other surgical history; other surgical history (1993); hernia repair; shoulder surgery (Right); and other surgical history (Right).      Past Social History     Reviewed,  reports that he has quit smoking. His smoking use included cigarettes. He has never used smokeless tobacco. He reports that he does not drink alcohol and does not use drugs.    Family History     Reviewed, and family history includes Cancer in his mother; Cerebrovascular Disease in his maternal grandfather.    Medication List     Current Outpatient Medications   Medication     atorvastatin (LIPITOR) 40 MG tablet     Cyanocobalamin 2500 MCG TABS     dexamethasone (DECADRON) 2 MG tablet     levETIRAcetam (KEPPRA) 500 MG tablet     polyethylene glycol (MIRALAX) 17 GM/Dose powder     primidone (MYSOLINE) 50 MG tablet     sodium chloride (OCEAN) 0.65 % nasal spray     tamsulosin (FLOMAX) 0.4 MG capsule     thiamine 50 MG TABS     No current facility-administered medications for this visit.          Allergies     Allergies   Allergen Reactions     Orphenadrine Hives     Other reaction(s): hives       Penicillins Anaphylaxis and Nausea and Vomiting     Alfuzosin Nausea and Vomiting     Donepezil Other (See Comments)     Myolin [Norflex] Unknown     Other reaction(s): hives     Sulfa Drugs Nausea and Vomiting     Triazolam Nausea and Vomiting       Review of Systems   A comprehensive review of 14 systems was done. Pertinent findings noted here and in history of present illness. All the rest negative.  Constitutional: Negative.  Negative for fever, chills, she has  activity change, appetite change and fatigue.   HENT: Negative for congestion and facial swelling.    Eyes: Negative for photophobia, redness and visual disturbance.   Respiratory: Negative for cough and chest tightness.    Cardiovascular: Negative for chest pain, palpitations and leg swelling.   Gastrointestinal: Negative for nausea,  "diarrhea, constipation, blood in stool and abdominal distention.   Genitourinary: Negative.  Has a rai  Musculoskeletal: reports falls   Skin: Negative.    Neurological: Negative for dizziness, tremors, syncope, weakness, light-headedness and headaches.   Hematological: Does not bruise/bleed easily.   Psychiatric/Behavioral: Negative.  Some recall issues noted      Physical Exam   /64   Pulse 85   Temp 97.3  F (36.3  C)   Resp 20   Ht 1.676 m (5' 6\")   Wt 65.3 kg (144 lb)   SpO2 98%   BMI 23.24 kg/m       Constitutional: Oriented to person, place, and time and appears well-developed.   HEENT:  Normocephalic and atraumatic.  Eyes: Conjunctivae and EOM are normal. Pupils are equal, round, and reactive to light. No discharge.  No scleral icterus. Nose normal. Mouth/Throat: Oropharynx is clear and moist. No oropharyngeal exudate.    [place]  NECK: Normal range of motion. Neck supple. No JVD present. No tracheal deviation present. No thyromegaly present.   CARDIOVASCULAR: Normal rate, regular rhythm and intact distal pulses.  Exam reveals no gallop and no friction rub.  Systolic murmur present.  PULMONARY: Effort normal and breath sounds normal. No respiratory distress.No Wheezing or rales.  ABDOMEN: Soft. Bowel sounds are normal. No distension and no mass.  There is no tenderness. There is no rebound and no guarding. No HSM.  MUSCULOSKELETAL: Normal range of motion. Mild kyphosis, no tenderness.  LYMPH NODES: Has no cervical, supraclavicular, axillary and groin adenopathy.   NEUROLOGICAL: Alert and oriented to person, place, and time. No cranial nerve deficit.  Normal muscle tone. Coordination normal.   GENITOURINARY: Deferred exam.has  A rai  SKIN: Skin is warm and dry. No rash noted. No erythema. No pallor.   EXTREMITIES: No cyanosis, no clubbing, no edema. No Deformity.  PSYCHIATRIC: Normal mood, affect and behavior.      Lab Results     Recent Results (from the past 240 hour(s))   CBC (+ platelets, no " diff)    Collection Time: 03/16/23 12:29 PM   Result Value Ref Range    WBC Count 11.0 4.0 - 11.0 10e3/uL    RBC Count 4.08 (L) 4.40 - 5.90 10e6/uL    Hemoglobin 12.9 (L) 13.3 - 17.7 g/dL    Hematocrit 39.5 (L) 40.0 - 53.0 %    MCV 97 78 - 100 fL    MCH 31.6 26.5 - 33.0 pg    MCHC 32.7 31.5 - 36.5 g/dL    RDW 12.4 10.0 - 15.0 %    Platelet Count 102 (L) 150 - 450 10e3/uL   Basic metabolic panel    Collection Time: 03/16/23 12:29 PM   Result Value Ref Range    Sodium 141 136 - 145 mmol/L    Potassium 4.4 3.5 - 5.0 mmol/L    Chloride 111 (H) 98 - 107 mmol/L    Carbon Dioxide (CO2) 22 22 - 31 mmol/L    Anion Gap 8 5 - 18 mmol/L    Urea Nitrogen 19 8 - 28 mg/dL    Creatinine 1.07 0.70 - 1.30 mg/dL    Calcium 9.3 8.5 - 10.5 mg/dL    Glucose 108 70 - 125 mg/dL    GFR Estimate 64 >60 mL/min/1.73m2   CK total    Collection Time: 03/16/23 12:29 PM   Result Value Ref Range    CK 2,827 (HH) 30 - 190 U/L   Magnesium    Collection Time: 03/16/23 12:29 PM   Result Value Ref Range    Magnesium 1.9 1.8 - 2.6 mg/dL   Troponin I (now)    Collection Time: 03/16/23 12:29 PM   Result Value Ref Range    Troponin I 0.07 0.00 - 0.29 ng/mL   Hepatic panel    Collection Time: 03/16/23 12:29 PM   Result Value Ref Range    Bilirubin Total 1.3 (H) 0.0 - 1.0 mg/dL    Bilirubin Direct 0.5 <=0.5 mg/dL    Protein Total 6.4 6.0 - 8.0 g/dL    Albumin 3.6 3.5 - 5.0 g/dL    Alkaline Phosphatase 83 45 - 120 U/L    AST 65 (H) 0 - 40 U/L    ALT 27 0 - 45 U/L   INR    Collection Time: 03/16/23  1:21 PM   Result Value Ref Range    INR 1.22 (H) 0.85 - 1.15   Partial thromboplastin time    Collection Time: 03/16/23  1:21 PM   Result Value Ref Range    aPTT 29 22 - 38 Seconds   ECG 12-Lead with MUSE - NATI,AMELIE,YUNIER    Collection Time: 03/16/23  1:31 PM   Result Value Ref Range    Systolic Blood Pressure 135 mmHg    Diastolic Blood Pressure 73 mmHg    Ventricular Rate 69 BPM    Atrial Rate 69 BPM    IL Interval 182 ms    QRS Duration 100 ms     ms    QTc  456 ms    P Axis 62 degrees    R AXIS -64 degrees    T Axis 17 degrees    Interpretation ECG       Sinus rhythm  Left anterior fascicular block  Minimal voltage criteria for LVH, may be normal variant  Septal infarct , age undetermined  Abnormal ECG  When compared with ECG of 18-JAN-2020 12:32,  No significant change was found  Confirmed by SEE ED PROVIDER NOTE FOR, ECG INTERPRETATION (4000),  KAYE PACKER ([id number]) on 3/16/2023 7:47:30 PM     UA with Microscopic reflex to Culture    Collection Time: 03/16/23  2:10 PM    Specimen: Urine, Catheter   Result Value Ref Range    Color Urine Yellow Colorless, Straw, Light Yellow, Yellow    Appearance Urine Clear Clear    Glucose Urine Negative Negative mg/dL    Bilirubin Urine Negative Negative    Ketones Urine Negative Negative mg/dL    Specific Gravity Urine 1.016 1.001 - 1.030    Blood Urine 0.1 mg/dL (A) Negative    pH Urine 5.5 5.0 - 7.0    Protein Albumin Urine 50 (A) Negative mg/dL    Urobilinogen Urine <2.0 <2.0 mg/dL    Nitrite Urine Negative Negative    Leukocyte Esterase Urine 25 Bipin/uL (A) Negative    Mucus Urine Present (A) None Seen /LPF    RBC Urine 7 (H) <=2 /HPF    WBC Urine 34 (H) <=5 /HPF   Urine Culture    Collection Time: 03/16/23  2:10 PM    Specimen: Urine, Catheter   Result Value Ref Range    Culture No Growth    Keppra (Levetiracetam) Level    Collection Time: 03/16/23  4:41 PM   Result Value Ref Range    Keppra (Levetiracetam) Level 20.4 10.0 - 40.0  g/mL   Basic metabolic panel    Collection Time: 03/17/23  5:45 AM   Result Value Ref Range    Sodium 139 136 - 145 mmol/L    Potassium 4.5 3.5 - 5.0 mmol/L    Chloride 113 (H) 98 - 107 mmol/L    Carbon Dioxide (CO2) 18 (L) 22 - 31 mmol/L    Anion Gap 8 5 - 18 mmol/L    Urea Nitrogen 19 8 - 28 mg/dL    Creatinine 0.93 0.70 - 1.30 mg/dL    Calcium 8.6 8.5 - 10.5 mg/dL    Glucose 115 70 - 125 mg/dL    GFR Estimate 76 >60 mL/min/1.73m2   CBC with platelets    Collection Time: 03/17/23  5:45 AM    Result Value Ref Range    WBC Count 11.1 (H) 4.0 - 11.0 10e3/uL    RBC Count 3.85 (L) 4.40 - 5.90 10e6/uL    Hemoglobin 12.1 (L) 13.3 - 17.7 g/dL    Hematocrit 37.3 (L) 40.0 - 53.0 %    MCV 97 78 - 100 fL    MCH 31.4 26.5 - 33.0 pg    MCHC 32.4 31.5 - 36.5 g/dL    RDW 12.6 10.0 - 15.0 %    Platelet Count 95 (L) 150 - 450 10e3/uL   CK total    Collection Time: 03/17/23  5:45 AM   Result Value Ref Range    CK 1,166 (HH) 30 - 190 U/L   Basic metabolic panel    Collection Time: 03/18/23  7:56 AM   Result Value Ref Range    Sodium 139 136 - 145 mmol/L    Potassium 4.4 3.5 - 5.0 mmol/L    Chloride 115 (H) 98 - 107 mmol/L    Carbon Dioxide (CO2) 19 (L) 22 - 31 mmol/L    Anion Gap 5 5 - 18 mmol/L    Urea Nitrogen 24 8 - 28 mg/dL    Creatinine 0.94 0.70 - 1.30 mg/dL    Calcium 8.3 (L) 8.5 - 10.5 mg/dL    Glucose 140 (H) 70 - 125 mg/dL    GFR Estimate 75 >60 mL/min/1.73m2   CK total    Collection Time: 03/18/23  7:56 AM   Result Value Ref Range     (H) 30 - 190 U/L   Extra Purple Top Tube    Collection Time: 03/18/23  7:56 AM   Result Value Ref Range    Hold Specimen [facility]    Symptomatic COVID-19 Virus (Coronavirus) by PCR Nose    Collection Time: 03/20/23  9:57 AM    Specimen: Nose; Swab   Result Value Ref Range    SARS CoV2 PCR Negative Negative                 Electronically signed by    Bela Franklin MD

## 2023-03-22 NOTE — LETTER
3/22/2023        RE: Kurtis Urban  [address] [facility]  C/o Adi Urban  [facility] [place] [address]        [facility] GERIATRIC SERVICES  Chief Complaint   Patient presents with     Hospital F/U     [place] Medical Record Number:  [id number]  Place of Service where encounter took place:  No question data found.  Code Status:  Full Code    HISTORY:      HPI:  Krutis Urban  is 89 year old (2/23/1929) undergoing physical and occupational therapy. He is with past medical history significant for dementia lives in a independent living facility, ambulates with a walker, seizure disorder, CVA, TIA, dyslipidemia brought into the emergency room with complaints of unwitnessed fall at care facility and unable to get up for about 10 to 12 hours being admitted for acute on chronic subdural hematoma, possible meningioma, early rhabdomyolysis, abnormal appearing urothelial tract CT.  Excerpted from records  In ED, CT head revealed mixed attenuating subdural collections concerning for acute on chronic subdural hemorrhage, rounded lesion in the anterior middle cranial fossa concerning for possible meningioma, mildly low attenuating changes in the left temporal lobe compatible with edema.  Neurosurgery recommended MRI brain, repeat CT head in 6 hours, if worsening they recommend transfer in case patient and family wishes to proceed with any further intervention.  His son is flying in from [place].   CT chest abdomen pelvis also revealed bilateral renal cysts,?  Hemorrhage, asymmetric left-sided urothelial thickening and right-sided collecting system dilation, polypoid lesion in the bladder.    Today he is seen to review vital signs, labs, routine visit and to establish care.  He denied chest pain shortness of breath cough congestion constipation or diarrhea.  He denied headaches dizziness.  He does have a Retana catheter with clear yellow urine he will follow-up with urology on 3/30/2023.  He is also noted to have a bladder  mass.  Hearing aid left ear only.  He tells writer he has no pain anywhere.  Labs reviewed CK down to 447 from 2827 5 days prior, WBC 11.1 hemoglobin 12.1 he will follow-up with neurology new visit on 5/1/2023.  Labs pending for 3/27/2023    ALLERGIES:Penicillins, Alfuzosin, Donepezil, Myolin [norflex], Sulfa (sulfonamide antibiotics) [sulfa drugs], and Triazolam    PAST MEDICAL HISTORY:   Past Medical History:   Diagnosis Date     Dementia with behavioral disturbance 9/3/2019     Depression 5/28/2014     Hypercholesteremia 5/28/2014     Melanoma of back (H) 5/28/2014    Surgically removed       Melanoma of face (H) 5/28/2014    Surgically removed      Seizure (H) 5/19/2015     Thiamine deficiency 9/3/2019     TIA (transient ischemic attack)        PAST SURGICAL HISTORY:   has a past surgical history that includes Cholecystectomy; appendectomy; other surgical history; other surgical history (1993); hernia repair; shoulder surgery (Right); and other surgical history (Right).    FAMILY HISTORY: family history includes Cancer in his mother; Cerebrovascular Disease in his maternal grandfather.    SOCIAL HISTORY:  reports that he has quit smoking. His smoking use included cigarettes. He has never used smokeless tobacco. He reports that he does not drink alcohol and does not use drugs.    ROS:  Constitutional: Negative for activity change, appetite change, fatigue and fever.   HENT: Negative for congestion.    Hearing aid left ear  Respiratory: Negative for cough, shortness of breath and wheezing.    Cardiovascular: Negative for chest pain and leg swelling.   Gastrointestinal: Negative for abdominal distention, abdominal pain, constipation, diarrhea and nausea.   Genitourinary: Negative for dysuria.   Musculoskeletal: Negative for arthralgia. Negative for back pain.   Skin: Negative for color change and wound.   Neurological: Negative for dizziness.   Psychiatric/Behavioral: Negative for agitation, behavioral problems and  confusion.     Physical Exam:  Constitutional:       Appearance: Patient is well-developed.   HENT:      Head: Normocephalic.   Eyes:      Conjunctiva/sclera: Conjunctivae normal.   Neck:      Musculoskeletal: Normal range of motion.   Cardiovascular:      Rate and Rhythm: Normal rate and regular rhythm.      Heart sounds: Normal heart sounds. No murmur.   Pulmonary:      Effort: No respiratory distress.      Breath sounds: Normal breath sounds. No wheezing or rales.   Abdominal:      General: Bowel sounds are normal. There is no distension.      Palpations: Abdomen is soft.      Tenderness: There is no abdominal tenderness.   Musculoskeletal:       Normal range of motion.     Skin:General:        Skin is warm.   Neurological:         Mental Status: Patient is alert and oriented to person, place, and time.   Psychiatric:         Behavior: Behavior normal.     Vitals:/61   Pulse 57   Temp 97.6  F (36.4  C)   Resp 16   Wt 65.3 kg (144 lb)   SpO2 94%   BMI 23.24 kg/m   and Body mass index is 23.24 kg/m .    Lab/Diagnostic data:   Recent Results (from the past 240 hour(s))   CBC (+ platelets, no diff)    Collection Time: 03/16/23 12:29 PM   Result Value Ref Range    WBC Count 11.0 4.0 - 11.0 10e3/uL    RBC Count 4.08 (L) 4.40 - 5.90 10e6/uL    Hemoglobin 12.9 (L) 13.3 - 17.7 g/dL    Hematocrit 39.5 (L) 40.0 - 53.0 %    MCV 97 78 - 100 fL    MCH 31.6 26.5 - 33.0 pg    MCHC 32.7 31.5 - 36.5 g/dL    RDW 12.4 10.0 - 15.0 %    Platelet Count 102 (L) 150 - 450 10e3/uL   Basic metabolic panel    Collection Time: 03/16/23 12:29 PM   Result Value Ref Range    Sodium 141 136 - 145 mmol/L    Potassium 4.4 3.5 - 5.0 mmol/L    Chloride 111 (H) 98 - 107 mmol/L    Carbon Dioxide (CO2) 22 22 - 31 mmol/L    Anion Gap 8 5 - 18 mmol/L    Urea Nitrogen 19 8 - 28 mg/dL    Creatinine 1.07 0.70 - 1.30 mg/dL    Calcium 9.3 8.5 - 10.5 mg/dL    Glucose 108 70 - 125 mg/dL    GFR Estimate 64 >60 mL/min/1.73m2   CK total    Collection  Time: 03/16/23 12:29 PM   Result Value Ref Range    CK 2,827 (HH) 30 - 190 U/L   Magnesium    Collection Time: 03/16/23 12:29 PM   Result Value Ref Range    Magnesium 1.9 1.8 - 2.6 mg/dL   Troponin I (now)    Collection Time: 03/16/23 12:29 PM   Result Value Ref Range    Troponin I 0.07 0.00 - 0.29 ng/mL   Hepatic panel    Collection Time: 03/16/23 12:29 PM   Result Value Ref Range    Bilirubin Total 1.3 (H) 0.0 - 1.0 mg/dL    Bilirubin Direct 0.5 <=0.5 mg/dL    Protein Total 6.4 6.0 - 8.0 g/dL    Albumin 3.6 3.5 - 5.0 g/dL    Alkaline Phosphatase 83 45 - 120 U/L    AST 65 (H) 0 - 40 U/L    ALT 27 0 - 45 U/L   INR    Collection Time: 03/16/23  1:21 PM   Result Value Ref Range    INR 1.22 (H) 0.85 - 1.15   Partial thromboplastin time    Collection Time: 03/16/23  1:21 PM   Result Value Ref Range    aPTT 29 22 - 38 Seconds   ECG 12-Lead with MUSE - [facility]    Collection Time: 03/16/23  1:31 PM   Result Value Ref Range    Systolic Blood Pressure 135 mmHg    Diastolic Blood Pressure 73 mmHg    Ventricular Rate 69 BPM    Atrial Rate 69 BPM    WA Interval 182 ms    QRS Duration 100 ms     ms    QTc 456 ms    P Axis 62 degrees    R AXIS -64 degrees    T Axis 17 degrees    Interpretation ECG       Sinus rhythm  Left anterior fascicular block  Minimal voltage criteria for LVH, may be normal variant  Septal infarct , age undetermined  Abnormal ECG  When compared with ECG of 18-JAN-2020 12:32,  No significant change was found  Confirmed by SEE ED PROVIDER NOTE FOR, ECG INTERPRETATION ([id number]),  KAYE PACKER ([id number]) on 3/16/2023 7:47:30 PM     UA with Microscopic reflex to Culture    Collection Time: 03/16/23  2:10 PM    Specimen: Urine, Catheter   Result Value Ref Range    Color Urine Yellow Colorless, Straw, Light Yellow, Yellow    Appearance Urine Clear Clear    Glucose Urine Negative Negative mg/dL    Bilirubin Urine Negative Negative    Ketones Urine Negative Negative mg/dL    Specific Gravity Urine 1.016  1.001 - 1.030    Blood Urine 0.1 mg/dL (A) Negative    pH Urine 5.5 5.0 - 7.0    Protein Albumin Urine 50 (A) Negative mg/dL    Urobilinogen Urine <2.0 <2.0 mg/dL    Nitrite Urine Negative Negative    Leukocyte Esterase Urine 25 Bipin/uL (A) Negative    Mucus Urine Present (A) None Seen /LPF    RBC Urine 7 (H) <=2 /HPF    WBC Urine 34 (H) <=5 /HPF   Urine Culture    Collection Time: 03/16/23  2:10 PM    Specimen: Urine, Catheter   Result Value Ref Range    Culture No Growth    Keppra (Levetiracetam) Level    Collection Time: 03/16/23  4:41 PM   Result Value Ref Range    Keppra (Levetiracetam) Level 20.4 10.0 - 40.0  g/mL   Basic metabolic panel    Collection Time: 03/17/23  5:45 AM   Result Value Ref Range    Sodium 139 136 - 145 mmol/L    Potassium 4.5 3.5 - 5.0 mmol/L    Chloride 113 (H) 98 - 107 mmol/L    Carbon Dioxide (CO2) 18 (L) 22 - 31 mmol/L    Anion Gap 8 5 - 18 mmol/L    Urea Nitrogen 19 8 - 28 mg/dL    Creatinine 0.93 0.70 - 1.30 mg/dL    Calcium 8.6 8.5 - 10.5 mg/dL    Glucose 115 70 - 125 mg/dL    GFR Estimate 76 >60 mL/min/1.73m2   CBC with platelets    Collection Time: 03/17/23  5:45 AM   Result Value Ref Range    WBC Count 11.1 (H) 4.0 - 11.0 10e3/uL    RBC Count 3.85 (L) 4.40 - 5.90 10e6/uL    Hemoglobin 12.1 (L) 13.3 - 17.7 g/dL    Hematocrit 37.3 (L) 40.0 - 53.0 %    MCV 97 78 - 100 fL    MCH 31.4 26.5 - 33.0 pg    MCHC 32.4 31.5 - 36.5 g/dL    RDW 12.6 10.0 - 15.0 %    Platelet Count 95 (L) 150 - 450 10e3/uL   CK total    Collection Time: 03/17/23  5:45 AM   Result Value Ref Range    CK 1,166 (HH) 30 - 190 U/L   Basic metabolic panel    Collection Time: 03/18/23  7:56 AM   Result Value Ref Range    Sodium 139 136 - 145 mmol/L    Potassium 4.4 3.5 - 5.0 mmol/L    Chloride 115 (H) 98 - 107 mmol/L    Carbon Dioxide (CO2) 19 (L) 22 - 31 mmol/L    Anion Gap 5 5 - 18 mmol/L    Urea Nitrogen 24 8 - 28 mg/dL    Creatinine 0.94 0.70 - 1.30 mg/dL    Calcium 8.3 (L) 8.5 - 10.5 mg/dL    Glucose 140 (H) 70 -  125 mg/dL    GFR Estimate 75 >60 mL/min/1.73m2   CK total    Collection Time: 03/18/23  7:56 AM   Result Value Ref Range     (H) 30 - 190 U/L   Extra Purple Top Tube    Collection Time: 03/18/23  7:56 AM   Result Value Ref Range    Hold Specimen JIC    Symptomatic COVID-19 Virus (Coronavirus) by PCR Nose    Collection Time: 03/20/23  9:57 AM    Specimen: Nose; Swab   Result Value Ref Range    SARS CoV2 PCR Negative Negative     MEDICATIONS:     Review of your medicines          Accurate as of March 21, 2023  6:09 PM. If you have any questions, ask your nurse or doctor.            CONTINUE these medicines which have NOT CHANGED      Dose / Directions   atorvastatin 40 MG tablet  Commonly known as: LIPITOR      Dose: 40 mg  Take 40 mg by mouth At Bedtime  Refills: 0     Cyanocobalamin 2500 MCG Tabs      Dose: 2,500 mcg  Take 2,500 mcg by mouth daily  Refills: 0     dexamethasone 2 MG tablet  Commonly known as: DECADRON  Used for: Meningioma (H)      Start taking on: March 20, 2023  Take 2 tablets (4 mg) by mouth 2 times daily for 1 day, THEN 1 tablet (2 mg) 2 times daily for 2 days, THEN 0.5 tablets (1 mg) 2 times daily for 2 days, THEN 0.5 tablets (1 mg) daily (with breakfast) for 2 days.  Refills: 0     levETIRAcetam 500 MG tablet  Commonly known as: KEPPRA  Used for: Seizure disorder (H)      TAKE 1 TABLET BY MOUTH TWICE DAILY  Quantity: 56 tablet  Refills: 12     polyethylene glycol 17 GM/Dose powder  Commonly known as: MIRALAX  Used for: Constipation, unspecified constipation type      Dose: 17 g  Take 17 g by mouth daily  Quantity: 510 g  Refills: 0     primidone 50 MG tablet  Commonly known as: MYSOLINE      Dose: 50 mg  Take 50 mg by mouth 2 times daily  Refills: 0     sodium chloride 0.65 % nasal spray  Commonly known as: OCEAN  Used for: Nasal congestion      Dose: 1 spray  Spray 1 spray into both nostrils daily as needed for congestion  Refills: 0     tamsulosin 0.4 MG capsule  Commonly known as:  FLOMAX  Used for: Urinary retention      Dose: 0.4 mg  Take 1 capsule (0.4 mg) by mouth daily  Refills: 0     thiamine 50 MG Tabs      Dose: 50 mg  Take 50 mg by mouth daily  Refills: 0            ASSESSMENT/PLAN  Encounter Diagnoses   Name Primary?     Physical deconditioning Yes     Seizure disorder (H)      Physical deconditioning continue PT OT    Seizure disorder on Keppra and primidone    Possible meningioma currently on dexamethasone taper    HDL continue atorvastatin  Electronically signed by: Juana Martinez CNP        Sincerely,        Juana Martinez CNP

## 2023-03-22 NOTE — DISCHARGE SUMMARY
[facility]  Hospitalist Discharge Summary      Date of Admission:  3/16/2023  Date of Discharge:  3/20/2023  3:53 PM  Discharging Provider: Go Oviedo MD  Discharge Service: Hospitalist Service    Discharge Diagnoses   Traumatic subdural hematoma  Urinary retention  Bladder mass    Follow-ups Needed After Discharge   Follow-up Appointments     Follow Up and recommended labs and tests      Follow up with residential physician.  The following labs/tests are   recommended: BMP one week post discharge.    Follow up with Urology regarding urinary retention and bladder mass.         Outpatient neurosurgery followup in four weeks with repeat CT head if in line with patient's goals of care.    Unresulted Labs Ordered in the Past 30 Days of this Admission     No orders found from 2/14/2023 to 3/17/2023.          Discharge Disposition   Discharged to nursing home  Condition at discharge: Stable      Hospital Course   The patient was admitted with a traumatic subdural hematoma.  Neurosurgery was consulted.  He did not have procedural intervention.  A meningioma was identified on MRI.  The patient was treated with steroids for an associated mass effect.  His presentation was also complicated by urinary retention requiring rai catheter placement.  Urology was consulted.  He was found to have a bladder mass as well.  The rai catheter was maintained on discharge.  The patient was ultimately discharged to a nursing facility.    Consultations This Hospital Stay   NEUROSURGERY IP CONSULT  UROLOGY IP CONSULT  PHYSICAL THERAPY ADULT IP CONSULT  OCCUPATIONAL THERAPY ADULT IP CONSULT  CARE MANAGEMENT / SOCIAL WORK IP CONSULT  PHYSICAL THERAPY ADULT IP CONSULT  OCCUPATIONAL THERAPY ADULT IP CONSULT    Code Status   Prior    Time Spent on this Encounter   I, Go Oviedo MD, personally saw the patient today and spent less than or equal to 30 minutes discharging this patient.       Go Oviedo,  MD  [address] [address]  Phone: [phone]  Fax: [phone]  ______________________________________________________________________    Physical Exam   Vital Signs:                   Weight: 141 lbs 15.62 oz    See progress note       Primary Care Physician   Juan West    Discharge Orders      Mantoux instructions    Give two-step Mantoux (PPD) Per Facility Policy Yes     Follow Up and recommended labs and tests    Follow up with [facility] physician.  The following labs/tests are recommended: BMP one week post discharge.    Follow up with Urology regarding urinary retention and bladder mass.     Reason for your hospital stay    You were admitted to the hospital after a fall.  You had bleeding in your brain and were found to have a meningioma as well.  Neurosurgery evaluated you and you did not have further intervention.  You will complete a course of steroids for swelling associated with the meningioma.  You also had urinary retention and rai catheter was placed.  You were evaluated by urology and found to have a bladder mass as well.  You are advised to have a rai catheter in place pending further evaluation with urology.     Rai catheter    To straight gravity drainage. Change catheter every 2 weeks and PRN for leaking or decreased urine output with signs of bladder distention. DO NOT change catheter without a specific Provider order IF diagnosis of benign prostatic hypertrophy (BPH), neurogenic bladder, or other urological conditions     Activity - Up with nursing assistance     Physical Therapy Adult Consult    Evaluate and treat as clinically indicated.    Reason:  fall     Occupational Therapy Adult Consult    Evaluate and treat as clinically indicated.    Reason:  fall     Fall precautions     Diet    Follow this diet upon discharge: Orders Placed This Encounter      Regular Diet Adult       Significant Results and  Procedures   Most Recent 3 CBC's:Recent Labs   Lab Test 03/17/23  0545 03/16/23  1229 07/07/22  0926   WBC 11.1* 11.0 9.1   HGB 12.1* 12.9* 12.1*   MCV 97 97 98   PLT 95* 102* 181     Most Recent 3 BMP's:Recent Labs   Lab Test 03/18/23  0756 03/17/23  0545 03/16/23  1229    139 141   POTASSIUM 4.4 4.5 4.4   CHLORIDE 115* 113* 111*   CO2 19* 18* 22   BUN 24 19 19   CR 0.94 0.93 1.07   ANIONGAP 5 8 8   MARICRUZ 8.3* 8.6 9.3   * 115 108   ,   Results for orders placed or performed during the hospital encounter of 03/16/23   Head CT w/o contrast    Narrative    EXAM: CT HEAD W/O CONTRAST  LOCATION: [facility]  DATE/TIME: 3/16/2023 1:02 PM    INDICATION: Unwitnessed fall. Generalized weakness.  COMPARISON: CT head without contrast 01/18/2020.  TECHNIQUE: Routine CT Head without IV contrast. Multiplanar reformats. Dose reduction techniques were used.    FINDINGS:  INTRACRANIAL CONTENTS: Mixed density subdural collections over the bilateral cerebral convexities. The right-sided subdural collection measures 7 mm in maximal radial thickness and the left-sided subdural collection measures 8 mm in maximal radial   thickness. Mild to moderate presumed chronic small vessel ischemic changes. Mild to moderate generalized volume loss. No hydrocephalus. Retrocerebellar arachnoid cyst.     There is a 1.7 x 1.4 cm lesion in the left anterior middle cranial fossa. There are low attenuating changes in the anterior left temporal lobe. An extra-axial lesion was seen along the left anterior cranial fossa on prior MRI dated 09/03/2019.    VISUALIZED ORBITS/SINUSES/MASTOIDS: No intraorbital abnormality. No paranasal sinus mucosal disease. No middle ear or mastoid effusion.    BONES/SOFT TISSUES: No acute abnormality.      Impression    IMPRESSION:  1.  Mixed attenuating subdural collections over the bilateral cerebral convexities with higher attenuating components suggesting more recent hemorrhage.  2.   Rounded lesion in the left anterior middle cranial fossa is most compatible with meningioma when comparing with MRI of the brain dated 09/03/2019. There is mild low attenuating changes in the adjacent left temporal lobe, most compatible with edema.   MRI of the brain with and without contrast is recommended for further evaluation.    Results discussed with Dr. Strickland on 3/16/2023 1:18 PM.   CT Chest Abdomen Pelvis w/o Contrast    Narrative    EXAM: CT CHEST ABDOMEN PELVIS W/O CONTRAST  LOCATION: [facility]  DATE/TIME: 3/16/2023 1:18 PM    INDICATION: unwitnessed fall, generalized weakness, unable to get self up of ground  COMPARISON: None.  TECHNIQUE: CT scan of the chest, abdomen, and pelvis was performed without IV contrast. Multiplanar reformats were obtained. Dose reduction techniques were used.   CONTRAST: None.    FINDINGS:   LUNGS AND PLEURA: Dependent atelectasis. A few tiny nodules in the left upper lobe measuring 0.3 cm (e.g. 4/74)    MEDIASTINUM/AXILLAE: No enlarged thoracic lymph nodes. 2.9 x 2.3 cm left thyroid nodule with peripheral calcification (3/25). No thoracic aortic aneurysm.    CORONARY ARTERY CALCIFICATION: Mild.    HEPATOBILIARY: Cholecystectomy. Liver and bile ducts are unremarkable.    PANCREAS: Normal.    SPLEEN: Normal.    ADRENAL GLANDS: Normal.    KIDNEYS/BLADDER: Cortical and parapelvic cysts bilaterally, some of which are high attenuation in the left kidney. Asymmetric mild left urothelial thickening and mild-moderate right renal collecting system dilation. Punctate nonobstructing calculus in   the left upper pole. 2.1 x 1.2 cm polypoid lesion along the right lateral bladder wall.    BOWEL: Diverticulosis of the colon. No acute inflammatory change. No obstruction.     LYMPH NODES: Normal.    VASCULATURE: Mild diffuse atherosclerotic calcification. No aneurysm.    PELVIC ORGANS: Prostatomegaly.    MUSCULOSKELETAL: Mild dependent body wall edema. No fluid  collection or hematoma. Surgical hardware in both shoulders. No acute fracture.      Impression    IMPRESSION:  1.  No acute or traumatic finding in the chest, abdomen, or pelvis, within the limitations of noncontrast technique.    2.  Bilateral renal cysts, some of which are high attenuation indicating hemorrhagic/proteinaceous content. There is also asymmetric left-sided urothelial thickening and right-sided collecting system dilation, as well as a polypoid lesion in the bladder,   which could represent urothelial carcinoma. Recommend further evaluation with CT urogram.    3.  Left thyroid nodule measuring 2.9 cm. This could be further evaluated with thyroid ultrasound, if not already performed.    4.  Tiny pulmonary nodules. Follow-up recommendations below:    REFERENCE:  Guidelines for Management of Incidental Pulmonary Nodules Detected on CT Images: From the Fleischner Society 2017.   Guidelines apply to incidental nodules in patients who are 35 years or older.  Guidelines do not apply to lung cancer screening, patients with immunosuppression, or patients with known primary cancer.    MULTIPLE NODULES  Nodule size <6 mm  Low-risk patients: No follow-up needed.  High-risk patients: Optional follow-up at 12 months.     MR Brain w/o & w Contrast    Narrative    EXAM: MR BRAIN W/O and W CONTRAST  LOCATION: [facility]  DATE/TIME: 3/16/2023 4:30 PM    INDICATION: Follow-up intracranial hemorrhage and question mass in the left middle cranial fossa  COMPARISON: CT head same day 1252 hours.  CONTRAST: 6.5 ml Gadavist given  TECHNIQUE: Routine multiplanar multisequence head MRI without and with intravenous contrast.    FINDINGS:  INTRACRANIAL CONTENTS: No acute infarct. Avidly enhancing extra-axial dural based mass within the left middle cranial fossa measuring up to approximately 1.5 x 2.0 x 1.6 cm (AP, ML, CC) with broad dural attachment/dural tail compatible with meningioma.   There is  T2/FLAIR hyperintense signal in the adjacent left temporal pole compatible with vasogenic edema most likely related to focal mass effect. Redemonstrated bilateral cerebral convexity subdural collections exhibiting predominantly hyperintense   signal on T1 and T2-weighted sequences with superimposed intermediate T1 signal compatible with acute/subacute blood products measuring up to 1.0 cm on the left and 0.9 cm on the right. There is copresent susceptibility, particularly anteriorly on the   left surrounding the region of reduced diffusivity compatible with acute/subacute blood products. Approximately 0.3 cm rightward shift of midline structures measured at the septum pellucidum. Patchy nonspecific T2/FLAIR hyperintensities within the   cerebral white matter most consistent with chronic microvascular ischemic change. Small chronic lacunar infarcts in the cerebellum. Proportional prominence of the ventricles and sulci is compatible with diffuse cerebral volume loss. Prominent   retroperitoneal cerebellar CSF space. No cerebellar tonsillar ectopia. No pathologic intra-axial enhancement.    SELLA: No abnormality accounting for technique.    OSSEOUS STRUCTURES/SOFT TISSUES: Normal marrow signal. The major intracranial vascular flow voids are maintained.    ORBITS: No visible intraorbital abnormality.     SINUSES/MASTOIDS: Mild mucosal thickening scattered about the paranasal sinuses. No middle ear or mastoid effusion.      Impression    IMPRESSION:  1.  Unchanged size of bilateral cerebral convexity subdural hematomas since same-day CT, which contain blood products of differing ages, suspect acute on subacute.  2.  Reduced diffusivity at the anterior component of the left subdural collection can be seen with hematoma of this age as well as in the context of infection/empyema - correlate clinically.  3.  Enhancing extra-axial mass in the left middle cranial fossa compatible with meningioma. Associated focal mass effect  exerted upon left temporal pole with parenchymal edema.  4.  Mild rightward shift of midline structures by approximately 0.3 cm.       Head CT w/o contrast    Narrative    EXAM: CT HEAD W/O CONTRAST  LOCATION: [facility]  DATE/TIME: 3/16/2023 8:12 PM    INDICATION: follow up [facility]  COMPARISON: CT and MR head same day.  TECHNIQUE: Routine CT Head without IV contrast. Multiplanar reformats. Dose reduction techniques were used.    FINDINGS:  INTRACRANIAL CONTENTS: Redemonstrated confluent edema in the left anterior temporal lobe most likely related to mass effect from adjacent extra-axial mass better characterized on same-day comparison MRI. Redemonstrated mixed density bilateral   frontoparietal convexity collections not substantially changed in size measuring up to approximately 8 mm bilaterally. No additional new intracranial hemorrhage. No acute transcortical infarct. Similar approximately 2 mm rightward shift of septum   pellucidum. Patchy white matter hypodensities are, while nonspecific, most compatible with chronic microvascular ischemic changes. Normal ventricles and sulci.    VISUALIZED ORBITS/SINUSES/MASTOIDS: No visible intraorbital abnormality. Paranasal sinuses are clear. No middle ear or mastoid effusion.    BONES/SOFT TISSUES: No acute abnormality.      Impression    IMPRESSION:  1.  No significant change in bilateral mixed density subdural hematomas and left temporal lobe edema associated with extra-axial mass better characterized on same-day MRI.       CT Urogram wo & w Contrast    Narrative    EXAM: CT UROGRAM WO and W CONTRAST  LOCATION: [facility]  DATE/TIME: 3/16/2023 8:11 PM    INDICATION: bladder mass, ureteral enhancement  COMPARISON: 03/16/2023, 1253 hours  TECHNIQUE: CT scan of the abdomen and pelvis using urogram technique with pre contrast, post contrast, and delayed images. Multiplanar reformats were obtained. Dose reduction techniques were  used.   CONTRAST: Isovue 370 100mL    FINDINGS:   LOWER CHEST: Dependent atelectasis.    HEPATOBILIARY: Cholecystectomy. Liver and bile ducts unremarkable.    PANCREAS: Normal.    SPLEEN: Normal.    ADRENAL GLANDS: Normal.    RIGHT KIDNEY/URETER: Simple cortical and parapelvic cysts, which do not require further follow-up. Mild-moderate renal pelvis and ureteral dilation with relative sparing of the calyces. No urothelial thickening, filling defects, urothelial enhancement,   or stricture.    LEFT KIDNEY/URETER: 1.3 cm endophytic lesion in the upper pole is high attenuation on precontrast images and demonstrates equivocal enhancement (15-20 Hounsfield units). Other simple and parapelvic and cortical cysts do not demonstrate enhancement. The   urothelial thickening that was seen on the same day noncontrast CT does not persist on nephrographic or excretory phase images. There is mild fullness of the renal pelvis and ureter, but no filling defects, urothelial enhancement, or stricture    BLADDER: Enhancing polypoid mass along the right lateral wall measuring 2.9 x 2.1 cm (12/135). No extravesical soft tissue. Retana catheter in the lumen.    BOWEL: Diverticulosis of the colon. No acute inflammatory change. No obstruction.     LYMPH NODES: Normal.    VASCULATURE: Mild diffuse atherosclerotic calcification. No aneurysm.    PELVIC ORGANS: Prostatomegaly.    MUSCULOSKELETAL: Mild subcutaneous body wall edema.      Impression    IMPRESSION:  1.  Enhancing polypoid soft tissue lesion along the right lateral bladder wall. Recommend further evaluation with cystoscopy. No evidence of upper tract disease.    2.  Patulous mildly dilated ureters bilaterally, right greater than left, but no calyceal dilation. This is most suggestive of sequelae of vesicoureteral reflux or a prior obstruction.     3.  High attenuation lesion in the upper pole left kidney demonstrates equivocal enhancement, which is probably technical due to  endophytic location. Renal MRI is suggested to exclude true enhancement.   MR Renal wo & w Contrast    Narrative    EXAM: MR RENAL W/O and W CONTRAST  LOCATION: [facility]  DATE/TIME: 3/18/2023 1:01 PM    INDICATION: left renal mass  COMPARISON: CT from 03/16/2023 is reviewed.  TECHNIQUE: Routine MRI renal protocol including T1 in/out phase, diffusion, multiplane T2, and dynamic T1 with IV contrast.  CONTRAST: 6,5ml joe    FINDINGS:    RIGHT KIDNEY: Moderately atrophic and containing numerous simple cysts, including parapelvic cysts. No follow-up is required.    LEFT KIDNEY: 1.1 x 0.5 cm cyst in the upper pole of the left kidney anteriorly demonstrates uniform internal T1 shortening and T2 prolongation, but no abnormal enhancement. Innumerable simple left kidney cysts, including parapelvic cysts, requiring no   follow-up. Moderate left kidney atrophy.    ADDITIONAL FINDINGS: Moderate pancreatic parenchymal atrophy.      Impression    IMPRESSION:  Hemorrhagic cyst accounts for the abnormality in the left kidney which was indeterminate on CT. No further follow-up is necessary.       Discharge Medications   Discharge Medication List as of 3/20/2023  2:34 PM      START taking these medications    Details   dexamethasone (DECADRON) 2 MG tablet Take 2 tablets (4 mg) by mouth 2 times daily for 1 day, THEN 1 tablet (2 mg) 2 times daily for 2 days, THEN 0.5 tablets (1 mg) 2 times daily for 2 days, THEN 0.5 tablets (1 mg) daily (with breakfast) for 2 days., R-0, No Print Out      polyethylene glycol (MIRALAX) 17 GM/Dose powder Take 17 g by mouth daily, Disp-510 g, No Print Out      sodium chloride (OCEAN) 0.65 % nasal spray Spray 1 spray into both nostrils daily as needed for congestion, No Print Out      tamsulosin (FLOMAX) 0.4 MG capsule Take 1 capsule (0.4 mg) by mouth daily, No Print Out         CONTINUE these medications which have NOT CHANGED    Details   atorvastatin (LIPITOR) 40 MG tablet  Take 40 mg by mouth At Bedtime, Historical      Cyanocobalamin 2500 MCG TABS Take 2,500 mcg by mouth daily, Historical      levETIRAcetam (KEPPRA) 500 MG tablet TAKE 1 TABLET BY MOUTH TWICE DAILY, Disp-56 tablet, R-12, [id number]/2020 - URGENT - PLEASE ADVISE REFILL NEEDED FOR CYCLE FILL. THANK YOU!      primidone (MYSOLINE) 50 MG tablet Take 50 mg by mouth 2 times daily, Historical      thiamine 50 MG TABS Take 50 mg by mouth daily, Historical         STOP taking these medications       aspirin 81 MG EC tablet Comments:   Reason for Stopping:             Allergies   Allergies   Allergen Reactions     Penicillins Anaphylaxis and Nausea and Vomiting     Alfuzosin Nausea and Vomiting     Donepezil Other (See Comments)     Myolin [Norflex] Unknown     Other reaction(s): hives     Sulfa (Sulfonamide Antibiotics) [Sulfa Drugs] Nausea and Vomiting     Triazolam Nausea and Vomiting

## 2023-03-23 NOTE — LETTER
3/23/2023        RE: Kurtis Urban  [address]  [place] Urban  [facility] [address]        [place] [facility]       Patient Kurtis Urban  MRN: [id number]        Reason for Visit     Chief Complaint   Patient presents with     RECHECK     INITIAL       Code Status     CPR/Full code     Assessment     History of traumatic subdural hematoma  History of a seizure disorder  Urinary retention requiring Retana catheter  Dementia  hld  [place]  Generalized weakness    Plan     Pt is admitted to TCU for strengthening and rehab.  Patient was admitted to the hospital and seen by neurosurgery as well as urology  Neurosurgery does not recommend surgical intervention  Aspirin has been discontinued and no anticoagulation recommended  On a steroid taper  Keppra 500 twice daily for seizure prevention added to his regimen  Urology recommended placement of Retana catheter  Started on Flomax  Left renal lesion to be worked up as an outpatient  Suspected he may need TURP  Monitor for ongoing hematuria  He has a history of unwitnessed fall and has been discharged to the TCU for strengthening and rehab  Recheck labs  Continue with PT/OT-reports weakness and falls  BIMS 13/15    History     Patient is a very pleasant 89 year old male who is admitted to TCU  Patient was admitted with traumatic subdural hematoma  Seen by neurosurgery no surgical intervention planned  Also saw urology because imaging revealed a distended bladder with a lesion noted on the bladder wall and renal cyst noted  Urology consulted and a Retana catheter was placed  Discharged to the TCU      Past Medical & Surgical History     PAST MEDICAL HISTORY:   Past Medical History:   Diagnosis Date     Dementia with behavioral disturbance 9/3/2019     Depression 5/28/2014     Hypercholesteremia 5/28/2014     Melanoma of back (H) 5/28/2014    Surgically removed       Melanoma of face (H) 5/28/2014    Surgically removed      Seizure (H) 5/19/2015      Thiamine deficiency 9/3/2019     TIA (transient ischemic attack)       PAST SURGICAL HISTORY:   has a past surgical history that includes Cholecystectomy; appendectomy; other surgical history; other surgical history (1993); hernia repair; shoulder surgery (Right); and other surgical history (Right).      Past Social History     Reviewed,  reports that he has quit smoking. His smoking use included cigarettes. He has never used smokeless tobacco. He reports that he does not drink alcohol and does not use drugs.    Family History     Reviewed, and family history includes Cancer in his mother; Cerebrovascular Disease in his maternal grandfather.    Medication List     Current Outpatient Medications   Medication     atorvastatin (LIPITOR) 40 MG tablet     Cyanocobalamin 2500 MCG TABS     dexamethasone (DECADRON) 2 MG tablet     levETIRAcetam (KEPPRA) 500 MG tablet     polyethylene glycol (MIRALAX) 17 GM/Dose powder     primidone (MYSOLINE) 50 MG tablet     sodium chloride (OCEAN) 0.65 % nasal spray     tamsulosin (FLOMAX) 0.4 MG capsule     thiamine 50 MG TABS     No current facility-administered medications for this visit.          Allergies     Allergies   Allergen Reactions     Orphenadrine Hives     Other reaction(s): hives       Penicillins Anaphylaxis and Nausea and Vomiting     Alfuzosin Nausea and Vomiting     Donepezil Other (See Comments)     Myolin [Norflex] Unknown     Other reaction(s): hives     Sulfa Drugs Nausea and Vomiting     Triazolam Nausea and Vomiting       Review of Systems   A comprehensive review of 14 systems was done. Pertinent findings noted here and in history of present illness. All the rest negative.  Constitutional: Negative.  Negative for fever, chills, she has  activity change, appetite change and fatigue.   HENT: Negative for congestion and facial swelling.    Eyes: Negative for photophobia, redness and visual disturbance.   Respiratory: Negative for cough and chest tightness.   "  Cardiovascular: Negative for chest pain, palpitations and leg swelling.   Gastrointestinal: Negative for nausea, diarrhea, constipation, blood in stool and abdominal distention.   Genitourinary: Negative.  Has a rai  Musculoskeletal: reports falls   Skin: Negative.    Neurological: Negative for dizziness, tremors, syncope, weakness, light-headedness and headaches.   Hematological: Does not bruise/bleed easily.   Psychiatric/Behavioral: Negative.  Some recall issues noted      Physical Exam   /64   Pulse 85   Temp 97.3  F (36.3  C)   Resp 20   Ht 1.676 m (5' 6\")   Wt 65.3 kg (144 lb)   SpO2 98%   BMI 23.24 kg/m       Constitutional: Oriented to person, place, and time and appears well-developed.   HEENT:  Normocephalic and atraumatic.  Eyes: Conjunctivae and EOM are normal. Pupils are equal, round, and reactive to light. No discharge.  No scleral icterus. Nose normal. Mouth/Throat: Oropharynx is clear and moist. No oropharyngeal exudate.    [place]  NECK: Normal range of motion. Neck supple. No JVD present. No tracheal deviation present. No thyromegaly present.   CARDIOVASCULAR: Normal rate, regular rhythm and intact distal pulses.  Exam reveals no gallop and no friction rub.  Systolic murmur present.  PULMONARY: Effort normal and breath sounds normal. No respiratory distress.No Wheezing or rales.  ABDOMEN: Soft. Bowel sounds are normal. No distension and no mass.  There is no tenderness. There is no rebound and no guarding. No HSM.  MUSCULOSKELETAL: Normal range of motion. Mild kyphosis, no tenderness.  LYMPH NODES: Has no cervical, supraclavicular, axillary and groin adenopathy.   NEUROLOGICAL: Alert and oriented to person, place, and time. No cranial nerve deficit.  Normal muscle tone. Coordination normal.   GENITOURINARY: Deferred exam.has  A rai  SKIN: Skin is warm and dry. No rash noted. No erythema. No pallor.   EXTREMITIES: No cyanosis, no clubbing, no edema. No Deformity.  PSYCHIATRIC: Normal " mood, affect and behavior.      Lab Results     Recent Results (from the past 240 hour(s))   CBC (+ platelets, no diff)    Collection Time: 03/16/23 12:29 PM   Result Value Ref Range    WBC Count 11.0 4.0 - 11.0 10e3/uL    RBC Count 4.08 (L) 4.40 - 5.90 10e6/uL    Hemoglobin 12.9 (L) 13.3 - 17.7 g/dL    Hematocrit 39.5 (L) 40.0 - 53.0 %    MCV 97 78 - 100 fL    MCH 31.6 26.5 - 33.0 pg    MCHC 32.7 31.5 - 36.5 g/dL    RDW 12.4 10.0 - 15.0 %    Platelet Count 102 (L) 150 - 450 10e3/uL   Basic metabolic panel    Collection Time: 03/16/23 12:29 PM   Result Value Ref Range    Sodium 141 136 - 145 mmol/L    Potassium 4.4 3.5 - 5.0 mmol/L    Chloride 111 (H) 98 - 107 mmol/L    Carbon Dioxide (CO2) 22 22 - 31 mmol/L    Anion Gap 8 5 - 18 mmol/L    Urea Nitrogen 19 8 - 28 mg/dL    Creatinine 1.07 0.70 - 1.30 mg/dL    Calcium 9.3 8.5 - 10.5 mg/dL    Glucose 108 70 - 125 mg/dL    GFR Estimate 64 >60 mL/min/1.73m2   CK total    Collection Time: 03/16/23 12:29 PM   Result Value Ref Range    CK 2,827 (HH) 30 - 190 U/L   Magnesium    Collection Time: 03/16/23 12:29 PM   Result Value Ref Range    Magnesium 1.9 1.8 - 2.6 mg/dL   Troponin I (now)    Collection Time: 03/16/23 12:29 PM   Result Value Ref Range    Troponin I 0.07 0.00 - 0.29 ng/mL   Hepatic panel    Collection Time: 03/16/23 12:29 PM   Result Value Ref Range    Bilirubin Total 1.3 (H) 0.0 - 1.0 mg/dL    Bilirubin Direct 0.5 <=0.5 mg/dL    Protein Total 6.4 6.0 - 8.0 g/dL    Albumin 3.6 3.5 - 5.0 g/dL    Alkaline Phosphatase 83 45 - 120 U/L    AST 65 (H) 0 - 40 U/L    ALT 27 0 - 45 U/L   INR    Collection Time: 03/16/23  1:21 PM   Result Value Ref Range    INR 1.22 (H) 0.85 - 1.15   Partial thromboplastin time    Collection Time: 03/16/23  1:21 PM   Result Value Ref Range    aPTT 29 22 - 38 Seconds   ECG 12-Lead with MUSE - [facility]    Collection Time: 03/16/23  1:31 PM   Result Value Ref Range    Systolic Blood Pressure 135 mmHg    Diastolic Blood Pressure 73 mmHg     Ventricular Rate 69 BPM    Atrial Rate 69 BPM    UT Interval 182 ms    QRS Duration 100 ms     ms    QTc 456 ms    P Axis 62 degrees    R AXIS -64 degrees    T Axis 17 degrees    Interpretation ECG       Sinus rhythm  Left anterior fascicular block  Minimal voltage criteria for LVH, may be normal variant  Septal infarct , age undetermined  Abnormal ECG  When compared with ECG of 18-JAN-2020 12:32,  No significant change was found  Confirmed by SEE ED PROVIDER NOTE FOR, ECG INTERPRETATION ([id number]),  KAYE PACKER ([id number]) on 3/16/2023 7:47:30 PM     UA with Microscopic reflex to Culture    Collection Time: 03/16/23  2:10 PM    Specimen: Urine, Catheter   Result Value Ref Range    Color Urine Yellow Colorless, Straw, Light Yellow, Yellow    Appearance Urine Clear Clear    Glucose Urine Negative Negative mg/dL    Bilirubin Urine Negative Negative    Ketones Urine Negative Negative mg/dL    Specific Gravity Urine 1.016 1.001 - 1.030    Blood Urine 0.1 mg/dL (A) Negative    pH Urine 5.5 5.0 - 7.0    Protein Albumin Urine 50 (A) Negative mg/dL    Urobilinogen Urine <2.0 <2.0 mg/dL    Nitrite Urine Negative Negative    Leukocyte Esterase Urine 25 Bipin/uL (A) Negative    Mucus Urine Present (A) None Seen /LPF    RBC Urine 7 (H) <=2 /HPF    WBC Urine 34 (H) <=5 /HPF   Urine Culture    Collection Time: 03/16/23  2:10 PM    Specimen: Urine, Catheter   Result Value Ref Range    Culture No Growth    Keppra (Levetiracetam) Level    Collection Time: 03/16/23  4:41 PM   Result Value Ref Range    Keppra (Levetiracetam) Level 20.4 10.0 - 40.0  g/mL   Basic metabolic panel    Collection Time: 03/17/23  5:45 AM   Result Value Ref Range    Sodium 139 136 - 145 mmol/L    Potassium 4.5 3.5 - 5.0 mmol/L    Chloride 113 (H) 98 - 107 mmol/L    Carbon Dioxide (CO2) 18 (L) 22 - 31 mmol/L    Anion Gap 8 5 - 18 mmol/L    Urea Nitrogen 19 8 - 28 mg/dL    Creatinine 0.93 0.70 - 1.30 mg/dL    Calcium 8.6 8.5 - 10.5 mg/dL    Glucose 115 70 -  125 mg/dL    GFR Estimate 76 >60 mL/min/1.73m2   CBC with platelets    Collection Time: 03/17/23  5:45 AM   Result Value Ref Range    WBC Count 11.1 (H) 4.0 - 11.0 10e3/uL    RBC Count 3.85 (L) 4.40 - 5.90 10e6/uL    Hemoglobin 12.1 (L) 13.3 - 17.7 g/dL    Hematocrit 37.3 (L) 40.0 - 53.0 %    MCV 97 78 - 100 fL    MCH 31.4 26.5 - 33.0 pg    MCHC 32.4 31.5 - 36.5 g/dL    RDW 12.6 10.0 - 15.0 %    Platelet Count 95 (L) 150 - 450 10e3/uL   CK total    Collection Time: 03/17/23  5:45 AM   Result Value Ref Range    CK 1,166 (HH) 30 - 190 U/L   Basic metabolic panel    Collection Time: 03/18/23  7:56 AM   Result Value Ref Range    Sodium 139 136 - 145 mmol/L    Potassium 4.4 3.5 - 5.0 mmol/L    Chloride 115 (H) 98 - 107 mmol/L    Carbon Dioxide (CO2) 19 (L) 22 - 31 mmol/L    Anion Gap 5 5 - 18 mmol/L    Urea Nitrogen 24 8 - 28 mg/dL    Creatinine 0.94 0.70 - 1.30 mg/dL    Calcium 8.3 (L) 8.5 - 10.5 mg/dL    Glucose 140 (H) 70 - 125 mg/dL    GFR Estimate 75 >60 mL/min/1.73m2   CK total    Collection Time: 03/18/23  7:56 AM   Result Value Ref Range     (H) 30 - 190 U/L   Extra Purple Top Tube    Collection Time: 03/18/23  7:56 AM   Result Value Ref Range    Hold Specimen [facility]    Symptomatic COVID-19 Virus (Coronavirus) by PCR Nose    Collection Time: 03/20/23  9:57 AM    Specimen: Nose; Swab   Result Value Ref Range    SARS CoV2 PCR Negative Negative                 Electronically signed by    Bela Franklin MD                             Sincerely,        DEB Mendiola

## 2023-03-26 NOTE — TELEPHONE ENCOUNTER
[place] [facility] TRIAGE ENCOUNTER    Chief Complaint   Patient presents with     Medication Question       Kurtis Urban is a 89 year old  (2/23/1929), Nurse called today to report: From the nurse to nurse report he was supposed to get one more dexamethasone 1 mg today but no pill left to give today.    ASSESSMENT/PLAN    Ok to end the taper today.     Follow up with primary NP tomorrow.    Electronically signed by:   Lazara West NP

## 2023-03-27 NOTE — PROGRESS NOTES
[facility]  Chief Complaint   Patient presents with     RECHECK     [facility] Medical Record Number:  [id number]  Place of Service where encounter took place:  [facility] ([facility]) [[id number]]  Code Status:  Full Code    HISTORY:      HPI:  Kurtis Urban  is 89 year old (2/23/1929) undergoing physical and occupational therapy. He is with past medical history significant for dementia lives in a independent living facility, ambulates with a walker, seizure disorder, CVA, TIA, dyslipidemia brought into the emergency room with complaints of unwitnessed fall at care facility and unable to get up for about 10 to 12 hours being admitted for acute on chronic subdural hematoma, possible meningioma, early rhabdomyolysis, abnormal appearing urothelial tract CT.  Excerpted from records  In ED, CT head revealed mixed attenuating subdural collections concerning for acute on chronic subdural hemorrhage, rounded lesion in the anterior middle cranial fossa concerning for possible meningioma, mildly low attenuating changes in the left temporal lobe compatible with edema.  Neurosurgery recommended MRI brain, repeat CT head in 6 hours, if worsening they recommend transfer in case patient and family wishes to proceed with any further intervention.  His son is flying in from [place].   CT chest abdomen pelvis also revealed bilateral renal cysts,?  Hemorrhage, asymmetric left-sided urothelial thickening and right-sided collecting system dilation, polypoid lesion in the bladder.    Today he is seen to review vital signs, labs, routine visit and a medication review.   From Methasone clarified and he completed the taper.  He denied chest pain shortness of breath cough congestion constipation or diarrhea.  He denied headaches dizziness.  He does have a Retana catheter with clear yellow urine he will follow-up with urology on 3/30/2023.  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  Hearing aid left ear only.  He denies  pain. He will follow-up with neurology new visit on 5/1/2023.  Labs reviewed today and he was noted to have an elevated white blood cell count of 17.6.  No cough or congestion he is afebrile at 97.5 and a  UC to be sent and a recheck lab in the a.m.    ALLERGIES:Orphenadrine, Penicillins, Alfuzosin, Donepezil, Myolin [norflex], Sulfa drugs, and Triazolam    PAST MEDICAL HISTORY:   Past Medical History:   Diagnosis Date     Dementia with behavioral disturbance 9/3/2019     Depression 5/28/2014     Hypercholesteremia 5/28/2014     Melanoma of back (H) 5/28/2014    Surgically removed       Melanoma of face (H) 5/28/2014    Surgically removed      Seizure (H) 5/19/2015     Thiamine deficiency 9/3/2019     TIA (transient ischemic attack)        PAST SURGICAL HISTORY:   has a past surgical history that includes Cholecystectomy; appendectomy; other surgical history; other surgical history (1993); hernia repair; shoulder surgery (Right); and other surgical history (Right).    FAMILY HISTORY: family history includes Cancer in his mother; Cerebrovascular Disease in his maternal grandfather.    SOCIAL HISTORY:  reports that he has quit smoking. His smoking use included cigarettes. He has never used smokeless tobacco. He reports that he does not drink alcohol and does not use drugs.    ROS:  Constitutional: Negative for activity change, appetite change, fatigue and fever.   HENT: Negative for congestion.    Hearing aid left ear  Respiratory: Negative for cough, shortness of breath and wheezing.    Cardiovascular: Negative for chest pain and leg swelling.   Gastrointestinal: Negative for abdominal distention, abdominal pain, constipation, diarrhea and nausea.   Genitourinary: Negative for dysuria.   Musculoskeletal: Negative for arthralgia. Negative for back pain.   Skin: Negative for color change and wound.   Neurological: Negative for dizziness.   Psychiatric/Behavioral: Negative for agitation, behavioral problems and  "confusion.     Physical Exam:  Constitutional:       Appearance: Patient is well-developed.   HENT:      Head: Normocephalic.   Eyes:      Conjunctiva/sclera: Conjunctivae normal.   Neck:      Musculoskeletal: Normal range of motion.   Cardiovascular:      Rate and Rhythm: Normal rate and regular rhythm.      Heart sounds: Normal heart sounds. No murmur.   Pulmonary:      Effort: No respiratory distress.      Breath sounds: Normal breath sounds. No wheezing or rales.   Abdominal:      General: Bowel sounds are normal. There is no distension.      Palpations: Abdomen is soft.      Tenderness: There is no abdominal tenderness.   Musculoskeletal:       Normal range of motion.     Skin:General:        Skin is warm.   Neurological:         Mental Status: Patient is alert and oriented to person, place, and time.   Psychiatric:         Behavior: Behavior normal.     Vitals:/66   Pulse 70   Temp 97.5  F (36.4  C)   Resp 16   Ht 1.676 m (5' 6\")   Wt 62.6 kg (138 lb)   SpO2 94%   BMI 22.27 kg/m   and Body mass index is 22.27 kg/m .    Lab/Diagnostic data:   Recent Results (from the past 240 hour(s))   Basic metabolic panel    Collection Time: 03/18/23  7:56 AM   Result Value Ref Range    Sodium 139 136 - 145 mmol/L    Potassium 4.4 3.5 - 5.0 mmol/L    Chloride 115 (H) 98 - 107 mmol/L    Carbon Dioxide (CO2) 19 (L) 22 - 31 mmol/L    Anion Gap 5 5 - 18 mmol/L    Urea Nitrogen 24 8 - 28 mg/dL    Creatinine 0.94 0.70 - 1.30 mg/dL    Calcium 8.3 (L) 8.5 - 10.5 mg/dL    Glucose 140 (H) 70 - 125 mg/dL    GFR Estimate 75 >60 mL/min/1.73m2   CK total    Collection Time: 03/18/23  7:56 AM   Result Value Ref Range     (H) 30 - 190 U/L   Extra Purple Top Tube    Collection Time: 03/18/23  7:56 AM   Result Value Ref Range    Hold Specimen [facility]    Symptomatic COVID-19 Virus (Coronavirus) by PCR Nose    Collection Time: 03/20/23  9:57 AM    Specimen: Nose; Swab   Result Value Ref Range    SARS CoV2 PCR Negative Negative "   Basic metabolic panel    Collection Time: 03/27/23  6:25 AM   Result Value Ref Range    Sodium 135 (L) 136 - 145 mmol/L    Potassium 4.7 3.4 - 5.3 mmol/L    Chloride 104 98 - 107 mmol/L    Carbon Dioxide (CO2) 21 (L) 22 - 29 mmol/L    Anion Gap 10 7 - 15 mmol/L    Urea Nitrogen 22.9 8.0 - 23.0 mg/dL    Creatinine 1.02 0.67 - 1.17 mg/dL    Calcium 8.7 8.2 - 9.6 mg/dL    Glucose 107 (H) 70 - 99 mg/dL    GFR Estimate 68 >60 mL/min/1.73m2   CBC with platelets    Collection Time: 03/27/23  6:25 AM   Result Value Ref Range    WBC Count 17.6 (H) 4.0 - 11.0 10e3/uL    RBC Count 3.60 (L) 4.40 - 5.90 10e6/uL    Hemoglobin 11.4 (L) 13.3 - 17.7 g/dL    Hematocrit 35.7 (L) 40.0 - 53.0 %    MCV 99 78 - 100 fL    MCH 31.7 26.5 - 33.0 pg    MCHC 31.9 31.5 - 36.5 g/dL    RDW 13.2 10.0 - 15.0 %    Platelet Count 111 (L) 150 - 450 10e3/uL   Magnesium    Collection Time: 03/27/23  6:25 AM   Result Value Ref Range    Magnesium 2.3 1.7 - 2.3 mg/dL     MEDICATIONS:     Review of your medicines          Accurate as of March 27, 2023  1:11 PM. If you have any questions, ask your nurse or doctor.            CONTINUE these medicines which have NOT CHANGED      Dose / Directions   atorvastatin 40 MG tablet  Commonly known as: LIPITOR      Dose: 40 mg  Take 40 mg by mouth At Bedtime  Refills: 0     Cyanocobalamin 2500 MCG Tabs      Dose: 2,500 mcg  Take 2,500 mcg by mouth daily  Refills: 0     levETIRAcetam 500 MG tablet  Commonly known as: KEPPRA  Used for: Seizure disorder (H)      TAKE 1 TABLET BY MOUTH TWICE DAILY  Quantity: 56 tablet  Refills: 12     polyethylene glycol 17 GM/Dose powder  Commonly known as: MIRALAX  Used for: Constipation, unspecified constipation type      Dose: 17 g  Take 17 g by mouth daily  Quantity: 510 g  Refills: 0     primidone 50 MG tablet  Commonly known as: MYSOLINE      Dose: 50 mg  Take 50 mg by mouth 2 times daily  Refills: 0     sodium chloride 0.65 % nasal spray  Commonly known as: OCEAN  Used for: Nasal  congestion      Dose: 1 spray  Spray 1 spray into both nostrils daily as needed for congestion  Refills: 0     tamsulosin 0.4 MG capsule  Commonly known as: FLOMAX  Used for: Urinary retention      Dose: 0.4 mg  Take 1 capsule (0.4 mg) by mouth daily  Refills: 0     thiamine 50 MG Tabs      Dose: 50 mg  Take 50 mg by mouth daily  Refills: 0        STOP taking    dexamethasone 2 MG tablet  Commonly known as: DECADRON  Stopped by: Juana Martinez CNP               ASSESSMENT/PLAN  Encounter Diagnoses   Name Primary?     Dementia with behavioral disturbance (H) Yes     Physical deconditioning      Encounter for medication review      Leukocytosis send UA UC recheck lab in the a.m.    Physical deconditioning continue PT OT    Seizure disorder on Keppra and primidone    Possible meningioma completed dexamethasone taper    HDL continue atorvastatin    Electronically signed by: Juana Martinez CNP

## 2023-03-27 NOTE — LETTER
3/27/2023        RE: Kurtis Urban  [address] [facility]  C/o Adi Urban  [facility] [address]        [facility] GERIATRIC SERVICES  Chief Complaint   Patient presents with     RECHECK     [facility] Record Number:  [id number]  Place of Service where encounter took place:  [facility] ([facility]) [[id number]]  Code Status:  Full Code    HISTORY:      HPI:  Kurtis Urban  is 89 year old (2/23/1929) undergoing physical and occupational therapy. He is with past medical history significant for dementia lives in a independent living facility, ambulates with a walker, seizure disorder, CVA, TIA, dyslipidemia brought into the emergency room with complaints of unwitnessed fall at care facility and unable to get up for about 10 to 12 hours being admitted for acute on chronic subdural hematoma, possible meningioma, early rhabdomyolysis, abnormal appearing urothelial tract CT.  Excerpted from records  In ED, CT head revealed mixed attenuating subdural collections concerning for acute on chronic subdural hemorrhage, rounded lesion in the anterior middle cranial fossa concerning for possible meningioma, mildly low attenuating changes in the left temporal lobe compatible with edema.  Neurosurgery recommended MRI brain, repeat CT head in 6 hours, if worsening they recommend transfer in case patient and family wishes to proceed with any further intervention.  His son is flying in from [place].   CT chest abdomen pelvis also revealed bilateral renal cysts,?  Hemorrhage, asymmetric left-sided urothelial thickening and right-sided collecting system dilation, polypoid lesion in the bladder.    Today he is seen to review vital signs, labs, routine visit and a medication review.   From Methasone clarified and he completed the taper.  He denied chest pain shortness of breath cough congestion constipation or diarrhea.  He denied headaches dizziness.  He does have a Retana catheter with clear yellow urine he will  follow-up with urology on 3/30/2023.  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  Hearing aid left ear only.  He denies pain. He will follow-up with neurology new visit on 5/1/2023.  Labs reviewed today and he was noted to have an elevated white blood cell count of 17.6.  No cough or congestion he is afebrile at 97.5 and a UA UC to be sent and a recheck lab in the a.m.    ALLERGIES:Orphenadrine, Penicillins, Alfuzosin, Donepezil, Myolin [norflex], Sulfa drugs, and Triazolam    PAST MEDICAL HISTORY:   Past Medical History:   Diagnosis Date     Dementia with behavioral disturbance 9/3/2019     Depression 5/28/2014     Hypercholesteremia 5/28/2014     Melanoma of back (H) 5/28/2014    Surgically removed       Melanoma of face (H) 5/28/2014    Surgically removed      Seizure (H) 5/19/2015     Thiamine deficiency 9/3/2019     TIA (transient ischemic attack)        PAST SURGICAL HISTORY:   has a past surgical history that includes Cholecystectomy; appendectomy; other surgical history; other surgical history (1993); hernia repair; shoulder surgery (Right); and other surgical history (Right).    FAMILY HISTORY: family history includes Cancer in his mother; Cerebrovascular Disease in his maternal grandfather.    SOCIAL HISTORY:  reports that he has quit smoking. His smoking use included cigarettes. He has never used smokeless tobacco. He reports that he does not drink alcohol and does not use drugs.    ROS:  Constitutional: Negative for activity change, appetite change, fatigue and fever.   HENT: Negative for congestion.    Hearing aid left ear  Respiratory: Negative for cough, shortness of breath and wheezing.    Cardiovascular: Negative for chest pain and leg swelling.   Gastrointestinal: Negative for abdominal distention, abdominal pain, constipation, diarrhea and nausea.   Genitourinary: Negative for dysuria.   Musculoskeletal: Negative for arthralgia. Negative for back pain.   Skin: Negative for color change and wound.  "  Neurological: Negative for dizziness.   Psychiatric/Behavioral: Negative for agitation, behavioral problems and confusion.     Physical Exam:  Constitutional:       Appearance: Patient is well-developed.   HENT:      Head: Normocephalic.   Eyes:      Conjunctiva/sclera: Conjunctivae normal.   Neck:      Musculoskeletal: Normal range of motion.   Cardiovascular:      Rate and Rhythm: Normal rate and regular rhythm.      Heart sounds: Normal heart sounds. No murmur.   Pulmonary:      Effort: No respiratory distress.      Breath sounds: Normal breath sounds. No wheezing or rales.   Abdominal:      General: Bowel sounds are normal. There is no distension.      Palpations: Abdomen is soft.      Tenderness: There is no abdominal tenderness.   Musculoskeletal:       Normal range of motion.     Skin:General:        Skin is warm.   Neurological:         Mental Status: Patient is alert and oriented to person, place, and time.   Psychiatric:         Behavior: Behavior normal.     Vitals:/66   Pulse 70   Temp 97.5  F (36.4  C)   Resp 16   Ht 1.676 m (5' 6\")   Wt 62.6 kg (138 lb)   SpO2 94%   BMI 22.27 kg/m   and Body mass index is 22.27 kg/m .    Lab/Diagnostic data:   Recent Results (from the past 240 hour(s))   Basic metabolic panel    Collection Time: 03/18/23  7:56 AM   Result Value Ref Range    Sodium 139 136 - 145 mmol/L    Potassium 4.4 3.5 - 5.0 mmol/L    Chloride 115 (H) 98 - 107 mmol/L    Carbon Dioxide (CO2) 19 (L) 22 - 31 mmol/L    Anion Gap 5 5 - 18 mmol/L    Urea Nitrogen 24 8 - 28 mg/dL    Creatinine 0.94 0.70 - 1.30 mg/dL    Calcium 8.3 (L) 8.5 - 10.5 mg/dL    Glucose 140 (H) 70 - 125 mg/dL    GFR Estimate 75 >60 mL/min/1.73m2   CK total    Collection Time: 03/18/23  7:56 AM   Result Value Ref Range     (H) 30 - 190 U/L   Extra Purple Top Tube    Collection Time: 03/18/23  7:56 AM   Result Value Ref Range    Hold Specimen [facility]    Symptomatic COVID-19 Virus (Coronavirus) by PCR Nose    " Collection Time: 03/20/23  9:57 AM    Specimen: Nose; Swab   Result Value Ref Range    SARS CoV2 PCR Negative Negative   Basic metabolic panel    Collection Time: 03/27/23  6:25 AM   Result Value Ref Range    Sodium 135 (L) 136 - 145 mmol/L    Potassium 4.7 3.4 - 5.3 mmol/L    Chloride 104 98 - 107 mmol/L    Carbon Dioxide (CO2) 21 (L) 22 - 29 mmol/L    Anion Gap 10 7 - 15 mmol/L    Urea Nitrogen 22.9 8.0 - 23.0 mg/dL    Creatinine 1.02 0.67 - 1.17 mg/dL    Calcium 8.7 8.2 - 9.6 mg/dL    Glucose 107 (H) 70 - 99 mg/dL    GFR Estimate 68 >60 mL/min/1.73m2   CBC with platelets    Collection Time: 03/27/23  6:25 AM   Result Value Ref Range    WBC Count 17.6 (H) 4.0 - 11.0 10e3/uL    RBC Count 3.60 (L) 4.40 - 5.90 10e6/uL    Hemoglobin 11.4 (L) 13.3 - 17.7 g/dL    Hematocrit 35.7 (L) 40.0 - 53.0 %    MCV 99 78 - 100 fL    MCH 31.7 26.5 - 33.0 pg    MCHC 31.9 31.5 - 36.5 g/dL    RDW 13.2 10.0 - 15.0 %    Platelet Count 111 (L) 150 - 450 10e3/uL   Magnesium    Collection Time: 03/27/23  6:25 AM   Result Value Ref Range    Magnesium 2.3 1.7 - 2.3 mg/dL     MEDICATIONS:     Review of your medicines          Accurate as of March 27, 2023  1:11 PM. If you have any questions, ask your nurse or doctor.            CONTINUE these medicines which have NOT CHANGED      Dose / Directions   atorvastatin 40 MG tablet  Commonly known as: LIPITOR      Dose: 40 mg  Take 40 mg by mouth At Bedtime  Refills: 0     Cyanocobalamin 2500 MCG Tabs      Dose: 2,500 mcg  Take 2,500 mcg by mouth daily  Refills: 0     levETIRAcetam 500 MG tablet  Commonly known as: KEPPRA  Used for: Seizure disorder (H)      TAKE 1 TABLET BY MOUTH TWICE DAILY  Quantity: 56 tablet  Refills: 12     polyethylene glycol 17 GM/Dose powder  Commonly known as: MIRALAX  Used for: Constipation, unspecified constipation type      Dose: 17 g  Take 17 g by mouth daily  Quantity: 510 g  Refills: 0     primidone 50 MG tablet  Commonly known as: MYSOLINE      Dose: 50 mg  Take 50  mg by mouth 2 times daily  Refills: 0     sodium chloride 0.65 % nasal spray  Commonly known as: OCEAN  Used for: Nasal congestion      Dose: 1 spray  Spray 1 spray into both nostrils daily as needed for congestion  Refills: 0     tamsulosin 0.4 MG capsule  Commonly known as: FLOMAX  Used for: Urinary retention      Dose: 0.4 mg  Take 1 capsule (0.4 mg) by mouth daily  Refills: 0     thiamine 50 MG Tabs      Dose: 50 mg  Take 50 mg by mouth daily  Refills: 0        STOP taking    dexamethasone 2 MG tablet  Commonly known as: DECADRON  Stopped by: Juana Martinez CNP               ASSESSMENT/PLAN  Encounter Diagnoses   Name Primary?     Dementia with behavioral disturbance (H) Yes     Physical deconditioning      Encounter for medication review      Leukocytosis send UA UC recheck lab in the a.m.    Physical deconditioning continue PT OT    Seizure disorder on Keppra and primidone    Possible meningioma completed dexamethasone taper    HDL continue atorvastatin    Electronically signed by: Juana Martinez CNP        Sincerely,        Juana Martinez CNP

## 2023-03-28 NOTE — TELEPHONE ENCOUNTER
[facility] Geriatrics Lab Note     Provider: Bela Franklin MD  Facility: [facility]  Facility Type:  TCU    Allergies   Allergen Reactions     Orphenadrine Hives     Other reaction(s): hives       Penicillins Anaphylaxis and Nausea and Vomiting     Alfuzosin Nausea and Vomiting     Donepezil Other (See Comments)     Myolin [Norflex] Unknown     Other reaction(s): hives     Sulfa Drugs Nausea and Vomiting     Triazolam Nausea and Vomiting       Labs Reviewed by provider: Heme 2     Verbal Order/Direction given by Provider: Check Heme 1 with peripheral smear and Procalcitonin on 3/29/23.      Provider giving Order:  Juana Martinez CNP    Verbal Order given to: Praveen([phone])    Matty Evans RN

## 2023-03-29 NOTE — TELEPHONE ENCOUNTER
[facility] Geriatrics Lab Note     Provider: CODY Dill  Facility: [facility]  Facility Type:  TCU    Allergies   Allergen Reactions     Orphenadrine Hives     Other reaction(s): hives       Penicillins Anaphylaxis and Nausea and Vomiting     Alfuzosin Nausea and Vomiting     Donepezil Other (See Comments)     Myolin [Norflex] Unknown     Other reaction(s): hives     Sulfa Drugs Nausea and Vomiting     Triazolam Nausea and Vomiting       Labs Reviewed by provider: Heme 1     Verbal Order/Direction given by Provider: Check Heme 1 on 3/31/23.      Provider giving Order:  CODY Dill    Verbal Order given to: Selene([phone])    Matty Evans RN

## 2023-03-29 NOTE — ED TRIAGE NOTES
Pt presents to ED via EMS from TCU with reports of pulling out his rai catheter.  Pt has catheter in d/t urinary retention.  Pt was upset with staff not coming in tonight and pulled out his cath.  Pt has dried blood with small amount of oozing around penis.  Pt is not currently on blood thinner per med list.  Pt is quite uncomfortable.  Pt at TCU for subdural hemorrhage.     Triage Assessment     Row Name 03/28/23 6358       Triage Assessment (Adult)    Airway WDL WDL       Respiratory WDL    Respiratory WDL WDL       Skin Circulation/Temperature WDL    Skin Circulation/Temperature WDL WDL       Cardiac WDL    Cardiac WDL WDL       Peripheral/Neurovascular WDL    Peripheral Neurovascular WDL WDL       Cognitive/Neuro/Behavioral WDL    Cognitive/Neuro/Behavioral WDL WDL

## 2023-03-29 NOTE — ED PROVIDER NOTES
EMERGENCY DEPARTMENT ENCOUnter      NAME: Kurtis Urban  AGE: 89 year old male  YOB: 1929  MRN: [id number]  EVALUATION DATE & TIME: 3/28/2023 10:49 PM    PCP: Juan West    ED PROVIDER: Itzel Jean MD      Chief Complaint   Patient presents with     Catheter Problem         FINAL IMPRESSION:  1. Retana catheter problem, initial encounter (H)    2. Urethral trauma, initial encounter          ED COURSE & MEDICAL DECISION MAKING:      In summary, the patient is a 89-year-old male that presents to the emergency department for evaluation of bleeding from his penis after he pulled his Retana catheter out this evening.  Retana catheter was easily replaced and he had scant bleeding in the emergency department.  I think he can follow-up with his urologist on Thursday as previously arranged.    Retana catheter was placed using a Uro-Jet for anesthesia.  The patient's son was at the bedside and discussed plan of discharge and continued followed up with urology on Thursday as previously arranged      At the conclusion of the encounter I discussed the results of all of the tests and the disposition. The questions were answered. The patient or family acknowledged understanding and was agreeable with the care plan.         MEDICATIONS GIVEN IN THE EMERGENCY:  Medications   lidocaine (XYLOCAINE) 2 % external gel 10 mL (10 mLs Urethral $Given 3/28/23 9927)       NEW PRESCRIPTIONS STARTED AT TODAY'S ER VISIT  New Prescriptions    No medications on file          =================================================================    HPI        Kurtis Urban is a 89 year old male with a pertinent history of dementia, KRIS, TIA, hypercholesteremia, and meniere's disease who presents to this ED via EMS for evaluation of a catheter problem.    Per EMS,  The patient is coming from [facility] due to a subdural. The patient got angry because the staff was late to change his catheter so the patient pulled out  the catheter himself.     REVIEW OF SYSTEMS     Constitutional:  Denies fever or chills  HENT:  Denies sore throat   Respiratory:  Denies cough or shortness of breath   Cardiovascular:  Denies chest pain or palpitations  GI:  Denies abdominal pain, nausea, or vomiting  Musculoskeletal:  Denies any new extremity pain   Skin:  Denies rash   Neurologic:  Denies headache, focal weakness or sensory changes    All other systems reviewed and are negative      PAST MEDICAL HISTORY:  Past Medical History:   Diagnosis Date     Dementia with behavioral disturbance 9/3/2019     Depression 5/28/2014     Hypercholesteremia 5/28/2014     Melanoma of back (H) 5/28/2014    Surgically removed       Melanoma of face (H) 5/28/2014    Surgically removed      Seizure (H) 5/19/2015     Thiamine deficiency 9/3/2019     TIA (transient ischemic attack)        PAST SURGICAL HISTORY:  Past Surgical History:   Procedure Laterality Date     APPENDECTOMY       CHOLECYSTECTOMY       HERNIA REPAIR       OTHER SURGICAL HISTORY      nasal surgeries     OTHER SURGICAL HISTORY  1993    meniscal surgery     OTHER SURGICAL HISTORY Right     total knee repair     SHOULDER SURGERY Right            CURRENT MEDICATIONS:    atorvastatin (LIPITOR) 40 MG tablet  Cyanocobalamin 2500 MCG TABS  levETIRAcetam (KEPPRA) 500 MG tablet  polyethylene glycol (MIRALAX) 17 GM/Dose powder  primidone (MYSOLINE) 50 MG tablet  sodium chloride (OCEAN) 0.65 % nasal spray  tamsulosin (FLOMAX) 0.4 MG capsule  thiamine 50 MG TABS        ALLERGIES:  Allergies   Allergen Reactions     Orphenadrine Hives     Other reaction(s): hives       Penicillins Anaphylaxis and Nausea and Vomiting     Alfuzosin Nausea and Vomiting     Donepezil Other (See Comments)     Myolin [Norflex] Unknown     Other reaction(s): hives     Sulfa Drugs Nausea and Vomiting     Triazolam Nausea and Vomiting       FAMILY HISTORY:  Family History   Problem Relation Age of Onset     Cancer Mother       Cerebrovascular Disease Maternal Grandfather        SOCIAL HISTORY:   Social History     Socioeconomic History     Marital status:    Tobacco Use     Smoking status: Former     Types: Cigarettes     Smokeless tobacco: Never   Substance and Sexual Activity     Alcohol use: Never     Drug use: Never       VITALS:  Patient Vitals for the past 24 hrs:   BP Temp Temp src Pulse Resp SpO2 Weight   03/28/23 2255 (!) 143/78 98.2  F (36.8  C) Oral 92 18 95 % 62.6 kg (138 lb)       PHYSICAL EXAM    Constitutional:  Well developed, Well nourished,  HENT:  Normocephalic, Atraumatic, Bilateral external ears normal, Oropharynx moist, Nose normal.   Neck:  Normal range of motion, No meningismus, No stridor.   Eyes:  EOMI, Conjunctiva normal, No discharge.   Respiratory:  Normal breath sounds, No respiratory distress, No wheezing, No chest tenderness.   Cardiovascular:  Normal heart rate, Normal rhythm, No murmurs  GI:  Soft, No tenderness, No guarding,  Musculoskeletal:  Neurovascularly intact distally, No edema, No tenderness, No cyanosis, Good range of motion in all major joints.   Integument:  Warm, Dry, No erythema, No rash.   Lymphatic:  No lymphadenopathy noted.   Neurologic:  Alert  , Normal motor function, No focal deficits noted.   Psychiatric:  Affect normal,Mood normal.          I, Leonor Mendieta, am serving as a scribe to document services personally performed by Dr. Jean based on my observation and the provider's statements to me. I, Itzel Jean MD attest that Leonor Mendieta is acting in a scribe capacity, has observed my performance of the services and has documented them in accordance with my direction.    Itzel Jean MD  Emergency Medicine  [facility] EMERGENCY ROOM  [address] [facility] [address] [phone]  Dept: [phone]       Itzel Jean MD  03/28/23 4637

## 2023-03-29 NOTE — DISCHARGE INSTRUCTIONS
Please follow-up with urology on Thursday as previously arranged  Retana catheter cares as previous  ReTurn to the emergency department for worsening problems or concerns

## 2023-03-29 NOTE — ED NOTES
Update given to Sherin at [facility].  Friend, Matty present in room.  Agrees with plan for discharge back to TCU.  [facility] can be reached at [phone] ext. [phone]

## 2023-03-30 NOTE — PROGRESS NOTES
[facility] GERIATRIC SERVICES       Patient Kurtis Marinohstaedt  MRN: [id number]        Reason for Visit     Chief Complaint   Patient presents with     Follow Up       Code Status     CPR/Full code     Assessment     History of traumatic subdural hematoma  History of a seizure disorder  leukocytosis  Urinary retention requiring Rai catheter  Dementia  hld  [place]  Chronic dvt  Generalized weakness    Plan     Pt is admitted to TCU for strengthening and rehab.  Saw urology today as he pulled his rai  Voiding trial was not successful and rai has been replaced  Scheduled for surgery for bladder mass  Denies pain  UC shows no growth and labs reviewed  balance score of 14/28 ;remains a falls risk  No pain   Cont PT  Has persistent leukocytosis and work up neg so far  R/c labs does not appear septic    History     Patient is a very pleasant 89 year old male who is admitted to TCU  Patient was admitted with traumatic subdural hematoma  Seen by neurosurgery no surgical intervention planned  Denies much pain but is sleepy  Also saw urology because imaging revealed a distended bladder with a lesion noted on the bladder wall and renal cyst noted  Urology consulted and a Rai catheter was placed  He was seen today and failed a voiding trail  Has a bladder mass and scheduled for surgery  Discharged to the TCU- remains a high fall risk      Past Medical & Surgical History     PAST MEDICAL HISTORY:   Past Medical History:   Diagnosis Date     Dementia with behavioral disturbance 9/3/2019     Depression 5/28/2014     Hypercholesteremia 5/28/2014     Melanoma of back (H) 5/28/2014    Surgically removed       Melanoma of face (H) 5/28/2014    Surgically removed      Seizure (H) 5/19/2015     Thiamine deficiency 9/3/2019     TIA (transient ischemic attack)       PAST SURGICAL HISTORY:   has a past surgical history that includes Cholecystectomy; appendectomy; other surgical history; other surgical history (1993); hernia repair;  shoulder surgery (Right); and other surgical history (Right).      Past Social History     Reviewed,  reports that he has quit smoking. His smoking use included cigarettes. He has never used smokeless tobacco. He reports that he does not drink alcohol and does not use drugs.    Family History     Reviewed, and family history includes Cancer in his mother; Cerebrovascular Disease in his maternal grandfather.    Medication List     Current Outpatient Medications   Medication     atorvastatin (LIPITOR) 40 MG tablet     Cyanocobalamin 2500 MCG TABS     levETIRAcetam (KEPPRA) 500 MG tablet     polyethylene glycol (MIRALAX) 17 GM/Dose powder     primidone (MYSOLINE) 50 MG tablet     sodium chloride (OCEAN) 0.65 % nasal spray     tamsulosin (FLOMAX) 0.4 MG capsule     thiamine 50 MG TABS     No current facility-administered medications for this visit.          Allergies     Allergies   Allergen Reactions     Orphenadrine Hives     Other reaction(s): hives       Penicillins Anaphylaxis and Nausea and Vomiting     Alfuzosin Nausea and Vomiting     Donepezil Other (See Comments)     Myolin [Norflex] Unknown     Other reaction(s): hives     Sulfa Drugs Nausea and Vomiting     Triazolam Nausea and Vomiting       Review of Systems   A comprehensive review of 14 systems was done. Pertinent findings noted here and in history of present illness. All the rest negative.  Constitutional: Negative.  Negative for fever, chills, she has  activity change, appetite change and fatigue.   HENT: Negative for congestion and facial swelling.    Eyes: Negative for photophobia, redness and visual disturbance.   Respiratory: Negative for cough and chest tightness.    Cardiovascular: Negative for chest pain, palpitations and leg swelling.   Gastrointestinal: Negative for nausea, diarrhea, constipation, blood in stool and abdominal distention.   Genitourinary: Negative.  Has a rai  Musculoskeletal: reports falls   Skin: Negative.    Neurological:  Negative for dizziness, tremors, syncope, weakness, light-headedness and headaches.   Hematological: Does not bruise/bleed easily.   Psychiatric/Behavioral: Negative.  Some recall issues noted      Physical Exam   /67   Pulse 82   Temp (!) 96.1  F (35.6  C)   Resp 16   Wt 62.6 kg (138 lb)   SpO2 96%   BMI 22.27 kg/m       Constitutional: Oriented to person, place, and time and appears well-developed.   HEENT:  Normocephalic and atraumatic.  Eyes: Conjunctivae and EOM are normal. Pupils are equal, round, and reactive to light. No discharge.  No scleral icterus. Nose normal. Mouth/Throat: Oropharynx is clear and moist. No oropharyngeal exudate.    [place]  NECK: Normal range of motion. Neck supple. No JVD present. No tracheal deviation present. No thyromegaly present.   CARDIOVASCULAR: Normal rate, regular rhythm and intact distal pulses.  Exam reveals no gallop and no friction rub.  Systolic murmur present.  PULMONARY: Effort normal and breath sounds normal. No respiratory distress.No Wheezing or rales.  ABDOMEN: Soft. Bowel sounds are normal. No distension and no mass.  There is no tenderness. There is no rebound and no guarding. No HSM.  MUSCULOSKELETAL: Normal range of motion. Mild kyphosis, no tenderness.  LYMPH NODES: Has no cervical, supraclavicular, axillary and groin adenopathy.   NEUROLOGICAL: Alert and oriented to person, place, and time. No cranial nerve deficit.  Normal muscle tone. Coordination normal.   GENITOURINARY: Deferred exam.has  A rai  SKIN: Skin is warm and dry. No rash noted. No erythema. No pallor.   EXTREMITIES: No cyanosis, no clubbing, no edema. No Deformity.  PSYCHIATRIC: Normal mood, affect and behavior.      Lab Results     Recent Results (from the past 240 hour(s))   Basic metabolic panel    Collection Time: 03/27/23  6:25 AM   Result Value Ref Range    Sodium 135 (L) 136 - 145 mmol/L    Potassium 4.7 3.4 - 5.3 mmol/L    Chloride 104 98 - 107 mmol/L    Carbon Dioxide (CO2) 21  (L) 22 - 29 mmol/L    Anion Gap 10 7 - 15 mmol/L    Urea Nitrogen 22.9 8.0 - 23.0 mg/dL    Creatinine 1.02 0.67 - 1.17 mg/dL    Calcium 8.7 8.2 - 9.6 mg/dL    Glucose 107 (H) 70 - 99 mg/dL    GFR Estimate 68 >60 mL/min/1.73m2   CBC with platelets    Collection Time: 03/27/23  6:25 AM   Result Value Ref Range    WBC Count 17.6 (H) 4.0 - 11.0 10e3/uL    RBC Count 3.60 (L) 4.40 - 5.90 10e6/uL    Hemoglobin 11.4 (L) 13.3 - 17.7 g/dL    Hematocrit 35.7 (L) 40.0 - 53.0 %    MCV 99 78 - 100 fL    MCH 31.7 26.5 - 33.0 pg    MCHC 31.9 31.5 - 36.5 g/dL    RDW 13.2 10.0 - 15.0 %    Platelet Count 111 (L) 150 - 450 10e3/uL   Magnesium    Collection Time: 03/27/23  6:25 AM   Result Value Ref Range    Magnesium 2.3 1.7 - 2.3 mg/dL   Urine Culture    Collection Time: 03/27/23  3:00 PM    Specimen: Urine, Retana Catheter   Result Value Ref Range    Culture No Growth    CBC with platelets    Collection Time: 03/28/23  7:10 AM   Result Value Ref Range    WBC Count 18.4 (H) 4.0 - 11.0 10e3/uL    RBC Count 3.51 (L) 4.40 - 5.90 10e6/uL    Hemoglobin 11.1 (L) 13.3 - 17.7 g/dL    Hematocrit 34.6 (L) 40.0 - 53.0 %    MCV 99 78 - 100 fL    MCH 31.6 26.5 - 33.0 pg    MCHC 32.1 31.5 - 36.5 g/dL    RDW 13.3 10.0 - 15.0 %    Platelet Count 129 (L) 150 - 450 10e3/uL   CBC with platelets and differential    Collection Time: 03/29/23  6:33 AM   Result Value Ref Range    WBC Count 15.2 (H) 4.0 - 11.0 10e3/uL    RBC Count 3.71 (L) 4.40 - 5.90 10e6/uL    Hemoglobin 11.8 (L) 13.3 - 17.7 g/dL    Hematocrit 36.8 (L) 40.0 - 53.0 %    MCV 99 78 - 100 fL    MCH 31.8 26.5 - 33.0 pg    MCHC 32.1 31.5 - 36.5 g/dL    RDW 13.2 10.0 - 15.0 %    Platelet Count 147 (L) 150 - 450 10e3/uL    % Neutrophils 87 %    % Lymphocytes 1 %    % Monocytes 10 %    % Eosinophils 1 %    % Basophils 0 %    % Immature Granulocytes 1 %    NRBCs per 100 WBC 0 <1 /100    Absolute Neutrophils 13.2 (H) 1.6 - 8.3 10e3/uL    Absolute Lymphocytes 0.2 (L) 0.8 - 5.3 10e3/uL    Absolute  Monocytes 1.5 (H) 0.0 - 1.3 10e3/uL    Absolute Eosinophils 0.2 0.0 - 0.7 10e3/uL    Absolute Basophils 0.0 0.0 - 0.2 10e3/uL    Absolute Immature Granulocytes 0.1 <=0.4 10e3/uL    Absolute NRBCs 0.0 10e3/uL   Reticulocyte count    Collection Time: 03/29/23  6:33 AM   Result Value Ref Range    % Reticulocyte 1.5 0.5 - 2.0 %    Absolute Reticulocyte 0.054 0.025 - 0.095 10e6/uL   Bld morphology pathology review    Collection Time: 03/29/23  6:33 AM   Result Value Ref Range    Final Diagnosis       Peripheral blood  - Next leukocytosis (primarily neutrophilia)  - Anemia with borderline macrocytosis  - Mild thrombocytopenia      Comment       The clinical history has been reviewed.      Clinical Information       Encounter for therapeutic drug level monitoring - Z51.81 [ICD-10-CM]      Performing Labs       The technical component of this testing was completed at the [facility] and [facility]      Procalcitonin    Collection Time: 03/29/23  6:33 AM   Result Value Ref Range    Procalcitonin 0.22 (H) <0.05 ng/mL                 Electronically signed by    Bela Franklin MD

## 2023-03-30 NOTE — LETTER
3/30/2023        RE: Kurtis Urban  [address]  [place]  [facility] [address]        [facility] GERIATRIC SERVICES       Patient Kurtis Urban  MRN: [id number]        Reason for Visit     Chief Complaint   Patient presents with     Follow Up       Code Status     CPR/Full code     Assessment     History of traumatic subdural hematoma  History of a seizure disorder  leukocytosis  Urinary retention requiring Rai catheter  Dementia  hld  [place]  Chronic dvt  Generalized weakness    Plan     Pt is admitted to TCU for strengthening and rehab.  Saw urology today as he pulled his rai  Voiding trial was not successful and rai has been replaced  Scheduled for surgery for bladder mass  Denies pain  UC shows no growth and labs reviewed  balance score of 14/28 ;remains a falls risk  No pain   Cont PT  Has persistent leukocytosis and work up neg so far  R/c labs does not appear septic    History     Patient is a very pleasant 89 year old male who is admitted to TCU  Patient was admitted with traumatic subdural hematoma  Seen by neurosurgery no surgical intervention planned  Denies much pain but is sleepy  Also saw urology because imaging revealed a distended bladder with a lesion noted on the bladder wall and renal cyst noted  Urology consulted and a Rai catheter was placed  He was seen today and failed a voiding trail  Has a bladder mass and scheduled for surgery  Discharged to the TCU- remains a high fall risk      Past Medical & Surgical History     PAST MEDICAL HISTORY:   Past Medical History:   Diagnosis Date     Dementia with behavioral disturbance 9/3/2019     Depression 5/28/2014     Hypercholesteremia 5/28/2014     Melanoma of back (H) 5/28/2014    Surgically removed       Melanoma of face (H) 5/28/2014    Surgically removed      Seizure (H) 5/19/2015     Thiamine deficiency 9/3/2019     TIA (transient ischemic attack)       PAST SURGICAL HISTORY:   has a past surgical history that  includes Cholecystectomy; appendectomy; other surgical history; other surgical history (1993); hernia repair; shoulder surgery (Right); and other surgical history (Right).      Past Social History     Reviewed,  reports that he has quit smoking. His smoking use included cigarettes. He has never used smokeless tobacco. He reports that he does not drink alcohol and does not use drugs.    Family History     Reviewed, and family history includes Cancer in his mother; Cerebrovascular Disease in his maternal grandfather.    Medication List     Current Outpatient Medications   Medication     atorvastatin (LIPITOR) 40 MG tablet     Cyanocobalamin 2500 MCG TABS     levETIRAcetam (KEPPRA) 500 MG tablet     polyethylene glycol (MIRALAX) 17 GM/Dose powder     primidone (MYSOLINE) 50 MG tablet     sodium chloride (OCEAN) 0.65 % nasal spray     tamsulosin (FLOMAX) 0.4 MG capsule     thiamine 50 MG TABS     No current facility-administered medications for this visit.          Allergies     Allergies   Allergen Reactions     Orphenadrine Hives     Other reaction(s): hives       Penicillins Anaphylaxis and Nausea and Vomiting     Alfuzosin Nausea and Vomiting     Donepezil Other (See Comments)     Myolin [Norflex] Unknown     Other reaction(s): hives     Sulfa Drugs Nausea and Vomiting     Triazolam Nausea and Vomiting       Review of Systems   A comprehensive review of 14 systems was done. Pertinent findings noted here and in history of present illness. All the rest negative.  Constitutional: Negative.  Negative for fever, chills, she has  activity change, appetite change and fatigue.   HENT: Negative for congestion and facial swelling.    Eyes: Negative for photophobia, redness and visual disturbance.   Respiratory: Negative for cough and chest tightness.    Cardiovascular: Negative for chest pain, palpitations and leg swelling.   Gastrointestinal: Negative for nausea, diarrhea, constipation, blood in stool and abdominal  distention.   Genitourinary: Negative.  Has a rai  Musculoskeletal: reports falls   Skin: Negative.    Neurological: Negative for dizziness, tremors, syncope, weakness, light-headedness and headaches.   Hematological: Does not bruise/bleed easily.   Psychiatric/Behavioral: Negative.  Some recall issues noted      Physical Exam   /67   Pulse 82   Temp (!) 96.1  F (35.6  C)   Resp 16   Wt 62.6 kg (138 lb)   SpO2 96%   BMI 22.27 kg/m       Constitutional: Oriented to person, place, and time and appears well-developed.   HEENT:  Normocephalic and atraumatic.  Eyes: Conjunctivae and EOM are normal. Pupils are equal, round, and reactive to light. No discharge.  No scleral icterus. Nose normal. Mouth/Throat: Oropharynx is clear and moist. No oropharyngeal exudate.    [place]  NECK: Normal range of motion. Neck supple. No JVD present. No tracheal deviation present. No thyromegaly present.   CARDIOVASCULAR: Normal rate, regular rhythm and intact distal pulses.  Exam reveals no gallop and no friction rub.  Systolic murmur present.  PULMONARY: Effort normal and breath sounds normal. No respiratory distress.No Wheezing or rales.  ABDOMEN: Soft. Bowel sounds are normal. No distension and no mass.  There is no tenderness. There is no rebound and no guarding. No HSM.  MUSCULOSKELETAL: Normal range of motion. Mild kyphosis, no tenderness.  LYMPH NODES: Has no cervical, supraclavicular, axillary and groin adenopathy.   NEUROLOGICAL: Alert and oriented to person, place, and time. No cranial nerve deficit.  Normal muscle tone. Coordination normal.   GENITOURINARY: Deferred exam.has  A rai  SKIN: Skin is warm and dry. No rash noted. No erythema. No pallor.   EXTREMITIES: No cyanosis, no clubbing, no edema. No Deformity.  PSYCHIATRIC: Normal mood, affect and behavior.      Lab Results     Recent Results (from the past 240 hour(s))   Basic metabolic panel    Collection Time: 03/27/23  6:25 AM   Result Value Ref Range     Sodium 135 (L) 136 - 145 mmol/L    Potassium 4.7 3.4 - 5.3 mmol/L    Chloride 104 98 - 107 mmol/L    Carbon Dioxide (CO2) 21 (L) 22 - 29 mmol/L    Anion Gap 10 7 - 15 mmol/L    Urea Nitrogen 22.9 8.0 - 23.0 mg/dL    Creatinine 1.02 0.67 - 1.17 mg/dL    Calcium 8.7 8.2 - 9.6 mg/dL    Glucose 107 (H) 70 - 99 mg/dL    GFR Estimate 68 >60 mL/min/1.73m2   CBC with platelets    Collection Time: 03/27/23  6:25 AM   Result Value Ref Range    WBC Count 17.6 (H) 4.0 - 11.0 10e3/uL    RBC Count 3.60 (L) 4.40 - 5.90 10e6/uL    Hemoglobin 11.4 (L) 13.3 - 17.7 g/dL    Hematocrit 35.7 (L) 40.0 - 53.0 %    MCV 99 78 - 100 fL    MCH 31.7 26.5 - 33.0 pg    MCHC 31.9 31.5 - 36.5 g/dL    RDW 13.2 10.0 - 15.0 %    Platelet Count 111 (L) 150 - 450 10e3/uL   Magnesium    Collection Time: 03/27/23  6:25 AM   Result Value Ref Range    Magnesium 2.3 1.7 - 2.3 mg/dL   Urine Culture    Collection Time: 03/27/23  3:00 PM    Specimen: Urine, Retana Catheter   Result Value Ref Range    Culture No Growth    CBC with platelets    Collection Time: 03/28/23  7:10 AM   Result Value Ref Range    WBC Count 18.4 (H) 4.0 - 11.0 10e3/uL    RBC Count 3.51 (L) 4.40 - 5.90 10e6/uL    Hemoglobin 11.1 (L) 13.3 - 17.7 g/dL    Hematocrit 34.6 (L) 40.0 - 53.0 %    MCV 99 78 - 100 fL    MCH 31.6 26.5 - 33.0 pg    MCHC 32.1 31.5 - 36.5 g/dL    RDW 13.3 10.0 - 15.0 %    Platelet Count 129 (L) 150 - 450 10e3/uL   CBC with platelets and differential    Collection Time: 03/29/23  6:33 AM   Result Value Ref Range    WBC Count 15.2 (H) 4.0 - 11.0 10e3/uL    RBC Count 3.71 (L) 4.40 - 5.90 10e6/uL    Hemoglobin 11.8 (L) 13.3 - 17.7 g/dL    Hematocrit 36.8 (L) 40.0 - 53.0 %    MCV 99 78 - 100 fL    MCH 31.8 26.5 - 33.0 pg    MCHC 32.1 31.5 - 36.5 g/dL    RDW 13.2 10.0 - 15.0 %    Platelet Count 147 (L) 150 - 450 10e3/uL    % Neutrophils 87 %    % Lymphocytes 1 %    % Monocytes 10 %    % Eosinophils 1 %    % Basophils 0 %    % Immature Granulocytes 1 %    NRBCs per 100 WBC 0  <1 /100    Absolute Neutrophils 13.2 (H) 1.6 - 8.3 10e3/uL    Absolute Lymphocytes 0.2 (L) 0.8 - 5.3 10e3/uL    Absolute Monocytes 1.5 (H) 0.0 - 1.3 10e3/uL    Absolute Eosinophils 0.2 0.0 - 0.7 10e3/uL    Absolute Basophils 0.0 0.0 - 0.2 10e3/uL    Absolute Immature Granulocytes 0.1 <=0.4 10e3/uL    Absolute NRBCs 0.0 10e3/uL   Reticulocyte count    Collection Time: 03/29/23  6:33 AM   Result Value Ref Range    % Reticulocyte 1.5 0.5 - 2.0 %    Absolute Reticulocyte 0.054 0.025 - 0.095 10e6/uL   Bld morphology pathology review    Collection Time: 03/29/23  6:33 AM   Result Value Ref Range    Final Diagnosis       Peripheral blood  - Next leukocytosis (primarily neutrophilia)  - Anemia with borderline macrocytosis  - Mild thrombocytopenia      Comment       The clinical history has been reviewed.      Clinical Information       Encounter for therapeutic drug level monitoring - Z51.81 [ICD-10-CM]      Performing Labs       The technical component of this testing was completed at the [facility] and [facility]      Procalcitonin    Collection Time: 03/29/23  6:33 AM   Result Value Ref Range    Procalcitonin 0.22 (H) <0.05 ng/mL                 Electronically signed by    Bela Franklin MD                             Sincerely,        DEB Mendiola

## 2023-03-31 NOTE — TELEPHONE ENCOUNTER
[facility] Geriatrics Lab Note     Provider: Bela Franklin MD  Facility: [facility]  Facility Type:  TCU    Allergies   Allergen Reactions     Orphenadrine Hives     Other reaction(s): hives       Penicillins Anaphylaxis and Nausea and Vomiting     Alfuzosin Nausea and Vomiting     Donepezil Other (See Comments)     Myolin [Norflex] Unknown     Other reaction(s): hives     Sulfa Drugs Nausea and Vomiting     Triazolam Nausea and Vomiting       Labs Reviewed by provider: Heme 1     Verbal Order/Direction given by Provider: No new orders.      Provider giving Order:  Bela Franklin MD    Verbal Order given to: Collette([phone])    Matty Evans RN

## 2023-04-03 NOTE — PROGRESS NOTES
[facility] GERIATRIC SERVICES  Chief Complaint   Patient presents with     RACHELMING     [place] Medical Record Number:  [id number]  Place of Service where encounter took place:  [facility] ([facility]) [[id number]]  Code Status:  Full Code    HISTORY:      HPI:  Kurtis Urban  is 89 year old (2/23/1929) undergoing physical and occupational therapy. He is with past medical history significant for dementia lives in a independent living facility, ambulates with a walker, seizure disorder, CVA, TIA, dyslipidemia brought into the emergency room with complaints of unwitnessed fall at care facility and unable to get up for about 10 to 12 hours being admitted for acute on chronic subdural hematoma, possible meningioma, early rhabdomyolysis, abnormal appearing urothelial tract CT.  Excerpted from records  In ED, CT head revealed mixed attenuating subdural collections concerning for acute on chronic subdural hemorrhage, rounded lesion in the anterior middle cranial fossa concerning for possible meningioma, mildly low attenuating changes in the left temporal lobe compatible with edema.  Neurosurgery recommended MRI brain, repeat CT head in 6 hours, if worsening they recommend transfer in case patient and family wishes to proceed with any further intervention.  His son is flying in from [place].   CT chest abdomen pelvis also revealed bilateral renal cysts,?  Hemorrhage, asymmetric left-sided urothelial thickening and right-sided collecting system dilation, polypoid lesion in the bladder.    Today he is seen to review vital signs, and for reports of a fever last night of 100.9.  He denied cough congestion or any urinary symptoms.  His lung sounds were clear and he is afebrile today. we will continue to monitor at this time for any changes.  he denied chest pain shortness of breath cough congestion constipation or diarrhea.  He denied headaches dizziness.  Hearing aid left ear only.  He denies pain. He will follow-up with  neurology new visit on 5/1/2023.  His labs are reviewed his WBC is down to 11.2 from 15.2      ALLERGIES:Orphenadrine, Penicillins, Alfuzosin, Donepezil, Myolin [norflex], Sulfa drugs, and Triazolam    PAST MEDICAL HISTORY:   Past Medical History:   Diagnosis Date     Dementia with behavioral disturbance (H) 9/3/2019     Depression 5/28/2014     Hypercholesteremia 5/28/2014     Melanoma of back (H) 5/28/2014    Surgically removed       Melanoma of face (H) 5/28/2014    Surgically removed      Seizure (H) 5/19/2015     Thiamine deficiency 9/3/2019     TIA (transient ischemic attack)        PAST SURGICAL HISTORY:   has a past surgical history that includes Cholecystectomy; appendectomy; other surgical history; other surgical history (1993); hernia repair; shoulder surgery (Right); and other surgical history (Right).    FAMILY HISTORY: family history includes Cancer in his mother; Cerebrovascular Disease in his maternal grandfather.    SOCIAL HISTORY:  reports that he has quit smoking. His smoking use included cigarettes. He has never used smokeless tobacco. He reports that he does not drink alcohol and does not use drugs.    ROS:  Constitutional: Negative for activity change, appetite change, fatigue and fever.   HENT: Negative for congestion.    Hearing aid left ear  Respiratory: Negative for cough, shortness of breath and wheezing.    Cardiovascular: Negative for chest pain and leg swelling.   Gastrointestinal: Negative for abdominal distention, abdominal pain, constipation, diarrhea and nausea.   Genitourinary: Negative for dysuria.   Musculoskeletal: Negative for arthralgia. Negative for back pain.   Skin: Negative for color change and wound.   Neurological: Negative for dizziness.   Psychiatric/Behavioral: Negative for agitation, behavioral problems and confusion.     Physical Exam:  Constitutional:       Appearance: Patient is well-developed.   HENT:      Head: Normocephalic.   Eyes:      Conjunctiva/sclera:  "Conjunctivae normal.   Neck:      Musculoskeletal: Normal range of motion.   Cardiovascular:      Rate and Rhythm: Normal rate and regular rhythm.      Heart sounds: Normal heart sounds. No murmur.   Pulmonary:      Effort: No respiratory distress.      Breath sounds: Normal breath sounds. No wheezing or rales.   Abdominal:      General: Bowel sounds are normal. There is no distension.      Palpations: Abdomen is soft.      Tenderness: There is no abdominal tenderness.   Musculoskeletal:       Normal range of motion.     Skin:General:        Skin is warm.   Neurological:         Mental Status: Patient is alert and oriented to person, place, and time.   Psychiatric:         Behavior: Behavior normal.     Vitals:/57   Pulse 82   Temp 97.1  F (36.2  C)   Ht 1.676 m (5' 6\")   Wt 62.6 kg (138 lb)   SpO2 94%   BMI 22.27 kg/m   and Body mass index is 22.27 kg/m .    Lab/Diagnostic data:   Recent Results (from the past 240 hour(s))   Basic metabolic panel    Collection Time: 03/27/23  6:25 AM   Result Value Ref Range    Sodium 135 (L) 136 - 145 mmol/L    Potassium 4.7 3.4 - 5.3 mmol/L    Chloride 104 98 - 107 mmol/L    Carbon Dioxide (CO2) 21 (L) 22 - 29 mmol/L    Anion Gap 10 7 - 15 mmol/L    Urea Nitrogen 22.9 8.0 - 23.0 mg/dL    Creatinine 1.02 0.67 - 1.17 mg/dL    Calcium 8.7 8.2 - 9.6 mg/dL    Glucose 107 (H) 70 - 99 mg/dL    GFR Estimate 68 >60 mL/min/1.73m2   CBC with platelets    Collection Time: 03/27/23  6:25 AM   Result Value Ref Range    WBC Count 17.6 (H) 4.0 - 11.0 10e3/uL    RBC Count 3.60 (L) 4.40 - 5.90 10e6/uL    Hemoglobin 11.4 (L) 13.3 - 17.7 g/dL    Hematocrit 35.7 (L) 40.0 - 53.0 %    MCV 99 78 - 100 fL    MCH 31.7 26.5 - 33.0 pg    MCHC 31.9 31.5 - 36.5 g/dL    RDW 13.2 10.0 - 15.0 %    Platelet Count 111 (L) 150 - 450 10e3/uL   Magnesium    Collection Time: 03/27/23  6:25 AM   Result Value Ref Range    Magnesium 2.3 1.7 - 2.3 mg/dL   Urine Culture    Collection Time: 03/27/23  3:00 PM    " Specimen: Urine, Retana Catheter   Result Value Ref Range    Culture No Growth    CBC with platelets    Collection Time: 03/28/23  7:10 AM   Result Value Ref Range    WBC Count 18.4 (H) 4.0 - 11.0 10e3/uL    RBC Count 3.51 (L) 4.40 - 5.90 10e6/uL    Hemoglobin 11.1 (L) 13.3 - 17.7 g/dL    Hematocrit 34.6 (L) 40.0 - 53.0 %    MCV 99 78 - 100 fL    MCH 31.6 26.5 - 33.0 pg    MCHC 32.1 31.5 - 36.5 g/dL    RDW 13.3 10.0 - 15.0 %    Platelet Count 129 (L) 150 - 450 10e3/uL   CBC with platelets and differential    Collection Time: 03/29/23  6:33 AM   Result Value Ref Range    WBC Count 15.2 (H) 4.0 - 11.0 10e3/uL    RBC Count 3.71 (L) 4.40 - 5.90 10e6/uL    Hemoglobin 11.8 (L) 13.3 - 17.7 g/dL    Hematocrit 36.8 (L) 40.0 - 53.0 %    MCV 99 78 - 100 fL    MCH 31.8 26.5 - 33.0 pg    MCHC 32.1 31.5 - 36.5 g/dL    RDW 13.2 10.0 - 15.0 %    Platelet Count 147 (L) 150 - 450 10e3/uL    % Neutrophils 87 %    % Lymphocytes 1 %    % Monocytes 10 %    % Eosinophils 1 %    % Basophils 0 %    % Immature Granulocytes 1 %    NRBCs per 100 WBC 0 <1 /100    Absolute Neutrophils 13.2 (H) 1.6 - 8.3 10e3/uL    Absolute Lymphocytes 0.2 (L) 0.8 - 5.3 10e3/uL    Absolute Monocytes 1.5 (H) 0.0 - 1.3 10e3/uL    Absolute Eosinophils 0.2 0.0 - 0.7 10e3/uL    Absolute Basophils 0.0 0.0 - 0.2 10e3/uL    Absolute Immature Granulocytes 0.1 <=0.4 10e3/uL    Absolute NRBCs 0.0 10e3/uL   Reticulocyte count    Collection Time: 03/29/23  6:33 AM   Result Value Ref Range    % Reticulocyte 1.5 0.5 - 2.0 %    Absolute Reticulocyte 0.054 0.025 - 0.095 10e6/uL   Bld morphology pathology review    Collection Time: 03/29/23  6:33 AM   Result Value Ref Range    Final Diagnosis       Peripheral blood  - Next leukocytosis (primarily neutrophilia)  - Anemia with borderline macrocytosis  - Mild thrombocytopenia      Comment       The clinical history has been reviewed.      Clinical Information       Encounter for therapeutic drug level monitoring - Z51.81 [ICD-10-CM]       Performing Labs       The technical component of this testing was completed at the [facility] and [facility]      Procalcitonin    Collection Time: 03/29/23  6:33 AM   Result Value Ref Range    Procalcitonin 0.22 (H) <0.05 ng/mL   CBC with platelets and differential    Collection Time: 03/31/23  8:20 AM   Result Value Ref Range    WBC Count 11.2 (H) 4.0 - 11.0 10e3/uL    RBC Count 3.20 (L) 4.40 - 5.90 10e6/uL    Hemoglobin 10.1 (L) 13.3 - 17.7 g/dL    Hematocrit 32.1 (L) 40.0 - 53.0 %     78 - 100 fL    MCH 31.6 26.5 - 33.0 pg    MCHC 31.5 31.5 - 36.5 g/dL    RDW 13.1 10.0 - 15.0 %    Platelet Count 160 150 - 450 10e3/uL    % Neutrophils 67 %    % Lymphocytes 11 %    % Monocytes 18 %    % Eosinophils 3 %    % Basophils 0 %    % Immature Granulocytes 1 %    NRBCs per 100 WBC 0 <1 /100    Absolute Neutrophils 7.4 1.6 - 8.3 10e3/uL    Absolute Lymphocytes 1.3 0.8 - 5.3 10e3/uL    Absolute Monocytes 2.0 (H) 0.0 - 1.3 10e3/uL    Absolute Eosinophils 0.4 0.0 - 0.7 10e3/uL    Absolute Basophils 0.0 0.0 - 0.2 10e3/uL    Absolute Immature Granulocytes 0.1 <=0.4 10e3/uL    Absolute NRBCs 0.0 10e3/uL     MEDICATIONS:     Review of your medicines          Accurate as of April 3, 2023  1:31 PM. If you have any questions, ask your nurse or doctor.            CONTINUE these medicines which have NOT CHANGED      Dose / Directions   atorvastatin 40 MG tablet  Commonly known as: LIPITOR      Dose: 40 mg  Take 40 mg by mouth At Bedtime  Refills: 0     Cyanocobalamin 2500 MCG Tabs      Dose: 2,500 mcg  Take 2,500 mcg by mouth daily  Refills: 0     levETIRAcetam 500 MG tablet  Commonly known as: KEPPRA  Used for: Seizure disorder (H)      TAKE 1 TABLET BY MOUTH TWICE DAILY  Quantity: 56 tablet  Refills: 12     polyethylene glycol 17 GM/Dose powder  Commonly known as: MIRALAX  Used for: Constipation, unspecified constipation type      Dose: 17 g  Take 17 g by mouth  daily  Quantity: 510 g  Refills: 0     primidone 50 MG tablet  Commonly known as: MYSOLINE      Dose: 50 mg  Take 50 mg by mouth 2 times daily  Refills: 0     sodium chloride 0.65 % nasal spray  Commonly known as: OCEAN  Used for: Nasal congestion      Dose: 1 spray  Spray 1 spray into both nostrils daily as needed for congestion  Refills: 0     tamsulosin 0.4 MG capsule  Commonly known as: FLOMAX  Used for: Urinary retention      Dose: 0.4 mg  Take 1 capsule (0.4 mg) by mouth daily  Refills: 0     thiamine 50 MG Tabs      Dose: 50 mg  Take 50 mg by mouth daily  Refills: 0            ASSESSMENT/PLAN  Encounter Diagnosis   Name Primary?     Fever, unspecified fever cause Yes     Leukocytosis resolving down to 11.2 from 15.2    Physical deconditioning continue PT OT    Seizure disorder on Keppra and primidone    Possible meningioma completed dexamethasone taper    HDL continue atorvastatin    Electronically signed by: Juana Martinez CNP

## 2023-04-03 NOTE — LETTER
4/3/2023        RE: Kurtis Urban  [address]  [address]  [place] [id number]        [facility] GERIATRIC SERVICES  Chief Complaint   Patient presents with     RECHECK     [place] Medical Record Number:  [id number]  Place of Service where encounter took place:  [facility] ([facility]) [[id number]]  Code Status:  Full Code    HISTORY:      HPI:  Kurtis Urban  is 89 year old (2/23/1929) undergoing physical and occupational therapy. He is with past medical history significant for dementia lives in a independent living facility, ambulates with a walker, seizure disorder, CVA, TIA, dyslipidemia brought into the emergency room with complaints of unwitnessed fall at care facility and unable to get up for about 10 to 12 hours being admitted for acute on chronic subdural hematoma, possible meningioma, early rhabdomyolysis, abnormal appearing urothelial tract CT.  Excerpted from records  In ED, CT head revealed mixed attenuating subdural collections concerning for acute on chronic subdural hemorrhage, rounded lesion in the anterior middle cranial fossa concerning for possible meningioma, mildly low attenuating changes in the left temporal lobe compatible with edema.  Neurosurgery recommended MRI brain, repeat CT head in 6 hours, if worsening they recommend transfer in case patient and family wishes to proceed with any further intervention.  His son is flying in from [place].   CT chest abdomen pelvis also revealed bilateral renal cysts,?  Hemorrhage, asymmetric left-sided urothelial thickening and right-sided collecting system dilation, polypoid lesion in the bladder.    Today he is seen to review vital signs, and for reports of a fever last night of 100.9.  He denied cough congestion or any urinary symptoms.  His lung sounds were clear and he is afebrile today. we will continue to monitor at this time for any changes.  he denied chest pain shortness of breath cough congestion constipation or  diarrhea.  He denied headaches dizziness.  Hearing aid left ear only.  He denies pain. He will follow-up with neurology new visit on 5/1/2023.  His labs are reviewed his WBC is down to 11.2 from 15.2      ALLERGIES:Orphenadrine, Penicillins, Alfuzosin, Donepezil, Myolin [norflex], Sulfa drugs, and Triazolam    PAST MEDICAL HISTORY:   Past Medical History:   Diagnosis Date     Dementia with behavioral disturbance (H) 9/3/2019     Depression 5/28/2014     Hypercholesteremia 5/28/2014     Melanoma of back (H) 5/28/2014    Surgically removed       Melanoma of face (H) 5/28/2014    Surgically removed      Seizure (H) 5/19/2015     Thiamine deficiency 9/3/2019     TIA (transient ischemic attack)        PAST SURGICAL HISTORY:   has a past surgical history that includes Cholecystectomy; appendectomy; other surgical history; other surgical history (1993); hernia repair; shoulder surgery (Right); and other surgical history (Right).    FAMILY HISTORY: family history includes Cancer in his mother; Cerebrovascular Disease in his maternal grandfather.    SOCIAL HISTORY:  reports that he has quit smoking. His smoking use included cigarettes. He has never used smokeless tobacco. He reports that he does not drink alcohol and does not use drugs.    ROS:  Constitutional: Negative for activity change, appetite change, fatigue and fever.   HENT: Negative for congestion.    Hearing aid left ear  Respiratory: Negative for cough, shortness of breath and wheezing.    Cardiovascular: Negative for chest pain and leg swelling.   Gastrointestinal: Negative for abdominal distention, abdominal pain, constipation, diarrhea and nausea.   Genitourinary: Negative for dysuria.   Musculoskeletal: Negative for arthralgia. Negative for back pain.   Skin: Negative for color change and wound.   Neurological: Negative for dizziness.   Psychiatric/Behavioral: Negative for agitation, behavioral problems and confusion.     Physical Exam:  Constitutional:        "Appearance: Patient is well-developed.   HENT:      Head: Normocephalic.   Eyes:      Conjunctiva/sclera: Conjunctivae normal.   Neck:      Musculoskeletal: Normal range of motion.   Cardiovascular:      Rate and Rhythm: Normal rate and regular rhythm.      Heart sounds: Normal heart sounds. No murmur.   Pulmonary:      Effort: No respiratory distress.      Breath sounds: Normal breath sounds. No wheezing or rales.   Abdominal:      General: Bowel sounds are normal. There is no distension.      Palpations: Abdomen is soft.      Tenderness: There is no abdominal tenderness.   Musculoskeletal:       Normal range of motion.     Skin:General:        Skin is warm.   Neurological:         Mental Status: Patient is alert and oriented to person, place, and time.   Psychiatric:         Behavior: Behavior normal.     Vitals:/57   Pulse 82   Temp 97.1  F (36.2  C)   Ht 1.676 m (5' 6\")   Wt 62.6 kg (138 lb)   SpO2 94%   BMI 22.27 kg/m   and Body mass index is 22.27 kg/m .    Lab/Diagnostic data:   Recent Results (from the past 240 hour(s))   Basic metabolic panel    Collection Time: 03/27/23  6:25 AM   Result Value Ref Range    Sodium 135 (L) 136 - 145 mmol/L    Potassium 4.7 3.4 - 5.3 mmol/L    Chloride 104 98 - 107 mmol/L    Carbon Dioxide (CO2) 21 (L) 22 - 29 mmol/L    Anion Gap 10 7 - 15 mmol/L    Urea Nitrogen 22.9 8.0 - 23.0 mg/dL    Creatinine 1.02 0.67 - 1.17 mg/dL    Calcium 8.7 8.2 - 9.6 mg/dL    Glucose 107 (H) 70 - 99 mg/dL    GFR Estimate 68 >60 mL/min/1.73m2   CBC with platelets    Collection Time: 03/27/23  6:25 AM   Result Value Ref Range    WBC Count 17.6 (H) 4.0 - 11.0 10e3/uL    RBC Count 3.60 (L) 4.40 - 5.90 10e6/uL    Hemoglobin 11.4 (L) 13.3 - 17.7 g/dL    Hematocrit 35.7 (L) 40.0 - 53.0 %    MCV 99 78 - 100 fL    MCH 31.7 26.5 - 33.0 pg    MCHC 31.9 31.5 - 36.5 g/dL    RDW 13.2 10.0 - 15.0 %    Platelet Count 111 (L) 150 - 450 10e3/uL   Magnesium    Collection Time: 03/27/23  6:25 AM   Result " Value Ref Range    Magnesium 2.3 1.7 - 2.3 mg/dL   Urine Culture    Collection Time: 03/27/23  3:00 PM    Specimen: Urine, Retana Catheter   Result Value Ref Range    Culture No Growth    CBC with platelets    Collection Time: 03/28/23  7:10 AM   Result Value Ref Range    WBC Count 18.4 (H) 4.0 - 11.0 10e3/uL    RBC Count 3.51 (L) 4.40 - 5.90 10e6/uL    Hemoglobin 11.1 (L) 13.3 - 17.7 g/dL    Hematocrit 34.6 (L) 40.0 - 53.0 %    MCV 99 78 - 100 fL    MCH 31.6 26.5 - 33.0 pg    MCHC 32.1 31.5 - 36.5 g/dL    RDW 13.3 10.0 - 15.0 %    Platelet Count 129 (L) 150 - 450 10e3/uL   CBC with platelets and differential    Collection Time: 03/29/23  6:33 AM   Result Value Ref Range    WBC Count 15.2 (H) 4.0 - 11.0 10e3/uL    RBC Count 3.71 (L) 4.40 - 5.90 10e6/uL    Hemoglobin 11.8 (L) 13.3 - 17.7 g/dL    Hematocrit 36.8 (L) 40.0 - 53.0 %    MCV 99 78 - 100 fL    MCH 31.8 26.5 - 33.0 pg    MCHC 32.1 31.5 - 36.5 g/dL    RDW 13.2 10.0 - 15.0 %    Platelet Count 147 (L) 150 - 450 10e3/uL    % Neutrophils 87 %    % Lymphocytes 1 %    % Monocytes 10 %    % Eosinophils 1 %    % Basophils 0 %    % Immature Granulocytes 1 %    NRBCs per 100 WBC 0 <1 /100    Absolute Neutrophils 13.2 (H) 1.6 - 8.3 10e3/uL    Absolute Lymphocytes 0.2 (L) 0.8 - 5.3 10e3/uL    Absolute Monocytes 1.5 (H) 0.0 - 1.3 10e3/uL    Absolute Eosinophils 0.2 0.0 - 0.7 10e3/uL    Absolute Basophils 0.0 0.0 - 0.2 10e3/uL    Absolute Immature Granulocytes 0.1 <=0.4 10e3/uL    Absolute NRBCs 0.0 10e3/uL   Reticulocyte count    Collection Time: 03/29/23  6:33 AM   Result Value Ref Range    % Reticulocyte 1.5 0.5 - 2.0 %    Absolute Reticulocyte 0.054 0.025 - 0.095 10e6/uL   Bld morphology pathology review    Collection Time: 03/29/23  6:33 AM   Result Value Ref Range    Final Diagnosis       Peripheral blood  - Next leukocytosis (primarily neutrophilia)  - Anemia with borderline macrocytosis  - Mild thrombocytopenia      Comment       The clinical history has been  reviewed.      Clinical Information       Encounter for therapeutic drug level monitoring - Z51.81 [ICD-10-CM]      Performing Labs       The technical component of this testing was completed at the [facility] and [facility]      Procalcitonin    Collection Time: 03/29/23  6:33 AM   Result Value Ref Range    Procalcitonin 0.22 (H) <0.05 ng/mL   CBC with platelets and differential    Collection Time: 03/31/23  8:20 AM   Result Value Ref Range    WBC Count 11.2 (H) 4.0 - 11.0 10e3/uL    RBC Count 3.20 (L) 4.40 - 5.90 10e6/uL    Hemoglobin 10.1 (L) 13.3 - 17.7 g/dL    Hematocrit 32.1 (L) 40.0 - 53.0 %     78 - 100 fL    MCH 31.6 26.5 - 33.0 pg    MCHC 31.5 31.5 - 36.5 g/dL    RDW 13.1 10.0 - 15.0 %    Platelet Count 160 150 - 450 10e3/uL    % Neutrophils 67 %    % Lymphocytes 11 %    % Monocytes 18 %    % Eosinophils 3 %    % Basophils 0 %    % Immature Granulocytes 1 %    NRBCs per 100 WBC 0 <1 /100    Absolute Neutrophils 7.4 1.6 - 8.3 10e3/uL    Absolute Lymphocytes 1.3 0.8 - 5.3 10e3/uL    Absolute Monocytes 2.0 (H) 0.0 - 1.3 10e3/uL    Absolute Eosinophils 0.4 0.0 - 0.7 10e3/uL    Absolute Basophils 0.0 0.0 - 0.2 10e3/uL    Absolute Immature Granulocytes 0.1 <=0.4 10e3/uL    Absolute NRBCs 0.0 10e3/uL     MEDICATIONS:     Review of your medicines          Accurate as of April 3, 2023  1:31 PM. If you have any questions, ask your nurse or doctor.            CONTINUE these medicines which have NOT CHANGED      Dose / Directions   atorvastatin 40 MG tablet  Commonly known as: LIPITOR      Dose: 40 mg  Take 40 mg by mouth At Bedtime  Refills: 0     Cyanocobalamin 2500 MCG Tabs      Dose: 2,500 mcg  Take 2,500 mcg by mouth daily  Refills: 0     levETIRAcetam 500 MG tablet  Commonly known as: KEPPRA  Used for: Seizure disorder (H)      TAKE 1 TABLET BY MOUTH TWICE DAILY  Quantity: 56 tablet  Refills: 12     polyethylene glycol 17 GM/Dose powder  Commonly known  as: MIRALAX  Used for: Constipation, unspecified constipation type      Dose: 17 g  Take 17 g by mouth daily  Quantity: 510 g  Refills: 0     primidone 50 MG tablet  Commonly known as: MYSOLINE      Dose: 50 mg  Take 50 mg by mouth 2 times daily  Refills: 0     sodium chloride 0.65 % nasal spray  Commonly known as: OCEAN  Used for: Nasal congestion      Dose: 1 spray  Spray 1 spray into both nostrils daily as needed for congestion  Refills: 0     tamsulosin 0.4 MG capsule  Commonly known as: FLOMAX  Used for: Urinary retention      Dose: 0.4 mg  Take 1 capsule (0.4 mg) by mouth daily  Refills: 0     thiamine 50 MG Tabs      Dose: 50 mg  Take 50 mg by mouth daily  Refills: 0            ASSESSMENT/PLAN  Encounter Diagnosis   Name Primary?     Fever, unspecified fever cause Yes     Leukocytosis resolving down to 11.2 from 15.2    Physical deconditioning continue PT OT    Seizure disorder on Keppra and primidone    Possible meningioma completed dexamethasone taper    HDL continue atorvastatin    Electronically signed by: Juana Martinez CNP        Sincerely,        Juana Martinez CNP

## 2023-04-10 NOTE — LETTER
4/10/2023        RE: Kurtis Urban  [address] [facility]  C/o Adi Urban  [facility] [address]        [facility] GERIATRIC SERVICES  Chief Complaint   Patient presents with     RECHECK     [place] Medical Record Number:  [id number]  Place of Service where encounter took place:  [facility] ([facility]) [[id number]]  Code Status:  Full Code    HISTORY:      HPI:  Kurtis Urban  is 89 year old (2/23/1929) undergoing physical and occupational therapy. He is with past medical history significant for dementia lives in a independent living facility, ambulates with a walker, seizure disorder, CVA, TIA, dyslipidemia brought into the emergency room with complaints of unwitnessed fall at care facility and unable to get up for about 10 to 12 hours being admitted for acute on chronic subdural hematoma, possible meningioma, early rhabdomyolysis, abnormal appearing urothelial tract CT.  Excerpted from records  In ED, CT head revealed mixed attenuating subdural collections concerning for acute on chronic subdural hemorrhage, rounded lesion in the anterior middle cranial fossa concerning for possible meningioma, mildly low attenuating changes in the left temporal lobe compatible with edema.  Neurosurgery recommended MRI brain, repeat CT head in 6 hours, if worsening they recommend transfer in case patient and family wishes to proceed with any further intervention.  His son is flying in from [place].   CT chest abdomen pelvis also revealed bilateral renal cysts,?  Hemorrhage, asymmetric left-sided urothelial thickening and right-sided collecting system dilation, polypoid lesion in the bladder.    Today he is seen to review vital signs, and a routine visit.  He was found in his room trying to self transfer himself to the toilet.  He was encouraged to use his call light.  Noted to be congested and coughing with small sips.  A chest x-ray to be checked.  Speech eval placed.  Per staff he is more lethargic than normal  however he was alert when writer met with him.  Labs to be checked in the a.m. urology to follow-up with patient to have a bladder tumor removed.  he is currently completing a course of Macrobid for a UTI.  He denied cough congestion or any urinary symptoms. He denied chest pain shortness of breath cough congestion constipation or diarrhea.  He denied headaches dizziness.  Hearing aid left ear only.  He denies pain. He will follow-up with neurology new visit on 5/1/2023.  His labs are reviewed his WBC is down to 11.2 from 15.2      ALLERGIES:Orphenadrine, Penicillins, Alfuzosin, Donepezil, Myolin [norflex], Sulfa drugs, and Triazolam    PAST MEDICAL HISTORY:   Past Medical History:   Diagnosis Date     Dementia with behavioral disturbance (H) 9/3/2019     Depression 5/28/2014     Hypercholesteremia 5/28/2014     Melanoma of back (H) 5/28/2014    Surgically removed       Melanoma of face (H) 5/28/2014    Surgically removed      Seizure (H) 5/19/2015     Thiamine deficiency 9/3/2019     TIA (transient ischemic attack)        PAST SURGICAL HISTORY:   has a past surgical history that includes Cholecystectomy; appendectomy; other surgical history; other surgical history (1993); hernia repair; shoulder surgery (Right); and other surgical history (Right).    FAMILY HISTORY: family history includes Cancer in his mother; Cerebrovascular Disease in his maternal grandfather.    SOCIAL HISTORY:  reports that he has quit smoking. His smoking use included cigarettes. He has never used smokeless tobacco. He reports that he does not drink alcohol and does not use drugs.    ROS:  Constitutional: Negative for activity change, appetite change, fatigue and fever.   HENT: Negative for congestion.    Hearing aid left ear  Respiratory: Negative for cough, shortness of breath and wheezing.    Cardiovascular: Negative for chest pain and leg swelling.   Gastrointestinal: Negative for abdominal distention, abdominal pain, constipation,  "diarrhea and nausea.   Genitourinary: Negative for dysuria.   Musculoskeletal: Negative for arthralgia. Negative for back pain.   Skin: Negative for color change and wound.   Neurological: Negative for dizziness.   Psychiatric/Behavioral: Negative for agitation, behavioral problems and confusion.     Physical Exam:  Constitutional:       Appearance: Patient is well-developed.   HENT:      Head: Normocephalic.   Eyes:      Conjunctiva/sclera: Conjunctivae normal.   Neck:      Musculoskeletal: Normal range of motion.   Cardiovascular:      Rate and Rhythm: Normal rate and regular rhythm.      Heart sounds: Normal heart sounds. No murmur.   Pulmonary:      Effort: No respiratory distress.      Breath sounds: Normal breath sounds.  Positive for crackles and chest congestion  Abdominal:      General: Bowel sounds are normal. There is no distension.      Palpations: Abdomen is soft.      Tenderness: There is no abdominal tenderness.   Musculoskeletal:       Normal range of motion.     Skin:General:        Skin is warm.   Neurological:         Mental Status: Patient is alert and oriented to person, place, and time.   Psychiatric:         Behavior: Behavior normal.     Vitals:/72   Pulse 87   Temp 98.4  F (36.9  C)   Resp 14   Ht 1.676 m (5' 6\")   Wt 63 kg (139 lb)   SpO2 95%   BMI 22.44 kg/m   and Body mass index is 22.44 kg/m .    Lab/Diagnostic data:   Recent Results (from the past 240 hour(s))   UA with Microscopic reflex to Culture    Collection Time: 04/04/23  4:45 PM    Specimen: Urine, Midstream   Result Value Ref Range    Color Urine Yellow Colorless, Straw, Light Yellow, Yellow    Appearance Urine Slightly Cloudy (A) Clear    Glucose Urine Negative Negative mg/dL    Bilirubin Urine Negative Negative    Ketones Urine Negative Negative mg/dL    Specific Gravity Urine 1.014 1.003 - 1.035    Blood Urine Large (A) Negative    pH Urine 5.0 5.0 - 7.0    Protein Albumin Urine 30 (A) Negative mg/dL    " Urobilinogen Urine Normal Normal, 2.0 mg/dL    Nitrite Urine Negative Negative    Leukocyte Esterase Urine Moderate (A) Negative    Bacteria Urine Few (A) None Seen /HPF    WBC Clumps Urine Present (A) None Seen /HPF    Mucus Urine Present (A) None Seen /LPF    RBC Urine 62 (H) <=2 /HPF    WBC Urine 47 (H) <=5 /HPF   Urine Culture    Collection Time: 04/04/23  4:45 PM    Specimen: Urine, Midstream   Result Value Ref Range    Culture 10,000-50,000 CFU/mL Enterococcus faecalis (A)     Culture 10,000-50,000 CFU/mL Enterococcus faecalis (A)        Susceptibility    Enterococcus faecalis - LUIS     Penicillin 2 Susceptible ug/mL     Ampicillin <=2 Susceptible ug/mL     Vancomycin 1 Susceptible ug/mL     Nitrofurantoin <=16 Susceptible ug/mL    Enterococcus faecalis - LUIS     Penicillin 4 Susceptible ug/mL     Ampicillin <=2 Susceptible ug/mL     Vancomycin 1 Susceptible ug/mL     Nitrofurantoin <=16 Susceptible ug/mL     MEDICATIONS:     Review of your medicines          Accurate as of April 10, 2023  5:45 PM. If you have any questions, ask your nurse or doctor.            CONTINUE these medicines which have NOT CHANGED      Dose / Directions   atorvastatin 40 MG tablet  Commonly known as: LIPITOR      Dose: 40 mg  Take 40 mg by mouth At Bedtime  Refills: 0     Cyanocobalamin 2500 MCG Tabs      Dose: 2,500 mcg  Take 2,500 mcg by mouth daily  Refills: 0     levETIRAcetam 500 MG tablet  Commonly known as: KEPPRA  Used for: Seizure disorder (H)      TAKE 1 TABLET BY MOUTH TWICE DAILY  Quantity: 56 tablet  Refills: 12     nitroFURantoin macrocrystal 50 MG capsule  Commonly known as: MACRODANTIN      Dose: 50 mg  Take 50 mg by mouth every 6 hours  Refills: 0     polyethylene glycol 17 GM/Dose powder  Commonly known as: MIRALAX  Used for: Constipation, unspecified constipation type      Dose: 17 g  Take 17 g by mouth daily  Quantity: 510 g  Refills: 0     primidone 50 MG tablet  Commonly known as: MYSOLINE      Dose: 50  mg  Take 50 mg by mouth 2 times daily  Refills: 0     sodium chloride 0.65 % nasal spray  Commonly known as: OCEAN  Used for: Nasal congestion      Dose: 1 spray  Spray 1 spray into both nostrils daily as needed for congestion  Refills: 0     tamsulosin 0.4 MG capsule  Commonly known as: FLOMAX  Used for: Urinary retention      Dose: 0.4 mg  Take 1 capsule (0.4 mg) by mouth daily  Refills: 0     thiamine 50 MG Tabs      Dose: 50 mg  Take 50 mg by mouth daily  Refills: 0            ASSESSMENT/PLAN  Encounter Diagnoses   Name Primary?     Dementia with behavioral disturbance (H) Yes     Physical deconditioning      Chest congestion      Urinary tract infection without hematuria, site unspecified      Leukocytosis resolving down to 11.2 from 15.2    Physical deconditioning continue PT OT    Seizure disorder on Keppra and primidone    Possible meningioma completed dexamethasone taper    HDL continue atorvastatin    Chest congestion speech therapy to evaluate, chest x-ray AP and lateral check labs CBC and BMP in a.m.    Electronically signed by: Juana Martinez CNP        Sincerely,        Juana Martinez CNP

## 2023-04-10 NOTE — PROGRESS NOTES
[facility] GERIATRIC SERVICES  Chief Complaint   Patient presents with     RECHECK     [place] Medical Record Number:  [id number]  Place of Service where encounter took place:  [facility] ([facility]) [[id number]]  Code Status:  Full Code    HISTORY:      HPI:  Kurtis Urban  is 89 year old (2/23/1929) undergoing physical and occupational therapy. He is with past medical history significant for dementia lives in a independent living facility, ambulates with a walker, seizure disorder, CVA, TIA, dyslipidemia brought into the emergency room with complaints of unwitnessed fall at care facility and unable to get up for about 10 to 12 hours being admitted for acute on chronic subdural hematoma, possible meningioma, early rhabdomyolysis, abnormal appearing urothelial tract CT.  Excerpted from records  In ED, CT head revealed mixed attenuating subdural collections concerning for acute on chronic subdural hemorrhage, rounded lesion in the anterior middle cranial fossa concerning for possible meningioma, mildly low attenuating changes in the left temporal lobe compatible with edema.  Neurosurgery recommended MRI brain, repeat CT head in 6 hours, if worsening they recommend transfer in case patient and family wishes to proceed with any further intervention.  His son is flying in from [place].   CT chest abdomen pelvis also revealed bilateral renal cysts,?  Hemorrhage, asymmetric left-sided urothelial thickening and right-sided collecting system dilation, polypoid lesion in the bladder.    Today he is seen to review vital signs, and a routine visit.  He was found in his room trying to self transfer himself to the toilet.  He was encouraged to use his call light.  Noted to be congested and coughing with small sips.  A chest x-ray to be checked.  Speech eval placed.  Per staff he is more lethargic than normal however he was alert when writer met with him.  Labs to be checked in the a.m. urology to follow-up with patient to  have a bladder tumor removed.  he is currently completing a course of Macrobid for a UTI.  He denied cough congestion or any urinary symptoms. He denied chest pain shortness of breath cough congestion constipation or diarrhea.  He denied headaches dizziness.  Hearing aid left ear only.  He denies pain. He will follow-up with neurology new visit on 5/1/2023.  His labs are reviewed his WBC is down to 11.2 from 15.2      ALLERGIES:Orphenadrine, Penicillins, Alfuzosin, Donepezil, Myolin [norflex], Sulfa drugs, and Triazolam    PAST MEDICAL HISTORY:   Past Medical History:   Diagnosis Date     Dementia with behavioral disturbance (H) 9/3/2019     Depression 5/28/2014     Hypercholesteremia 5/28/2014     Melanoma of back (H) 5/28/2014    Surgically removed       Melanoma of face (H) 5/28/2014    Surgically removed      Seizure (H) 5/19/2015     Thiamine deficiency 9/3/2019     TIA (transient ischemic attack)        PAST SURGICAL HISTORY:   has a past surgical history that includes Cholecystectomy; appendectomy; other surgical history; other surgical history (1993); hernia repair; shoulder surgery (Right); and other surgical history (Right).    FAMILY HISTORY: family history includes Cancer in his mother; Cerebrovascular Disease in his maternal grandfather.    SOCIAL HISTORY:  reports that he has quit smoking. His smoking use included cigarettes. He has never used smokeless tobacco. He reports that he does not drink alcohol and does not use drugs.    ROS:  Constitutional: Negative for activity change, appetite change, fatigue and fever.   HENT: Negative for congestion.    Hearing aid left ear  Respiratory: Negative for cough, shortness of breath and wheezing.    Cardiovascular: Negative for chest pain and leg swelling.   Gastrointestinal: Negative for abdominal distention, abdominal pain, constipation, diarrhea and nausea.   Genitourinary: Negative for dysuria.   Musculoskeletal: Negative for arthralgia. Negative for back  "pain.   Skin: Negative for color change and wound.   Neurological: Negative for dizziness.   Psychiatric/Behavioral: Negative for agitation, behavioral problems and confusion.     Physical Exam:  Constitutional:       Appearance: Patient is well-developed.   HENT:      Head: Normocephalic.   Eyes:      Conjunctiva/sclera: Conjunctivae normal.   Neck:      Musculoskeletal: Normal range of motion.   Cardiovascular:      Rate and Rhythm: Normal rate and regular rhythm.      Heart sounds: Normal heart sounds. No murmur.   Pulmonary:      Effort: No respiratory distress.      Breath sounds: Normal breath sounds.  Positive for crackles and chest congestion  Abdominal:      General: Bowel sounds are normal. There is no distension.      Palpations: Abdomen is soft.      Tenderness: There is no abdominal tenderness.   Musculoskeletal:       Normal range of motion.     Skin:General:        Skin is warm.   Neurological:         Mental Status: Patient is alert and oriented to person, place, and time.   Psychiatric:         Behavior: Behavior normal.     Vitals:/72   Pulse 87   Temp 98.4  F (36.9  C)   Resp 14   Ht 1.676 m (5' 6\")   Wt 63 kg (139 lb)   SpO2 95%   BMI 22.44 kg/m   and Body mass index is 22.44 kg/m .    Lab/Diagnostic data:   Recent Results (from the past 240 hour(s))   UA with Microscopic reflex to Culture    Collection Time: 04/04/23  4:45 PM    Specimen: Urine, Midstream   Result Value Ref Range    Color Urine Yellow Colorless, Straw, Light Yellow, Yellow    Appearance Urine Slightly Cloudy (A) Clear    Glucose Urine Negative Negative mg/dL    Bilirubin Urine Negative Negative    Ketones Urine Negative Negative mg/dL    Specific Gravity Urine 1.014 1.003 - 1.035    Blood Urine Large (A) Negative    pH Urine 5.0 5.0 - 7.0    Protein Albumin Urine 30 (A) Negative mg/dL    Urobilinogen Urine Normal Normal, 2.0 mg/dL    Nitrite Urine Negative Negative    Leukocyte Esterase Urine Moderate (A) Negative "    Bacteria Urine Few (A) None Seen /HPF    WBC Clumps Urine Present (A) None Seen /HPF    Mucus Urine Present (A) None Seen /LPF    RBC Urine 62 (H) <=2 /HPF    WBC Urine 47 (H) <=5 /HPF   Urine Culture    Collection Time: 04/04/23  4:45 PM    Specimen: Urine, Midstream   Result Value Ref Range    Culture 10,000-50,000 CFU/mL Enterococcus faecalis (A)     Culture 10,000-50,000 CFU/mL Enterococcus faecalis (A)        Susceptibility    Enterococcus faecalis - LUIS     Penicillin 2 Susceptible ug/mL     Ampicillin <=2 Susceptible ug/mL     Vancomycin 1 Susceptible ug/mL     Nitrofurantoin <=16 Susceptible ug/mL    Enterococcus faecalis - LUIS     Penicillin 4 Susceptible ug/mL     Ampicillin <=2 Susceptible ug/mL     Vancomycin 1 Susceptible ug/mL     Nitrofurantoin <=16 Susceptible ug/mL     MEDICATIONS:     Review of your medicines          Accurate as of April 10, 2023  5:45 PM. If you have any questions, ask your nurse or doctor.            CONTINUE these medicines which have NOT CHANGED      Dose / Directions   atorvastatin 40 MG tablet  Commonly known as: LIPITOR      Dose: 40 mg  Take 40 mg by mouth At Bedtime  Refills: 0     Cyanocobalamin 2500 MCG Tabs      Dose: 2,500 mcg  Take 2,500 mcg by mouth daily  Refills: 0     levETIRAcetam 500 MG tablet  Commonly known as: KEPPRA  Used for: Seizure disorder (H)      TAKE 1 TABLET BY MOUTH TWICE DAILY  Quantity: 56 tablet  Refills: 12     nitroFURantoin macrocrystal 50 MG capsule  Commonly known as: MACRODANTIN      Dose: 50 mg  Take 50 mg by mouth every 6 hours  Refills: 0     polyethylene glycol 17 GM/Dose powder  Commonly known as: MIRALAX  Used for: Constipation, unspecified constipation type      Dose: 17 g  Take 17 g by mouth daily  Quantity: 510 g  Refills: 0     primidone 50 MG tablet  Commonly known as: MYSOLINE      Dose: 50 mg  Take 50 mg by mouth 2 times daily  Refills: 0     sodium chloride 0.65 % nasal spray  Commonly known as: OCEAN  Used for: Nasal  congestion      Dose: 1 spray  Spray 1 spray into both nostrils daily as needed for congestion  Refills: 0     tamsulosin 0.4 MG capsule  Commonly known as: FLOMAX  Used for: Urinary retention      Dose: 0.4 mg  Take 1 capsule (0.4 mg) by mouth daily  Refills: 0     thiamine 50 MG Tabs      Dose: 50 mg  Take 50 mg by mouth daily  Refills: 0            ASSESSMENT/PLAN  Encounter Diagnoses   Name Primary?     Dementia with behavioral disturbance (H) Yes     Physical deconditioning      Chest congestion      Urinary tract infection without hematuria, site unspecified      Leukocytosis resolving down to 11.2 from 15.2    Physical deconditioning continue PT OT    Seizure disorder on Keppra and primidone    Possible meningioma completed dexamethasone taper    HDL continue atorvastatin    Chest congestion speech therapy to evaluate, chest x-ray AP and lateral check labs CBC and BMP in a.m.    Electronically signed by: Juana Martinez CNP

## 2023-04-11 PROBLEM — R41.82 ALTERED MENTAL STATUS, UNSPECIFIED ALTERED MENTAL STATUS TYPE: Status: ACTIVE | Noted: 2023-01-01

## 2023-04-11 PROBLEM — N39.0 URINARY TRACT INFECTION WITHOUT HEMATURIA, SITE UNSPECIFIED: Status: ACTIVE | Noted: 2023-01-01

## 2023-04-11 PROBLEM — J18.9 HEALTHCARE-ASSOCIATED PNEUMONIA: Status: ACTIVE | Noted: 2023-01-01

## 2023-04-11 PROBLEM — N17.9 AKI (ACUTE KIDNEY INJURY) (H): Status: ACTIVE | Noted: 2020-01-18

## 2023-04-11 NOTE — LETTER
4/11/2023        RE: Kurtis Urban  [address]  [place]  [facility] [address]        [facility]ER[facility]       Patient Kurtis Urban  MRN: [id number]        Reason for Visit     Chief Complaint   Patient presents with     Nursing Home Acute       Code Status     CPR/Full code     Assessment     AMS WITH CONFUSION AND SOMNOLENCE  KRSI   HYPOTENSION  SUSPECTED ASPIRATION/DYSPHAGIA  History of traumatic subdural hematoma  History of a seizure disorder  leukocytosis  Urinary retention requiring Rai catheter  Dementia  hld  [place]  Chronic dvt  Generalized weakness    Plan     Pt is admitted to TCU for strengthening and rehab.  Saw urology today as he pulled his rai  Voiding trial was not successful and rai has been replaced  Scheduled for surgery for bladder mass  Denies pain  He was seen at the request of staff because of altered mental status.  covid neg  Recently noted to have UTI and started on abx by NP  Noted to be very somnolent with more confusion  Staff is reporting poor oral intake  Progressive weakness and hypotension also noted  Speech consulted and suspecting that he may be aspirating and requesting a swallow eval  Recheck labs show renal failure with a jump in creatinine to greater than 2  Blood pressures are consistently low  Recheck labs show a leukocytosis  In light of significant change in condition patient will be sent to the emergency room for further evaluation      History     Patient is a very pleasant 89 year old male who is admitted to TCU  Patient was admitted with traumatic subdural hematoma  Seen by neurosurgery no surgical intervention planned  Denies much pain but is sleepy  Also saw urology because imaging revealed a distended bladder with a lesion noted on the bladder wall and renal cyst noted  Urology consulted and a Rai catheter was placed  He was seen today and failed a voiding trail  Has a bladder mass and scheduled for surgery  Discharged  to the TCU- remains a high fall risk  He has had a last significant change in condition over the weekend  Apparently has been more confused ; sedated and somnolent.  Eating poorly  Suspected to be aspirating and blood pressures are low      Past Medical & Surgical History     PAST MEDICAL HISTORY:   Past Medical History:   Diagnosis Date     Dementia with behavioral disturbance (H) 9/3/2019     Depression 5/28/2014     Hypercholesteremia 5/28/2014     Melanoma of back (H) 5/28/2014    Surgically removed       Melanoma of face (H) 5/28/2014    Surgically removed      Seizure (H) 5/19/2015     Thiamine deficiency 9/3/2019     TIA (transient ischemic attack)       PAST SURGICAL HISTORY:   has a past surgical history that includes Cholecystectomy; appendectomy; other surgical history; other surgical history (1993); hernia repair; shoulder surgery (Right); and other surgical history (Right).      Past Social History     Reviewed,  reports that he has quit smoking. His smoking use included cigarettes. He has never used smokeless tobacco. He reports that he does not drink alcohol and does not use drugs.    Family History     Reviewed, and family history includes Cancer in his mother; Cerebrovascular Disease in his maternal grandfather.    Medication List     No current facility-administered medications for this visit.     Current Outpatient Medications   Medication     atorvastatin (LIPITOR) 40 MG tablet     Cyanocobalamin 2500 MCG TABS     levETIRAcetam (KEPPRA) 500 MG tablet     nitroFURantoin macrocrystal (MACRODANTIN) 50 MG capsule     polyethylene glycol (MIRALAX) 17 GM/Dose powder     primidone (MYSOLINE) 50 MG tablet     sodium chloride (OCEAN) 0.65 % nasal spray     thiamine 50 MG TABS     Facility-Administered Medications Ordered in Other Visits   Medication     meropenem (MERREM) 500 mg vial to attach to  mL bag for ADULTS or 25 mL bag for PEDS     vancomycin (VANCOCIN) 1,250 mg in 0.9% NaCl 250 mL  "intermittent infusion          Allergies     Allergies   Allergen Reactions     Orphenadrine Hives     Other reaction(s): hives       Penicillins Anaphylaxis and Nausea and Vomiting     Alfuzosin Nausea and Vomiting     Donepezil Other (See Comments)     Myolin [Norflex] Unknown     Other reaction(s): hives     Sulfa Drugs Nausea and Vomiting     Triazolam Nausea and Vomiting       Review of Systems   A comprehensive review of 14 systems was done. Pertinent findings noted here and in history of present illness. All the rest negative.  Constitutional: Negative.  Negative for fever, chills, she has  activity change, appetite change and fatigue.   Eating poorly and suspected to be aspirating per staff  Has a cough while eating  Hypotension noted  HENT: Negative for congestion and facial swelling.    Eyes: Negative for photophobia, redness and visual disturbance.   Respiratory: Negative for cough and chest tightness.    Cardiovascular: Negative for chest pain, palpitations and leg swelling.   Gastrointestinal: Negative for nausea, diarrhea, constipation, blood in stool and abdominal distention.   Genitourinary: Negative.  Has a rai  Musculoskeletal: reports falls   Skin: Negative.    Neurological: Negative for dizziness, tremors, syncope, weakness, light-headedness and headaches.   Hematological: Does not bruise/bleed easily.   Psychiatric/Behavioral: Negative.  Some recall issues noted  More confused sedated and somnolent      Physical Exam   BP 93/58   Pulse 73   Temp 98.4  F (36.9  C)   Resp 14   Ht 1.676 m (5' 6\")   Wt 57.6 kg (127 lb)   SpO2 96%   BMI 20.50 kg/m       Constitutional: Oriented to person, place, and appears well-developed.   Somnolent  HEENT:  Normocephalic and atraumatic.  Eyes: Conjunctivae and EOM are normal. Pupils are equal, round, and reactive to light. No discharge.  No scleral icterus. Nose normal. Mouth/Throat: Oropharynx is clear and moist. No oropharyngeal exudate.    [place]  NECK: " Normal range of motion. Neck supple. No JVD present. No tracheal deviation present. No thyromegaly present.   CARDIOVASCULAR: Normal rate, regular rhythm and intact distal pulses.  Exam reveals no gallop and no friction rub.  Systolic murmur present.  PULMONARY: Effort normal and breath sounds normal. No respiratory distress.No Wheezing or rales.  ABDOMEN: Soft. Bowel sounds are normal. No distension and no mass.  There is no tenderness. There is no rebound and no guarding. No HSM.  MUSCULOSKELETAL: Normal range of motion. Mild kyphosis, no tenderness.  LYMPH NODES: Has no cervical, supraclavicular, axillary and groin adenopathy.   NEUROLOGICAL: Alert and oriented to person, place, No cranial nerve deficit.  Normal muscle tone. Coordination normal.   GENITOURINARY: Deferred exam.has  A rai  SKIN: Skin is warm and dry. No rash noted. No erythema. No pallor.   EXTREMITIES: No cyanosis, no clubbing, no edema. No Deformity.  PSYCHIATRIC sleepy but arousable      Lab Results     Recent Results (from the past 240 hour(s))   UA with Microscopic reflex to Culture    Collection Time: 04/04/23  4:45 PM    Specimen: Urine, Midstream   Result Value Ref Range    Color Urine Yellow Colorless, Straw, Light Yellow, Yellow    Appearance Urine Slightly Cloudy (A) Clear    Glucose Urine Negative Negative mg/dL    Bilirubin Urine Negative Negative    Ketones Urine Negative Negative mg/dL    Specific Gravity Urine 1.014 1.003 - 1.035    Blood Urine Large (A) Negative    pH Urine 5.0 5.0 - 7.0    Protein Albumin Urine 30 (A) Negative mg/dL    Urobilinogen Urine Normal Normal, 2.0 mg/dL    Nitrite Urine Negative Negative    Leukocyte Esterase Urine Moderate (A) Negative    Bacteria Urine Few (A) None Seen /HPF    WBC Clumps Urine Present (A) None Seen /HPF    Mucus Urine Present (A) None Seen /LPF    RBC Urine 62 (H) <=2 /HPF    WBC Urine 47 (H) <=5 /HPF   Urine Culture    Collection Time: 04/04/23  4:45 PM    Specimen: Urine, Midstream    Result Value Ref Range    Culture 10,000-50,000 CFU/mL Enterococcus faecalis (A)     Culture 10,000-50,000 CFU/mL Enterococcus faecalis (A)        Susceptibility    Enterococcus faecalis - LUIS     Penicillin 2 Susceptible ug/mL     Ampicillin <=2 Susceptible ug/mL     Vancomycin 1 Susceptible ug/mL     Nitrofurantoin <=16 Susceptible ug/mL    Enterococcus faecalis - LUIS     Penicillin 4 Susceptible ug/mL     Ampicillin <=2 Susceptible ug/mL     Vancomycin 1 Susceptible ug/mL     Nitrofurantoin <=16 Susceptible ug/mL   Basic metabolic panel    Collection Time: 04/11/23  5:30 AM   Result Value Ref Range    Sodium 139 136 - 145 mmol/L    Potassium 4.4 3.4 - 5.3 mmol/L    Chloride 108 (H) 98 - 107 mmol/L    Carbon Dioxide (CO2) 16 (L) 22 - 29 mmol/L    Anion Gap 15 7 - 15 mmol/L    Urea Nitrogen 55.5 (H) 8.0 - 23.0 mg/dL    Creatinine 2.36 (H) 0.67 - 1.17 mg/dL    Calcium 8.5 8.2 - 9.6 mg/dL    Glucose 109 (H) 70 - 99 mg/dL    GFR Estimate 25 (L) >60 mL/min/1.73m2   CBC with platelets    Collection Time: 04/11/23  5:30 AM   Result Value Ref Range    WBC Count 15.3 (H) 4.0 - 11.0 10e3/uL    RBC Count 3.08 (L) 4.40 - 5.90 10e6/uL    Hemoglobin 9.4 (L) 13.3 - 17.7 g/dL    Hematocrit 30.3 (L) 40.0 - 53.0 %    MCV 98 78 - 100 fL    MCH 30.5 26.5 - 33.0 pg    MCHC 31.0 (L) 31.5 - 36.5 g/dL    RDW 13.5 10.0 - 15.0 %    Platelet Count 203 150 - 450 10e3/uL   Comprehensive metabolic panel    Collection Time: 04/11/23  2:20 PM   Result Value Ref Range    Sodium 136 136 - 145 mmol/L    Potassium 4.7 3.5 - 5.0 mmol/L    Chloride 108 (H) 98 - 107 mmol/L    Carbon Dioxide (CO2) 19 (L) 22 - 31 mmol/L    Anion Gap 9 5 - 18 mmol/L    Urea Nitrogen 57 (H) 8 - 28 mg/dL    Creatinine 2.45 (H) 0.70 - 1.30 mg/dL    Calcium 8.7 8.5 - 10.5 mg/dL    Glucose 154 (H) 70 - 125 mg/dL    Alkaline Phosphatase 143 (H) 45 - 120 U/L     (H) 0 - 40 U/L     (H) 0 - 45 U/L    Protein Total 6.7 6.0 - 8.0 g/dL    Albumin 1.9 (L) 3.5 - 5.0  g/dL    Bilirubin Total 0.4 0.0 - 1.0 mg/dL    GFR Estimate 24 (L) >60 mL/min/1.73m2   Lipase    Collection Time: 04/11/23  2:20 PM   Result Value Ref Range    Lipase 99 (H) 0 - 52 U/L   Lactic acid whole blood    Collection Time: 04/11/23  2:20 PM   Result Value Ref Range    Lactic Acid 1.6 0.7 - 2.0 mmol/L   Troponin I    Collection Time: 04/11/23  2:20 PM   Result Value Ref Range    Troponin I 0.02 0.00 - 0.29 ng/mL   Magnesium    Collection Time: 04/11/23  2:20 PM   Result Value Ref Range    Magnesium 2.6 1.8 - 2.6 mg/dL   TSH with free T4 reflex    Collection Time: 04/11/23  2:20 PM   Result Value Ref Range    TSH 0.72 0.30 - 5.00 uIU/mL   Procalcitonin    Collection Time: 04/11/23  2:20 PM   Result Value Ref Range    Procalcitonin 1.59 (H) 0.00 - 0.49 ng/mL   CBC with platelets and differential    Collection Time: 04/11/23  2:20 PM   Result Value Ref Range    WBC Count 15.6 (H) 4.0 - 11.0 10e3/uL    RBC Count 3.27 (L) 4.40 - 5.90 10e6/uL    Hemoglobin 10.3 (L) 13.3 - 17.7 g/dL    Hematocrit 31.2 (L) 40.0 - 53.0 %    MCV 95 78 - 100 fL    MCH 31.5 26.5 - 33.0 pg    MCHC 33.0 31.5 - 36.5 g/dL    RDW 13.5 10.0 - 15.0 %    Platelet Count 223 150 - 450 10e3/uL    % Neutrophils 83 %    % Lymphocytes 5 %    % Monocytes 9 %    % Eosinophils 0 %    % Basophils 0 %    % Immature Granulocytes 3 %    NRBCs per 100 WBC 0 <1 /100    Absolute Neutrophils 12.9 (H) 1.6 - 8.3 10e3/uL    Absolute Lymphocytes 0.7 (L) 0.8 - 5.3 10e3/uL    Absolute Monocytes 1.5 (H) 0.0 - 1.3 10e3/uL    Absolute Eosinophils 0.1 0.0 - 0.7 10e3/uL    Absolute Basophils 0.1 0.0 - 0.2 10e3/uL    Absolute Immature Granulocytes 0.4 <=0.4 10e3/uL    Absolute NRBCs 0.0 10e3/uL   Extra Green Top (Lithium Heparin) Tube    Collection Time: 04/11/23  2:20 PM   Result Value Ref Range    Hold Specimen [facility]    Blood gas venous    Collection Time: 04/11/23  2:27 PM   Result Value Ref Range    pH Venous 7.30 (L) 7.35 - 7.45    pCO2 Venous 40 35 - 50 mm Hg    pO2  Venous 33 25 - 47 mm Hg    Bicarbonate Venous 20 (L) 24 - 30 mmol/L    Base Excess/Deficit (+/-) -6.4   mmol/L    Oxyhemoglobin Venous 55.1 (L) 70.0 - 75.0 %    O2 Sat, Venous 56.2 (L) 70.0 - 75.0 %   ECG 12-LEAD WITH MUSE (LHE)    Collection Time: 04/11/23  2:35 PM   Result Value Ref Range    Systolic Blood Pressure 110 mmHg    Diastolic Blood Pressure 59 mmHg    Ventricular Rate 69 BPM    Atrial Rate 69 BPM    MA Interval 168 ms    QRS Duration 96 ms     ms    QTc 426 ms    P Axis 60 degrees    R AXIS -48 degrees    T Axis 28 degrees    Interpretation ECG       Sinus rhythm  Left anterior fascicular block  Voltage criteria for left ventricular hypertrophy  Cannot rule out Septal infarct (cited on or before 16-MAR-2023)  Abnormal ECG  When compared with ECG of 16-MAR-2023 13:31,  Questionable change in initial forces of Septal leads  Confirmed by SEE ED PROVIDER NOTE FOR, ECG INTERPRETATION (4000),  LUIS FERNANDO ALMAZAN ([id number]) on 4/11/2023 3:33:19 PM     Symptomatic Influenza A/B, RSV, & SARS-CoV2 PCR (COVID-19) Nose    Collection Time: 04/11/23  2:50 PM    Specimen: Nose; Swab   Result Value Ref Range    Influenza A PCR Negative Negative    Influenza B PCR Negative Negative    RSV PCR Negative Negative    SARS CoV2 PCR Negative Negative   UA with Microscopic reflex to Culture    Collection Time: 04/11/23  3:50 PM    Specimen: Urine, Clean Catch   Result Value Ref Range    Color Urine Yellow Colorless, Straw, Light Yellow, Yellow    Appearance Urine Turbid (A) Clear    Glucose Urine Negative Negative mg/dL    Bilirubin Urine Negative Negative    Ketones Urine Negative Negative mg/dL    Specific Gravity Urine 1.012 1.001 - 1.030    Blood Urine 0.03 mg/dL (A) Negative    pH Urine 5.0 5.0 - 7.0    Protein Albumin Urine 30 (A) Negative mg/dL    Urobilinogen Urine <2.0 <2.0 mg/dL    Nitrite Urine Negative Negative    Leukocyte Esterase Urine 250 Bipin/uL (A) Negative    Bacteria Urine Few (A) None Seen /HPF     Mucus Urine Present (A) None Seen /LPF    RBC Urine 2 <=2 /HPF    WBC Urine 20 (H) <=5 /HPF                 Electronically signed by    Bela Franklin MD                             Sincerely,        DEB Mendiola

## 2023-04-11 NOTE — ED PROVIDER NOTES
EMERGENCY DEPARTMENT ENCOUNTER      NAME: Kurtis Urban  AGE: 89 year old male  YOB: 1929  MRN: [id number]  EVALUATION DATE & TIME: 4/11/2023  1:44 PM    PCP: Juan West    ED PROVIDER: Domingo Harley MD    Chief Complaint   Patient presents with     Altered Mental Status     Generalized Weakness     FINAL IMPRESSION:  1. Urinary tract infection without hematuria, site unspecified    2. Urinary retention    3. KRIS (acute kidney injury) (H)    4. Altered mental status, unspecified altered mental status type    5. Healthcare-associated pneumonia      ED COURSE & MEDICAL DECISION MAKING:    Pertinent Labs & Imaging studies reviewed. (See chart for details)  89 year old male presents to the Emergency Department for evaluation of increasing generalized weakness and confusion.  Majority of history obtained from patient's friend Matty who is also one of his caregivers.  Patient currently residing at a TCU after hospital admission for subdural hematomas.  Patient's TCU course complicated by a urinary tract infection with Enterococcus facialis for which he is reportedly on antibiotics.  Over the last 4 days increasing fatigue weakness and confusion ultimately prompting EMS transport to the emergency department.  On examination patient was noted to be sleeping.  His vital signs reviewed and were notable for temperature of 96.9 otherwise unremarkable.  Patient awoke to verbal stimulation he appeared to be somnolent.  He was answering no to all of the questions that I asked and after stimulation immediately fell back asleep.  He had some rhonchorous breath sounds but did not appear to be tachypneic no cough appreciated.  Abdominal examination was benign.  No other abnormal exam findings appreciated on clinical examination.  I recommended broad work-up at this point.  In addition, I reviewed all of the test results from [facility] including the urinalysis demonstrating E faecilis that was  pansensitive.  Laboratory testing notable for acute kidney injury and elevated white blood cell count.  Repeat labs are pending at this time as well as full sepsis evaluation.  Have ordered repeat urinalysis.  Also reviewed chest x-ray does demonstrate a focal right lower lobe infiltrate obtained earlier at the care facility.  We will also obtain repeat CT scan imaging of his head.  Patient will require admission to the hospital given his decompensation.    5:00 PM  Patient's laboratory testing was notable for a leukocytosis.  Urinalysis was concerning for infection.  CT scan head demonstrating improvement of the subdurals.  Procalcitonin borderline raising concern for bacteremia and/or sepsis.  Cultures obtained.  I reviewed the antibiotic sensitivities from the TCU which demonstrated pansensitive enterococcus facialis.  Also noted on chest x-ray imaging to have focal infiltrates concerning for healthcare associated pneumonia given recent hospital admission.  Patient has penicillin allergy including anaphylaxis.  Discussion held with pharmacy regarding appropriate antibiotic selection and we are going to administer meropenem and vancomycin.  Patient had some elevation to his LFTs and given his recent urinary obstruction issues I did opt to perform CT scan imaging for further assessment of those problems which demonstrated significant hydroureteronephrosis and obstruction.  Retana catheter to be placed shortly.  Likely acute kidney injury secondary to urinary obstruction.  Overall plan of care for admission the hospital for these multiple medical issues.    5:13 PM  I updated patient's friend and caregiver Matty.  Subsequently contacted and discussed the case with Adi's son.  Adi is coming in from [place] and anticipates being here tomorrow morning to help.  Case was discussed with resident service for admission.     2:00 PM I introduced myself to the patient, obtained patient history, performed a physical exam,  and discussed plan for ED workup including potential diagnostic laboratory/imaging studies and interventions.    4:24 PM Discussed antibiotics with patient       At the conclusion of the encounter I discussed the results of all of the tests and the disposition. The questions were answered. The patient or family acknowledged understanding and was agreeable with the care plan.       Medical Decision Making    History:    Supplemental history from: Documented in chart, if applicable and Caregiver    External Record(s) reviewed: Other: Reviewed [facility] reports    Work Up:    Chart documentation includes differential considered and any EKGs or imaging independently interpreted by provider, where specified.    In additional to work up documented, I considered the following work up: Documented in chart, if applicable.    External consultation:    Discussion of management with another provider: Documented in chart, if applicable    Complicating factors:    Care impacted by chronic illness: N/A    Care affected by social determinants of health: N/A    Disposition considerations: Admit.          MEDICATIONS GIVEN IN THE EMERGENCY:  Medications   vancomycin (VANCOCIN) 1,250 mg in 0.9% NaCl 250 mL intermittent infusion (has no administration in time range)   0.9% sodium chloride BOLUS (0 mLs Intravenous Stopped 4/11/23 1550)   meropenem (MERREM) 500 mg vial to attach to  mL bag for ADULTS or 25 mL bag for PEDS (500 mg Intravenous $New Bag 4/11/23 1639)       NEW PRESCRIPTIONS STARTED AT TODAY'S ER VISIT  New Prescriptions    No medications on file          =================================================================    HPI    Patient information was obtained from: Patient and friend    Use of : N/A       Kurtis Urban is a 89 year old male with a pertinent history of seizures,  dementia, depression, TIA, who presents to this ED by EMS for evaluation of altered mental status.    HPI limited  due to altered mental status    Per patients friend, patient has been having progressively worse confusion for 4 days which is unusual. Patients friend takes care of him.   Patient has been in TCU after a previous CT revealed mixed attenuating subdural collections  TCU did a urinalysis it showed E faecalis . Labs today at TCU showed white count of 15. Hgb of 9.4 and creatinine of 2.36. No fever, nausea, vomiting.           REVIEW OF SYSTEMS   Review of Systems   Constitutional: Negative for fever.   Gastrointestinal: Negative for nausea and vomiting.   Psychiatric/Behavioral: Positive for confusion.   All other systems reviewed and are negative.       PAST MEDICAL HISTORY:  Past Medical History:   Diagnosis Date     Dementia with behavioral disturbance (H) 9/3/2019     Depression 5/28/2014     Hypercholesteremia 5/28/2014     Melanoma of back (H) 5/28/2014    Surgically removed       Melanoma of face (H) 5/28/2014    Surgically removed      Seizure (H) 5/19/2015     Thiamine deficiency 9/3/2019     TIA (transient ischemic attack)        PAST SURGICAL HISTORY:  Past Surgical History:   Procedure Laterality Date     APPENDECTOMY       CHOLECYSTECTOMY       HERNIA REPAIR       OTHER SURGICAL HISTORY      nasal surgeries     OTHER SURGICAL HISTORY  1993    meniscal surgery     OTHER SURGICAL HISTORY Right     total knee repair     SHOULDER SURGERY Right            CURRENT MEDICATIONS:    atorvastatin (LIPITOR) 40 MG tablet  Cyanocobalamin 2500 MCG TABS  levETIRAcetam (KEPPRA) 500 MG tablet  nitroFURantoin macrocrystal (MACRODANTIN) 50 MG capsule  polyethylene glycol (MIRALAX) 17 GM/Dose powder  primidone (MYSOLINE) 50 MG tablet  sodium chloride (OCEAN) 0.65 % nasal spray  thiamine 50 MG TABS        ALLERGIES:  Allergies   Allergen Reactions     Orphenadrine Hives     Other reaction(s): hives       Penicillins Anaphylaxis and Nausea and Vomiting     Alfuzosin Nausea and Vomiting     Donepezil Other (See Comments)      Myolin [Norflex] Unknown     Other reaction(s): hives     Sulfa Drugs Nausea and Vomiting     Triazolam Nausea and Vomiting       FAMILY HISTORY:  Family History   Problem Relation Age of Onset     Cancer Mother      Cerebrovascular Disease Maternal Grandfather        SOCIAL HISTORY:   Social History     Socioeconomic History     Marital status:    Tobacco Use     Smoking status: Former     Types: Cigarettes     Smokeless tobacco: Never   Substance and Sexual Activity     Alcohol use: Never     Drug use: Never       VITALS:  /67   Pulse 72   Temp 96.9  F (36.1  C) (Temporal)   Resp (!) 34   SpO2 100%     PHYSICAL EXAM    PHYSICAL EXAM    Constitutional: Elderly male, somnolent, awakens to verbal stimulation  HENT: Normocephalic, Atraumatic, Bilateral external ears normal, Oropharynx normal, mucous membranes moist, Nose normal. Neck-  Normal range of motion, No tenderness, Supple, No stridor.   Eyes: PERRL, EOMI, Conjunctiva normal, No discharge.   Respiratory: Normal breath sounds, No respiratory distress, No wheezing, Speaks full sentences easily. No cough.   Cardiovascular: Normal heart rate, Regular rhythm, No murmurs Chest wall nontender.    GI:  Soft, No tenderness, No masses, No flank tenderness. No rebound or guarding.  : No cva tenderness    Musculoskeletal: 2+ DP pulses. No edema. No cyanosis. Good range of motion in all major joints. No tenderness to palpation. No tenderness of the CTLS spine.   Integument: Warm, Dry, No erythema, No rash. No petechiae.   Neurologic: Patient is very somnolent but awakens to verbal stimulation.  His cranial nerves appear to be functioning normally.  He exam is limited by his level of somnolence but grossly does not appear to be any focal neurological deficits to clinical examination he is nonambulatory due to generalized weakness.  Psychiatric: Flat affect       LAB:  All pertinent labs reviewed and interpreted.  Results for orders placed or performed  during the hospital encounter of 04/11/23   XR Chest 2 Views    Impression    IMPRESSION:     Mildly heterogeneous right lung base opacities, possibly infectious / inflammatory (versus atelectasis). Minimal streaky left base opacities suggestive of atelectasis. Possible minimal pleural fluid.     Upper normal heart size. Mildly tortuous aorta. No obvious pulmonary edema.     Shoulder arthroplasties.       Head CT w/o contrast    Impression    IMPRESSION:  1.  No acute abnormality.  2.  Regressing subdural hematomas.  3.  Stable mild anterior left temporal edema associated with a meningioma.  4.  Moderate presumed chronic small vessel ischemic change and generalized volume loss again noted.   CT Abdomen Pelvis w/o Contrast    Impression    IMPRESSION:   1.  Moderate bilateral hydroureteronephrosis, distended bladder with bladder wall trabeculation and enlarged prostate, suggesting bladder outlet obstruction.  2.  2.1 x 1.3 x 1.3 cm polypoid lesion right lateral bladder wall redemonstrated.  3.  Bilateral parapelvic and renal cortical hypodense/isodense lesions likely represent cysts, some of which are indeterminate by density measurements.  4.  Scattered air-fluid levels throughout nondilated small bowel and colon with no formed stool in the colon. No obstruction or inflammatory change. Findings can be seen in viral illness and laxative use.  5.  Some new small bilateral pleural effusions with increased bibasilar subsegmental atelectasis.     Comprehensive metabolic panel   Result Value Ref Range    Sodium 136 136 - 145 mmol/L    Potassium 4.7 3.5 - 5.0 mmol/L    Chloride 108 (H) 98 - 107 mmol/L    Carbon Dioxide (CO2) 19 (L) 22 - 31 mmol/L    Anion Gap 9 5 - 18 mmol/L    Urea Nitrogen 57 (H) 8 - 28 mg/dL    Creatinine 2.45 (H) 0.70 - 1.30 mg/dL    Calcium 8.7 8.5 - 10.5 mg/dL    Glucose 154 (H) 70 - 125 mg/dL    Alkaline Phosphatase 143 (H) 45 - 120 U/L     (H) 0 - 40 U/L     (H) 0 - 45 U/L    Protein  Total 6.7 6.0 - 8.0 g/dL    Albumin 1.9 (L) 3.5 - 5.0 g/dL    Bilirubin Total 0.4 0.0 - 1.0 mg/dL    GFR Estimate 24 (L) >60 mL/min/1.73m2   Result Value Ref Range    Lipase 99 (H) 0 - 52 U/L   Lactic acid whole blood   Result Value Ref Range    Lactic Acid 1.6 0.7 - 2.0 mmol/L   Result Value Ref Range    Troponin I 0.02 0.00 - 0.29 ng/mL   Result Value Ref Range    Magnesium 2.6 1.8 - 2.6 mg/dL   TSH with free T4 reflex   Result Value Ref Range    TSH 0.72 0.30 - 5.00 uIU/mL   UA with Microscopic reflex to Culture    Specimen: Urine, Clean Catch   Result Value Ref Range    Color Urine Yellow Colorless, Straw, Light Yellow, Yellow    Appearance Urine Turbid (A) Clear    Glucose Urine Negative Negative mg/dL    Bilirubin Urine Negative Negative    Ketones Urine Negative Negative mg/dL    Specific Gravity Urine 1.012 1.001 - 1.030    Blood Urine 0.03 mg/dL (A) Negative    pH Urine 5.0 5.0 - 7.0    Protein Albumin Urine 30 (A) Negative mg/dL    Urobilinogen Urine <2.0 <2.0 mg/dL    Nitrite Urine Negative Negative    Leukocyte Esterase Urine 250 Bipin/uL (A) Negative    Bacteria Urine Few (A) None Seen /HPF    Mucus Urine Present (A) None Seen /LPF    RBC Urine 2 <=2 /HPF    WBC Urine 20 (H) <=5 /HPF   Result Value Ref Range    Procalcitonin 1.59 (H) 0.00 - 0.49 ng/mL   Blood gas venous   Result Value Ref Range    pH Venous 7.30 (L) 7.35 - 7.45    pCO2 Venous 40 35 - 50 mm Hg    pO2 Venous 33 25 - 47 mm Hg    Bicarbonate Venous 20 (L) 24 - 30 mmol/L    Base Excess/Deficit (+/-) -6.4   mmol/L    Oxyhemoglobin Venous 55.1 (L) 70.0 - 75.0 %    O2 Sat, Venous 56.2 (L) 70.0 - 75.0 %   Symptomatic Influenza A/B, RSV, & SARS-CoV2 PCR (COVID-19) Nose    Specimen: Nose; Swab   Result Value Ref Range    Influenza A PCR Negative Negative    Influenza B PCR Negative Negative    RSV PCR Negative Negative    SARS CoV2 PCR Negative Negative   CBC with platelets and differential   Result Value Ref Range    WBC Count 15.6 (H) 4.0 - 11.0  10e3/uL    RBC Count 3.27 (L) 4.40 - 5.90 10e6/uL    Hemoglobin 10.3 (L) 13.3 - 17.7 g/dL    Hematocrit 31.2 (L) 40.0 - 53.0 %    MCV 95 78 - 100 fL    MCH 31.5 26.5 - 33.0 pg    MCHC 33.0 31.5 - 36.5 g/dL    RDW 13.5 10.0 - 15.0 %    Platelet Count 223 150 - 450 10e3/uL    % Neutrophils 83 %    % Lymphocytes 5 %    % Monocytes 9 %    % Eosinophils 0 %    % Basophils 0 %    % Immature Granulocytes 3 %    NRBCs per 100 WBC 0 <1 /100    Absolute Neutrophils 12.9 (H) 1.6 - 8.3 10e3/uL    Absolute Lymphocytes 0.7 (L) 0.8 - 5.3 10e3/uL    Absolute Monocytes 1.5 (H) 0.0 - 1.3 10e3/uL    Absolute Eosinophils 0.1 0.0 - 0.7 10e3/uL    Absolute Basophils 0.1 0.0 - 0.2 10e3/uL    Absolute Immature Granulocytes 0.4 <=0.4 10e3/uL    Absolute NRBCs 0.0 10e3/uL   Extra Green Top (Lithium Heparin) Tube   Result Value Ref Range    Hold Specimen [facility]    ECG 12-LEAD WITH MUSE (E)   Result Value Ref Range    Systolic Blood Pressure 110 mmHg    Diastolic Blood Pressure 59 mmHg    Ventricular Rate 69 BPM    Atrial Rate 69 BPM    ID Interval 168 ms    QRS Duration 96 ms     ms    QTc 426 ms    P Axis 60 degrees    R AXIS -48 degrees    T Axis 28 degrees    Interpretation ECG       Sinus rhythm  Left anterior fascicular block  Voltage criteria for left ventricular hypertrophy  Cannot rule out Septal infarct (cited on or before 16-MAR-2023)  Abnormal ECG  When compared with ECG of 16-MAR-2023 13:31,  Questionable change in initial forces of Septal leads  Confirmed by SEE ED PROVIDER NOTE FOR, ECG INTERPRETATION (4000),  LUIS FERNANDO ALMAZAN ([id number]) on 4/11/2023 3:33:19 PM         RADIOLOGY:  Reviewed all pertinent imaging. Please see official radiology report.  CT Abdomen Pelvis w/o Contrast   Final Result   IMPRESSION:    1.  Moderate bilateral hydroureteronephrosis, distended bladder with bladder wall trabeculation and enlarged prostate, suggesting bladder outlet obstruction.   2.  2.1 x 1.3 x 1.3 cm polypoid lesion right lateral  bladder wall redemonstrated.   3.  Bilateral parapelvic and renal cortical hypodense/isodense lesions likely represent cysts, some of which are indeterminate by density measurements.   4.  Scattered air-fluid levels throughout nondilated small bowel and colon with no formed stool in the colon. No obstruction or inflammatory change. Findings can be seen in viral illness and laxative use.   5.  Some new small bilateral pleural effusions with increased bibasilar subsegmental atelectasis.         XR Chest 2 Views   Final Result   IMPRESSION:       Mildly heterogeneous right lung base opacities, possibly infectious / inflammatory (versus atelectasis). Minimal streaky left base opacities suggestive of atelectasis. Possible minimal pleural fluid.       Upper normal heart size. Mildly tortuous aorta. No obvious pulmonary edema.       Shoulder arthroplasties.            Head CT w/o contrast   Final Result   IMPRESSION:   1.  No acute abnormality.   2.  Regressing subdural hematomas.   3.  Stable mild anterior left temporal edema associated with a meningioma.   4.  Moderate presumed chronic small vessel ischemic change and generalized volume loss again noted.          EKG:    Performed at: 1435  Sinus rhythm  Left anterior fascicular block  ST segments per nonischemic  No ectopy  Comparison to ECG from March 2023 no significant change appreciated  Otherwise unremarkable  Clinical impression: Sinus rhythm no significant changes compared to previous ECG        IJames, am serving as a scribe to document services personally performed by Domingo Harley MD based on my observation and the provider's statements to me. IDomingo MD attest that James Muñiz is acting in a scribe capacity, has observed my performance of the services and has documented them in accordance with my direction.    Domingo Harley MD  [facility] EMERGENCY ROOM  [address]  [address]  [phone]     Domingo Harley MD  04/11/23 [id number]

## 2023-04-11 NOTE — PHARMACY-VANCOMYCIN DOSING SERVICE
Pharmacy Vancomycin Initial Note  Date of Service 2023  Patient's  1929  89 year old, male    Indication: Healthcare-Associated Pneumonia    Current estimated CrCl = Estimated Creatinine Clearance: 15 mL/min (A) (based on SCr of 2.45 mg/dL (H)).    Creatinine for last 3 days  2023:  5:30 AM Creatinine 2.36 mg/dL;  2:20 PM Creatinine 2.45 mg/dL    Recent Vancomycin Level(s) for last 3 days  No results found for requested labs within last 3 days.      Vancomycin IV Administrations (past 72 hours)      No vancomycin orders with administrations in past 72 hours.                Nephrotoxins and other renal medications (From now, onward)    None          Contrast Orders - past 72 hours (72h ago, onward)    None              Plan:  1. Start vancomycin  1,250 mg IV once in ED.   2. If vancomycin therapy to continue while patient admitted, please re-consult pharmacy to dose vancomycin.     Thank you for the consult.   Lazara Xiao, PharmD, BCPS

## 2023-04-11 NOTE — PROGRESS NOTES
[facility] GERIATRIC SERVICES       Patient Kurtis Marinohstaedt  MRN: [id number]        Reason for Visit     Chief Complaint   Patient presents with     Nursing Home Acute       Code Status     CPR/Full code     Assessment     AMS WITH CONFUSION AND SOMNOLENCE  KRIS   HYPOTENSION  SUSPECTED ASPIRATION/DYSPHAGIA  History of traumatic subdural hematoma  History of a seizure disorder  leukocytosis  Urinary retention requiring Rai catheter  Dementia  hld  [place]  Chronic dvt  Generalized weakness    Plan     Pt is admitted to TCU for strengthening and rehab.  Saw urology today as he pulled his rai  Voiding trial was not successful and rai has been replaced  Scheduled for surgery for bladder mass  Denies pain  He was seen at the request of staff because of altered mental status.  covid neg  Recently noted to have UTI and started on abx by NP  Noted to be very somnolent with more confusion  Staff is reporting poor oral intake  Progressive weakness and hypotension also noted  Speech consulted and suspecting that he may be aspirating and requesting a swallow eval  Recheck labs show renal failure with a jump in creatinine to greater than 2  Blood pressures are consistently low  Recheck labs show a leukocytosis  In light of significant change in condition patient will be sent to the emergency room for further evaluation      History     Patient is a very pleasant 89 year old male who is admitted to TCU  Patient was admitted with traumatic subdural hematoma  Seen by neurosurgery no surgical intervention planned  Denies much pain but is sleepy  Also saw urology because imaging revealed a distended bladder with a lesion noted on the bladder wall and renal cyst noted  Urology consulted and a Rai catheter was placed  He was seen today and failed a voiding trail  Has a bladder mass and scheduled for surgery  Discharged to the TCU- remains a high fall risk  He has had a last significant change in condition over the  weekend  Apparently has been more confused ; sedated and somnolent.  Eating poorly  Suspected to be aspirating and blood pressures are low      Past Medical & Surgical History     PAST MEDICAL HISTORY:   Past Medical History:   Diagnosis Date     Dementia with behavioral disturbance (H) 9/3/2019     Depression 5/28/2014     Hypercholesteremia 5/28/2014     Melanoma of back (H) 5/28/2014    Surgically removed       Melanoma of face (H) 5/28/2014    Surgically removed      Seizure (H) 5/19/2015     Thiamine deficiency 9/3/2019     TIA (transient ischemic attack)       PAST SURGICAL HISTORY:   has a past surgical history that includes Cholecystectomy; appendectomy; other surgical history; other surgical history (1993); hernia repair; shoulder surgery (Right); and other surgical history (Right).      Past Social History     Reviewed,  reports that he has quit smoking. His smoking use included cigarettes. He has never used smokeless tobacco. He reports that he does not drink alcohol and does not use drugs.    Family History     Reviewed, and family history includes Cancer in his mother; Cerebrovascular Disease in his maternal grandfather.    Medication List     No current facility-administered medications for this visit.     Current Outpatient Medications   Medication     atorvastatin (LIPITOR) 40 MG tablet     Cyanocobalamin 2500 MCG TABS     levETIRAcetam (KEPPRA) 500 MG tablet     nitroFURantoin macrocrystal (MACRODANTIN) 50 MG capsule     polyethylene glycol (MIRALAX) 17 GM/Dose powder     primidone (MYSOLINE) 50 MG tablet     sodium chloride (OCEAN) 0.65 % nasal spray     thiamine 50 MG TABS     Facility-Administered Medications Ordered in Other Visits   Medication     meropenem (MERREM) 500 mg vial to attach to  mL bag for ADULTS or 25 mL bag for PEDS     vancomycin (VANCOCIN) 1,250 mg in 0.9% NaCl 250 mL intermittent infusion          Allergies     Allergies   Allergen Reactions     Orphenadrine Hives      "Other reaction(s): hives       Penicillins Anaphylaxis and Nausea and Vomiting     Alfuzosin Nausea and Vomiting     Donepezil Other (See Comments)     Myolin [Norflex] Unknown     Other reaction(s): hives     Sulfa Drugs Nausea and Vomiting     Triazolam Nausea and Vomiting       Review of Systems   A comprehensive review of 14 systems was done. Pertinent findings noted here and in history of present illness. All the rest negative.  Constitutional: Negative.  Negative for fever, chills, she has  activity change, appetite change and fatigue.   Eating poorly and suspected to be aspirating per staff  Has a cough while eating  Hypotension noted  HENT: Negative for congestion and facial swelling.    Eyes: Negative for photophobia, redness and visual disturbance.   Respiratory: Negative for cough and chest tightness.    Cardiovascular: Negative for chest pain, palpitations and leg swelling.   Gastrointestinal: Negative for nausea, diarrhea, constipation, blood in stool and abdominal distention.   Genitourinary: Negative.  Has a rai  Musculoskeletal: reports falls   Skin: Negative.    Neurological: Negative for dizziness, tremors, syncope, weakness, light-headedness and headaches.   Hematological: Does not bruise/bleed easily.   Psychiatric/Behavioral: Negative.  Some recall issues noted  More confused sedated and somnolent      Physical Exam   BP 93/58   Pulse 73   Temp 98.4  F (36.9  C)   Resp 14   Ht 1.676 m (5' 6\")   Wt 57.6 kg (127 lb)   SpO2 96%   BMI 20.50 kg/m       Constitutional: Oriented to person, place, and appears well-developed.   Somnolent  HEENT:  Normocephalic and atraumatic.  Eyes: Conjunctivae and EOM are normal. Pupils are equal, round, and reactive to light. No discharge.  No scleral icterus. Nose normal. Mouth/Throat: Oropharynx is clear and moist. No oropharyngeal exudate.    [place]  NECK: Normal range of motion. Neck supple. No JVD present. No tracheal deviation present. No thyromegaly " present.   CARDIOVASCULAR: Normal rate, regular rhythm and intact distal pulses.  Exam reveals no gallop and no friction rub.  Systolic murmur present.  PULMONARY: Effort normal and breath sounds normal. No respiratory distress.No Wheezing or rales.  ABDOMEN: Soft. Bowel sounds are normal. No distension and no mass.  There is no tenderness. There is no rebound and no guarding. No HSM.  MUSCULOSKELETAL: Normal range of motion. Mild kyphosis, no tenderness.  LYMPH NODES: Has no cervical, supraclavicular, axillary and groin adenopathy.   NEUROLOGICAL: Alert and oriented to person, place, No cranial nerve deficit.  Normal muscle tone. Coordination normal.   GENITOURINARY: Deferred exam.has  A rai  SKIN: Skin is warm and dry. No rash noted. No erythema. No pallor.   EXTREMITIES: No cyanosis, no clubbing, no edema. No Deformity.  PSYCHIATRIC sleepy but arousable      Lab Results     Recent Results (from the past 240 hour(s))   UA with Microscopic reflex to Culture    Collection Time: 04/04/23  4:45 PM    Specimen: Urine, Midstream   Result Value Ref Range    Color Urine Yellow Colorless, Straw, Light Yellow, Yellow    Appearance Urine Slightly Cloudy (A) Clear    Glucose Urine Negative Negative mg/dL    Bilirubin Urine Negative Negative    Ketones Urine Negative Negative mg/dL    Specific Gravity Urine 1.014 1.003 - 1.035    Blood Urine Large (A) Negative    pH Urine 5.0 5.0 - 7.0    Protein Albumin Urine 30 (A) Negative mg/dL    Urobilinogen Urine Normal Normal, 2.0 mg/dL    Nitrite Urine Negative Negative    Leukocyte Esterase Urine Moderate (A) Negative    Bacteria Urine Few (A) None Seen /HPF    WBC Clumps Urine Present (A) None Seen /HPF    Mucus Urine Present (A) None Seen /LPF    RBC Urine 62 (H) <=2 /HPF    WBC Urine 47 (H) <=5 /HPF   Urine Culture    Collection Time: 04/04/23  4:45 PM    Specimen: Urine, Midstream   Result Value Ref Range    Culture 10,000-50,000 CFU/mL Enterococcus faecalis (A)     Culture  10,000-50,000 CFU/mL Enterococcus faecalis (A)        Susceptibility    Enterococcus faecalis - LUIS     Penicillin 2 Susceptible ug/mL     Ampicillin <=2 Susceptible ug/mL     Vancomycin 1 Susceptible ug/mL     Nitrofurantoin <=16 Susceptible ug/mL    Enterococcus faecalis - LUIS     Penicillin 4 Susceptible ug/mL     Ampicillin <=2 Susceptible ug/mL     Vancomycin 1 Susceptible ug/mL     Nitrofurantoin <=16 Susceptible ug/mL   Basic metabolic panel    Collection Time: 04/11/23  5:30 AM   Result Value Ref Range    Sodium 139 136 - 145 mmol/L    Potassium 4.4 3.4 - 5.3 mmol/L    Chloride 108 (H) 98 - 107 mmol/L    Carbon Dioxide (CO2) 16 (L) 22 - 29 mmol/L    Anion Gap 15 7 - 15 mmol/L    Urea Nitrogen 55.5 (H) 8.0 - 23.0 mg/dL    Creatinine 2.36 (H) 0.67 - 1.17 mg/dL    Calcium 8.5 8.2 - 9.6 mg/dL    Glucose 109 (H) 70 - 99 mg/dL    GFR Estimate 25 (L) >60 mL/min/1.73m2   CBC with platelets    Collection Time: 04/11/23  5:30 AM   Result Value Ref Range    WBC Count 15.3 (H) 4.0 - 11.0 10e3/uL    RBC Count 3.08 (L) 4.40 - 5.90 10e6/uL    Hemoglobin 9.4 (L) 13.3 - 17.7 g/dL    Hematocrit 30.3 (L) 40.0 - 53.0 %    MCV 98 78 - 100 fL    MCH 30.5 26.5 - 33.0 pg    MCHC 31.0 (L) 31.5 - 36.5 g/dL    RDW 13.5 10.0 - 15.0 %    Platelet Count 203 150 - 450 10e3/uL   Comprehensive metabolic panel    Collection Time: 04/11/23  2:20 PM   Result Value Ref Range    Sodium 136 136 - 145 mmol/L    Potassium 4.7 3.5 - 5.0 mmol/L    Chloride 108 (H) 98 - 107 mmol/L    Carbon Dioxide (CO2) 19 (L) 22 - 31 mmol/L    Anion Gap 9 5 - 18 mmol/L    Urea Nitrogen 57 (H) 8 - 28 mg/dL    Creatinine 2.45 (H) 0.70 - 1.30 mg/dL    Calcium 8.7 8.5 - 10.5 mg/dL    Glucose 154 (H) 70 - 125 mg/dL    Alkaline Phosphatase 143 (H) 45 - 120 U/L     (H) 0 - 40 U/L     (H) 0 - 45 U/L    Protein Total 6.7 6.0 - 8.0 g/dL    Albumin 1.9 (L) 3.5 - 5.0 g/dL    Bilirubin Total 0.4 0.0 - 1.0 mg/dL    GFR Estimate 24 (L) >60 mL/min/1.73m2   Lipase     Collection Time: 04/11/23  2:20 PM   Result Value Ref Range    Lipase 99 (H) 0 - 52 U/L   Lactic acid whole blood    Collection Time: 04/11/23  2:20 PM   Result Value Ref Range    Lactic Acid 1.6 0.7 - 2.0 mmol/L   Troponin I    Collection Time: 04/11/23  2:20 PM   Result Value Ref Range    Troponin I 0.02 0.00 - 0.29 ng/mL   Magnesium    Collection Time: 04/11/23  2:20 PM   Result Value Ref Range    Magnesium 2.6 1.8 - 2.6 mg/dL   TSH with free T4 reflex    Collection Time: 04/11/23  2:20 PM   Result Value Ref Range    TSH 0.72 0.30 - 5.00 uIU/mL   Procalcitonin    Collection Time: 04/11/23  2:20 PM   Result Value Ref Range    Procalcitonin 1.59 (H) 0.00 - 0.49 ng/mL   CBC with platelets and differential    Collection Time: 04/11/23  2:20 PM   Result Value Ref Range    WBC Count 15.6 (H) 4.0 - 11.0 10e3/uL    RBC Count 3.27 (L) 4.40 - 5.90 10e6/uL    Hemoglobin 10.3 (L) 13.3 - 17.7 g/dL    Hematocrit 31.2 (L) 40.0 - 53.0 %    MCV 95 78 - 100 fL    MCH 31.5 26.5 - 33.0 pg    MCHC 33.0 31.5 - 36.5 g/dL    RDW 13.5 10.0 - 15.0 %    Platelet Count 223 150 - 450 10e3/uL    % Neutrophils 83 %    % Lymphocytes 5 %    % Monocytes 9 %    % Eosinophils 0 %    % Basophils 0 %    % Immature Granulocytes 3 %    NRBCs per 100 WBC 0 <1 /100    Absolute Neutrophils 12.9 (H) 1.6 - 8.3 10e3/uL    Absolute Lymphocytes 0.7 (L) 0.8 - 5.3 10e3/uL    Absolute Monocytes 1.5 (H) 0.0 - 1.3 10e3/uL    Absolute Eosinophils 0.1 0.0 - 0.7 10e3/uL    Absolute Basophils 0.1 0.0 - 0.2 10e3/uL    Absolute Immature Granulocytes 0.4 <=0.4 10e3/uL    Absolute NRBCs 0.0 10e3/uL   Extra Green Top (Lithium Heparin) Tube    Collection Time: 04/11/23  2:20 PM   Result Value Ref Range    Hold Specimen [facility]    Blood gas venous    Collection Time: 04/11/23  2:27 PM   Result Value Ref Range    pH Venous 7.30 (L) 7.35 - 7.45    pCO2 Venous 40 35 - 50 mm Hg    pO2 Venous 33 25 - 47 mm Hg    Bicarbonate Venous 20 (L) 24 - 30 mmol/L    Base Excess/Deficit (+/-)  -6.4   mmol/L    Oxyhemoglobin Venous 55.1 (L) 70.0 - 75.0 %    O2 Sat, Venous 56.2 (L) 70.0 - 75.0 %   ECG 12-LEAD WITH MUSE (LHE)    Collection Time: 04/11/23  2:35 PM   Result Value Ref Range    Systolic Blood Pressure 110 mmHg    Diastolic Blood Pressure 59 mmHg    Ventricular Rate 69 BPM    Atrial Rate 69 BPM    ID Interval 168 ms    QRS Duration 96 ms     ms    QTc 426 ms    P Axis 60 degrees    R AXIS -48 degrees    T Axis 28 degrees    Interpretation ECG       Sinus rhythm  Left anterior fascicular block  Voltage criteria for left ventricular hypertrophy  Cannot rule out Septal infarct (cited on or before 16-MAR-2023)  Abnormal ECG  When compared with ECG of 16-MAR-2023 13:31,  Questionable change in initial forces of Septal leads  Confirmed by SEE ED PROVIDER NOTE FOR, ECG INTERPRETATION (4000),  LUIS FERNANDO ALMAZAN ([id number]) on 4/11/2023 3:33:19 PM     Symptomatic Influenza A/B, RSV, & SARS-CoV2 PCR (COVID-19) Nose    Collection Time: 04/11/23  2:50 PM    Specimen: Nose; Swab   Result Value Ref Range    Influenza A PCR Negative Negative    Influenza B PCR Negative Negative    RSV PCR Negative Negative    SARS CoV2 PCR Negative Negative   UA with Microscopic reflex to Culture    Collection Time: 04/11/23  3:50 PM    Specimen: Urine, Clean Catch   Result Value Ref Range    Color Urine Yellow Colorless, Straw, Light Yellow, Yellow    Appearance Urine Turbid (A) Clear    Glucose Urine Negative Negative mg/dL    Bilirubin Urine Negative Negative    Ketones Urine Negative Negative mg/dL    Specific Gravity Urine 1.012 1.001 - 1.030    Blood Urine 0.03 mg/dL (A) Negative    pH Urine 5.0 5.0 - 7.0    Protein Albumin Urine 30 (A) Negative mg/dL    Urobilinogen Urine <2.0 <2.0 mg/dL    Nitrite Urine Negative Negative    Leukocyte Esterase Urine 250 B[place]in/uL (A) Negative    Bacteria Urine Few (A) None Seen /HPF    Mucus Urine Present (A) None Seen /LPF    RBC Urine 2 <=2 /HPF    WBC Urine 20 (H) <=5 /HPF                  Electronically signed by    Bela Franklin MD

## 2023-04-11 NOTE — PHARMACY-ADMISSION MEDICATION HISTORY
Pharmacist Admission Medication History    Admission medication history is complete. The information provided in this note is only as accurate as the sources available at the time of the update.    Medication reconciliation/reorder completed by provider prior to medication history? No    Information Source(s): Clinic records, Facility (U/NH/) medication list/MAR (med list from [facility]) and CareEverywhere/SureScripts via N/A    Pertinent Information: None    Changes made to PTA medication list:    Added: None    Deleted: [email or web address]    Changed: None    Medication History Completed By: Lazara Xiao, PharmD, BCPS 4/11/2023 3:49 PM    PTA Med List   Medication Sig Note Last Dose     atorvastatin (LIPITOR) 40 MG tablet Take 40 mg by mouth At Bedtime  4/10/2023     Cyanocobalamin 2500 MCG TABS Take 2,500 mcg by mouth every morning  4/11/2023     levETIRAcetam (KEPPRA) 500 MG tablet TAKE 1 TABLET BY MOUTH TWICE DAILY  4/11/2023 at x 1     nitroFURantoin macrocrystal (MACRODANTIN) 50 MG capsule Take 50 mg by mouth every 6 hours 4/11/2023: X 5 days (started 4/8/23) 4/11/2023 at x 1     polyethylene glycol (MIRALAX) 17 GM/Dose powder Take 17 g by mouth daily  4/11/2023 at AM     primidone (MYSOLINE) 50 MG tablet Take 50 mg by mouth 2 times daily  4/11/2023 at x 1     sodium chloride (OCEAN) 0.65 % nasal spray Spray 1 spray into both nostrils daily as needed for congestion   at unable to bring     thiamine 50 MG TABS Take 50 mg by mouth every morning  4/11/2023

## 2023-04-11 NOTE — H&P
[facility]    History and Physical - Hospitalist Service       Date of Admission:  4/11/2023    Assessment & Plan      Kurtis Urban is a 89 year old male with a past medical history of recent subdural hematoma, seizures, dementia, UTI, urinary retention s/p rai,  who presented to the [facility] Emergency Department on the day of admission with complaints of altered mental status.      Acute encephalopathy  Urinary tract infection   Urinary obstruction  Bilateral hydroureteronephrosis  Presented from TCU after recent hospitalization for traumatic subdural hematoma, has had worsening confusion and weakness for past 4 days.  UA and urine culture at TCU growing E faecalis, pansensitive.  Reportedly on nitrofurantoin for past few days, transition to vancomycin and meropenem in ED.  Leukocytosis with elevated Pro-Gareth.  Patient disoriented on my exam, unable to assess mentation and obtain history. Rai placed in ED with good output.     Continue vancomycin and meropenem (4/11 - )    Follow urine and blood cultures    Maintain Rai    Fall precautions     Transaminitis  Elevated alk phos  Hepatic encephalopathy?   250 and 304 ALT/AST, respectively. ALT/AST 3/16/2023 of 27 and 65 with normal alk phos at that time.  CT abdomen pelvis showing normal liver, cholecystectomy with no bile duct dilation.  Normal pancreas.  With mildly elevated lipase, possible patient had a passed gallstone causing abnormal LFTs. DILI also possibility 2/2 nitrofurantoin.     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  BUN/creatinine ratio >20, suggests pre-renal picture. CT a/p showing moderate bilateral hydronephrosis with bilateral renal lesions that are unchanged; suggests obstructive etiology. Echo 2020 with EF 61%.     Labs as above    Maintenance IV fluids    Avoid NSAIDs, avoid nephrotoxic drugs    Bronchiectasis  Dysphagia  Not appear to have history of bronchiectasis.  CT  abdomen pelvis showing bronchiectasis of the lower lungs with bibasilar effusion atelectasis.  Does not appear to be in acute exacerbation, unable to assess fully due to mentation.  Appeared to have previous concerns with dysphagia and aspiration.  Possible aspiration event causing symptoms as well.  Satting well on room air.    Dysphagia screen - failed    NPO until video swallow study    SLP consult placed    Continuous pulse ox    Bladder mass  Noted on the 3/2023 hospitalization, was set to follow-up with urology as outpatient for mass and urinary retention. Bladder decompressed with rai catheter placed in ED.     Outpatient urology follow-up    Subdural hematoma  CT head this hospitalization showing regressing subdural hematoma, previous meningioma present.    Outpatient neurosurgery follow-up, previously recommended follow-up in 4 weeks from last hospitalization.     Chronic Conditions  Seizures: PTA Keppra IV instead of PO   Hyperlipidemia: Hold PTA Lipitor  Essential tremor: Continue PTA primidone once cognition improves       Fluids: Normal Saline at maintenance   Diet: NPO until SLP evaluation  PPX: SubQ Heparin   Rai Catheter: PRESENT, indication: Retention  Lines: None     Cardiac Monitoring: None  Code Status: Full code    Clinically Significant Risk Factors Present on Admission           # Hypercalcemia: corrected calcium is >10.1, will monitor as appropriate    # Hypoalbuminemia: Lowest albumin = 1.9 g/dL at 4/11/2023  2:20 PM, will monitor as appropriate    # Acute Kidney Injury, unspecified: based on a >150% or 0.3 mg/dL increase in last creatinine compared to past 90 day average, will monitor renal function    # Dementia: noted on problem list            Disposition Plan   Status: Inpatient  Expected Discharge:    Expected Discharge Date: 04/13/2023             Anticipated discharge location: TBD     Patient was staffed with supervising physician, Dr. Horacio Kim, who agrees with the  findings and plan. {atient to be seen in the morning by attending, Dr. Missael Cook, DO  Hospitalist Service  [facility]  Please page/text page the senior pager with any questions or concerns, 24/7: [phone], or search ID# [id number] on [facility]  Securely message with [employer] (more info)  Text page via [facility] Paging/Directory     Chief Complaint   Altered mental status    History of Present Illness   Kurtis Urban is a 89 year old male with a past medical history of recent subdural hematoma, UTI, urinary retention s/p rai,  who presented to the [facility] Emergency Department on the day of admission with complaints of altered mental status.       Per patients friend, patient has been having progressively worsening confusion and weakness for 4 days which is unusual.  Patient's friend, Matty, is one of his caregivers.  Patient has been in TCU after a previous CT revealed mixed attenuating subdural collections   stay complicated by UTI growing E faecalis.  Patient discharged to TCU with Rai in place, patient self removed his rai, replaced after he failed voiding trial.      TCU did a urinalysis it showed E faecalis . Labs today at TCU showed white count of 15. Hgb of 9.4 and creatinine of 2.36. No fever, nausea, vomiting    Hydroureteronephrosis and obstruction likely causing KRIS.   Adi to come here tomorrow     Hospitalized 3/16/2023 to 3/20/2023 for unwitnessed fall at independent living facility, sustaining an acute on chronic subdural hematoma and rhabdomyolysis.  Found to have a meningioma and bladder mass.  Had urinary retention requiring Rai, discharged to TCU with Rai placed.      History is obtained from chart checking due to patient's mentation, friend Matty not present in room at my visit.    ED COURSE:  ED Triage Vitals [04/11/23 1349]   Enc Vitals Group      /62      Pulse 68      Resp 16      Temp 96.9  F (36.1  C)      Temp src Temporal      SpO2 98 %       Weight       Height       Head Circumference       Peak Flow       Pain Score       Pain Loc       Pain Edu?       Excl. in GC?      Initial evaluation in the Emergency Department: Confused and not answering questions, friend Matty answered questions of ED provider. Initial w/u showing KRIS, elevated LFTs, elevated procalc, mildly elevated lipase, leukocytosis and normocytic anemia, UA suggestive of UTI. CXR with mild R lung base opacities. CT a/p with moderate bilateral hydroureteronephrosis with small bilateral pleural effusions with increased bibasilar subsegmental atelectasis.     Given 500 cc bolus of NS, started on meropenem and vancomycin.    Patient was admitted to the Med Surg for further workup and management.     Review of Systems   Review of Systems     A 12 point comprehensive review of systems was negative except as noted above or in HPI    Past Medical History    I have reviewed this patient's medical history and updated it with pertinent information if needed.   Past Medical History:   Diagnosis Date     Dementia with behavioral disturbance (H) 9/3/2019     Depression 5/28/2014     Hypercholesteremia 5/28/2014     Melanoma of back (H) 5/28/2014    Surgically removed       Melanoma of face (H) 5/28/2014    Surgically removed      Seizure (H) 5/19/2015     Thiamine deficiency 9/3/2019     TIA (transient ischemic attack)      Past Surgical History   I have reviewed this patient's surgical history and updated it with pertinent information if needed.  Past Surgical History:   Procedure Laterality Date     APPENDECTOMY       CHOLECYSTECTOMY       HERNIA REPAIR       OTHER SURGICAL HISTORY      nasal surgeries     OTHER SURGICAL HISTORY  1993    meniscal surgery     OTHER SURGICAL HISTORY Right     total knee repair     SHOULDER SURGERY Right      Social History   I have reviewed this patient's social history and updated it with pertinent information if needed.  Presumed POA: Son, Adi.     Social History      Social History Narrative     Not on file     Social History     Tobacco Use     Smoking status: Former     Types: Cigarettes     Smokeless tobacco: Never   Substance Use Topics     Alcohol use: Never     Drug use: Never       Family History   I have reviewed this patient's family history and updated it with pertinent information if needed.  Family History   Problem Relation Age of Onset     Cancer Mother      Cerebrovascular Disease Maternal Grandfather        Prior to Admission Medications   Prior to Admission Medications   Prescriptions Last Dose Informant Patient Reported? Taking?   Cyanocobalamin 2500 MCG TABS 4/11/2023  Yes Yes   Sig: Take 2,500 mcg by mouth every morning   atorvastatin (LIPITOR) 40 MG tablet 4/10/2023  Yes Yes   Sig: Take 40 mg by mouth At Bedtime   levETIRAcetam (KEPPRA) 500 MG tablet 4/11/2023 at x 1  No Yes   Sig: TAKE 1 TABLET BY MOUTH TWICE DAILY   nitroFURantoin macrocrystal (MACRODANTIN) 50 MG capsule 4/11/2023 at x 1  Yes Yes   Sig: Take 50 mg by mouth every 6 hours   polyethylene glycol (MIRALAX) 17 GM/Dose powder 4/11/2023 at AM  No Yes   Sig: Take 17 g by mouth daily   primidone (MYSOLINE) 50 MG tablet 4/11/2023 at x 1  Yes Yes   Sig: Take 50 mg by mouth 2 times daily   sodium chloride (OCEAN) 0.65 % nasal spray  at unable to bring  No Yes   Sig: Spray 1 spray into both nostrils daily as needed for congestion   thiamine 50 MG TABS 4/11/2023  Yes Yes   Sig: Take 50 mg by mouth every morning      Facility-Administered Medications: None     Allergies   Allergies   Allergen Reactions     Orphenadrine Hives     Other reaction(s): hives       Penicillins Anaphylaxis and Nausea and Vomiting     Alfuzosin Nausea and Vomiting     Donepezil Other (See Comments)     Myolin [Norflex] Unknown     Other reaction(s): hives     Sulfa Drugs Nausea and Vomiting     Triazolam Nausea and Vomiting     Physical Exam   Temp:  [96.9  F (36.1  C)-98.4  F (36.9  C)] 96.9  F (36.1  C)  Pulse:  [67-73]  72  Resp:  [14-34] 23  BP: ()/(57-67) 123/67  SpO2:  [96 %-100 %] 99 %+  Vital Signs: Temp: 96.9  F (36.1  C) Temp src: Temporal BP: 123/67 Pulse: 72   Resp: 23 SpO2: 99 % O2 Device: None (Room air)    Weight: 0 lbs 0 oz    Physical Exam  Constitutional:       General: He is not in acute distress.     Appearance: He is not ill-appearing or diaphoretic.   HENT:      Head: Normocephalic and atraumatic.      Mouth/Throat:      Mouth: Mucous membranes are moist.      Pharynx: Oropharynx is clear. No oropharyngeal exudate or posterior oropharyngeal erythema.   Eyes:      Pupils: Pupils are equal, round, and reactive to light.   Cardiovascular:      Rate and Rhythm: Normal rate and regular rhythm.      Pulses: Normal pulses.      Heart sounds: Murmur heard.   Pulmonary:      Effort: Pulmonary effort is normal. No respiratory distress.      Breath sounds: Rales (Mild and bibasilar) present.   Abdominal:      General: Abdomen is flat. Bowel sounds are normal. There is no distension.      Palpations: Abdomen is soft. There is no mass.      Tenderness: Tenderness: Unable to assess due to mentation.      Comments: Retana in place, yellow urine output   Neurological:      Mental Status: He is disoriented.      Comments: Answers questions with incoherent speech.       Data   Pertinent Labs  Lab Results: personally reviewed.   Recent Labs   Lab 04/11/23  1420 04/11/23  0530   WBC 15.6* 15.3*   HGB 10.3* 9.4*   MCV 95 98    203    139   POTASSIUM 4.7 4.4   CHLORIDE 108* 108*   CO2 19* 16*   BUN 57* 55.5*   CR 2.45* 2.36*   ANIONGAP 9 15   MARICRUZ 8.7 8.5   * 109*   ALBUMIN 1.9*  --    PROTTOTAL 6.7  --    BILITOTAL 0.4  --    ALKPHOS 143*  --    *  --    *  --    LIPASE 99*  --          Pertinent Radiology:  Radiology Results: personally reviewed.   Recent Results (from the past 24 hour(s))   Head CT w/o contrast    Narrative    EXAM: CT HEAD W/O CONTRAST  LOCATION: [facility]  DATE/TIME: 4/11/2023 3:20 PM    INDICATION: Altered mental status.  COMPARISON: Head CT 03/16/2023 and head MRI 03/16/2020 3 .  TECHNIQUE: Routine CT Head without IV contrast. Multiplanar reformats. Dose reduction techniques were used.    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 Chronic 6.2 mm left frontoparietal subdural hematoma previously measuring 8.4 mm. No new hemorrhage. No CT evidence of an acute infarct.   Stable burden of left temporal edema associated with a 19.8 x 10.6 mm anterior temporal meningioma. The meningioma is better seen on MRI. Moderate presumed chronic small vessel ischemic change. Moderate generalized volume loss. No hydrocephalus.     VISUALIZED ORBITS/SINUSES/MASTOIDS: No intraorbital abnormality. No paranasal sinus mucosal disease. No middle ear or mastoid effusion.    BONES/SOFT TISSUES: No acute abnormality.      Impression    IMPRESSION:  1.  No acute abnormality.  2.  Regressing subdural hematomas.  3.  Stable mild anterior left temporal edema associated with a meningioma.  4.  Moderate presumed chronic small vessel ischemic change and generalized volume loss again noted.   XR Chest 2 Views    Narrative    EXAM: XR CHEST 2 VIEWS  LOCATION: [facility]  DATE/TIME: 4/11/2023 3:27 PM    INDICATION: Altered mental status.  COMPARISON: Chest CT 03/16/2023.      Impression    IMPRESSION:     Mildly heterogeneous right lung base opacities, possibly infectious / inflammatory (versus atelectasis). Minimal streaky left base opacities suggestive of atelectasis. Possible minimal pleural fluid.     Upper normal heart size. Mildly tortuous aorta. No obvious pulmonary edema.     Shoulder arthroplasties.       CT Abdomen Pelvis w/o Contrast    Narrative    EXAM: CT ABDOMEN PELVIS W/O CONTRAST  LOCATION: [facility]  DATE/TIME: 4/11/2023 4:20 PM    INDICATION:  Altered mental status. Elevated liver function tests.  COMPARISON: CT urogram 03/16/2023  TECHNIQUE: CT scan of the abdomen and pelvis was performed without IV contrast. Multiplanar reformats were obtained. Dose reduction techniques were used.  CONTRAST: None.    FINDINGS:   LOWER CHEST: New small pleural effusions. Cylindrical bronchiectasis with increased bibasilar subsegmental atelectasis.    HEPATOBILIARY: Cholecystectomy with no bile duct dilatation. Negative liver.    PANCREAS: Normal.    SPLEEN: Normal.    ADRENAL GLANDS: Normal.    KIDNEYS/BLADDER: Moderate bilateral hydroureteronephrosis secondary to distended bladder. Vascular calcification left upper pole. Bilateral parapelvic and cortical hypodense/isodense lesions unchanged.    Bladder wall trabeculation. Focal 2.1 x 1.3 x 1.3 cm polypoid lesion right lateral bladder wall.    BOWEL: Scattered air-fluid levels throughout nondilated stomach, small bowel and colon. Diverticulosis of the colon. No acute inflammatory change. No obstruction. No significant formed stool within the colon.    LYMPH NODES: No lymphadenopathy.    VASCULATURE: No aortoiliac aneurysm.    PELVIC ORGANS: Prostatomegaly.    MUSCULOSKELETAL: No suspicious osseous lesions.      Impression    IMPRESSION:   1.  Moderate bilateral hydroureteronephrosis, distended bladder with bladder wall trabeculation and enlarged prostate, suggesting bladder outlet obstruction.  2.  2.1 x 1.3 x 1.3 cm polypoid lesion right lateral bladder wall redemonstrated.  3.  Bilateral parapelvic and renal cortical hypodense/isodense lesions likely represent cysts, some of which are indeterminate by density measurements.  4.  Scattered air-fluid levels throughout nondilated small bowel and colon with no formed stool in the colon. No obstruction or inflammatory change. Findings can be seen in viral illness and laxative use.  5.  Some new small bilateral pleural effusions with increased bibasilar subsegmental  atelectasis.         Cardiographics:   EKG Results: personally reviewed.   EKG: normal sinus rhythm, LAFB.       This note was created with help of Dragon dictation system. Grammatical /typing errors are not intentional.

## 2023-04-11 NOTE — ED TRIAGE NOTES
Pt presents to the ED via EMS with c/o altered mental status and worsening weakness. Pt currently being treated for UTI. Pt afebrile- last dose of antibiotic was this morning. BG was 224 per EMS.

## 2023-04-12 NOTE — ED NOTES
Dr Cook paged for swallowing concerns, patient failed the dysphagia screen. Dr. Cook changed the oral tablet of keppra to IV.

## 2023-04-12 NOTE — PROGRESS NOTES
[facility]    Progress Note - Hospitalist Service       Date of Admission:  4/11/2023    Assessment & Plan   Kurtis Urban is a 89 year old male with a past medical history of recent subdural hematoma, seizures, dementia, UTI, and urinary retention s/p rai who is admitted for acute encephalopathy likely secondary to UTI, urinary obstruction and bilateral hydronephrosis.    Acute encephalopathy  Urinary tract infection   Urinary obstruction  Bilateral hydroureteronephrosis  Urinary retention  Leukocytosis  Presented from TCU after recent hospitalization (3/16-3/20/23) for fall causing traumatic subdural hematoma, had worsening confusion and weakness for past 4 days prior to presentation.  Had a Rai when first discharged to the TCU, but self-removed it at TCU and presented to ED without one. UA and urine culture at TCU growing E faecalis, pansensitive.  Reportedly on nitrofurantoin for past few days, transition to vancomycin and meropenem in ED. CT A/P showing moderate bilateral hydroureteronephrosis with distended bladder and enlarged prostate suggesting bladder outlet obstruction. Leukocytosis with elevated procal, urinalysis on admission appears to show infection.  Patient disoriented on admission; somnolent today and unable to assess mental status. Per H&P, this is abnormal from his baseline so still considered to have encephalopathy. Rai placed in ED with good output. Encephalopathy thought secondary to UTI and urinary retention, as ammonia normal, but also consider aspiration pneumonia given dysphagia (see below) and new bilateral pleural effusions seen on CT A/P. Leukocytosis improving.    Continue vancomycin and meropenem (4/11 - )    Follow urine and blood cultures    Maintain Rai    Fall precautions    Bronchiectasis  Small bilateral pleural effusions  Dysphagia  Moderate oropharyngeal swallow function  Does not appear to have history of bronchiectasis.  CT abdomen  pelvis showing bilateral small pleural effusions, bronchiectasis of the lower lungs with bibasilar effusion atelectasis. Satting well on room air; does not appear to be in acute exacerbation but unable to assess fully due to mentation. Appeared to have previous concerns with dysphagia and aspiration. Failed bedside swallow study - aspiration may be possible contributor to symptoms. SLP evaluated 4/12; has aspiration with thin liquids.    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  BUN/creatinine ratio >20, suggests pre-renal picture. CT a/p showing moderate bilateral hydronephrosis with bilateral renal lesions that are unchanged; suggests obstructive etiology. Echo 2020 with EF 61%. Cr improving.    Follow Cr    Maintenance IV fluids    Avoid NSAIDs, avoid nephrotoxic drugs    Transaminitis  Elevated alk phos, resolved  ,  and alk phos 143 on presentation. ALT/AST 3/16/2023 of 27 and 65 with normal alk phos at that time.  CT abdomen pelvis showing normal liver, cholecystectomy with no bile duct dilation, normal pancreas.  Lipase mildly elevated, ammonia normal. Unclear etiology of elevated LFTs - with mildly elevated lipase, possible patient had a passed gallstone causing abnormal LFTs, but he is status post cholecystectomy. Could also consider DILI 2/2 nitrofurantoin. LFTs now downtrending.    Follow CMP    Bladder mass  Noted on the 3/2023 hospitalization, was set to follow-up with urology as outpatient for mass and urinary retention. Bladder decompressed with rai catheter placed in ED.     Outpatient urology follow-up     Subdural hematoma  CT head this hospitalization showing regressing subdural hematoma, previous meningioma present.    Outpatient neurosurgery follow-up, previously recommended  follow-up in 4 weeks from last hospitalization.     Chronic Conditions  Seizures: PTA Keppra IV instead of PO   Hyperlipidemia: Hold PTA Lipitor  Essential tremor: Continue PTA primidone once cognition improves        Diet: Combination Diet Pureed Diet (level 4); Mildly Thick (level 2)    DVT Prophylaxis: Heparin SQ  Retana Catheter: PRESENT, indication: Retention  Fluids: NS @ 100 mL/hr  Lines: None     Cardiac Monitoring: None  Code Status: Full Code      Disposition Plan      Expected Discharge Date: 04/14/2023        Discharge Comments: pending transition from IV to oral abx, improvement in mental status        The patient's care was discussed with the Attending Physician, Dr. Sinha.    Nery Coe MD PGY-1  [facility] Medicine Resident Team  [facility]  Securely message with Vocera (more info)  Text page via [employer] Paging/Directory   ______________________________________________________________________    Interval History   No acute events overnight. Patient somnolent when I saw him. Able to briefly wake up to nod yes to his name, denies pain anywhere, and denies nausea.    Physical Exam   Vital Signs: Temp: 97.9  F (36.6  C) Temp src: Axillary BP: 118/58 Pulse: 78   Resp: 20 SpO2: 95 % O2 Device: None (Room air)    Weight: 0 lbs 0 oz  GENERAL: lying in bed sleeping/somnolent, NAD  RESP: from anterior, lungs clear in upper lobes  CV: RRR, normal S1 and S2, no murmurs, 1-2+ LE edema bilaterally  ABDOMEN: +BS, soft, nontender to palpation  MS: no gross musculoskeletal deformities  NEURO: unable to assess orientation due to pt sleeping    Data     I have personally reviewed the following data over the past 24 hrs:    13.8 (H)  \   9.5 (L)   / 225     140 118 (H) 53 (H) /  109   4.6 14 (L) 2.03 (H) \       ALT: 188 (H) AST: 186 (H) AP: 118 TBILI: 0.4   ALB: 1.6 (L) TOT PROTEIN: 5.7 (L) LIPASE: 99 (H)       TSH: 0.72 T4: N/A A1C: N/A       Procal: 1.59 (H) CRP: N/A Lactic Acid: 1.6          Imaging results reviewed over the past 24 hrs:   Recent Results (from the past 24 hour(s))   Head CT w/o contrast    Narrative    EXAM: CT HEAD W/O CONTRAST  LOCATION: [facility]  DATE/TIME: 4/11/2023 3:20 PM    INDICATION: Altered mental status.  COMPARISON: Head CT 03/16/2023 and head MRI 03/16/2020 3 .  TECHNIQUE: Routine CT Head without IV contrast. Multiplanar reformats. Dose reduction techniques were used.    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 Chronic 6.2 mm left frontoparietal subdural hematoma previously measuring 8.4 mm. No new hemorrhage. No CT evidence of an acute infarct.   Stable burden of left temporal edema associated with a 19.8 x 10.6 mm anterior temporal meningioma. The meningioma is better seen on MRI. Moderate presumed chronic small vessel ischemic change. Moderate generalized volume loss. No hydrocephalus.     VISUALIZED ORBITS/SINUSES/MASTOIDS: No intraorbital abnormality. No paranasal sinus mucosal disease. No middle ear or mastoid effusion.    BONES/SOFT TISSUES: No acute abnormality.      Impression    IMPRESSION:  1.  No acute abnormality.  2.  Regressing subdural hematomas.  3.  Stable mild anterior left temporal edema associated with a meningioma.  4.  Moderate presumed chronic small vessel ischemic change and generalized volume loss again noted.   XR Chest 2 Views    Narrative    EXAM: XR CHEST 2 VIEWS  LOCATION: [facility]  DATE/TIME: 4/11/2023 3:27 PM    INDICATION: Altered mental status.  COMPARISON: Chest CT 03/16/2023.      Impression    IMPRESSION:     Mildly heterogeneous right lung base opacities, possibly infectious / inflammatory (versus atelectasis). Minimal streaky left base opacities suggestive of atelectasis. Possible minimal pleural fluid.     Upper normal heart size. Mildly tortuous aorta. No obvious pulmonary edema.     Shoulder  arthroplasties.       CT Abdomen Pelvis w/o Contrast    Narrative    EXAM: CT ABDOMEN PELVIS W/O CONTRAST  LOCATION: [facility]  DATE/TIME: 4/11/2023 4:20 PM    INDICATION: Altered mental status. Elevated liver function tests.  COMPARISON: CT urogram 03/16/2023  TECHNIQUE: CT scan of the abdomen and pelvis was performed without IV contrast. Multiplanar reformats were obtained. Dose reduction techniques were used.  CONTRAST: None.    FINDINGS:   LOWER CHEST: New small pleural effusions. Cylindrical bronchiectasis with increased bibasilar subsegmental atelectasis.    HEPATOBILIARY: Cholecystectomy with no bile duct dilatation. Negative liver.    PANCREAS: Normal.    SPLEEN: Normal.    ADRENAL GLANDS: Normal.    KIDNEYS/BLADDER: Moderate bilateral hydroureteronephrosis secondary to distended bladder. Vascular calcification left upper pole. Bilateral parapelvic and cortical hypodense/isodense lesions unchanged.    Bladder wall trabeculation. Focal 2.1 x 1.3 x 1.3 cm polypoid lesion right lateral bladder wall.    BOWEL: Scattered air-fluid levels throughout nondilated stomach, small bowel and colon. Diverticulosis of the colon. No acute inflammatory change. No obstruction. No significant formed stool within the colon.    LYMPH NODES: No lymphadenopathy.    VASCULATURE: No aortoiliac aneurysm.    PELVIC ORGANS: Prostatomegaly.    MUSCULOSKELETAL: No suspicious osseous lesions.      Impression    IMPRESSION:   1.  Moderate bilateral hydroureteronephrosis, distended bladder with bladder wall trabeculation and enlarged prostate, suggesting bladder outlet obstruction.  2.  2.1 x 1.3 x 1.3 cm polypoid lesion right lateral bladder wall redemonstrated.  3.  Bilateral parapelvic and renal cortical hypodense/isodense lesions likely represent cysts, some of which are indeterminate by density measurements.  4.  Scattered air-fluid levels throughout nondilated small bowel and colon with no formed stool in  the colon. No obstruction or inflammatory change. Findings can be seen in viral illness and laxative use.  5.  Some new small bilateral pleural effusions with increased bibasilar subsegmental atelectasis.

## 2023-04-12 NOTE — PHARMACY-VANCOMYCIN DOSING SERVICE
Pharmacy Vancomycin Initial Note  Date of Service 2023  Patient's  [id number]  89 year old, male    Indication: Healthcare-Associated Pneumonia    Current estimated CrCl = Estimated Creatinine Clearance: 18.1 mL/min (A) (based on SCr of 2.03 mg/dL (H)).    Creatinine for last 3 days  2023:  5:30 AM Creatinine 2.36 mg/dL;  2:20 PM Creatinine 2.45 mg/dL  2023:  6:28 AM Creatinine 2.03 mg/dL    Recent Vancomycin Level(s) for last 3 days  No results found for requested labs within last 3 days.      Vancomycin IV Administrations (past 72 hours)                   vancomycin (VANCOCIN) 1,250 mg in 0.9% NaCl 250 mL intermittent infusion (mg) 1,250 mg New Bag 23 1726                Nephrotoxins and other renal medications (From now, onward)    Start     Dose/Rate Route Frequency Ordered Stop    23 1700  vancomycin (VANCOCIN) 500 mg vial to attach to  mL bag         500 mg  over 1 Hours Intravenous EVERY 24 HOURS 23 1006            Contrast Orders - past 72 hours (72h ago, onward)    None          [employer] Prediction of Planned Initial Vancomycin Regimen  Loading dose: N/A  Regimen: 500 mg IV every 24 hours.  Start time: 10:01 on 2023  Exposure target: AUC24 (range)400-600 mg/L.hr   AUC24,ss: 485 mg/L.hr  Probability of AUC24 > 400: 72 %  Ctrough,ss: 17.2 mg/L  Probability of Ctrough,ss > 20: 34 %  Probability of nephrotoxicity (Lodise DEANNE ): 13 %          Plan:  1. Start vancomycin  500 mg IV q24h.  Received 1250 mg loading dose .   2. Vancomycin monitoring method: AUC  3. Vancomycin therapeutic monitoring goal: 400-600 mg*h/L  4. Pharmacy will check vancomycin levels as appropriate in 1-3 Days.    5. Serum creatinine levels will be ordered daily for the first week of therapy and at least twice weekly for subsequent weeks.      Kvng Monroy [facility]

## 2023-04-12 NOTE — PLAN OF CARE
Problem: Plan of Care - These are the overarching goals to be used throughout the patient stay.    Goal: Optimal Comfort and Wellbeing  Outcome: Progressing     Problem: Urinary Retention  Goal: Effective Urinary Elimination  Outcome: Progressing   Goal Outcome Evaluation:    Patient disoriented x3, lethargic and has been sleeping since he was transferred up here. Retana placed in ED today and output was 1000mL of clear yellow urine. NS infusing at 100mL/hr. Caregivers/friends Matty and his wife at bedside and son Remy is coming from [place] to be with patient. Top and bottom dentures and hearing aids. IV abx for UTI and PNU. Pending discharge back to [facility].

## 2023-04-12 NOTE — PROGRESS NOTES
"Speech-Language Pathology - Clinical Swallow Evaluation     04/12/23 0936   Appointment Info   Signing Clinician's Name / Credentials (SLP) Tenisha Florence MS CCC-SLP   General Information   Onset of Illness/Injury or Date of Surgery 04/11/23   Referring Physician Clyde Cook DO   Patient/Family Therapy Goal Statement (SLP) None stated   Pertinent History of Current Problem The pt  is a 89 year old male with a past medical history of recent subdural hematoma, seizures, dementia, UTI, urinary retention s/p rai,  who presented to the [facility] Emergency Department on the day of admission with complaints of altered mental status. Clinical swallow evaluation ordered per MD d/t confusion and hx of dysphagia. Per MD note, \"CT abdomen pelvis showing bronchiectasis of the lower lungs with bibasilar effusion atelectasis.  Does not appear to be in acute exacerbation, unable to assess fully due to mentation.  Appeared to have previous concerns with dysphagia and aspiration.  Possible aspiration event causing symptoms as well.  Satting well on room air.\" The pt failed nursing swallow screen.   General Observations The pt is lethargic and confused, however agreeable to PO. RN assisted in sitting pt upright in bed.   Pain Assessment   Patient Currently in Pain No   Type of Evaluation   Type of Evaluation Swallow Evaluation   Oral Motor   Oral Musculature anomalies present   Structural Abnormalities none present   Mucosal Quality dry   Dentition (Oral Motor)   Dentition (Oral Motor) significant number of missing teeth;dental appliance/dentures   Dental Appliance/Denture (Oral Motor) upper and lower;dentures, partial   Facial Symmetry (Oral Motor)   Facial Symmetry (Oral Motor) WNL   Lip Function (Oral Motor)   Lip Range of Motion (Oral Motor) unable/difficult to assess   Tongue Function (Oral Motor)   Tongue ROM (Oral Motor) unable/difficult to assess   Jaw Function (Oral Motor)   Jaw Function (Oral Motor) range of motion " "impairment   Jaw Range of Motion Impairment bilateral;minimal impairment   Cough/Swallow/Gag Reflex (Oral Motor)   Volitional Throat Clear/Cough (Oral Motor) reduced strength   Volitional Swallow (Oral Motor) mildly delayed   Vocal Quality/Secretion Management (Oral Motor)   Vocal Quality (Oral Motor) breathy;other (see comments)   Secretion Management (Oral Motor) WNL   Comment, Vocal Quality/Secretion Management (Oral Motor) low volume   General Swallowing Observations   Past History of Dysphagia No hx of SLP therapy for dysphagia. The pt does nod \"yes\" when asked if he has difficulty swallowing, but unable to elaborate. He did seen SLP therapy in 2019 for cognitive-linguistic evaluation, however hx of Dementia preindicated tx.   Respiratory Support (General Swallowing Observations) none   Current Diet/Method of Nutritional Intake (General Swallowing Observations, NIS) NPO   Swallowing Evaluation Clinical swallow evaluation   Clinical Swallow Evaluation   Feeding Assistance dependent   Clinical Swallow Evaluation Textures Trialed thin liquids;mildly thick liquids;pureed   Clinical Swallow Eval: Thin Liquid Texture Trial   Mode of Presentation, Thin Liquids straw;spoon;fed by clinician   Volume of Liquid or Food Presented x2 each   Oral Phase of Swallow premature pharyngeal entry   Pharyngeal Phase of Swallow coughing/choking;reduction in laryngeal movement   Diagnostic Statement Reduced bolus cohesion with mild anterior loss of spoon and straw sips. He had bolus holding for 10+ seconds with concern for premature spillage. Coughing after straw sips despite controlling amount.   Clinical Swallow Eval: Mildly Thick Liquids   Mode of Presentation straw;spoon;fed by clinician   Volume Presented 3 oz   Oral Phase WFL   Pharyngeal Phase intact   Diagnostic Statement No s/s of aspiration or difficulty with swallowing. SLP did control sip size to reduce bolus holding.   Clinical Swallow Evaluation: Puree Solid Texture " Trial   Mode of Presentation, Puree spoon;fed by clinician   Volume of Puree Presented 4 oz   Oral Phase, Puree WFL   Pharyngeal Phase, Puree intact   Diagnostic Statement No s/s of aspiration or bolus holding. Timely bolus propulsion and no residuals.   Esophageal Phase of Swallow   Patient reports or presents with symptoms of esophageal dysphagia No   Swallowing Recommendations   Diet Consistency Recommendations mildly thick liquids (level 2);pureed (level 4)   Supervision Level for Intake 1:1 supervision needed   Mode of Delivery Recommendations no cups;bolus size, small   Monitoring/Assistance Required (Eating/Swallowing) monitor for cough or change in vocal quality with intake;stop eating activities when fatigue is present   Recommended Feeding/Eating Techniques (Swallow Eval) encourage use of dentures;maintain upright sitting position for eating;provide assist with feeding   Medication Administration Recommendations, Swallowing (SLP) With puree, could trial small medications whole, otherwise crushed   Instrumental Assessment Recommendations VFSS (videofluoroscopic swallowing study)  (when more alert)   General Therapy Interventions   Planned Therapy Interventions Dysphagia Treatment   Dysphagia treatment Modified diet education;Instruction of safe swallow strategies;Compensatory strategies for swallowing   Clinical Impression   Criteria for Skilled Therapeutic Interventions Met (SLP Eval) Yes, treatment indicated   SLP Diagnosis Moderate oropharyngeal swallow function   Risks & Benefits of therapy have been explained evaluation/treatment results reviewed;care plan/treatment goals reviewed;risks/benefits reviewed;current/potential barriers reviewed   Clinical Impression Comments Clinical swallow evaluation completed per MD order. The pt presents with moderate oropharyngeal dysphagia in setting of acute AMS and lethargy. Oral mech significant for dry oral cavity, reduced lingual/labial strength, upper and lower  partial dentures, and breathy vocal quality. The pt was very fatigued, but agreeable to PO. He tolerated 4 oz puree solids with no s/s of aspiration. Bolus control and propulsion was timely and no oral residuals. With thin liquids, he had prolonged bolus prep with bolus holding noted and suspect premature spillage. This resulted in coughing with thin liquids despite controlling sip size. Improved control with mildly thick liquids via straw with no s/s of aspiration. Advanced solids not trialed d/t severity of fatigue, however his baseline diet consistency is also unclear. Given s/s of aspiration with thin liquids today, recommend puree solids (4) and mildly thick liquids (2) with 1:1 assist/supervision. Upright with PO and slow pace. Straws ok, however try to control sip size. Pills with puree. SLP will follow for dysphagia and complete further assessment as indicated.   SLP Total Evaluation Time   Eval: oral/pharyngeal swallow function, clinical swallow Minutes ([id number]) 20   SLP Discharge Planning   SLP Plan Diet tolerance, strategy training, ADAT   SLP Discharge Recommendation home with home care speech therapy   SLP Rationale for DC Rec Swallow function is likely below baseline   SLP Brief overview of current status  Given s/s of aspiration with thin liquids today, recommend puree solids (4) and mildly thick liquids (2) with 1:1 assist/supervision. Upright with PO and slow pace. Straws ok, however try to control sip size. Pills with puree. SLP will follow for dysphagia and complete further assessment as indicated.

## 2023-04-12 NOTE — PLAN OF CARE
Problem: Urinary Retention  Goal: Effective Urinary Elimination  Outcome: Progressing   Goal Outcome Evaluation:                      Patient is lethargic at rest, but arouses to speech. Appears to have generalized tenderness. Bed changed and josefina cares done. Swallow study done, mildly thickened liquids and pureed diet orders. Patient transported up to unit.     Alexandrea Souza RN

## 2023-04-13 NOTE — PLAN OF CARE
"Slept on and off tonight. Not always able to make needs known. Patient noted have both hands wrapped around rai catheter, was tugging/trying to pull out, while saying \"ow\" repeatedly with each tug. Was able to redirect. Rai remains patent and draining.     Assisted with drinking fluids throughout shift, mildly thickened. Requires assist with feeding/drinking. High aspiration risk. Q2 hour turns.     Problem: Urinary Retention  Goal: Effective Urinary Elimination  Outcome: Not Progressing     Problem: Risk for Delirium  Goal: Improved Sleep  Outcome: Not Progressing   Goal Outcome Evaluation:                        "

## 2023-04-13 NOTE — PROGRESS NOTES
[facility]    Progress Note - Hospitalist Service       Date of Admission:  4/11/2023    Assessment & Plan   Kurtis Urban is a 89 year old male with a past medical history of recent subdural hematoma, seizures, dementia, UTI, and urinary retention s/p rai who is admitted for acute encephalopathy likely secondary to UTI, urinary obstruction and bilateral hydronephrosis.    Acute encephalopathy, improved  Urinary tract infection  Urinary obstruction  Bilateral hydroureteronephrosis  Urinary retention  Leukocytosis  Presented from TCU after recent hospitalization (3/16-3/20/23) for fall causing traumatic subdural hematoma, had worsening confusion and weakness for past 4 days prior to presentation.  Had a Rai when first discharged to the TCU, but self-removed it at TCU and presented to ED without one. UA and urine culture at TCU growing E faecalis, pansensitive.  Reportedly on nitrofurantoin for past few days, transition to vancomycin and meropenem in ED. CT A/P showing moderate bilateral hydroureteronephrosis with distended bladder and enlarged prostate suggesting bladder outlet obstruction. Leukocytosis with elevated procal, urinalysis on admission appears to show infection.  Patient disoriented on admission; somnolent 4/12 but mental status much improved 4/13 and closer to baseline per family. Rai placed in ED with good output. Encephalopathy thought secondary to UTI and urinary retention, as ammonia normal, but also consider aspiration pneumonia given dysphagia (see below) and new bilateral pleural effusions seen on CT A/P. Leukocytosis improving. Urine culture on admission growing E faecalis; awaiting sensitivities.    Continue vancomycin and meropenem (4/11 - )    Follow urine and blood cultures    Maintain Rai    Fall precautions    PT/OT for discharge planning - previously came from TCU and can go back to TCU vs LTC    Bronchiectasis  Small bilateral pleural  effusions  Dysphagia  Moderate oropharyngeal swallow function  Does not appear to have history of bronchiectasis.  CT abdomen pelvis showing bilateral small pleural effusions, bronchiectasis of the lower lungs with bibasilar effusion atelectasis. Satting well on room air; does not appear to be in acute exacerbation but unable to assess fully due to mentation. Appeared to have previous concerns with dysphagia and aspiration. Failed bedside swallow study - aspiration may be possible contributor to symptoms. SLP evaluated 4/12; has aspiration with thin liquids.    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  BUN/creatinine ratio >20, suggests pre-renal picture. CT a/p showing moderate bilateral hydronephrosis with bilateral renal lesions that are unchanged; suggests obstructive etiology. Echo 2020 with EF 61%. Cr improving.    Follow Cr    Maintenance IV fluids    Avoid NSAIDs, avoid nephrotoxic drugs    Transaminitis  Elevated alk phos, resolved  ,  and alk phos 143 on presentation. ALT/AST 3/16/2023 of 27 and 65 with normal alk phos at that time.  CT abdomen pelvis showing normal liver, cholecystectomy with no bile duct dilation, normal pancreas.  Lipase mildly elevated, ammonia normal. Unclear etiology of elevated LFTs - most likely some degree of shock liver given patient's UTI. LFTs now downtrending.    Follow CMP    Bladder mass  Noted on the 3/2023 hospitalization, was set to follow-up with urology as outpatient for mass and urinary retention. Bladder decompressed with rai catheter placed in ED.     Outpatient urology follow-up     Subdural hematoma  CT head this hospitalization showing regressing subdural hematoma, previous meningioma present.    Outpatient neurosurgery follow-up, previously  recommended follow-up in 4 weeks from last hospitalization.    Chronic Conditions  Seizures: PTA PO Keppra   Hyperlipidemia: Hold PTA Lipitor given elevated LFTs  Essential tremor: Continue PTA primidone       Diet: Combination Diet Pureed Diet (level 4); Mildly Thick (level 2)    DVT Prophylaxis: Heparin SQ  Retana Catheter: PRESENT, indication: Retention  Fluids: D5+1/2NS @ 100 mL/hr  Lines: None     Cardiac Monitoring: None  Code Status: Full Code      Disposition Plan      Expected Discharge Date: 04/14/2023      Destination: other (comment) (TCU)  Discharge Comments: pending transition from IV to oral abx, improvement in mental status        The patient's care was discussed with the Attending Physician, Dr. Sinha.    Nery Coe MD PGY-1  [facility] Family Medicine Resident Team  [facility]  Securely message with [employer] (more info)  Text page via [facility] Paging/Directory   ______________________________________________________________________    Interval History   No acute events overnight. Attempted to self-remove Retana but was redirectable. Family visited last night and he was more alert. Today, patient is alert and answering questions appropriately. [place], has hearing aids. Denies fever, headache, chest pain, dyspnea, nausea, abdominal pain, and dysuria. Would like coffee and a cup of water. Per son (colleagues spoke with him earlier), mental status closer to baseline but not at baseline yet.    Physical Exam   Vital Signs: Temp: 98.4  F (36.9  C) Temp src: Oral BP: 138/61 Pulse: 77   Resp: 16 SpO2: 96 % O2 Device: None (Room air)    Weight: 0 lbs 0 oz  GENERAL: alert, lying in bed comfortably, NAD  RESP: from anterior, lungs CTAB  CV: RRR, normal S1 and S2, no murmurs, 1+ LE edema bilaterally  ABDOMEN: +BS, soft, nontender to palpation  : Retana with clear yellow urine output  MS: no gross musculoskeletal deformities  NEURO: alert and oriented to name, place, and year    Data     I have  personally reviewed the following data over the past 24 hrs:    11.9 (H)  \   9.4 (L)   / 222     145 123 (H) 44 (H) /  101   5.1 (H) 13 (L) 1.75 (H) \       ALT: 155 (H) AST: 139 (H) AP: 109 TBILI: 0.4   ALB: 1.6 (L) TOT PROTEIN: 5.6 (L) LIPASE: N/A       Imaging results reviewed over the past 24 hrs:   No results found for this or any previous visit (from the past 24 hour(s)).

## 2023-04-13 NOTE — CONSULTS
Care Management Initial Consult    General Information  Assessment completed with: VM-chart review,         Primary Care Provider verified and updated as needed:     Readmission within the last 30 days: current reason for admission unrelated to previous admission      Reason for Consult: discharge planning  Advance Care Planning:            Communication Assessment  Patient's communication style: spoken language (English or Bilingual)             Cognitive  Cognitive/Neuro/Behavioral: all  Level of Consciousness: alert  Arousal Level: arouses to voice  Orientation: disoriented to, place, time, situation  Mood/Behavior: calm, cooperative  Best Language: 1 - Mild to moderate  Speech: dysarthric, garbled, slow, whispers, word-finding difficulty (Able to nod head yes/no)    Living Environment:   People in home: alone     Current living Arrangements: independent living facility ([facility])      Able to return to prior arrangements: yes       Family/Social Support:  Care provided by: self  Provides care for: no one     Children          Description of Support System: Involved         Current Resources:   Patient receiving home care services:       Community Resources:    Equipment currently used at home:    Supplies currently used at home:      Employment/Financial:  Employment Status:          Financial Concerns:             Lifestyle & Psychosocial Needs:  Social Determinants of Health     Tobacco Use: Medium Risk (4/11/2023)    Patient History      Smoking Tobacco Use: Former      Smokeless Tobacco Use: Never      Passive Exposure: Not on file   Alcohol Use: Not on file   Financial Resource Strain: Not on file   Food Insecurity: Not on file   Transportation Needs: Not on file   Physical Activity: Not on file   Stress: Not on file   Social Connections: Not on file   Intimate Partner Violence: Not on file   Depression: Not at risk (11/9/2022)    PHQ-2      PHQ-2 Score: 0   Housing Stability:  Not on file       Functional Status:  Prior to admission patient needed assistance:   Dependent ADLs:: Ambulation-walker  Dependent IADLs:: Cleaning, Cooking, Medication Management, Transportation       Mental Health Status:      confusion    Chemical Dependency Status:                Values/Beliefs:  Spiritual, Cultural Beliefs, Baptism Practices, Values that affect care:                 Additional Information:  RNCM attempted to call fly [facility] for CM assessment; no answer- left a voicemail.    Chart reviewed.  Pt from [facility].  [place] [facility] [place] [place][phone][place]  Pt resides at [facility], was independent with a walker prior to 3/16/23-3/20/23 hospitalization at [facility] due to fall, subdural hematoma.    11:57 AM   Return call received from pt's son Adi.  Per Adi, he already made arrangements and paid the deposit for [facility].  Adi hopes that if pt can rehab back to previous baseline, pt can return to [facility].  If not, backup plan is LTC.    Per Alicia, [facility] can accept pt back, whether to TCU or LTC - depending on therapy recommendations and pt needs at time of discharge.    Son Adi resides in [place] [address] and is currently in town visiting pt, plans to be here until 23 afternoon, then he must leave to attend a .  Adi plans to return to [facility] Monday or  or .      Discharge Transportation TBD: If Adi is in town at time of discharge, and if pt has sufficient mobility, Adi is willing to transport pt at time of discharge.  Discussed [facility] stretcher potential private pay, Adi agreeable if there are no other options at time of discharge.    If CM is to arrange [facility] transport, Adi asks that CM please specify which department of the facility is expecting pt.  Adi reports pt was transported to Independent Living in a recent encounter, but was supposed to be transported to LTC;  therefore facility staff had difficulty locating the patient.    Adi also wanted CM to be aware that Adi's brother Kirk- listed as the alternative healthcare agent -has mental health issues, has not been involved in pt's cares for many years, and should not be contacted.  If Kirk is contacted, Kirk will not have the capacity to forward any information to Adi nor to take appropriate actions.  Adi is the primary healthcare agent.     Adi confirms pt is full code at this time.    SLP recommendation: home with home care speech therapy  No orders for PT/OT evals at this time.    CM will continue to follow care progression and aide in discharge planning as needed.      Felipe Cook RN

## 2023-04-13 NOTE — PLAN OF CARE
Problem: Plan of Care - These are the overarching goals to be used throughout the patient stay.    Goal: Readiness for Transition of Care  Outcome: Not Progressing     Problem: Urinary Retention  Goal: Effective Urinary Elimination  Outcome: Progressing     Pt lethargic, nods head yes/no. More alert with family in the room. [place] with bilateral hearing aids. Retana patent and draining. Mepilex on right elbow. Edema +2 in BLE. NS running at 100mL/hr. Takes pills whole in applesauce. Requires feeding help with meals. Repositioning q2h. Pt from [facility], pending discharge back to their care once medically stable.

## 2023-04-13 NOTE — PROGRESS NOTES
"   04/13/23 1315   Appointment Info   Signing Clinician's Name / Credentials (OT) Aline Ramirez, MOTR/L, CLT   Living Environment   People in Home alone   Current Living Arrangements independent living facility   Living Environment Comments was living independently prior to recent TCU stay   Self-Care   Usual Activity Tolerance good   Current Activity Tolerance fair   Equipment Currently Used at Home walker, rolling   Fall history within last six months yes   Activity/Exercise/Self-Care Comment per son has been indep with ADL/mobility prior to recent TCU stay except for medication mgmt and schedule per PT's prior note.   General Information   Onset of Illness/Injury or Date of Surgery 04/11/23   Referring Physician Dr. Sinha   Patient/Family Therapy Goal Statement (OT) friends stating ok for him to get up and, \"he looks much better than yesterday\".   Additional Occupational Profile Info/Pertinent History of Current Problem UTI with past TBI/CVA/dementia in past history.   Existing Precautions/Restrictions fall   Limitations/Impairments hearing  ([place] with bilat hearing aides)   Visual Perception   Visual Impairment/Limitations WFL   Sensory   Sensory Quick Adds sensation intact   Pain Assessment   Patient Currently in Pain No   Posture   Posture not impaired   Range of Motion Comprehensive   Comment, General Range of Motion per chart, shoulder deficits bilaterally-functional mvmts at this time.   Strength Comprehensive (MMT)   Comment, General Manual Muscle Testing (MMT) Assessment generalized weakness   Muscle Tone Assessment   Muscle Tone Quick Adds No deficits were identified   Coordination   Upper Extremity Coordination   (decreased-slower)   Bed Mobility   Comment (Bed Mobility) max Ax2   Transfers   Transfer Comments dependent   Balance   Balance Comments decreased   Activities of Daily Living   BADL Assessment/Intervention lower body dressing;grooming;feeding   Lower Body Dressing Assessment/Training "   Comment, (Lower Body Dressing) max A   Grooming Assessment/Training   Comment, (Grooming) min A   Eating/Self Feeding   Comment, (Feeding) SBA   Clinical Impression   Criteria for Skilled Therapeutic Interventions Met (OT) Yes, treatment indicated   OT Diagnosis decr ADL indep/safety   Influenced by the following impairments UTI on top of dementia   OT Problem List-Impairments impacting ADL activity tolerance impaired;balance;cognition;coordination;flexibility;hearing;mobility;range of motion (ROM);strength   Assessment of Occupational Performance 3-5 Performance Deficits   Identified Performance Deficits dressing, toileting, bed mobility, activity tolerance   Planned Therapy Interventions (OT) ADL retraining;bed mobility training;strengthening;transfer training;progressive activity/exercise;cognition;balance training   Clinical Decision Making Complexity (OT) high complexity   Risk & Benefits of therapy have been explained care plan/treatment goals reviewed;patient;friend   OT Total Evaluation Time   OT Eval, Moderate Complexity Minutes (47558) 10   OT Goals   Therapy Frequency (OT) Daily   OT Predicted Duration/Target Date for Goal Attainment 04/20/23   OT Goals Hygiene/Grooming;Lower Body Dressing;Toilet Transfer/Toileting   OT: Hygiene/Grooming supervision/stand-by assist;while standing   OT: Lower Body Dressing Minimal assist   OT: Toilet Transfer/Toileting Minimal assist   OT Discharge Planning   OT Plan 4./20:[place];g/h in stand with chair behind, bed mob, LE dress; toileting   OT Discharge Recommendation (DC Rec) Transitional Care Facility   OT Rationale for DC Rec max Ax2 for transfers and bed mobility   OT Brief overview of current status max Ax2 for transfers and bed mobility   Total Session Time   Total Session Time (sum of timed and untimed services) 10

## 2023-04-13 NOTE — PROGRESS NOTES
04/13/23 1320   Appointment Info   Signing Clinician's Name / Credentials (PT) Jojo Patton, PT, DPT   Living Environment   People in Home alone   Current Living Arrangements independent living facility   Home Accessibility no concerns   Living Environment Comments From U since fall in March   Self-Care   Equipment Currently Used at Home walker, rolling   Fall history within last six months yes   Activity/Exercise/Self-Care Comment pt has 4WW at home, has been using FWW at [facility]   General Information   Onset of Illness/Injury or Date of Surgery 04/11/23   Referring Physician Dr. Missael Sinha   Patient/Family Therapy Goals Statement (PT) None Stated   Pertinent History of Current Problem (include personal factors and/or comorbidities that impact the POC) UTI   Existing Precautions/Restrictions fall   Bed Mobility   Bed Mobility supine-sit   Rolling Left [place] (Bed Mobility) moderate assist (50% patient effort);2 person assist;verbal cues;nonverbal cues (demo/gesture)   Transfers   Transfers sit-stand transfer   Maintains Weight-bearing Status (Transfers) able to maintain   Sit-Stand Transfer   Sit-Stand [place] (Transfers) moderate assist (50% patient effort);2 person assist;verbal cues;nonverbal cues (demo/gesture)   Assistive Device (Sit-Stand Transfers) walker, front-wheeled   Gait/Stairs (Locomotion)   [place] Level (Gait) moderate assist (50% patient effort);verbal cues;2 person assist   Assistive Device (Gait) walker, front-wheeled   Distance in Feet SPT right   Pattern (Gait) step-to   Deviations/Abnormal Patterns (Gait) base of support, wide;[place]ena decreased;festinating/shuffling  (flexed posture)   Maintains Weight-bearing Status (Gait) able to maintain   Clinical Impression   Criteria for Skilled Therapeutic Intervention Yes, treatment indicated   PT Diagnosis (PT) Impaired functional mobility   Influenced by the following impairments weakness, impaired balance   Functional  limitations due to impairments transfers, gait, bed mobility   Clinical Presentation (PT Evaluation Complexity) Stable/Uncomplicated   Clinical Presentation Rationale Pt presents as medically diagnosed   Clinical Decision Making (Complexity) low complexity   Planned Therapy Interventions (PT) gait training;bed mobility training;balance training;transfer training;strengthening;patient/family education;home exercise program   Anticipated Equipment Needs at Discharge (PT) walker, rolling;wheelchair;gait belt   Risk & Benefits of therapy have been explained evaluation/treatment results reviewed;care plan/treatment goals reviewed;patient   PT Total Evaluation Time   PT Eval, Low Complexity Minutes (68813) 15   Physical Therapy Goals   PT Frequency Daily   PT Predicted Duration/Target Date for Goal Attainment 04/20/23   PT Goals Transfers;Gait   PT: Transfers Minimal assist;Sit to/from stand;Assistive device   PT: Gait Rolling walker;50 feet  (CGA)   Therapeutic Activity   Symptoms Noted During/After Treatment Fatigue   Treatment Detail/Skilled Intervention Supine to sit with HOB elevated and use of rail, mod assist of 2, verbal cueing for technique, hand placement and use of rail. Sit<>stand with FWW, mod assist of 2, verbal cueing for hand placement, safety, posture and weight shift forward, pt tends to lean posterior. Stand pivot transfer with FWW to left, FWW, mod assist of 2, cueing for LE advancement, safety, posture, physical assist for device advancement   PT Discharge Planning   PT Plan Gait with assist, LE exercises   PT Discharge Recommendation (DC Rec) (S)  Transitional Care Facility   PT Rationale for DC Rec Patient needs assist of 2 for transfers, bed mobility. TCU for mobility training, balance and strengthening   PT Brief overview of current status Stand pivot transfer with FWW, mod assist of 2   Total Session Time   Total Session Time (sum of timed and untimed services) 15

## 2023-04-14 NOTE — PHARMACY-VANCOMYCIN DOSING SERVICE
Pharmacy Vancomycin Note  Date of Service 2023  Patient's  [id number]   89 year old, male    Indication: Skin and Soft Tissue Infection and Urinary Tract Infection  Day of Therapy: 4  Current vancomycin regimen:  500 mg IV q24h  Current vancomycin monitoring method: AUC  Current vancomycin therapeutic monitoring goal: 400-600 mg*h/L    InsightRX Prediction of Current Vancomycin Regimen  Loading dose: 1250 on 2023  Regimen: 500 mg IV every 24 hours.  exposure target: AUC24 (range)400-600 mg/L.hr   AUC24,ss: 350 mg/L.hr  Probability of AUC24 > 400: 29 %  Ctrough,ss: 11.7 mg/L  Probability of Ctrough,ss > 20: 3 %  Probability of nephrotoxicity (Lodise DEANNE ): 7 %      Current estimated CrCl = Estimated Creatinine Clearance: 27.1 mL/min (A) (based on SCr of 1.36 mg/dL (H)).    Creatinine for last 3 days  2023:  2:20 PM Creatinine 2.45 mg/dL  2023:  6:28 AM Creatinine 2.03 mg/dL  2023:  8:27 AM Creatinine 1.75 mg/dL  2023:  6:09 AM Creatinine 1.36 mg/dL    Recent Vancomycin Levels (past 3 days)  2023:  6:09 AM Vancomycin 14.0 mg/L    Vancomycin IV Administrations (past 72 hours)                   vancomycin (VANCOCIN) 500 mg vial to attach to  mL bag (mg) 500 mg New Bag 23 1741     500 mg New Bag 23 1825    vancomycin (VANCOCIN) 1,250 mg in 0.9% NaCl 250 mL intermittent infusion (mg) 1,250 mg New Bag 23 1726                Nephrotoxins and other renal medications (From now, onward)    Start     Dose/Rate Route Frequency Ordered Stop    23 1000  vancomycin (VANCOCIN) 750 mg in 0.9% NaCl 250 mL intermittent infusion         750 mg  over 60 Minutes Intravenous EVERY 24 HOURS 23 0838               Contrast Orders - past 72 hours (72h ago, onward)    None          Interpretation of levels and current regimen:  Vancomycin level is reflective of AUC less than 400    Has serum creatinine changed greater than 50% in last 72 hours: Yes    Urine  output:  good urine output    Renal Function: Improving    InsightRX Prediction of Planned New Vancomycin Regimen  Regimen: 750 mg IV every 24 hours.  Start time: 1000 on 04/14/2023  Exposure target: AUC24 (range)400-600 mg/L.hr   AUC24,ss: 510 mg/L.hr  Probability of AUC24 > 400: 87 %  Ctrough,ss: 17.1 mg/L  Probability of Ctrough,ss > 20: 29 %  Probability of nephrotoxicity (Lodise DEANNE 2009): 13 %      Plan:  1. Increase Dose to 750mg iv q24h with next dose at 1000 today  2. Vancomycin monitoring method: AUC  3. Vancomycin therapeutic monitoring goal: 400-600 mg*h/L  4. Pharmacy will check vancomycin levels as appropriate in 1-3 Days.  5. Serum creatinine levels will be ordered daily for the first week of therapy and at least twice weekly for subsequent weeks.    Sae Brothers, [facility]

## 2023-04-14 NOTE — PLAN OF CARE
Every two hour turns. Open are on buttocks covered with mepilex. Retana patent and draining. Denies pain. D5 with 1/2 NS running at 100mL/hr. Receiving IV ABX. Does not call appropriately. Sometimes able to make needs known, especially when asked yes/no questions. Extremely hard of hearing. Hearing aides very effective. Mildly thickened fluids, pureed diet.     Problem: Plan of Care - These are the overarching goals to be used throughout the patient stay.    Goal: Readiness for Transition of Care  Outcome: Adequate for Care Transition     Problem: Risk for Delirium  Goal: Improved Sleep  Outcome: Adequate for Care Transition   Goal Outcome Evaluation:

## 2023-04-14 NOTE — PROGRESS NOTES
Care Management Follow Up    Length of Stay (days): 3    Expected Discharge Date: 04/15/2023     Concerns to be Addressed:    Discharge planning   Patient plan of care discussed at interdisciplinary rounds: Yes    Anticipated Discharge Disposition:  TCU     Anticipated Discharge Services:  TCU  Anticipated Discharge DME:  TCU    Additional Information:  Chart reviewed.    Anticipate discharge to [facility] TCU when medically ready.  Per Alicia ([phone]), they can accept pt back over the weekend if needed, they would prefer 11am, but pt must arrive by 17:00pm.      [employer] Auth submitted, pending (Request ID#[id number], clinical information submitted)    If pt to discharge over this upcoming weekend, fly Joshi will not be in town and pt will need [facility] stretcher transport.     CM will continue to follow.    Felipe Cook RN

## 2023-04-14 NOTE — CONSULTS
[facility] Nurse Inpatient Assessment     Consulted for: sacrum    Patient History (according to provider note(s):      Per H+P:  89 year old male with a past medical history of recent subdural hematoma, seizures, dementia, UTI, urinary retention s/p rai,  who presented to the [facility] Emergency Department on the day of admission with complaints of altered mental status.     Areas Assessed:      Areas visualized during today's visit: Occiput and Upper extremities     Pressure Injury Location: Coccyx    Last photo: 4/14  Wound type: Pressure Injury with friction component     Pressure Injury Stage: 2, present on admission   Wound history/plan of care: unknown  Wound base: clean partial thickness     Palpation of the wound bed: normal      Drainage: none     Description of drainage: none     Measurements (length x width x depth, in cm) 1 x 0.6cm     Tunneling N/A     Undermining N/A  Periwound skin: Intact      Color: normal and consistent with surrounding tissue      Temperature: normal   Odor: none  Pain: mild, tender  Pain intervention prior to dressing change: patient tolerated well  Treatment goal: Heal   STATUS: initial assessment  Supplies ordered: supplies stored on unit      Skin Injury Location: Right forearm  Last photo: NA  Skin injury due to: Skin tear  Skin history and plan of care:   unknown  Affected area:      Skin assessment: Skin stripping     Measurements (length x width x depth, in cm) 0.6  x 0.4  cm      Color: normal and consistent with surrounding tissue     Temperature  normal      Drainage: small .      Color: serosanguinous      Odor: none  Pain: denies   Treatment goal: Heal   STATUS: initial assessment  Supplies ordered: supplies stored on unit             Treatment Plan:     Mepilex to coccyx every three days  Mepilex to right arm skin tear every 5 days    Orders: Written    RECOMMEND PRIMARY TEAM ORDER: None, at this time  Education provided: plan of  care  Discussed plan of care with: Patient and Nurse  [facility] nurse follow-up plan: weekly  Notify WOC if wound(s) deteriorate.  Nursing to notify the Provider(s) and re-consult the [facility] Nurse if new skin concern.    DATA:     Current support surface: Standard  Standard gel/foam mattress (IsoFlex, Atmos air, etc)  Containment of urine/stool: Brief  BMI: There is no height or weight on file to calculate BMI.   Active diet order: Orders Placed This Encounter      Combination Diet Pureed Diet (level 4); Mildly Thick (level 2)     Output: I/O last 3 completed shifts:  In: 2640 [P.O.:960; I.V.:1680]  Out: 1200 [Urine:1200]     Labs: Recent Labs   Lab 04/14/23  0719 04/14/23  0609   ALBUMIN  --  1.5*   HGB 9.2*  --    WBC 11.2*  --      Pressure injury risk assessment:   Sensory Perception: 3-->slightly limited  Moisture: 3-->occasionally moist  Activity: 2-->chairfast  Mobility: 2-->very limited  Nutrition: 2-->probably inadequate  Friction and Shear: 2-->potential problem  Brendan Score: 14    Trinidad Weeks, BSN, RN, PHN, HNB-BC, CWOCN  Pager no longer is use, please contact through [employer] group: [facility] Vocera Group

## 2023-04-14 NOTE — PROGRESS NOTES
[facility]    Progress Note - Hospitalist Service       Date of Admission:  4/11/2023    Assessment & Plan   Kurtis Urban is a 89 year old male with a past medical history of recent subdural hematoma, seizures, dementia, UTI, and urinary retention s/p rai who is admitted for acute encephalopathy likely secondary to UTI, urinary obstruction and bilateral hydronephrosis.    Acute encephalopathy, improved  Urinary tract infection  Urinary obstruction  Bilateral hydroureteronephrosis  Urinary retention  Leukocytosis  Presented from TCU after recent hospitalization (3/16-3/20/23) for fall causing traumatic subdural hematoma, had worsening confusion and weakness for past 4 days prior to presentation.  Had a Rai when first discharged to the TCU, but self-removed it at TCU and presented to ED without one. UA and urine culture at TCU growing E faecalis, pansensitive. Reportedly on nitrofurantoin for the past few days prior to admission; transitioned to vancomycin and meropenem in ED. CT A/P showing moderate bilateral hydroureteronephrosis with distended bladder and enlarged prostate suggesting bladder outlet obstruction. Leukocytosis with elevated procal, urinalysis on admission appeared infected and urine culture growing E faecalis; pan-sensitive. Patient disoriented on admission; somnolent 4/12 but mental status much improved starting 4/13 and closer to baseline per family. Rai placed in ED with good output. Encephalopathy thought secondary to UTI and urinary retention, as ammonia normal, but also consider aspiration pneumonia given dysphagia (see below) and new bilateral pleural effusions seen on CT A/P. Leukocytosis improving.    Continue vancomycin (4/11 - ), stop meropenem (4/11 - 4/14) given urine culture sensitivities    Follow blood cultures    Maintain Ari    Fall precautions    PT/OT for discharge planning - previously came from TCU. PT/OT recommending back to  TCU.    Bronchiectasis  Small bilateral pleural effusions  Dysphagia  Moderate oropharyngeal swallow function  Does not appear to have history of bronchiectasis.  CT abdomen pelvis showing bilateral small pleural effusions, bronchiectasis of the lower lungs with bibasilar effusion atelectasis. Satting well on room air with no signs or symptoms of respiratory failure. Appears there were previous concerns with dysphagia and aspiration. Failed bedside swallow study - aspiration may be possible contributor to symptoms. SLP evaluated 4/12; has aspiration with thin liquids.    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  BUN/creatinine ratio >20, suggests pre-renal picture. CT a/p showing moderate bilateral hydronephrosis with bilateral renal lesions that are unchanged; suggests obstructive etiology. Echo 2020 with EF 61%. Cr improving.    Follow Cr    Maintenance IV fluids    Avoid NSAIDs, avoid nephrotoxic drugs    Hyperkalemia  Noted beginning 4/13. Unclear etiology.    Recheck at 1500 today    If K > 6.0, will treat    Transaminitis  Elevated alk phos, resolved  ,  and alk phos 143 on presentation. ALT/AST 3/16/2023 of 27 and 65 with normal alk phos at that time.  CT abdomen pelvis showing normal liver, cholecystectomy with no bile duct dilation, normal pancreas.  Lipase mildly elevated, ammonia normal. Unclear etiology of elevated LFTs - most likely some degree of shock liver given patient's UTI. LFTs now downtrending.    Follow CMP    Sacral wound  Noted by RNs. Unclear how long it has been there for.    WOC consult    Bladder mass  Noted on the 3/2023 hospitalization, was set to follow-up with urology as outpatient for mass and urinary retention. Bladder decompressed with rai catheter placed in ED.      Outpatient urology follow-up     Subdural hematoma  CT head this hospitalization showing regressing subdural hematoma, previous meningioma present.    Outpatient neurosurgery follow-up, previously recommended follow-up in 4 weeks from last hospitalization.    Chronic Conditions  Seizures: PTA PO Keppra   Hyperlipidemia: Hold PTA Lipitor given elevated LFTs  Essential tremor: Continue PTA primidone       Diet: Combination Diet Pureed Diet (level 4); Mildly Thick (level 2)    DVT Prophylaxis: Heparin SQ  Retana Catheter: PRESENT, indication: Retention  Fluids: D5+1/2NS @ 100 mL/hr  Lines: None     Cardiac Monitoring: None  Code Status: Full Code      Disposition Plan      Expected Discharge Date: 04/15/2023      Destination: other (comment) (TCU)  Discharge Comments: considering finishing IV abx vs transitioning to PO, has KRIS        The patient's care was discussed with the Attending Physician, Dr. Gillette.    Nery Coe MD PGY-1  [facility] Family Medicine Resident Team  [facility]  Securely message with [employer] (more info)  Text page via [employer] Paging/Directory   ______________________________________________________________________    Interval History   No acute events overnight. WOC consult placed for sacral wound. Feels well today; denies pain. Denies fever, headache, chest pain, dyspnea, nausea and abdominal pain.    Physical Exam   Vital Signs: Temp: 97.6  F (36.4  C) Temp src: Oral BP: (!) 150/74 Pulse: 80   Resp: 16 SpO2: 96 % O2 Device: None (Room air)    Weight: 0 lbs 0 oz  GENERAL: alert, sitting up in bed comfortably, NAD  RESP: lungs with slight expiratory crackles at bilateral bases  CV: RRR, normal S1 and S2, no murmurs, 1+ LE edema bilaterally  ABDOMEN: +BS, soft, nontender to palpation  MS: no gross musculoskeletal deformities  NEURO: alert and oriented to name and year, not place    Data     I have personally reviewed the following data over the past 24 hrs:    11.2 (H)  \    9.2 (L)   / 236     143 123 (H) 36 (H) /  126 (H)   5.6 (H) 14 (L) 1.36 (H) \       ALT: 152 (H) AST: 132 (H) AP: 106 TBILI: 0.3   ALB: 1.5 (L) TOT PROTEIN: 5.2 (L) LIPASE: N/A       Imaging results reviewed over the past 24 hrs:   No results found for this or any previous visit (from the past 24 hour(s)).

## 2023-04-14 NOTE — PLAN OF CARE
Patient arouses to voice. Disorientated to time, place, situation. VSS. BP (!) 151/69 (BP Location: Right arm)   Pulse 72   Temp 98.5  F (36.9  C) (Oral)   Resp 18   SpO2 99%     Repositioned q2h. Tolerated thickened PO fluids. Denies pain.    Goal Outcome Evaluation:      Plan of Care Reviewed With: patient    Overall Patient Progress: improvingOverall Patient Progress: improving     Oma Thomas RN, BSN

## 2023-04-14 NOTE — PLAN OF CARE
Problem: Plan of Care - These are the overarching goals to be used throughout the patient stay.    Goal: Absence of Hospital-Acquired Illness or Injury  Intervention: Identify and Manage Fall Risk    Goal Outcome Evaluation:    A & O x 2 (not situation/place). VSS, besides hypertension. No complaints of pain. WOC consult completed. Wound orders in chart and completed today. IV with D5 1/2 NS @ 100 mL/h. Retana draining. Up to chair. Eating with assistance. Puree/Mildly thick diet. Q 2 turns provided. Alarms on for safety. Discharge pending clinical improvement with return to [facility].    Problem: UTI (Urinary Tract Infection)  Goal: Improved Infection Symptoms  Outcome: Progressing

## 2023-04-14 NOTE — DISCHARGE INSTRUCTIONS
Thank you for the opportunity to take care of you during your hospitalization! Best of luck on your recovery journey.    - We started Kurtis on a small daily dose of amlodipine (an anti-hypertensive/blood pressure drug) because he had consistently elevated blood pressures during his hospitalization.  - Please do not restart his atorvastatin until you follow up with his primary care provider. His liver enzymes are still elevated, so we want to wait until those come back down/become normal before restarting his atorvastatin.  - Kurtis will not be discharging on any antibiotics due to finishing his antibiotic course during his hospitalization.  - Please leave the Retana catheter in until follow up with Urology. They will contact you to schedule his appointment.  - Please follow up with Neurosurgery for repeat CT and a clinic visit about 4 weeks after his hospitalization, so sometime in the next 1-2 weeks.      WOC DISCHARGE INSTRUCTIONS:  Mepilex to coccyx every three days  Mepilex to right arm skin tear every 5 days

## 2023-04-15 NOTE — PROGRESS NOTES
[facility]    Progress Note - Hospitalist Service       Date of Admission:  4/11/2023    Assessment & Plan   Kurtis Urban is a 89 year old male with a past medical history of recent subdural hematoma, seizures, dementia, UTI, and urinary retention s/p rai who is admitted for acute encephalopathy likely secondary to UTI, urinary obstruction and bilateral hydronephrosis.    Acute encephalopathy, improved  Urinary tract infection  Urinary obstruction  Bilateral hydroureteronephrosis  Urinary retention  Leukocytosis  Presented from TCU after recent hospitalization (3/16-3/20/23) for fall causing traumatic subdural hematoma; had worsening confusion and weakness for past 4 days prior to presentation.  Had a Rai when first discharged to the TCU, but self-removed it at TCU and presented to ED without one. UA and urine culture at TCU growing E faecalis, pansensitive. Reportedly on nitrofurantoin for the past few days prior to admission; transitioned to vancomycin and meropenem in ED. CT A/P showing moderate bilateral hydroureteronephrosis with distended bladder and enlarged prostate suggesting bladder outlet obstruction. Leukocytosis with elevated procal, urinalysis on admission appeared infected and urine culture growing E faecalis; pan-sensitive. Patient disoriented on admission; somnolent 4/12 but mental status improving starting 4/13. Still not close to baseline per inna Starr. Rai placed in ED with good output. Encephalopathy thought secondary to UTI and urinary retention, as ammonia normal, but also consider aspiration pneumonia given dysphagia (see below) and new bilateral pleural effusions seen on CT A/P. Leukocytosis improving.    Continue vancomycin (4/11 - ), stop meropenem (4/11 - 4/14) given urine culture sensitivities    Follow blood cultures    Maintain Rai    Urology consult for potentially continuing Rai at discharge d/t urinary obstruction/retention. Also to  arrange outpatient follow up for bladder mass (seen below).    Fall precautions    PT/OT for discharge planning - previously came from TCU. PT/OT recommending back to TCU.    Bronchiectasis  Small bilateral pleural effusions  Dysphagia  Moderate oropharyngeal swallow function  Does not appear to have history of bronchiectasis.  CT abdomen pelvis showing bilateral small pleural effusions, bronchiectasis of the lower lungs with bibasilar effusion atelectasis. Satting well on room air with no signs or symptoms of respiratory failure. Appears there were previous concerns with dysphagia and aspiration. Failed bedside swallow study - aspiration may be possible contributor to symptoms. SLP evaluated 4/12; has aspiration with thin liquids.    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  BUN/creatinine ratio >20, suggests pre-renal picture. CT a/p showing moderate bilateral hydronephrosis with bilateral renal lesions that are unchanged; suggests obstructive etiology. Echo 2020 with EF 61%. Cr improving.    Follow Cr    Maintenance IV fluids    Avoid NSAIDs, avoid nephrotoxic drugs    Hyperkalemia  Noted beginning 4/13. Unclear etiology.    Follow K    If K > 6.0, will treat    Transaminitis  Elevated alk phos, resolved  ,  and alk phos 143 on presentation. ALT/AST 3/16/2023 of 27 and 65 with normal alk phos at that time.  CT abdomen pelvis showing normal liver, cholecystectomy with no bile duct dilation, normal pancreas.  Lipase mildly elevated, ammonia normal. Unclear etiology of elevated LFTs - most likely some degree of shock liver given patient's UTI. LFTs now downtrending.    Follow CMP    Acute hepatitis panel today    Sacral wound  Noted by RNs; present on admission but unclear how long it has been there for.  WOC consulted (see their note from 4/14), noted stage 2 pressure injury with friction component.    WOC consult, appreciate recs    Mepilex to coccyx every 3 days    Bladder mass  Noted on the 3/2023 hospitalization, was set to follow-up with urology as outpatient for mass and urinary retention. Bladder decompressed with rai catheter placed in ED.     Outpatient urology follow-up     Subdural hematoma  CT head this hospitalization showing regressing subdural hematoma, previous meningioma present.    Outpatient neurosurgery follow-up, previously recommended follow-up in 4 weeks from last hospitalization.    Chronic Conditions  Seizures: PTA PO Keppra   Hyperlipidemia: Hold PTA Lipitor given elevated LFTs  Essential tremor: Continue PTA primidone       Diet: Combination Diet Pureed Diet (level 4); Mildly Thick (level 2)    DVT Prophylaxis: Heparin SQ  Rai Catheter: PRESENT, indication: Retention  Fluids: D5+1/2NS @ 100 mL/hr  Lines: None     Cardiac Monitoring: None  Code Status: Full Code      Disposition Plan      Expected Discharge Date: 04/17/2023      Destination: other (comment) (TCU)  Discharge Comments: needs to finish IV abx and has KRIS        The patient's care was discussed with the Attending Physician, Dr. Gillette.    Nery Coe MD PGY-1  [facility] Family Medicine Resident Team  [facility]  Securely message with [employer] (more info)  Text page via [employer] Paging/Directory   ______________________________________________________________________    Interval History   No acute events overnight. Waxing and waning mental status overnight. Patient's friend Matty is here today to help care for patient. Matty is here while Adi is away for the weekend. Matty states patient is about 30-40% of his mental baseline, which was last normal on 4/6. Typically is conversant and making jokes. Matty says patient is now at least awake and somewhat conversant - was somnolent when he first came into the  hospital. Patient denies fever, headache, chest pain, dyspnea, nausea and abdominal pain.    Matty also states that Adi is arranging for another living arrangement at [facility] that is not TCU, but has skilled nursing care to transition patient back to independent living. Will likely be there for 3 months.    Physical Exam   Vital Signs: Temp: 97.8  F (36.6  C) Temp src: Axillary BP: (!) 142/73 Pulse: 71   Resp: 19 SpO2: 97 % O2 Device: None (Room air)    Weight: 134 lbs 14.74 oz  GENERAL: alert, sitting up in bed comfortably, NAD  RESP: upper lungs clear from anterior  CV: RRR, normal S1 and S2, no murmurs, no LE edema bilaterally  ABDOMEN: +BS, soft, nontender to palpation  MS: no gross musculoskeletal deformities  NEURO: alert and oriented to name, place, and month/year    Data     I have personally reviewed the following data over the past 24 hrs:    11.6 (H)  \   9.1 (L)   / 225     142 118 (H) 26 /  105   5.3 (H) 17 (L) 1.21 \       ALT: 167 (H) AST: 139 (H) AP: 107 TBILI: 0.3   ALB: 1.6 (L) TOT PROTEIN: 5.5 (L) LIPASE: N/A       Imaging results reviewed over the past 24 hrs:   No results found for this or any previous visit (from the past 24 hour(s)).

## 2023-04-15 NOTE — PLAN OF CARE
Problem: UTI (Urinary Tract Infection)  Goal: Improved Infection Symptoms  Outcome: Progressing   Goal Outcome Evaluation:    Patient confused intermittently. Vary hard of hearing. VSS. Denies pain. Patient was able to sit up the chair for a few hours. Patient had small BM. Feeding assistance provided and pills crushed and administered with pudding.

## 2023-04-15 NOTE — PLAN OF CARE
Problem: Plan of Care - These are the overarching goals to be used throughout the patient stay.    Goal: Absence of Hospital-Acquired Illness or Injury  Intervention: Prevent Skin Injury  Recent Flowsheet Documentation  Taken 4/14/2023 1543 by Argelia Nevarez RN  Body Position: position maintained     VSS. Pt sleeping most of the shift. Pt does not appear to be in pain. Turned Q2. Bed alarm on.

## 2023-04-15 NOTE — PLAN OF CARE
Problem: UTI (Urinary Tract Infection)  Goal: Improved Infection Symptoms  Outcome: Progressing   Goal Outcome Evaluation:    Pt orientation fluctuated, became more confused through the night.  A&O to self, place and time, disoriented to situation at 2000, however at 0000 pt was only A&O to self.  Denied pain, repositioned Q2H.  D5 and 1/2 NS running at 100 ml/hr.  Retana patent and draining.  Pills crushed with pudding, pt is a feeder.  Pt [facility], has hearing aids at bedside.

## 2023-04-16 NOTE — PLAN OF CARE
Goal Outcome Evaluation:                      Pt oriented to self only. Continues on IV fluids D5/0.45%NS 100mL/hour. Retana patent and draining light yellow urine, emptied for 850mL from 6234-5270. Pt with blanchable redness and open areas/moisture damage to buttock area, sacral mepilex in place. [id number]. Air mattress attachment added to bed. Does not call appropriately, bed alarm on for pt safety.

## 2023-04-16 NOTE — PLAN OF CARE
Pt oriented to self and not clearly to much else. He is quite [place] so requires a decent amount of volume to communicate. He has refused, quite adamantly any needles this shift. Attempted to draw blood X3 as well as administer heparin and pt was in no mood to accept it. Teds were placed. Matty, a friend of the pt stated that he continues to be more confused than his baseline, prior to the first hospitalization and TCU placement. Therapy working with pt. Will continue to monitor.     Goal Outcome Evaluation:

## 2023-04-16 NOTE — PROGRESS NOTES
[facility]    Progress Note - Hospitalist Service       Date of Admission:  4/11/2023    Assessment & Plan   Kurtis Urban is a 89 year old male with a past medical history of recent subdural hematoma, seizures, dementia, UTI, and urinary retention s/p rai who is admitted for acute encephalopathy likely secondary to UTI, urinary obstruction and bilateral hydronephrosis. Improving overall, likely to discharge in 1-2 days.     Acute encephalopathy, improved  Urinary tract infection  Urinary obstruction  Bilateral hydroureteronephrosis  Urinary retention  Leukocytosis  Presented from TCU after recent hospitalization (3/16-3/20/23) for fall causing traumatic subdural hematoma; had worsening confusion and weakness for past 4 days prior to presentation.  UA and urine culture at TCU growing E faecalis, pansensitive. CT A/P showing moderate bilateral hydroureteronephrosis with distended bladder and enlarged prostate suggesting bladder outlet obstruction.  Patient disoriented on admission; somnolent 4/12 but mental status improving starting 4/13. Still not close to baseline per family and friends. Rai placed in ED with good output. Encephalopathy thought secondary to UTI and urinary retention.    Continue vancomycin (4/11 - 4/17) discontinue on discharge, meropenem (4/11 - 4/14)    Follow blood cultures    Maintain Rai at discharge per Urology recs    Fall precautions    PT/OT for discharge planning - previously came from TCU. PT/OT recommending back to TCU.    Bronchiectasis  Small bilateral pleural effusions  Dysphagia  Moderate oropharyngeal swallow function  Does not appear to have history of bronchiectasis.  CT abdomen pelvis showing bilateral small pleural effusions, bronchiectasis of the lower lungs with bibasilar effusion atelectasis. Satting well on room air with no signs or symptoms of respiratory failure. SLP evaluated 4/12; has aspiration with thin liquids.    SLP  consulted, appreciate recs and cares    Puree solids and mildly thick liquids with 1:1 assist/supervision    Home care speech therapy at discharge    Continuous pulse ox    Abx as above    KRIS, improving  Creatinine 2.3 on intake, baseline 0.9-1.0.  BUN/creatinine ratio >20, suggests pre-renal picture. CT a/p showing moderate bilateral hydronephrosis with bilateral renal lesions that are unchanged; suggests obstructive etiology. Echo 2020 with EF 61%. Cr improving.    Follow Cr    Maintenance IV fluids    Avoid NSAIDs, avoid nephrotoxic drugs    Hyperkalemia, improving  Noted beginning 4/13. Unclear etiology.    Follow K    If K > 6.0, will treat    Transaminitis  Elevated alk phos, resolved  ,  and alk phos 143 on presentation. ALT/AST 3/16/2023 of 27 and 65 with normal alk phos at that time.  CT abdomen pelvis showing normal liver, cholecystectomy with no bile duct dilation, normal pancreas.  Lipase mildly elevated, ammonia normal. Unclear etiology of elevated LFTs - most likely some degree of shock liver given patient's UTI. LFTs now downtrending.    Follow CMP    Acute hepatitis panel     Sacral wound  Noted by RNs; present on admission but unclear how long it has been there for. WOC consulted (see their note from 4/14), noted stage 2 pressure injury with friction component.    WOC consult, appreciate recs    Mepilex to coccyx every 3 days    Bladder mass  Noted on the 3/2023 hospitalization, was set to follow-up with urology as outpatient for mass and urinary retention. Bladder decompressed with rai catheter placed in ED.     Outpatient urology follow-up    Urology recommended Rai catheter to remain in place on discharge.      Subdural hematoma  CT head this hospitalization showing regressing subdural hematoma, previous meningioma present.    Outpatient neurosurgery follow-up, previously recommended follow-up in 4 weeks from last hospitalization.    Chronic Conditions  Seizures: PTA PO Keppra    Hyperlipidemia: Hold PTA Lipitor given elevated LFTs  Essential tremor: Continue PTA primidone       Diet: Combination Diet Pureed Diet (level 4); Mildly Thick (level 2)    DVT Prophylaxis: TEDs  Retana Catheter: PRESENT, indication: Retention  Fluids: D5+1/2NS @ 100 mL/hr  Lines: None     Cardiac Monitoring: None  Code Status: Full Code      Disposition Plan     Expected Discharge Date: 04/17/2023      Destination: other (comment) (TCU)  Discharge Comments: needs to finish IV abx and has KRIS        The patient's care was discussed with the Attending Physician, Dr. Gillette.    Radha Reveles MD PGY-2  [facility] Family Medicine Resident Team  [facility]  Securely message with [employer] (more info)  Text page via [employer] Paging/Directory   ______________________________________________________________________    Interval History   No acute events overnight. Waxing and waning mental status overnight. Patient was refusing blood draw and lovenox this AM due to excessive needle pokes. Explained situation. Patient somewhat redirectable. [place], hearing aids to be repaired tomorrow. Patient unable to express other symptoms.     Physical Exam   Vital Signs: Temp: 97.9  F (36.6  C) Temp src: Oral BP: 134/71 Pulse: 79   Resp: 18 SpO2: 95 % O2 Device: None (Room air)    Weight: 136 lbs 14.49 oz  GENERAL: alert, sitting up in bed comfortably, NAD, slightly confused and refusing blood draw  RESP: upper lungs clear from anterior  CV: RRR, normal S1 and S2, no murmurs, no LE edema bilaterally  ABDOMEN: +BS, soft, nontender to palpation  MS: no gross musculoskeletal deformities  NEURO: alert and oriented to name, but not place. Redirects easily.     Data         Imaging results reviewed over the past 24 hrs:   No results found for this or any previous visit (from the past 24 hour(s)).

## 2023-04-16 NOTE — PROGRESS NOTES
Care Management Follow Up    Length of Stay (days): 5    Expected Discharge Date: 04/17/2023     Concerns to be Addressed:     Discharge planning  Patient plan of care discussed at interdisciplinary rounds: Yes    Anticipated Discharge Disposition:  [facility]      Additional Information:    [employer] insurance authorization for [facility] APPROVED from 4/15/23-4/21/23.  Authorization# [id number].    Anticipate discharge back to [facility] (has a bed hold).  Transportation TBD - per son Adi or per [facility] stretcher.  See previous CM notes.    CM will continue to follow.    Felipe Cook RN

## 2023-04-16 NOTE — CONSULTS
[facility] CONSULTATION    Type of Consult: inpatient  Place of Service: [facility]   Reason for consult: Urinary retention, bladder mass, hydronephrosis  Request for consult by: Dr Coe    History of present illness:   Kurtis Urban is a 89 year old male that was admitted for Urinary tract infection without hematuria, site unspecified. Urology is being consulted for urinary retention and a bladder mass as well as hydronephrosis. History obtained through outpatient chart and inpatient chart review.     Kurtis is known to me.  I first saw him on March 30, 2023.  He had recently incurred a fall and was found to have urinary retention.  He was also found to have renal and bladder masses.  On that day, he was able to void after his catheter was removed.  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  I offered him resection of the bladder tumor to avoid future problems with bleeding.  He and his family wished to proceed.  We were unable to schedule that because he was readmitted to the hospital.  He was admitted to [facility] on 4/11/2023 with a change in mental status.  He was found to have urinary retention with bilateral hydroureteronephrosis and suspected urinary tract infection.  He is now much improved after catheter placement and antibiotics.  The plan is for discharge soon.        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    Surgically removed      Seizure (H) 5/19/2015     Thiamine deficiency 9/3/2019     TIA (transient ischemic attack)        Past Surgical History:  Past Surgical History:   Procedure Laterality Date     APPENDECTOMY       CHOLECYSTECTOMY       HERNIA REPAIR       OTHER SURGICAL HISTORY      nasal surgeries     OTHER SURGICAL HISTORY   1993    meniscal surgery     OTHER SURGICAL HISTORY Right     total knee repair     SHOULDER SURGERY Right        Social History:  Social History     Socioeconomic History     Marital status:      Spouse name: Not on file     Number of children: Not on file     Years of education: Not on file     Highest education level: Not on file   Occupational History     Not on file   Tobacco Use     Smoking status: Former     Types: Cigarettes     Smokeless tobacco: Never   Vaping Use     Vaping status: Not on file   Substance and Sexual Activity     Alcohol use: Never     Drug use: Never     Sexual activity: Not on file   Other Topics Concern     Not on file   Social History Narrative     Not on file     Social Determinants of Health     Financial Resource Strain: Not on file   Food Insecurity: Not on file   Transportation Needs: Not on file   Physical Activity: Not on file   Stress: Not on file   Social Connections: Not on file   Intimate Partner Violence: Not on file   Housing Stability: Not on file       Medications:  Current Facility-Administered Medications   Medication     amLODIPine (NORVASC) tablet 5 mg     dextrose 5% and 0.45% NaCl infusion     heparin ANTICOAGULANT injection 5,000 Units     levETIRAcetam (KEPPRA) solution 500 mg     lidocaine (LMX4) cream     lidocaine 1 % 0.1-1 mL     melatonin tablet 1 mg     ondansetron (ZOFRAN ODT) ODT tab 4 mg    Or     ondansetron (ZOFRAN) injection 4 mg     polyethylene glycol (MIRALAX) Packet 17 g     primidone (MYSOLINE) tablet 50 mg     sodium chloride (PF) 0.9% PF flush 3 mL     sodium chloride (PF) 0.9% PF flush 3 mL     vancomycin (VANCOCIN) 750 mg in 0.9% NaCl 250 mL intermittent infusion       Allergies:   Allergies   Allergen Reactions     Orphenadrine Hives     Other reaction(s): hives       Penicillins Anaphylaxis and Nausea and Vomiting     Alfuzosin Nausea and Vomiting     Donepezil Other (See Comments)     Myolin [Norflex] Unknown     Other reaction(s):  hives     Sulfa Drugs Nausea and Vomiting     Triazolam Nausea and Vomiting       Review of Systems:   A full 12 point review of systems was taken and is negative aside from what is noted above in the HPI    Physical Exam:  Temp:  [97.5  F (36.4  C)-98.1  F (36.7  C)] 97.9  F (36.6  C)  Pulse:  [68-88] 79  Resp:  [16-18] 18  BP: (134-166)/(71-86) 134/71  SpO2:  [95 %-97 %] 95 %  General: NAD, alert, no comprehensible speech.  Head: normocephalic, without abnormality / atraumatic  Abdomen: soft, not tender, not distended.  No suprapubic fullness/tenderness.  No CVA tenderness noted  Geniturinary: Catheter in place draining clear yellow urine  Skin: no rashes or lesions  Musculoskeletal: moves all extremities equally; no calf edema or tenderness  Psychological: alert and oriented, answers questions appropriately      Labs:   Creatinine   Date Value Ref Range Status   04/15/2023 1.21 0.70 - 1.30 mg/dL Final       Lab Results   Component Value Date    WBC 11.6 04/15/2023     Lab Results   Component Value Date    HGB 9.1 04/15/2023     Lab Results   Component Value Date     04/15/2023       UA RESULTS:  Recent Labs   Lab Test 04/11/23  1550 03/16/23  1410 01/18/20  1650   COLOR Yellow   < > Yellow   APPEARANCE Turbid*   < > Clear   URINEGLC Negative   < > Negative   URINEBILI Negative   < > Negative   URINEKETONE Negative   < > Negative   SG 1.012   < > 1.015   UBLD 0.03 mg/dL*   < > Negative   URINEPH 5.0   < > 6.0   PROTEIN 30*   < > Negative   UROBILINOGEN  --   --  <2.0 E.U./dL   NITRITE Negative   < > Negative   LEUKEST 250 Bipin/uL*   < > Negative   RBCU 2   < >  --    WBCU 20*   < >  --     < > = values in this interval not displayed.         Urine culture: Urine culture showed a pansensitive Enterococcus faecalis.  This is from 4/11/2023.    Lab Results: personally reviewed     Imaging:  Review of the CT scan report and images from 4/11/2023.  I see bilateral hydroureteronephrosis with a moderately distended  bladder.  Known hyperdense lesions in the kidneys.  Known and apparently unchanged bladder tumor.  Enlarged prostate.    I have personally reviewed the imaging reports above.     Assessment / Plan : Kurtis Urban is being seen by [facility] for urinary retention, bladder mass, and renal masses in the setting of dementia.  He required repeat Retana catheterization.  His creatinine is back to baseline.  The MRI from March 18 showed no concerning renal lesions.  Our plan is to resect the bladder tumor to minimize the risk for bleeding complications in the future.  I would leave the catheter in for now.    My  plans to reach out to Kurtis's family for scheduling early this week.    Thank you for consulting [facility] regarding this patient's care. Please contact us with questions or concerns.     Donn Daniel MD  [facility]   [phone]

## 2023-04-17 NOTE — PROGRESS NOTES
Care Management Follow Up    Length of Stay (days): 6    Expected Discharge Date: 04/17/2023     Concerns to be Addressed:       Patient plan of care discussed at interdisciplinary rounds: Yes    Anticipated Discharge Disposition:  [facility]-returning to this facility.     Education Provided on the Discharge Plan:  yes  Patient/Family in Agreement with the Plan:  Adi Kerr, updated that patient hopefully will discharge po abx-Adi reports traveling from [place] to [facility] and will be in hospital 4/18 8:30-9:30am.    Adi would like to transport to [facility]    Referrals Placed by CM/SW:  [facility]      Additional Information:  Adi to transport patient back to [facility] 4/18.  Ayesha at facility updated via voice mail.     Prior Auth with [employer] already completed.    UPDATE: BLS [facility] arranged 4/18 4:10-4:55.  [facility], updated patient needs BLS transport due to safety concerns about family transporting. Adi in agreement but would like to confirm need for BLS when sees patient 4/18/23  PCS completed.  [place] [facility] updated transport BLS time.    Update: Writer spoke to provider regarding discharge plan for abx?  Per provider patient will not discharge with any abx and will work on discharge orders.       Niki Verma CM

## 2023-04-17 NOTE — PLAN OF CARE
Problem: UTI (Urinary Tract Infection)  Goal: Improved Infection Symptoms  Outcome: Progressing   Goal Outcome Evaluation:    Pt A&Ox3, disoriented to situation.  VSS, denied pain, appeared comfortable.  Retana patent and draining.  D5 and 0.45 NS running at 100 ml/hr.  Repositioned Q2H.  Pt slept well overnight.

## 2023-04-17 NOTE — PROGRESS NOTES
Place of Service:  [facility]     Reason for follow up: Urinary retention, bladder mass, and renal masses in the setting of dementia.    SUBJECTIVE:  Events: no acute events overnight    Patient reports he is feeling ok. Denies abdominal and bilateral flank pain, fever, chills.    OBJECTIVE:  PHYSICAL EXAM:  Temp: 98.1  F (36.7  C) Temp src: Oral BP: (!) 141/64 Pulse: 72   Resp: 18 SpO2: 97 % O2 Device: None (Room air)    General: NAD, alert, cooperative, sitting up in chair.   Head: normocephalic, without abnormality / atraumatic  Abdomen: soft, non- tender, non- distended. no suprapubic fullness, no suprapubic tenderness. No bilateral CVA tenderness.   Genitourinary: Penis and scrotum without erythema or edema. Retana catheter draining clear yellow urine.   Skin: No rashes or lesions  Musculoskeletal: moves all four extremities equally.   Psychological: alert and oriented to self, answers some questions appropriately    LABS:  Creatinine   Date Value Ref Range Status   04/16/2023 1.19 0.70 - 1.30 mg/dL Final     WBC Count   Date Value Ref Range Status   04/16/2023 13.1 (H) 4.0 - 11.0 10e3/uL Final     Hemoglobin   Date Value Ref Range Status   04/16/2023 9.3 (L) 13.3 - 17.7 g/dL Final     Platelet Count   Date Value Ref Range Status   04/16/2023 228 150 - 450 10e3/uL Final       UA:  UA RESULTS:  Recent Labs   Lab Test 04/11/23  1550 03/16/23  1410 01/18/20  1650   COLOR Yellow   < > Yellow   APPEARANCE Turbid*   < > Clear   URINEGLC Negative   < > Negative   URINEBILI Negative   < > Negative   URINEKETONE Negative   < > Negative   SG 1.012   < > 1.015   UBLD 0.03 mg/dL*   < > Negative   URINEPH 5.0   < > 6.0   PROTEIN 30*   < > Negative   UROBILINOGEN  --   --  <2.0 E.U./dL   NITRITE Negative   < > Negative   LEUKEST 250 Bipin/uL*   < > Negative   RBCU 2   < >  --    WBCU 20*   < >  --     < > = values in this interval not displayed.         Cultures:  Urine 4/11: 50,000-100,000 CFU/mL Enterococcus  faecalis   Susceptibility     Enterococcus faecalis     LUIS     Ampicillin <=2 ug/mL Susceptible     Nitrofurantoin <=16 ug/mL Susceptible     Penicillin 2 ug/mL Susceptible     Vancomycin 1 ug/mL Susceptible        Blood 4/11 x 2: no growth     Lab Results: personally reviewed.     ASSESSMENT/PLAN:  Kurtis Urban is being seen by [facility] for Urinary retention, UTI, bladder mass, and renal masses in the setting of dementia.    - Tolerating rai, urine yellow with good output. Creatinine normalized 4/14. Maintain rai at discharge.   - UC 4/11: 50-100k Enterococcus faecalis. Remains afebrile. WBC pending today, 13.1 on 4/16.  Recommend completing 7-10 day course of appropriate antibiotic.   - MRI from March 18 showed no concerning renal lesions  - Dr. Daniel's  plans to reach out to Kurtis's family early this week to arrange for TURBT to minimize risk for bleeding complications in the future.   - [facility] will sign off. Please re-consult with any new or worsening urologic concerns.      Updated:  Dr Donn Nixon, APRN, CNP  [facility]   [phone]

## 2023-04-17 NOTE — PLAN OF CARE
Problem: Plan of Care - These are the overarching goals to be used throughout the patient stay.    Goal: Optimal Comfort and Wellbeing  Outcome: Progressing     Problem: UTI (Urinary Tract Infection)  Goal: Improved Infection Symptoms  Outcome: Progressing   Goal Outcome Evaluation:  Pt Alert with intermittent forgetfulness/confusion. PIV infusing cont fluids. Mepilex changed to coxxyx. Mepilex applied LUE skin tear. [place]; hearing aides in- place. Has antiembolism stockings on. Aspiration precautions maintained. Difficulty standing; A2 pivot. Alarms on, increased rounding to assess needs.

## 2023-04-17 NOTE — PROGRESS NOTES
[facility]    Progress Note - Hospitalist Service       Date of Admission:  4/11/2023    Assessment & Plan   Kurtis Urban is a 89 year old male with a past medical history of recent subdural hematoma, seizures, dementia, UTI, and urinary retention s/p rai who is admitted for acute encephalopathy likely secondary to UTI, urinary obstruction and bilateral hydronephrosis. Improving overall, likely to discharge tomorrow.     Acute encephalopathy, improved  Urinary tract infection  Urinary obstruction  Bilateral hydroureteronephrosis  Urinary retention  Leukocytosis  Presented from TCU after recent hospitalization (3/16-3/20/23) for fall causing traumatic subdural hematoma; had worsening confusion and weakness for past 4 days prior to presentation.  UA and urine culture at TCU growing E faecalis, pansensitive. CT A/P showing moderate bilateral hydroureteronephrosis with distended bladder and enlarged prostate suggesting bladder outlet obstruction.  Patient disoriented on admission; somnolent 4/12 but mental status improving starting 4/13. Still not close to baseline per family and friends. Rai placed in ED with good output. Encephalopathy thought secondary to UTI and urinary retention. Mental status is slow to improve and leukocytosis increased to 13 starting 4/16; stable today. Will recheck CXR and check Keppra level to rule out other causes of AMS. UTI appropriately treated.    Finish vancomycin (4/11 - 4/17), meropenem (4/11 - 4/14)    Follow blood cultures    Maintain Rai at discharge per Urology recs    Fall precautions    PT/OT for discharge planning - previously came from TCU. PT/OT recommending back to TCU.    Check CXR today and Keppra level given slow improvement in mental status    Bronchiectasis  Small bilateral pleural effusions  Dysphagia  Moderate oropharyngeal swallow function  Does not appear to have history of bronchiectasis.  CT abdomen pelvis showing bilateral  small pleural effusions, bronchiectasis of the lower lungs with bibasilar effusion atelectasis. Satting well on room air with no signs or symptoms of respiratory failure. SLP evaluated 4/12; has aspiration with thin liquids.    SLP consulted, appreciate recs and cares    Puree solids and mildly thick liquids with 1:1 assist/supervision    Home care speech therapy at discharge    Continuous pulse ox    Abx as above    CXR today as above    KRIS, resolved  Creatinine 2.3 on intake, baseline 0.9-1.0.  BUN/creatinine ratio >20, suggests pre-renal picture. CT a/p showing moderate bilateral hydronephrosis with bilateral renal lesions that are unchanged; suggests obstructive etiology. Echo 2020 with EF 61%. Cr back to baseline 4/17.    Follow Cr    Maintenance IV fluids    Avoid NSAIDs, avoid nephrotoxic drugs    Hyperkalemia, resolved  Noted beginning 4/13. Unclear etiology.    Follow K    If K > 6.0, will treat    Transaminitis  Elevated alk phos, resolved  ,  and alk phos 143 on presentation. ALT/AST 3/16/2023 of 27 and 65 with normal alk phos at that time.  CT abdomen pelvis showing normal liver, cholecystectomy with no bile duct dilation, normal pancreas.  Lipase mildly elevated, ammonia normal. Unclear etiology of elevated LFTs - most likely some degree of shock liver given patient's UTI. LFTs now downtrending.    Follow CMP    Acute hepatitis panel     Sacral wound  Noted by RNs; present on admission but unclear how long it has been there for. WOC consulted (see their note from 4/14), noted stage 2 pressure injury with friction component.    WOC consult, appreciate recs    Mepilex to coccyx every 3 days    Bladder mass  Noted on the 3/2023 hospitalization, was set to follow-up with urology as outpatient for mass and urinary retention. Bladder decompressed with rai catheter placed in ED.     Outpatient urology follow-up    Urology recommended Rai catheter to remain in place on discharge    Subdural  hematoma  CT head this hospitalization showing regressing subdural hematoma, previous meningioma present.    Outpatient neurosurgery follow-up, previously recommended follow-up in 4 weeks from last hospitalization.    Chronic Conditions  Seizures: PTA PO Keppra   Hyperlipidemia: Hold PTA Lipitor given elevated LFTs  Essential tremor: Continue PTA primidone       Diet: Combination Diet Pureed Diet (level 4); Mildly Thick (level 2)    DVT Prophylaxis: TEDs  Retana Catheter: PRESENT, indication: Retention  Fluids: D5+1/2NS @ 100 mL/hr  Lines: None     Cardiac Monitoring: None  Code Status: Full Code      Disposition Plan      Expected Discharge Date: 04/18/2023      Destination: other (comment) (TCU)  Discharge Comments: finishing abx, monitoring overnight        The patient's care was discussed with the Attending Physician, Dr. Sharpe.    Nery Coe MD PGY-1  [facility] Family Medicine Resident Team  [facility]  Securely message with [employer] (more info)  Text page via [employer] Paging/Directory   ______________________________________________________________________    Interval History   No acute events overnight. Patient denies pain, dysuria, dyspnea. Waxing and waning mental status overnight.    Physical Exam   Vital Signs: Temp: 98.1  F (36.7  C) Temp src: Oral BP: (!) 141/64 Pulse: 72   Resp: 18 SpO2: 97 % O2 Device: None (Room air)    Weight: 136 lbs 14.49 oz  GENERAL: alert, sitting up in bed comfortably, NAD, mildly confused  RESP: mild expiratory crackles at bilateral bases  CV: RRR, normal S1 and S2, no murmurs, no LE edema bilaterally  ABDOMEN: +BS, soft, nontender to palpation  MS: no gross musculoskeletal deformities  NEURO: alert and oriented to name and year but not place. Redirects easily.     Data     I have personally reviewed the following data over the past 24 hrs:    13.2 (H)  \   9.9 (L)   / 222     138 111 (H) 22 /  130 (H)   4.7 19 (L) 1.05 \       ALT: 121 (H) AST: 64 (H)  AP: 124 (H) TBILI: 0.3   ALB: 1.9 (L) TOT PROTEIN: 6.0 LIPASE: N/A       Imaging results reviewed over the past 24 hrs:   No results found for this or any previous visit (from the past 24 hour(s)).

## 2023-04-18 NOTE — PLAN OF CARE
Occupational Therapy Discharge Summary    Reason for therapy discharge:    Discharged to transitional care facility.    Progress towards therapy goal(s). See goals on Care Plan in [facility] electronic health record for goal details.  Goals not met.  Barriers to achieving goals:   discharge from facility.    Therapy recommendation(s):    Continued therapy is recommended.  Rationale/Recommendations:  continue therapy at TCU.

## 2023-04-18 NOTE — PLAN OF CARE
Problem: Plan of Care - These are the overarching goals to be used throughout the patient stay.    Goal: Plan of Care Review  Outcome: Progressing    Problem: UTI (Urinary Tract Infection)  Goal: Improved Infection Symptoms  Outcome: Progressing   Goal Outcome Evaluation:    PT A&O to self only, VSS, denied pain and pt appeared comfortable.  Retana patent and draining.  D5 and 1/2 NS running at 100 ml/hr.  Repositioned Q2H.  Heavy assist of 2 with transfers, utilize walker and GB.  1:1 assist with feedings as pt is on pureed diet and mildly thickened liquids.  Anticipated discharge back to [place], stretcher ride at 4pm to [facility].

## 2023-04-18 NOTE — PLAN OF CARE
Problem: UTI (Urinary Tract Infection)  Goal: Improved Infection Symptoms  Outcome: Progressing   Goal Outcome Evaluation:      Pt alert to self only. Retana in place and drain Retana cares done on shift. Pt on room air and denies pain. Mepilex in place on arms and on buttocks area. . Pt assist with feeding. Call light within reach and bed alarm on. Pt to be discharge back to TCU around 4pm.

## 2023-04-18 NOTE — DISCHARGE SUMMARY
[facility]  Discharge Summary - Medicine & Pediatrics       Date of Admission:  4/11/2023  Date of Discharge:  4/18/2023  Discharging Provider: Trinidad Sharpe MD  Discharge Service: Hospitalist Service    Discharge Diagnoses   Acute encephalopathy  Urinary tract infection  Urinary obstruction  Bilateral hydroureteronephrosis  Urinary retention  Leukocytosis  Bronchiectasis  Small bilateral pleural effusions  Dysphagia  Moderate oropharyngeal swallow function  KRIS, resolved  Transaminitis  Elevated alkaline phosphatase, resolved  Sacral wound  Bladder mass  Subdural hematoma  Essential hypertension    Follow-ups Needed After Discharge   Follow up with primary care provider or TCU physician within 7 days for   hospital follow up.  The following labs/tests are recommended: CMP, CBC.    PCP: atorvastatin held due to elevated LFTs and amlodipine started for elevated blood pressures. Will defer for restarting atorvastatin.    Will also be following up with Urology outpatient (they will call to schedule) and will keep Rai in until then.    Also due for outpatient Neurosurgery follow up for subdural hematoma.    Discharge Disposition   Discharged to TCU  Condition at discharge: Stable    Hospital Course   Kurtis Urban is a 89 year old male with a past medical history of recent subdural hematoma, dementia, urinary retention s/p rai who is admitted for acute encephalopathy likely secondary to UTI, urinary obstruction and bilateral hydronephrosis.    Acute encephalopathy  Urinary tract infection  Urinary obstruction  Bilateral hydroureteronephrosis  Urinary retention  Leukocytosis  Presented from TCU after recent hospitalization (3/16-3/20/23) for fall causing traumatic subdural hematoma; had worsening confusion and weakness for past 4 days prior to presentation.  UA and urine culture at TCU and on admission growing E faecalis, pansensitive. CT A/P showing moderate bilateral  hydroureteronephrosis with distended bladder and enlarged prostate suggesting bladder outlet obstruction. Retana placed in ED with good output - patient had discharged to TCU with one but had self-removed it while at TCU. Encephalopathy thought secondary to UTI and urinary retention. UTI treated with IV vancomycin (4/11 - 4/17) and meropenem (4/11 - 4/14). Blood cultures no growth. Patient was disoriented and somnolent on admission but mental status improving starting 4/13. However, improvement has been slow - patient alert and conversant on day of discharge, but still not back to baseline per family and friends. Leukocytosis also increased during last few days of admission; decreasing at discharge. CXR does not appear to show infection and Keppra level normal - these were checked to rule out other causes of AMS. Patient's vital signs were normal at discharge. Suspect component of hospital-associated delirium is contributing to patient not being back at baseline cognition. As UTI appropriately treated, recommend rechecking CBC outpatient and maintaining Retana until outpatient Urology follow up per Urology. Will continue PT/OT rehab at TCU.    Bronchiectasis  Small bilateral pleural effusions  Dysphagia  Moderate oropharyngeal swallow function  CT abdomen pelvis on admission showing bilateral small pleural effusions, bronchiectasis of the lower lungs with bibasilar effusion atelectasis. Does not appear to have history of bronchiectasis. Patient did not exhibit signs or symptoms of respiratory failure during admission; satted well on room air. SLP evaluated 4/12; has aspiration with thin liquids. Recommended puree solids and mildly thick liquids with 1:1 assist/supervision, and home care speech therapy, which was ordered at discharge. There was concern for aspiration causing infection that would contribute to encephalopathy, but CXR 4/17 does not appear to show aspiration pneumonia.    KRIS, resolved  Creatinine 2.3 on  intake, baseline 0.9-1.0. KRIS resolved with IV fluids and Retana catheter placement with good output.     Transaminitis  Elevated alkaline phosphatase, resolved  ,  and alk phos 143 on presentation. Most recent LFTs normal 1 month prior to admission. CT abdomen pelvis showing normal liver, cholecystectomy with no bile duct dilation, normal pancreas. Lipase mildly elevated, ammonia normal, acute hepatitis panel negative. Unclear etiology of elevated LFTs - most likely some degree of shock liver given patient's UTI. LFTs now downtrending and elevated alk phos normalized. Recommend rechecking CMP at outpatient to trend LFTs to normal. Patient's Lipitor was held during admission and at discharge due to elevated LFTs - will defer to PCP to restart.    Sacral wound  Noted by RNs; present on admission but unclear how long it has been there for. WOC consulted, noted stage 2 pressure injury with friction component. Recommend Mepilex to coccyx every 3 days.    Bladder mass  Noted during hospitalization in 3/2023; was set to follow-up with Urology as outpatient for mass and urinary retention. Urology saw patient during this admission and recommended Retana remain in place at discharge until outpatient follow up and they plan on future resection of bladder mass. They will be reaching out to patient's family to coordinate scheduling visit.    Subdural hematoma  CT head this hospitalization showing regressing subdural hematoma, previous meningioma present. Was previously recommended to follow up with Neurosurgery outpatient if desired in 4 weeks after 3/2023 discharge.    Essential hypertension  Started on amlodipine 5 mg daily during admission due to consistently elevated blood pressures qualifying for hypertension. Blood pressures controlled with this. Amlodipine continued at discharge.    Consultations This Hospital Stay   PHARMACY TO DOSE [employer]  SPEECH LANGUAGE PATH ADULT IP CONSULT  PHARMACY TO DOSE [employer]  CARE  MANAGEMENT / SOCIAL WORK IP CONSULT  PHYSICAL THERAPY ADULT IP CONSULT  OCCUPATIONAL THERAPY ADULT IP CONSULT  WOUND OSTOMY CONTINENCE NURSE  IP CONSULT  UROLOGY IP CONSULT  PHYSICAL THERAPY ADULT IP CONSULT  OCCUPATIONAL THERAPY ADULT IP CONSULT  SPEECH LANGUAGE PATH ADULT IP CONSULT    Code Status   Full Code       The patient was discussed with Dr. Sharpe.    Nery Coe MD PGY-1  [facility] Family Medicine Resident Team  [address] [address]  Phone: [phone]  Fax: [phone]  ______________________________________________________________________    Physical Exam   Vital Signs: Temp: 98  F (36.7  C) Temp src: Oral BP: 112/57 Pulse: 78   Resp: 18 SpO2: 95 % O2 Device: None (Room air)    Weight: 131 lbs 2.78 oz  GENERAL: alert, sitting up in bed comfortably, NAD, mildly confused  RESP: lungs CTAB from anterior  CV: RRR, normal S1 and S2, no murmurs, no LE edema bilaterally, wearing compression stockings  ABDOMEN: +BS, soft, nontender to palpation  : Retana with clear yellow urine  MS: no gross musculoskeletal deformities  NEURO: alert, answers some questions, tangential speech but redirects easily     Primary Care Physician   Juan West    Discharge Orders      Anti-Embolism Stockings    Bilateral below knee length.  On in the morning, off at night     General info for SNF    Length of Stay Estimate: Short Term Care: Estimated # of Days <30  Condition at Discharge: Stable  Level of care:skilled   Rehabilitation Potential: Good  Admission H&P remains valid and up-to-date: Yes  Recent Chemotherapy: N/A  Use Nursing Home Standing Orders: Yes     Mantoux instructions    Give two-step Mantoux (PPD) Per Facility Policy Yes     Follow Up and recommended labs and tests    Follow up with primary care provider or TCU physician within 7 days for hospital follow up.  The following labs/tests are recommended: CMP, CBC.     Reason for your  hospital stay    Urinary tract infection, altered mental status     Retana catheter    To straight gravity drainage. Change catheter every 2 weeks and PRN for leaking or decreased urine output with signs of bladder distention. DO NOT change catheter without a specific Provider order IF diagnosis of benign prostatic hypertrophy (BPH), neurogenic bladder, or other urological conditions     Wound care (specify)    Mepilex to coccyx every three days  Mepilex to right arm skin tear every 5 days     Activity - Up with nursing assistance     Physical Therapy Adult Consult    Evaluate and treat as clinically indicated.    Reason:  history of fall and subdural hematoma, generalized weakness/acute illness with UTI     Occupational Therapy Adult Consult    Evaluate and treat as clinically indicated.    Reason:   history of fall and subdural hematoma, generalized weakness/acute illness with UTI     Speech Language Path Adult Consult    Evaluate and treat as clinically indicated.    Reason:  dysphagia, aspiration with thin liquids     Fall precautions     Diet    Follow this diet upon discharge: Orders Placed This Encounter      Combination Diet Pureed Diet (level 4); Mildly Thick (level 2)       Significant Results and Procedures   Most Recent 3 CBC's:  Recent Labs   Lab Test 04/18/23  0931 04/17/23  1032 04/16/23  1528   WBC 12.3* 13.2* 13.1*   HGB 8.4* 9.9* 9.3*   MCV 99 99 98    222 228     Most Recent 3 BMP's:  Recent Labs   Lab Test 04/18/23  0931 04/17/23  1032 04/16/23  1447    138 138   POTASSIUM 4.6 4.7 5.2*   CHLORIDE 110* 111* 111*   CO2 21* 19* 16*   BUN 22 22 23   CR 0.99 1.05 1.19   ANIONGAP 6 8 11   MARICRUZ 8.1* 8.5 8.4*    130* 142*     Most Recent 2 LFT's:  Recent Labs   Lab Test 04/18/23  0931 04/17/23  1032   AST 45* 64*   ALT 84* 121*   ALKPHOS 102 124*   BILITOTAL 0.3 0.3     7-Day Micro Results     No results found for the last 168 hours.        Most Recent Urinalysis:  Recent Labs   Lab  Test 04/11/23  1550 03/16/23  1410 01/18/20  1650   COLOR Yellow   < > Yellow   APPEARANCE Turbid*   < > Clear   URINEGLC Negative   < > Negative   URINEBILI Negative   < > Negative   URINEKETONE Negative   < > Negative   SG 1.012   < > 1.015   UBLD 0.03 mg/dL*   < > Negative   URINEPH 5.0   < > 6.0   PROTEIN 30*   < > Negative   UROBILINOGEN  --   --  <2.0 E.U./dL   NITRITE Negative   < > Negative   LEUKEST 250 Bipin/uL*   < > Negative   RBCU 2   < >  --    WBCU 20*   < >  --     < > = values in this interval not displayed.     Results for orders placed or performed during the hospital encounter of 04/11/23   XR Chest 2 Views    Narrative    EXAM: XR CHEST 2 VIEWS  LOCATION: [facility]  DATE/TIME: 4/11/2023 3:27 PM    INDICATION: Altered mental status.  COMPARISON: Chest CT 03/16/2023.      Impression    IMPRESSION:     Mildly heterogeneous right lung base opacities, possibly infectious / inflammatory (versus atelectasis). Minimal streaky left base opacities suggestive of atelectasis. Possible minimal pleural fluid.     Upper normal heart size. Mildly tortuous aorta. No obvious pulmonary edema.     Shoulder arthroplasties.       Head CT w/o contrast    Narrative    EXAM: CT HEAD W/O CONTRAST  LOCATION: [facility]  DATE/TIME: 4/11/2023 3:20 PM    INDICATION: Altered mental status.  COMPARISON: Head CT 03/16/2023 and head MRI 03/16/2020 3 .  TECHNIQUE: Routine CT Head without IV contrast. Multiplanar reformats. Dose reduction techniques were used.    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 Chronic 6.2 mm left frontoparietal subdural hematoma previously measuring 8.4 mm. No new hemorrhage. No CT evidence of an acute infarct.   Stable burden of left temporal edema associated with a 19.8 x 10.6 mm anterior temporal meningioma. The meningioma is better seen on MRI. Moderate presumed  chronic small vessel ischemic change. Moderate generalized volume loss. No hydrocephalus.     VISUALIZED ORBITS/SINUSES/MASTOIDS: No intraorbital abnormality. No paranasal sinus mucosal disease. No middle ear or mastoid effusion.    BONES/SOFT TISSUES: No acute abnormality.      Impression    IMPRESSION:  1.  No acute abnormality.  2.  Regressing subdural hematomas.  3.  Stable mild anterior left temporal edema associated with a meningioma.  4.  Moderate presumed chronic small vessel ischemic change and generalized volume loss again noted.   CT Abdomen Pelvis w/o Contrast    Narrative    EXAM: CT ABDOMEN PELVIS W/O CONTRAST  LOCATION: [facility]  DATE/TIME: 4/11/2023 4:20 PM    INDICATION: Altered mental status. Elevated liver function tests.  COMPARISON: CT urogram 03/16/2023  TECHNIQUE: CT scan of the abdomen and pelvis was performed without IV contrast. Multiplanar reformats were obtained. Dose reduction techniques were used.  CONTRAST: None.    FINDINGS:   LOWER CHEST: New small pleural effusions. Cylindrical bronchiectasis with increased bibasilar subsegmental atelectasis.    HEPATOBILIARY: Cholecystectomy with no bile duct dilatation. Negative liver.    PANCREAS: Normal.    SPLEEN: Normal.    ADRENAL GLANDS: Normal.    KIDNEYS/BLADDER: Moderate bilateral hydroureteronephrosis secondary to distended bladder. Vascular calcification left upper pole. Bilateral parapelvic and cortical hypodense/isodense lesions unchanged.    Bladder wall trabeculation. Focal 2.1 x 1.3 x 1.3 cm polypoid lesion right lateral bladder wall.    BOWEL: Scattered air-fluid levels throughout nondilated stomach, small bowel and colon. Diverticulosis of the colon. No acute inflammatory change. No obstruction. No significant formed stool within the colon.    LYMPH NODES: No lymphadenopathy.    VASCULATURE: No aortoiliac aneurysm.    PELVIC ORGANS: Prostatomegaly.    MUSCULOSKELETAL: No suspicious osseous lesions.       Impression    IMPRESSION:   1.  Moderate bilateral hydroureteronephrosis, distended bladder with bladder wall trabeculation and enlarged prostate, suggesting bladder outlet obstruction.  2.  2.1 x 1.3 x 1.3 cm polypoid lesion right lateral bladder wall redemonstrated.  3.  Bilateral parapelvic and renal cortical hypodense/isodense lesions likely represent cysts, some of which are indeterminate by density measurements.  4.  Scattered air-fluid levels throughout nondilated small bowel and colon with no formed stool in the colon. No obstruction or inflammatory change. Findings can be seen in viral illness and laxative use.  5.  Some new small bilateral pleural effusions with increased bibasilar subsegmental atelectasis.     XR Chest Port 1 View    Narrative    EXAM: XR CHEST PORT 1 VIEW  LOCATION: [facility]  DATE/TIME: 4/17/2023 1:09 PM CDT    INDICATION: Elevated white count, hx of aspiration, continued altered mental status  COMPARISON: 04/11/2023      Impression    IMPRESSION: Heart size and vascularity are normal. Shallow inspiration. Increased left basilar atelectasis and effusion. Bibasilar subsegmental atelectasis unchanged. No pneumothorax.       Discharge Medications   Discharge Medication List as of 4/18/2023  5:20 PM      START taking these medications    Details   amLODIPine (NORVASC) 5 MG tablet Take 1 tablet (5 mg) by mouth daily, Disp-30 tablet, R-1, E-Prescribe         CONTINUE these medications which have NOT CHANGED    Details   Cyanocobalamin 2500 MCG TABS Take 2,500 mcg by mouth every morning, Historical      levETIRAcetam (KEPPRA) 500 MG tablet TAKE 1 TABLET BY MOUTH TWICE DAILY, Disp-56 tablet, R-12, [id number]/2020 - URGENT - PLEASE ADVISE REFILL NEEDED FOR CYCLE FILL. THANK YOU!      polyethylene glycol (MIRALAX) 17 GM/Dose powder Take 17 g by mouth daily, Disp-510 g, No Print Out      primidone (MYSOLINE) 50 MG tablet Take 50 mg by mouth 2 times daily,  Historical      sodium chloride (OCEAN) 0.65 % nasal spray Spray 1 spray into both nostrils daily as needed for congestion, No Print Out      thiamine 50 MG TABS Take 50 mg by mouth every morning, Historical         STOP taking these medications       atorvastatin (LIPITOR) 40 MG tablet Comments:   Reason for Stopping:         nitroFURantoin macrocrystal (MACRODANTIN) 50 MG capsule Comments:   Reason for Stopping:             Allergies   Allergies   Allergen Reactions     Orphenadrine Hives     Other reaction(s): hives       Penicillins Anaphylaxis and Nausea and Vomiting     Alfuzosin Nausea and Vomiting     Donepezil Other (See Comments)     Myolin [Norflex] Unknown     Other reaction(s): hives     Sulfa Drugs Nausea and Vomiting     Triazolam Nausea and Vomiting

## 2023-04-18 NOTE — PROGRESS NOTES
Physical Therapy Discharge Summary    Reason for therapy discharge:    Discharged to transitional care facility.    Progress towards therapy goal(s). See goals on Care Plan in [facility] electronic health record for goal details.  Goals not met.  Barriers to achieving goals:   limited tolerance for therapy.    Therapy recommendation(s):    Continued therapy is recommended.  Rationale/Recommendations:  Patient is not at baseline for functional mobility.

## 2023-04-18 NOTE — PROGRESS NOTES
Care Management Discharge Note    Discharge Date: 04/18/2023       Discharge Disposition: Transitional Care    Discharge Services: None    Discharge DME:  (Per TCU)    Discharge Transportation:      Private pay costs discussed: with fly Joshi, in patient room    PAS Confirmation Code: Not required.  Patient returning to [facility]    Education Provided on the Discharge Plan: Yes (Met with fly Adi)  Persons Notified of Discharge Plans: yes  Patient/Family in Agreement with the Plan: yes    Handoff Referral Completed: yes    Additional Information:  Discharge orders sent [facility] via [facility], [facility] transport arranged today with F 4:10-4:55. Fly Joshi aware discharge plan.         Niki Verma CM

## 2023-04-19 NOTE — PROGRESS NOTES
[facility]    Background: Transitional Care Management program identified per system criteria and reviewed by [facility] team for possible outreach.    Assessment: Upon chart review, CCRC Team member will not proceed with patient outreach related to this episode of Transitional Care Management program due to reason below:    Non-[facility] TCU: CCRC team member noted patient discharged to TCU/ARU/LTACH. Patient is not established with a [facility] currently supported by Primary Care-Care Coordination therefore handoff to Primary Care-Care Coordination is not appropriate at this time.    Plan: Transitional Care Management episode addressed appropriately per reason noted above.      Sasha Martínez RN  [facility], [facility]    *Connected Care Resource Team does NOT follow patient ongoing. Referrals are identified based on internal discharge reports and the outreach is to ensure patient has an understanding of their discharge instructions.

## 2023-04-23 NOTE — PROGRESS NOTES
[facility] GERIATRIC SERVICES  Chief Complaint   Patient presents with     RECHECK     [facility] Record Number:  [id number]  Place of Service where encounter took place:  No question data found.  Code Status:  Full Code    HISTORY:      HPI:  Kurtis Urban  is 89 year old (2/23/1929) residing in the LTC at [facility].  He is with past medical history significant for dementia,   seizure disorder, CVA, TIA, dyslipidemia with recent hospitalization after a fall and found to have an acute on chronic subdural hematoma, possible meningioma, early rhabdomyolysis, abnormal appearing urothelial tract CT.  Excerpted from records  In ED, CT head revealed mixed attenuating subdural collections concerning for acute on chronic subdural hemorrhage, rounded lesion in the anterior middle cranial fossa concerning for possible meningioma, mildly low attenuating changes in the left temporal lobe compatible with edema.  Neurosurgery recommended MRI brain, repeat CT head in 6 hours, if worsening they recommend transfer in case patient and family wishes to proceed with any further intervention.  His son is flying in from [place].   CT chest abdomen pelvis also revealed bilateral renal cysts,?  Hemorrhage, asymmetric left-sided urothelial thickening and right-sided collecting system dilation, polypoid lesion in the bladder.    Today he is seen to review vital signs, and a routine visit. He denied cough congestion or any urinary symptoms.  He does have a Retana with clear yellow urine. He denied chest pain shortness of breath cough congestion he reported no recent bowel movement however staff reported he had 2 BMs yesterday.  He denied headaches dizziness.  Hearing aid left ear only.  He denies pain. He will follow-up with neurology new visit on 5/1/2023.  He was also noted to have bilateral renal cysts and renal tumor and will undergo a urology procedure on 5/15/2023.  Per the records he will be staying overnight.  He  does have lower extremity edema and RYLAND stockings were ordered.  Video swallow study scheduled for 5/10/2023 currently he is on a puréed diet.      ALLERGIES:Orphenadrine, Penicillins, Alfuzosin, Donepezil, Myolin [norflex], Sulfa drugs, and Triazolam    PAST MEDICAL HISTORY:   Past Medical History:   Diagnosis Date     Dementia with behavioral disturbance (H) 9/3/2019     Depression 5/28/2014     Hypercholesteremia 5/28/2014     Melanoma of back (H) 5/28/2014    Surgically removed       Melanoma of face (H) 5/28/2014    Surgically removed      Seizure (H) 5/19/2015     Thiamine deficiency 9/3/2019     TIA (transient ischemic attack)        PAST SURGICAL HISTORY:   has a past surgical history that includes Cholecystectomy; appendectomy; other surgical history; other surgical history (1993); hernia repair; shoulder surgery (Right); and other surgical history (Right).    FAMILY HISTORY: family history includes Cancer in his mother; Cerebrovascular Disease in his maternal grandfather.    SOCIAL HISTORY:  reports that he has quit smoking. His smoking use included cigarettes. He has never used smokeless tobacco. He reports that he does not drink alcohol and does not use drugs.    ROS:  Constitutional: Negative for activity change, appetite change, fatigue and fever.   HENT: Negative for congestion.    Hearing aid left ear  Respiratory: Negative for cough, shortness of breath and wheezing.    Cardiovascular: Negative for chest pain and leg swelling.   Gastrointestinal: Negative for abdominal distention, abdominal pain, constipation, diarrhea and nausea.   Genitourinary: Negative for dysuria. Retana in place  Musculoskeletal: Negative for arthralgia. Negative for back pain.   Skin: Negative for color change and wound.   Neurological: Negative for dizziness.   Psychiatric/Behavioral: Negative for agitation, behavioral problems and confusion.     Physical Exam:  Constitutional:       Appearance: Patient is well-developed.    HENT:      Head: Normocephalic.   Eyes:      Conjunctiva/sclera: Conjunctivae normal.   Neck:      Musculoskeletal: Normal range of motion.   Cardiovascular:      Rate and Rhythm: Normal rate and regular rhythm.      Heart sounds: Normal heart sounds. No murmur.   Pulmonary:      Effort: No respiratory distress.      Breath sounds: Normal breath sounds.   Abdominal:      General: Bowel sounds are normal. There is no distension.      Palpations: Abdomen is soft.      Tenderness: There is no abdominal tenderness.   Musculoskeletal:       Normal range of motion.     Skin:General:        Skin is warm.   Neurological:         Mental Status: Patient is alert and oriented to person, place, and time.   Psychiatric:         Behavior: Behavior normal.     Vitals:/62   Pulse 78   Temp 97.6  F (36.4  C)   Resp 18   Wt 59.8 kg (131 lb 12.8 oz)   SpO2 93%   BMI 21.27 kg/m   and Body mass index is 21.27 kg/m .    Lab/Diagnostic data:   Recent Results (from the past 240 hour(s))   Vancomycin level    Collection Time: 04/14/23  6:09 AM   Result Value Ref Range    Vancomycin 14.0   mg/L   Comprehensive metabolic panel    Collection Time: 04/14/23  6:09 AM   Result Value Ref Range    Sodium 143 136 - 145 mmol/L    Potassium 5.6 (H) 3.5 - 5.0 mmol/L    Chloride 123 (H) 98 - 107 mmol/L    Carbon Dioxide (CO2) 14 (L) 22 - 31 mmol/L    Anion Gap 6 5 - 18 mmol/L    Urea Nitrogen 36 (H) 8 - 28 mg/dL    Creatinine 1.36 (H) 0.70 - 1.30 mg/dL    Calcium 7.9 (L) 8.5 - 10.5 mg/dL    Glucose 126 (H) 70 - 125 mg/dL    Alkaline Phosphatase 106 45 - 120 U/L     (H) 0 - 40 U/L     (H) 0 - 45 U/L    Protein Total 5.2 (L) 6.0 - 8.0 g/dL    Albumin 1.5 (L) 3.5 - 5.0 g/dL    Bilirubin Total 0.3 0.0 - 1.0 mg/dL    GFR Estimate 48 (L) >60 mL/min/1.73m2   CBC with platelets    Collection Time: 04/14/23  7:19 AM   Result Value Ref Range    WBC Count 11.2 (H) 4.0 - 11.0 10e3/uL    RBC Count 2.96 (L) 4.40 - 5.90 10e6/uL     Hemoglobin 9.2 (L) 13.3 - 17.7 g/dL    Hematocrit 28.3 (L) 40.0 - 53.0 %    MCV 96 78 - 100 fL    MCH 31.1 26.5 - 33.0 pg    MCHC 32.5 31.5 - 36.5 g/dL    RDW 13.7 10.0 - 15.0 %    Platelet Count 236 150 - 450 10e3/uL   Potassium    Collection Time: 04/14/23  2:59 PM   Result Value Ref Range    Potassium 5.3 (H) 3.5 - 5.0 mmol/L   Comprehensive metabolic panel    Collection Time: 04/15/23  8:56 AM   Result Value Ref Range    Sodium 142 136 - 145 mmol/L    Potassium 5.3 (H) 3.5 - 5.0 mmol/L    Chloride 118 (H) 98 - 107 mmol/L    Carbon Dioxide (CO2) 17 (L) 22 - 31 mmol/L    Anion Gap 7 5 - 18 mmol/L    Urea Nitrogen 26 8 - 28 mg/dL    Creatinine 1.21 0.70 - 1.30 mg/dL    Calcium 8.6 8.5 - 10.5 mg/dL    Glucose 105 70 - 125 mg/dL    Alkaline Phosphatase 107 45 - 120 U/L     (H) 0 - 40 U/L     (H) 0 - 45 U/L    Protein Total 5.5 (L) 6.0 - 8.0 g/dL    Albumin 1.6 (L) 3.5 - 5.0 g/dL    Bilirubin Total 0.3 0.0 - 1.0 mg/dL    GFR Estimate 55 (L) >60 mL/min/1.73m2   CBC with platelets    Collection Time: 04/15/23  8:56 AM   Result Value Ref Range    WBC Count 11.6 (H) 4.0 - 11.0 10e3/uL    RBC Count 2.94 (L) 4.40 - 5.90 10e6/uL    Hemoglobin 9.1 (L) 13.3 - 17.7 g/dL    Hematocrit 29.5 (L) 40.0 - 53.0 %     78 - 100 fL    MCH 31.0 26.5 - 33.0 pg    MCHC 30.8 (L) 31.5 - 36.5 g/dL    RDW 13.6 10.0 - 15.0 %    Platelet Count 225 150 - 450 10e3/uL   Comprehensive metabolic panel    Collection Time: 04/16/23  2:47 PM   Result Value Ref Range    Sodium 138 136 - 145 mmol/L    Potassium 5.2 (H) 3.5 - 5.0 mmol/L    Chloride 111 (H) 98 - 107 mmol/L    Carbon Dioxide (CO2) 16 (L) 22 - 31 mmol/L    Anion Gap 11 5 - 18 mmol/L    Urea Nitrogen 23 8 - 28 mg/dL    Creatinine 1.19 0.70 - 1.30 mg/dL    Calcium 8.4 (L) 8.5 - 10.5 mg/dL    Glucose 142 (H) 70 - 125 mg/dL    Alkaline Phosphatase 116 45 - 120 U/L    AST 99 (H) 0 - 40 U/L     (H) 0 - 45 U/L    Protein Total 6.3 6.0 - 8.0 g/dL    Albumin 1.9 (L) 3.5 -  5.0 g/dL    Bilirubin Total 0.3 0.0 - 1.0 mg/dL    GFR Estimate 57 (L) >60 mL/min/1.73m2   CBC with platelets    Collection Time: 04/16/23  3:28 PM   Result Value Ref Range    WBC Count 13.1 (H) 4.0 - 11.0 10e3/uL    RBC Count 3.01 (L) 4.40 - 5.90 10e6/uL    Hemoglobin 9.3 (L) 13.3 - 17.7 g/dL    Hematocrit 29.5 (L) 40.0 - 53.0 %    MCV 98 78 - 100 fL    MCH 30.9 26.5 - 33.0 pg    MCHC 31.5 31.5 - 36.5 g/dL    RDW 13.6 10.0 - 15.0 %    Platelet Count 228 150 - 450 10e3/uL   Acute hepatitis panel    Collection Time: 04/16/23  3:28 PM   Result Value Ref Range    Hepatitis A Antibody IgM Nonreactive Nonreactive    Hepatitis B Core Antibody IgM Nonreactive Nonreactive    Hepatitis C Antibody Nonreactive Nonreactive    Hepatitis B Surface Antigen Nonreactive Nonreactive   Extra Green Top (Lithium Heparin) Tube    Collection Time: 04/16/23  3:28 PM   Result Value Ref Range    Hold Specimen [facility]    Comprehensive metabolic panel    Collection Time: 04/17/23 10:32 AM   Result Value Ref Range    Sodium 138 136 - 145 mmol/L    Potassium 4.7 3.5 - 5.0 mmol/L    Chloride 111 (H) 98 - 107 mmol/L    Carbon Dioxide (CO2) 19 (L) 22 - 31 mmol/L    Anion Gap 8 5 - 18 mmol/L    Urea Nitrogen 22 8 - 28 mg/dL    Creatinine 1.05 0.70 - 1.30 mg/dL    Calcium 8.5 8.5 - 10.5 mg/dL    Glucose 130 (H) 70 - 125 mg/dL    Alkaline Phosphatase 124 (H) 45 - 120 U/L    AST 64 (H) 0 - 40 U/L     (H) 0 - 45 U/L    Protein Total 6.0 6.0 - 8.0 g/dL    Albumin 1.9 (L) 3.5 - 5.0 g/dL    Bilirubin Total 0.3 0.0 - 1.0 mg/dL    GFR Estimate 66 >60 mL/min/1.73m2   CBC with platelets    Collection Time: 04/17/23 10:32 AM   Result Value Ref Range    WBC Count 13.2 (H) 4.0 - 11.0 10e3/uL    RBC Count 3.19 (L) 4.40 - 5.90 10e6/uL    Hemoglobin 9.9 (L) 13.3 - 17.7 g/dL    Hematocrit 31.6 (L) 40.0 - 53.0 %    MCV 99 78 - 100 fL    MCH 31.0 26.5 - 33.0 pg    MCHC 31.3 (L) 31.5 - 36.5 g/dL    RDW 13.6 10.0 - 15.0 %    Platelet Count 222 150 - 450 10e3/uL    Keppra (Levetiracetam) Level    Collection Time: 04/17/23 11:47 PM   Result Value Ref Range    Keppra (Levetiracetam) Level 22.7 10.0 - 40.0  g/mL   Comprehensive metabolic panel    Collection Time: 04/18/23  9:31 AM   Result Value Ref Range    Sodium 137 136 - 145 mmol/L    Potassium 4.6 3.5 - 5.0 mmol/L    Chloride 110 (H) 98 - 107 mmol/L    Carbon Dioxide (CO2) 21 (L) 22 - 31 mmol/L    Anion Gap 6 5 - 18 mmol/L    Urea Nitrogen 22 8 - 28 mg/dL    Creatinine 0.99 0.70 - 1.30 mg/dL    Calcium 8.1 (L) 8.5 - 10.5 mg/dL    Glucose 107 70 - 125 mg/dL    Alkaline Phosphatase 102 45 - 120 U/L    AST 45 (H) 0 - 40 U/L    ALT 84 (H) 0 - 45 U/L    Protein Total 5.0 (L) 6.0 - 8.0 g/dL    Albumin 1.6 (L) 3.5 - 5.0 g/dL    Bilirubin Total 0.3 0.0 - 1.0 mg/dL    GFR Estimate 71 >60 mL/min/1.73m2   CBC with platelets    Collection Time: 04/18/23  9:31 AM   Result Value Ref Range    WBC Count 12.3 (H) 4.0 - 11.0 10e3/uL    RBC Count 2.72 (L) 4.40 - 5.90 10e6/uL    Hemoglobin 8.4 (L) 13.3 - 17.7 g/dL    Hematocrit 26.9 (L) 40.0 - 53.0 %    MCV 99 78 - 100 fL    MCH 30.9 26.5 - 33.0 pg    MCHC 31.2 (L) 31.5 - 36.5 g/dL    RDW 13.8 10.0 - 15.0 %    Platelet Count 197 150 - 450 10e3/uL     MEDICATIONS:     Review of your medicines          Accurate as of April 23, 2023 11:06 AM. If you have any questions, ask your nurse or doctor.            CONTINUE these medicines which have NOT CHANGED      Dose / Directions   amLODIPine 5 MG tablet  Commonly known as: NORVASC  Used for: Benign essential hypertension      Dose: 5 mg  Take 1 tablet (5 mg) by mouth daily  Quantity: 30 tablet  Refills: 1     Cyanocobalamin 2500 MCG Tabs      Dose: 2,500 mcg  Take 2,500 mcg by mouth every morning  Refills: 0     * hypromellose 0.5 % Soln ophthalmic solution  Commonly known as: ARTIFICIAL TEARS      Dose: 2 drop  2 drops 3 times daily  Refills: 0     * hypromellose 0.5 % Soln ophthalmic solution  Commonly known as: ARTIFICIAL TEARS      Dose: 2  drop  2 drops 3 times daily as needed for dry eyes  Refills: 0     levETIRAcetam 500 MG tablet  Commonly known as: KEPPRA  Used for: Seizure disorder (H)      TAKE 1 TABLET BY MOUTH TWICE DAILY  Quantity: 56 tablet  Refills: 12     polyethylene glycol 17 GM/Dose powder  Commonly known as: MIRALAX  Used for: Constipation, unspecified constipation type      Dose: 17 g  Take 17 g by mouth daily  Quantity: 510 g  Refills: 0     primidone 50 MG tablet  Commonly known as: MYSOLINE      Dose: 50 mg  Take 50 mg by mouth 2 times daily  Refills: 0     sodium chloride 0.65 % nasal spray  Commonly known as: OCEAN  Used for: Nasal congestion      Dose: 1 spray  Spray 1 spray into both nostrils daily as needed for congestion  Refills: 0     thiamine 50 MG Tabs      Dose: 50 mg  Take 50 mg by mouth every morning  Refills: 0         * This list has 2 medication(s) that are the same as other medications prescribed for you. Read the directions carefully, and ask your doctor or other care provider to review them with you.                ASSESSMENT/PLAN  Encounter Diagnoses   Name Primary?     Seizure disorder (H) Yes     Dementia with behavioral disturbance (H)      Physical deconditioning      Urinary retention      Hypertension continue Norvasc    Seizure disorder on Keppra and primidone Keppra Level WNL 4/17/23 at 22.7 he will follow-up with neurology for first visit on 5/1/2023    Leukocytosis WBC on 4/17/2023 was 13.2 lab pending for the a.mLazarus Retana in place will undergo urology procedure on 5/15/2023    Electronically signed by: Juana Martinez CNP

## 2023-04-24 NOTE — LETTER
4/24/2023        RE: Kurtis Urban  [address] [facility] [facility]  C/o Adi Urban  [facility] [address]        [facility] GERIATRIC SERVICES  Chief Complaint   Patient presents with     RECHECK     [facility] Medical Record Number:  [id number]  Place of Service where encounter took place:  No question data found.  Code Status:  Full Code    HISTORY:      HPI:  Kurtis Urban  is 89 year old (2/23/1929) residing in the LTC at [facility].  He is with past medical history significant for dementia,   seizure disorder, CVA, TIA, dyslipidemia with recent hospitalization after a fall and found to have an acute on chronic subdural hematoma, possible meningioma, early rhabdomyolysis, abnormal appearing urothelial tract CT.  Excerpted from records  In ED, CT head revealed mixed attenuating subdural collections concerning for acute on chronic subdural hemorrhage, rounded lesion in the anterior middle cranial fossa concerning for possible meningioma, mildly low attenuating changes in the left temporal lobe compatible with edema.  Neurosurgery recommended MRI brain, repeat CT head in 6 hours, if worsening they recommend transfer in case patient and family wishes to proceed with any further intervention.  His son is flying in from [place].   CT chest abdomen pelvis also revealed bilateral renal cysts,?  Hemorrhage, asymmetric left-sided urothelial thickening and right-sided collecting system dilation, polypoid lesion in the bladder.    Today he is seen to review vital signs, and a routine visit. He denied cough congestion or any urinary symptoms.  He does have a Retana with clear yellow urine. He denied chest pain shortness of breath cough congestion he reported no recent bowel movement however staff reported he had 2 BMs yesterday.  He denied headaches dizziness.  Hearing aid left ear only.  He denies pain. He will follow-up with neurology new visit on 5/1/2023.  He was also noted to have bilateral renal cysts and  renal tumor and will undergo a urology procedure on 5/15/2023.  Per the records he will be staying overnight.  He does have lower extremity edema and RYLAND stockings were ordered.  Video swallow study scheduled for 5/10/2023 currently he is on a puréed diet.      ALLERGIES:Orphenadrine, Penicillins, Alfuzosin, Donepezil, Myolin [norflex], Sulfa drugs, and Triazolam    PAST MEDICAL HISTORY:   Past Medical History:   Diagnosis Date     Dementia with behavioral disturbance (H) 9/3/2019     Depression 5/28/2014     Hypercholesteremia 5/28/2014     Melanoma of back (H) 5/28/2014    Surgically removed       Melanoma of face (H) 5/28/2014    Surgically removed      Seizure (H) 5/19/2015     Thiamine deficiency 9/3/2019     TIA (transient ischemic attack)        PAST SURGICAL HISTORY:   has a past surgical history that includes Cholecystectomy; appendectomy; other surgical history; other surgical history (1993); hernia repair; shoulder surgery (Right); and other surgical history (Right).    FAMILY HISTORY: family history includes Cancer in his mother; Cerebrovascular Disease in his maternal grandfather.    SOCIAL HISTORY:  reports that he has quit smoking. His smoking use included cigarettes. He has never used smokeless tobacco. He reports that he does not drink alcohol and does not use drugs.    ROS:  Constitutional: Negative for activity change, appetite change, fatigue and fever.   HENT: Negative for congestion.    Hearing aid left ear  Respiratory: Negative for cough, shortness of breath and wheezing.    Cardiovascular: Negative for chest pain and leg swelling.   Gastrointestinal: Negative for abdominal distention, abdominal pain, constipation, diarrhea and nausea.   Genitourinary: Negative for dysuria. Retana in place  Musculoskeletal: Negative for arthralgia. Negative for back pain.   Skin: Negative for color change and wound.   Neurological: Negative for dizziness.   Psychiatric/Behavioral: Negative for agitation,  behavioral problems and confusion.     Physical Exam:  Constitutional:       Appearance: Patient is well-developed.   HENT:      Head: Normocephalic.   Eyes:      Conjunctiva/sclera: Conjunctivae normal.   Neck:      Musculoskeletal: Normal range of motion.   Cardiovascular:      Rate and Rhythm: Normal rate and regular rhythm.      Heart sounds: Normal heart sounds. No murmur.   Pulmonary:      Effort: No respiratory distress.      Breath sounds: Normal breath sounds.   Abdominal:      General: Bowel sounds are normal. There is no distension.      Palpations: Abdomen is soft.      Tenderness: There is no abdominal tenderness.   Musculoskeletal:       Normal range of motion.     Skin:General:        Skin is warm.   Neurological:         Mental Status: Patient is alert and oriented to person, place, and time.   Psychiatric:         Behavior: Behavior normal.     Vitals:/62   Pulse 78   Temp 97.6  F (36.4  C)   Resp 18   Wt 59.8 kg (131 lb 12.8 oz)   SpO2 93%   BMI 21.27 kg/m   and Body mass index is 21.27 kg/m .    Lab/Diagnostic data:   Recent Results (from the past 240 hour(s))   Vancomycin level    Collection Time: 04/14/23  6:09 AM   Result Value Ref Range    Vancomycin 14.0   mg/L   Comprehensive metabolic panel    Collection Time: 04/14/23  6:09 AM   Result Value Ref Range    Sodium 143 136 - 145 mmol/L    Potassium 5.6 (H) 3.5 - 5.0 mmol/L    Chloride 123 (H) 98 - 107 mmol/L    Carbon Dioxide (CO2) 14 (L) 22 - 31 mmol/L    Anion Gap 6 5 - 18 mmol/L    Urea Nitrogen 36 (H) 8 - 28 mg/dL    Creatinine 1.36 (H) 0.70 - 1.30 mg/dL    Calcium 7.9 (L) 8.5 - 10.5 mg/dL    Glucose 126 (H) 70 - 125 mg/dL    Alkaline Phosphatase 106 45 - 120 U/L     (H) 0 - 40 U/L     (H) 0 - 45 U/L    Protein Total 5.2 (L) 6.0 - 8.0 g/dL    Albumin 1.5 (L) 3.5 - 5.0 g/dL    Bilirubin Total 0.3 0.0 - 1.0 mg/dL    GFR Estimate 48 (L) >60 mL/min/1.73m2   CBC with platelets    Collection Time: 04/14/23  7:19 AM    Result Value Ref Range    WBC Count 11.2 (H) 4.0 - 11.0 10e3/uL    RBC Count 2.96 (L) 4.40 - 5.90 10e6/uL    Hemoglobin 9.2 (L) 13.3 - 17.7 g/dL    Hematocrit 28.3 (L) 40.0 - 53.0 %    MCV 96 78 - 100 fL    MCH 31.1 26.5 - 33.0 pg    MCHC 32.5 31.5 - 36.5 g/dL    RDW 13.7 10.0 - 15.0 %    Platelet Count 236 150 - 450 10e3/uL   Potassium    Collection Time: 04/14/23  2:59 PM   Result Value Ref Range    Potassium 5.3 (H) 3.5 - 5.0 mmol/L   Comprehensive metabolic panel    Collection Time: 04/15/23  8:56 AM   Result Value Ref Range    Sodium 142 136 - 145 mmol/L    Potassium 5.3 (H) 3.5 - 5.0 mmol/L    Chloride 118 (H) 98 - 107 mmol/L    Carbon Dioxide (CO2) 17 (L) 22 - 31 mmol/L    Anion Gap 7 5 - 18 mmol/L    Urea Nitrogen 26 8 - 28 mg/dL    Creatinine 1.21 0.70 - 1.30 mg/dL    Calcium 8.6 8.5 - 10.5 mg/dL    Glucose 105 70 - 125 mg/dL    Alkaline Phosphatase 107 45 - 120 U/L     (H) 0 - 40 U/L     (H) 0 - 45 U/L    Protein Total 5.5 (L) 6.0 - 8.0 g/dL    Albumin 1.6 (L) 3.5 - 5.0 g/dL    Bilirubin Total 0.3 0.0 - 1.0 mg/dL    GFR Estimate 55 (L) >60 mL/min/1.73m2   CBC with platelets    Collection Time: 04/15/23  8:56 AM   Result Value Ref Range    WBC Count 11.6 (H) 4.0 - 11.0 10e3/uL    RBC Count 2.94 (L) 4.40 - 5.90 10e6/uL    Hemoglobin 9.1 (L) 13.3 - 17.7 g/dL    Hematocrit 29.5 (L) 40.0 - 53.0 %     78 - 100 fL    MCH 31.0 26.5 - 33.0 pg    MCHC 30.8 (L) 31.5 - 36.5 g/dL    RDW 13.6 10.0 - 15.0 %    Platelet Count 225 150 - 450 10e3/uL   Comprehensive metabolic panel    Collection Time: 04/16/23  2:47 PM   Result Value Ref Range    Sodium 138 136 - 145 mmol/L    Potassium 5.2 (H) 3.5 - 5.0 mmol/L    Chloride 111 (H) 98 - 107 mmol/L    Carbon Dioxide (CO2) 16 (L) 22 - 31 mmol/L    Anion Gap 11 5 - 18 mmol/L    Urea Nitrogen 23 8 - 28 mg/dL    Creatinine 1.19 0.70 - 1.30 mg/dL    Calcium 8.4 (L) 8.5 - 10.5 mg/dL    Glucose 142 (H) 70 - 125 mg/dL    Alkaline Phosphatase 116 45 - 120 U/L     AST 99 (H) 0 - 40 U/L     (H) 0 - 45 U/L    Protein Total 6.3 6.0 - 8.0 g/dL    Albumin 1.9 (L) 3.5 - 5.0 g/dL    Bilirubin Total 0.3 0.0 - 1.0 mg/dL    GFR Estimate 57 (L) >60 mL/min/1.73m2   CBC with platelets    Collection Time: 04/16/23  3:28 PM   Result Value Ref Range    WBC Count 13.1 (H) 4.0 - 11.0 10e3/uL    RBC Count 3.01 (L) 4.40 - 5.90 10e6/uL    Hemoglobin 9.3 (L) 13.3 - 17.7 g/dL    Hematocrit 29.5 (L) 40.0 - 53.0 %    MCV 98 78 - 100 fL    MCH 30.9 26.5 - 33.0 pg    MCHC 31.5 31.5 - 36.5 g/dL    RDW 13.6 10.0 - 15.0 %    Platelet Count 228 150 - 450 10e3/uL   Acute hepatitis panel    Collection Time: 04/16/23  3:28 PM   Result Value Ref Range    Hepatitis A Antibody IgM Nonreactive Nonreactive    Hepatitis B Core Antibody IgM Nonreactive Nonreactive    Hepatitis C Antibody Nonreactive Nonreactive    Hepatitis B Surface Antigen Nonreactive Nonreactive   Extra Green Top (Lithium Heparin) Tube    Collection Time: 04/16/23  3:28 PM   Result Value Ref Range    Hold Specimen [facility]    Comprehensive metabolic panel    Collection Time: 04/17/23 10:32 AM   Result Value Ref Range    Sodium 138 136 - 145 mmol/L    Potassium 4.7 3.5 - 5.0 mmol/L    Chloride 111 (H) 98 - 107 mmol/L    Carbon Dioxide (CO2) 19 (L) 22 - 31 mmol/L    Anion Gap 8 5 - 18 mmol/L    Urea Nitrogen 22 8 - 28 mg/dL    Creatinine 1.05 0.70 - 1.30 mg/dL    Calcium 8.5 8.5 - 10.5 mg/dL    Glucose 130 (H) 70 - 125 mg/dL    Alkaline Phosphatase 124 (H) 45 - 120 U/L    AST 64 (H) 0 - 40 U/L     (H) 0 - 45 U/L    Protein Total 6.0 6.0 - 8.0 g/dL    Albumin 1.9 (L) 3.5 - 5.0 g/dL    Bilirubin Total 0.3 0.0 - 1.0 mg/dL    GFR Estimate 66 >60 mL/min/1.73m2   CBC with platelets    Collection Time: 04/17/23 10:32 AM   Result Value Ref Range    WBC Count 13.2 (H) 4.0 - 11.0 10e3/uL    RBC Count 3.19 (L) 4.40 - 5.90 10e6/uL    Hemoglobin 9.9 (L) 13.3 - 17.7 g/dL    Hematocrit 31.6 (L) 40.0 - 53.0 %    MCV 99 78 - 100 fL    MCH 31.0 26.5 - 33.0  pg    MCHC 31.3 (L) 31.5 - 36.5 g/dL    RDW 13.6 10.0 - 15.0 %    Platelet Count 222 150 - 450 10e3/uL   Keppra (Levetiracetam) Level    Collection Time: 04/17/23 11:47 PM   Result Value Ref Range    Keppra (Levetiracetam) Level 22.7 10.0 - 40.0  g/mL   Comprehensive metabolic panel    Collection Time: 04/18/23  9:31 AM   Result Value Ref Range    Sodium 137 136 - 145 mmol/L    Potassium 4.6 3.5 - 5.0 mmol/L    Chloride 110 (H) 98 - 107 mmol/L    Carbon Dioxide (CO2) 21 (L) 22 - 31 mmol/L    Anion Gap 6 5 - 18 mmol/L    Urea Nitrogen 22 8 - 28 mg/dL    Creatinine 0.99 0.70 - 1.30 mg/dL    Calcium 8.1 (L) 8.5 - 10.5 mg/dL    Glucose 107 70 - 125 mg/dL    Alkaline Phosphatase 102 45 - 120 U/L    AST 45 (H) 0 - 40 U/L    ALT 84 (H) 0 - 45 U/L    Protein Total 5.0 (L) 6.0 - 8.0 g/dL    Albumin 1.6 (L) 3.5 - 5.0 g/dL    Bilirubin Total 0.3 0.0 - 1.0 mg/dL    GFR Estimate 71 >60 mL/min/1.73m2   CBC with platelets    Collection Time: 04/18/23  9:31 AM   Result Value Ref Range    WBC Count 12.3 (H) 4.0 - 11.0 10e3/uL    RBC Count 2.72 (L) 4.40 - 5.90 10e6/uL    Hemoglobin 8.4 (L) 13.3 - 17.7 g/dL    Hematocrit 26.9 (L) 40.0 - 53.0 %    MCV 99 78 - 100 fL    MCH 30.9 26.5 - 33.0 pg    MCHC 31.2 (L) 31.5 - 36.5 g/dL    RDW 13.8 10.0 - 15.0 %    Platelet Count 197 150 - 450 10e3/uL     MEDICATIONS:     Review of your medicines          Accurate as of April 23, 2023 11:06 AM. If you have any questions, ask your nurse or doctor.            CONTINUE these medicines which have NOT CHANGED      Dose / Directions   amLODIPine 5 MG tablet  Commonly known as: NORVASC  Used for: Benign essential hypertension      Dose: 5 mg  Take 1 tablet (5 mg) by mouth daily  Quantity: 30 tablet  Refills: 1     Cyanocobalamin 2500 MCG Tabs      Dose: 2,500 mcg  Take 2,500 mcg by mouth every morning  Refills: 0     * hypromellose 0.5 % Soln ophthalmic solution  Commonly known as: ARTIFICIAL TEARS      Dose: 2 drop  2 drops 3 times daily  Refills: 0      * hypromellose 0.5 % Soln ophthalmic solution  Commonly known as: ARTIFICIAL TEARS      Dose: 2 drop  2 drops 3 times daily as needed for dry eyes  Refills: 0     levETIRAcetam 500 MG tablet  Commonly known as: KEPPRA  Used for: Seizure disorder (H)      TAKE 1 TABLET BY MOUTH TWICE DAILY  Quantity: 56 tablet  Refills: 12     polyethylene glycol 17 GM/Dose powder  Commonly known as: MIRALAX  Used for: Constipation, unspecified constipation type      Dose: 17 g  Take 17 g by mouth daily  Quantity: 510 g  Refills: 0     primidone 50 MG tablet  Commonly known as: MYSOLINE      Dose: 50 mg  Take 50 mg by mouth 2 times daily  Refills: 0     sodium chloride 0.65 % nasal spray  Commonly known as: OCEAN  Used for: Nasal congestion      Dose: 1 spray  Spray 1 spray into both nostrils daily as needed for congestion  Refills: 0     thiamine 50 MG Tabs      Dose: 50 mg  Take 50 mg by mouth every morning  Refills: 0         * This list has 2 medication(s) that are the same as other medications prescribed for you. Read the directions carefully, and ask your doctor or other care provider to review them with you.                ASSESSMENT/PLAN  Encounter Diagnoses   Name Primary?     Seizure disorder (H) Yes     Dementia with behavioral disturbance (H)      Physical deconditioning      Urinary retention      Hypertension continue Norvasc    Seizure disorder on Keppra and primidone Keppra Level WNL 4/17/23 at 22.7 he will follow-up with neurology for first visit on 5/1/2023    Leukocytosis WBC on 4/17/2023 was 13.2 lab pending for the a.m.    Rteana in place will undergo urology procedure on 5/15/2023    Electronically signed by: Juana Martinez CNP      Sincerely,        Juana Martinez CNP

## 2023-04-25 NOTE — LETTER
4/25/2023        RE: Kurtis Urban  [address]  [place]/o [place] Urban  [facility] [address]        [facility] GERIATRIC SERVICES       Patient Kurtis Urban  MRN: [id number]        Reason for Visit     Chief Complaint   Patient presents with     Establish Care       Code Status     CPR/Full code     Assessment     Generalized weakness  Urinary retention s/p rai  Bronchiectasis  Seizure disorder  Dysphagia  Sacral wound  Bladder mass  Subdural hematoma  Essential hypertension    Plan     Pt is admitted to TCU for strengthening and rehab.  Recheck labs  Continue with PT/OT  Outpatient urology follow up   Outpatient neurosurgery follow up  Wound cares  Continue amlodipine 5mg   Defer restarting atorvastatin to PCP   Aspiration precautions       History     Patient is a very pleasant 89 year old male who is admitted to TCU  Initial hospitalization 3/2023 for mechanical fall sustaining subdural hematoma. Found to have urinary retention and rai placement.   Recent hospitalization for encephalopathy in setting of a UTI.     Transferred from bed to his chair this morning and wants to go back to bed. No other concerns or complaints. Not a good appetite but that is unchanged from baseline. Normal urine output and BMs.     Past Medical & Surgical History     PAST MEDICAL HISTORY:   Past Medical History:   Diagnosis Date     Dementia with behavioral disturbance (H) 9/3/2019     Depression 5/28/2014     Hypercholesteremia 5/28/2014     Melanoma of back (H) 5/28/2014    Surgically removed       Melanoma of face (H) 5/28/2014    Surgically removed      Seizure (H) 5/19/2015     Thiamine deficiency 9/3/2019     TIA (transient ischemic attack)       PAST SURGICAL HISTORY:   has a past surgical history that includes Cholecystectomy; appendectomy; other surgical history; other surgical history (1993); hernia repair; shoulder surgery (Right); and other surgical history (Right).      Past Social History      Reviewed,  reports that he has quit smoking. His smoking use included cigarettes. He has never used smokeless tobacco. He reports that he does not drink alcohol and does not use drugs.    Family History     Reviewed, and family history includes Cancer in his mother; Cerebrovascular Disease in his maternal grandfather.    Medication List     Current Outpatient Medications   Medication     amLODIPine (NORVASC) 5 MG tablet     Cyanocobalamin 2500 MCG TABS     hypromellose (ARTIFICIAL TEARS) 0.5 % SOLN ophthalmic solution     hypromellose (ARTIFICIAL TEARS) 0.5 % SOLN ophthalmic solution     levETIRAcetam (KEPPRA) 500 MG tablet     polyethylene glycol (MIRALAX) 17 GM/Dose powder     primidone (MYSOLINE) 50 MG tablet     sodium chloride (OCEAN) 0.65 % nasal spray     thiamine 50 MG TABS     No current facility-administered medications for this visit.      MED REC REQUIRED  Post Medication Reconciliation Status:  Discharge medications reconciled and changed, see notes/orders         Allergies     Allergies   Allergen Reactions     Orphenadrine Hives     Other reaction(s): hives       Penicillins Anaphylaxis and Nausea and Vomiting     Alfuzosin Nausea and Vomiting     Donepezil Other (See Comments)     Myolin [Norflex] Unknown     Other reaction(s): hives     Sulfa Drugs Nausea and Vomiting     Triazolam Nausea and Vomiting     Penicillin V Rash       Review of Systems   A comprehensive review of 14 systems was done. Pertinent findings noted here and in history of present illness. All the rest negative.  Constitutional: Negative.  Negative for fever, chills, he has  activity change, appetite change and fatigue.   Denies any pain but has been not eating or participating much in other activities needs more care now  HENT: Negative for congestion and facial swelling.    Eyes: Negative for photophobia, redness and visual disturbance.   Respiratory: Negative for cough and chest tightness.    Cardiovascular: Negative for  "chest pain, palpitations and leg swelling.   Gastrointestinal: Negative for nausea, diarrhea, constipation, blood in stool and abdominal distention.   Genitourinary: Negative.    Musculoskeletal: Negative.  Reports no pain but wants to lay in bed  Skin: Negative.    Neurological: Negative for dizziness, tremors, syncope, weakness, light-headedness and headaches.   Hematological: Does not bruise/bleed easily.   Psychiatric/Behavioral: Negative.  Overall decline with weakness and fatigue and less participation      Physical Exam   /72   Pulse 68   Temp 97.8  F (36.6  C)   Resp 18   Ht 1.676 m (5' 6\")   Wt 58.5 kg (129 lb)   SpO2 97%   BMI 20.82 kg/m       Constitutional: Oriented to person, place, and time and appears well-developed. Thin.   HEENT:  Normocephalic and atraumatic.  Eyes: Conjunctivae and EOM are normal. Pupils are equal, round, and reactive to light. No discharge.  No scleral icterus. Nose normal. Mouth/Throat: Oropharynx is clear and moist. No oropharyngeal exudate.    NECK: Normal range of motion. Neck supple. No JVD present. No tracheal deviation present. No thyromegaly present.   CARDIOVASCULAR: Normal rate, regular rhythm and intact distal pulses.  Exam reveals no gallop and no friction rub.  Systolic murmur present.  PULMONARY: Effort normal and breath sounds normal. No respiratory distress.No Wheezing or rales.  ABDOMEN: Soft. Bowel sounds are normal. No distension and no mass.  There is no tenderness. There is no rebound and no guarding. No HSM.  MUSCULOSKELETAL: Normal range of motion. Mild kyphosis, no tenderness.  LYMPH NODES: Has no cervical, supraclavicular, axillary and groin adenopathy.   NEUROLOGICAL: Alert and oriented to person, place, and time. No cranial nerve deficit.  Normal muscle tone. Coordination normal.   GENITOURINARY: Deferred exam.  SKIN: Skin is warm and dry. No rash noted. No erythema. No pallor.   EXTREMITIES: No cyanosis, no clubbing, no edema. No " Deformity.  PSYCHIATRIC: Normal mood, affect and behavior.  This is tiredness and does not want to get out of bed poor participation in activities noted      Lab Results     Recent Results (from the past 240 hour(s))   Comprehensive metabolic panel    Collection Time: 04/16/23  2:47 PM   Result Value Ref Range    Sodium 138 136 - 145 mmol/L    Potassium 5.2 (H) 3.5 - 5.0 mmol/L    Chloride 111 (H) 98 - 107 mmol/L    Carbon Dioxide (CO2) 16 (L) 22 - 31 mmol/L    Anion Gap 11 5 - 18 mmol/L    Urea Nitrogen 23 8 - 28 mg/dL    Creatinine 1.19 0.70 - 1.30 mg/dL    Calcium 8.4 (L) 8.5 - 10.5 mg/dL    Glucose 142 (H) 70 - 125 mg/dL    Alkaline Phosphatase 116 45 - 120 U/L    AST 99 (H) 0 - 40 U/L     (H) 0 - 45 U/L    Protein Total 6.3 6.0 - 8.0 g/dL    Albumin 1.9 (L) 3.5 - 5.0 g/dL    Bilirubin Total 0.3 0.0 - 1.0 mg/dL    GFR Estimate 57 (L) >60 mL/min/1.73m2   CBC with platelets    Collection Time: 04/16/23  3:28 PM   Result Value Ref Range    WBC Count 13.1 (H) 4.0 - 11.0 10e3/uL    RBC Count 3.01 (L) 4.40 - 5.90 10e6/uL    Hemoglobin 9.3 (L) 13.3 - 17.7 g/dL    Hematocrit 29.5 (L) 40.0 - 53.0 %    MCV 98 78 - 100 fL    MCH 30.9 26.5 - 33.0 pg    MCHC 31.5 31.5 - 36.5 g/dL    RDW 13.6 10.0 - 15.0 %    Platelet Count 228 150 - 450 10e3/uL   Acute hepatitis panel    Collection Time: 04/16/23  3:28 PM   Result Value Ref Range    Hepatitis A Antibody IgM Nonreactive Nonreactive    Hepatitis B Core Antibody IgM Nonreactive Nonreactive    Hepatitis C Antibody Nonreactive Nonreactive    Hepatitis B Surface Antigen Nonreactive Nonreactive   Extra Green Top (Lithium Heparin) Tube    Collection Time: 04/16/23  3:28 PM   Result Value Ref Range    Hold Specimen [facility]    Comprehensive metabolic panel    Collection Time: 04/17/23 10:32 AM   Result Value Ref Range    Sodium 138 136 - 145 mmol/L    Potassium 4.7 3.5 - 5.0 mmol/L    Chloride 111 (H) 98 - 107 mmol/L    Carbon Dioxide (CO2) 19 (L) 22 - 31 mmol/L    Anion Gap 8 5 -  18 mmol/L    Urea Nitrogen 22 8 - 28 mg/dL    Creatinine 1.05 0.70 - 1.30 mg/dL    Calcium 8.5 8.5 - 10.5 mg/dL    Glucose 130 (H) 70 - 125 mg/dL    Alkaline Phosphatase 124 (H) 45 - 120 U/L    AST 64 (H) 0 - 40 U/L     (H) 0 - 45 U/L    Protein Total 6.0 6.0 - 8.0 g/dL    Albumin 1.9 (L) 3.5 - 5.0 g/dL    Bilirubin Total 0.3 0.0 - 1.0 mg/dL    GFR Estimate 66 >60 mL/min/1.73m2   CBC with platelets    Collection Time: 04/17/23 10:32 AM   Result Value Ref Range    WBC Count 13.2 (H) 4.0 - 11.0 10e3/uL    RBC Count 3.19 (L) 4.40 - 5.90 10e6/uL    Hemoglobin 9.9 (L) 13.3 - 17.7 g/dL    Hematocrit 31.6 (L) 40.0 - 53.0 %    MCV 99 78 - 100 fL    MCH 31.0 26.5 - 33.0 pg    MCHC 31.3 (L) 31.5 - 36.5 g/dL    RDW 13.6 10.0 - 15.0 %    Platelet Count 222 150 - 450 10e3/uL   Keppra (Levetiracetam) Level    Collection Time: 04/17/23 11:47 PM   Result Value Ref Range    Keppra (Levetiracetam) Level 22.7 10.0 - 40.0  g/mL   Comprehensive metabolic panel    Collection Time: 04/18/23  9:31 AM   Result Value Ref Range    Sodium 137 136 - 145 mmol/L    Potassium 4.6 3.5 - 5.0 mmol/L    Chloride 110 (H) 98 - 107 mmol/L    Carbon Dioxide (CO2) 21 (L) 22 - 31 mmol/L    Anion Gap 6 5 - 18 mmol/L    Urea Nitrogen 22 8 - 28 mg/dL    Creatinine 0.99 0.70 - 1.30 mg/dL    Calcium 8.1 (L) 8.5 - 10.5 mg/dL    Glucose 107 70 - 125 mg/dL    Alkaline Phosphatase 102 45 - 120 U/L    AST 45 (H) 0 - 40 U/L    ALT 84 (H) 0 - 45 U/L    Protein Total 5.0 (L) 6.0 - 8.0 g/dL    Albumin 1.6 (L) 3.5 - 5.0 g/dL    Bilirubin Total 0.3 0.0 - 1.0 mg/dL    GFR Estimate 71 >60 mL/min/1.73m2   CBC with platelets    Collection Time: 04/18/23  9:31 AM   Result Value Ref Range    WBC Count 12.3 (H) 4.0 - 11.0 10e3/uL    RBC Count 2.72 (L) 4.40 - 5.90 10e6/uL    Hemoglobin 8.4 (L) 13.3 - 17.7 g/dL    Hematocrit 26.9 (L) 40.0 - 53.0 %    MCV 99 78 - 100 fL    MCH 30.9 26.5 - 33.0 pg    MCHC 31.2 (L) 31.5 - 36.5 g/dL    RDW 13.8 10.0 - 15.0 %    Platelet Count  197 150  450 10e3/uL     Electronically signed by  Clyde Cook DO       Patient seen independently And reviewed with the resident as well as with nursing staff.  Patient has readmitted but overall a significant decline noted both in cognition and physical abilities.  He has been moved to long-term care with goal being to keep him in long-term care unless he rebounds and can go back to his original assisted living apartment    He has upcoming surgery scheduled for next month.  Not sure if he will be able to keep those appointments in light of his cognitive and physical decline.  Son has been contacted to update him and to consider discussing goals of care.  Son has reported that he would like to talk to his family before making a decision.  Continue with current care plan  I agree with above assessment plan  Bela Franklin MD                               Sincerely,        DEB Mendiola

## 2023-04-25 NOTE — PROGRESS NOTES
[facility] GERIATRIC SERVICES       Patient Kurtis BOBBY Eichstaedt  MRN: [id number]        Reason for Visit     Chief Complaint   Patient presents with     Establish Care       Code Status     CPR/Full code     Assessment     Generalized weakness  Urinary retention s/p rai  Bronchiectasis  Seizure disorder  Dysphagia  Sacral wound  Bladder mass  Subdural hematoma  Essential hypertension    Plan     Pt is admitted to TCU for strengthening and rehab.  Recheck labs  Continue with PT/OT  Outpatient urology follow up   Outpatient neurosurgery follow up  Wound cares  Continue amlodipine 5mg   Defer restarting atorvastatin to PCP   Aspiration precautions       History     Patient is a very pleasant 89 year old male who is admitted to TCU  Initial hospitalization 3/2023 for mechanical fall sustaining subdural hematoma. Found to have urinary retention and rai placement.   Recent hospitalization for encephalopathy in setting of a UTI.     Transferred from bed to his chair this morning and wants to go back to bed. No other concerns or complaints. Not a good appetite but that is unchanged from baseline. Normal urine output and BMs.     Past Medical & Surgical History     PAST MEDICAL HISTORY:   Past Medical History:   Diagnosis Date     Dementia with behavioral disturbance (H) 9/3/2019     Depression 5/28/2014     Hypercholesteremia 5/28/2014     Melanoma of back (H) 5/28/2014    Surgically removed       Melanoma of face (H) 5/28/2014    Surgically removed      Seizure (H) 5/19/2015     Thiamine deficiency 9/3/2019     TIA (transient ischemic attack)       PAST SURGICAL HISTORY:   has a past surgical history that includes Cholecystectomy; appendectomy; other surgical history; other surgical history (1993); hernia repair; shoulder surgery (Right); and other surgical history (Right).      Past Social History     Reviewed,  reports that he has quit smoking. His smoking use included cigarettes. He has never used smokeless tobacco. He  reports that he does not drink alcohol and does not use drugs.    Family History     Reviewed, and family history includes Cancer in his mother; Cerebrovascular Disease in his maternal grandfather.    Medication List     Current Outpatient Medications   Medication     amLODIPine (NORVASC) 5 MG tablet     Cyanocobalamin 2500 MCG TABS     hypromellose (ARTIFICIAL TEARS) 0.5 % SOLN ophthalmic solution     hypromellose (ARTIFICIAL TEARS) 0.5 % SOLN ophthalmic solution     levETIRAcetam (KEPPRA) 500 MG tablet     polyethylene glycol (MIRALAX) 17 GM/Dose powder     primidone (MYSOLINE) 50 MG tablet     sodium chloride (OCEAN) 0.65 % nasal spray     thiamine 50 MG TABS     No current facility-administered medications for this visit.      MED REC REQUIRED  Post Medication Reconciliation Status:  Discharge medications reconciled and changed, see notes/orders         Allergies     Allergies   Allergen Reactions     Orphenadrine Hives     Other reaction(s): hives       Penicillins Anaphylaxis and Nausea and Vomiting     Alfuzosin Nausea and Vomiting     Donepezil Other (See Comments)     Myolin [Norflex] Unknown     Other reaction(s): hives     Sulfa Drugs Nausea and Vomiting     Triazolam Nausea and Vomiting     Penicillin V Rash       Review of Systems   A comprehensive review of 14 systems was done. Pertinent findings noted here and in history of present illness. All the rest negative.  Constitutional: Negative.  Negative for fever, chills, he has  activity change, appetite change and fatigue.   Denies any pain but has been not eating or participating much in other activities needs more care now  HENT: Negative for congestion and facial swelling.    Eyes: Negative for photophobia, redness and visual disturbance.   Respiratory: Negative for cough and chest tightness.    Cardiovascular: Negative for chest pain, palpitations and leg swelling.   Gastrointestinal: Negative for nausea, diarrhea, constipation, blood in stool and  "abdominal distention.   Genitourinary: Negative.    Musculoskeletal: Negative.  Reports no pain but wants to lay in bed  Skin: Negative.    Neurological: Negative for dizziness, tremors, syncope, weakness, light-headedness and headaches.   Hematological: Does not bruise/bleed easily.   Psychiatric/Behavioral: Negative.  Overall decline with weakness and fatigue and less participation      Physical Exam   /72   Pulse 68   Temp 97.8  F (36.6  C)   Resp 18   Ht 1.676 m (5' 6\")   Wt 58.5 kg (129 lb)   SpO2 97%   BMI 20.82 kg/m       Constitutional: Oriented to person, place, and time and appears well-developed. Thin.   HEENT:  Normocephalic and atraumatic.  Eyes: Conjunctivae and EOM are normal. Pupils are equal, round, and reactive to light. No discharge.  No scleral icterus. Nose normal. Mouth/Throat: Oropharynx is clear and moist. No oropharyngeal exudate.    NECK: Normal range of motion. Neck supple. No JVD present. No tracheal deviation present. No thyromegaly present.   CARDIOVASCULAR: Normal rate, regular rhythm and intact distal pulses.  Exam reveals no gallop and no friction rub.  Systolic murmur present.  PULMONARY: Effort normal and breath sounds normal. No respiratory distress.No Wheezing or rales.  ABDOMEN: Soft. Bowel sounds are normal. No distension and no mass.  There is no tenderness. There is no rebound and no guarding. No HSM.  MUSCULOSKELETAL: Normal range of motion. Mild kyphosis, no tenderness.  LYMPH NODES: Has no cervical, supraclavicular, axillary and groin adenopathy.   NEUROLOGICAL: Alert and oriented to person, place, and time. No cranial nerve deficit.  Normal muscle tone. Coordination normal.   GENITOURINARY: Deferred exam.  SKIN: Skin is warm and dry. No rash noted. No erythema. No pallor.   EXTREMITIES: No cyanosis, no clubbing, no edema. No Deformity.  PSYCHIATRIC: Normal mood, affect and behavior.  This is tiredness and does not want to get out of bed poor participation in " activities noted      Lab Results     Recent Results (from the past 240 hour(s))   Comprehensive metabolic panel    Collection Time: 04/16/23  2:47 PM   Result Value Ref Range    Sodium 138 136 - 145 mmol/L    Potassium 5.2 (H) 3.5 - 5.0 mmol/L    Chloride 111 (H) 98 - 107 mmol/L    Carbon Dioxide (CO2) 16 (L) 22 - 31 mmol/L    Anion Gap 11 5 - 18 mmol/L    Urea Nitrogen 23 8 - 28 mg/dL    Creatinine 1.19 0.70 - 1.30 mg/dL    Calcium 8.4 (L) 8.5 - 10.5 mg/dL    Glucose 142 (H) 70 - 125 mg/dL    Alkaline Phosphatase 116 45 - 120 U/L    AST 99 (H) 0 - 40 U/L     (H) 0 - 45 U/L    Protein Total 6.3 6.0 - 8.0 g/dL    Albumin 1.9 (L) 3.5 - 5.0 g/dL    Bilirubin Total 0.3 0.0 - 1.0 mg/dL    GFR Estimate 57 (L) >60 mL/min/1.73m2   CBC with platelets    Collection Time: 04/16/23  3:28 PM   Result Value Ref Range    WBC Count 13.1 (H) 4.0 - 11.0 10e3/uL    RBC Count 3.01 (L) 4.40 - 5.90 10e6/uL    Hemoglobin 9.3 (L) 13.3 - 17.7 g/dL    Hematocrit 29.5 (L) 40.0 - 53.0 %    MCV 98 78 - 100 fL    MCH 30.9 26.5 - 33.0 pg    MCHC 31.5 31.5 - 36.5 g/dL    RDW 13.6 10.0 - 15.0 %    Platelet Count 228 150 - 450 10e3/uL   Acute hepatitis panel    Collection Time: 04/16/23  3:28 PM   Result Value Ref Range    Hepatitis A Antibody IgM Nonreactive Nonreactive    Hepatitis B Core Antibody IgM Nonreactive Nonreactive    Hepatitis C Antibody Nonreactive Nonreactive    Hepatitis B Surface Antigen Nonreactive Nonreactive   Extra Green Top (Lithium Heparin) Tube    Collection Time: 04/16/23  3:28 PM   Result Value Ref Range    Hold Specimen [facility]    Comprehensive metabolic panel    Collection Time: 04/17/23 10:32 AM   Result Value Ref Range    Sodium 138 136 - 145 mmol/L    Potassium 4.7 3.5 - 5.0 mmol/L    Chloride 111 (H) 98 - 107 mmol/L    Carbon Dioxide (CO2) 19 (L) 22 - 31 mmol/L    Anion Gap 8 5 - 18 mmol/L    Urea Nitrogen 22 8 - 28 mg/dL    Creatinine 1.05 0.70 - 1.30 mg/dL    Calcium 8.5 8.5 - 10.5 mg/dL    Glucose 130 (H) 70 -  125 mg/dL    Alkaline Phosphatase 124 (H) 45 - 120 U/L    AST 64 (H) 0 - 40 U/L     (H) 0 - 45 U/L    Protein Total 6.0 6.0 - 8.0 g/dL    Albumin 1.9 (L) 3.5 - 5.0 g/dL    Bilirubin Total 0.3 0.0 - 1.0 mg/dL    GFR Estimate 66 >60 mL/min/1.73m2   CBC with platelets    Collection Time: 04/17/23 10:32 AM   Result Value Ref Range    WBC Count 13.2 (H) 4.0 - 11.0 10e3/uL    RBC Count 3.19 (L) 4.40 - 5.90 10e6/uL    Hemoglobin 9.9 (L) 13.3 - 17.7 g/dL    Hematocrit 31.6 (L) 40.0 - 53.0 %    MCV 99 78 - 100 fL    MCH 31.0 26.5 - 33.0 pg    MCHC 31.3 (L) 31.5 - 36.5 g/dL    RDW 13.6 10.0 - 15.0 %    Platelet Count 222 150 - 450 10e3/uL   Keppra (Levetiracetam) Level    Collection Time: 04/17/23 11:47 PM   Result Value Ref Range    Keppra (Levetiracetam) Level 22.7 10.0 - 40.0  g/mL   Comprehensive metabolic panel    Collection Time: 04/18/23  9:31 AM   Result Value Ref Range    Sodium 137 136 - 145 mmol/L    Potassium 4.6 3.5 - 5.0 mmol/L    Chloride 110 (H) 98 - 107 mmol/L    Carbon Dioxide (CO2) 21 (L) 22 - 31 mmol/L    Anion Gap 6 5 - 18 mmol/L    Urea Nitrogen 22 8 - 28 mg/dL    Creatinine 0.99 0.70 - 1.30 mg/dL    Calcium 8.1 (L) 8.5 - 10.5 mg/dL    Glucose 107 70 - 125 mg/dL    Alkaline Phosphatase 102 45 - 120 U/L    AST 45 (H) 0 - 40 U/L    ALT 84 (H) 0 - 45 U/L    Protein Total 5.0 (L) 6.0 - 8.0 g/dL    Albumin 1.6 (L) 3.5 - 5.0 g/dL    Bilirubin Total 0.3 0.0 - 1.0 mg/dL    GFR Estimate 71 >60 mL/min/1.73m2   CBC with platelets    Collection Time: 04/18/23  9:31 AM   Result Value Ref Range    WBC Count 12.3 (H) 4.0 - 11.0 10e3/uL    RBC Count 2.72 (L) 4.40 - 5.90 10e6/uL    Hemoglobin 8.4 (L) 13.3 - 17.7 g/dL    Hematocrit 26.9 (L) 40.0 - 53.0 %    MCV 99 78 - 100 fL    MCH 30.9 26.5 - 33.0 pg    MCHC 31.2 (L) 31.5 - 36.5 g/dL    RDW 13.8 10.0 - 15.0 %    Platelet Count 197 150 - 450 10e3/uL     Electronically signed by  Clyde Cook DO       Patient seen independently And reviewed with the resident as well  as with nursing staff.  Patient has readmitted but overall a significant decline noted both in cognition and physical abilities.  He has been moved to long-term care with goal being to keep him in long-term care unless he rebounds and can go back to his original assisted living apartment    He has upcoming surgery scheduled for next month.  Not sure if he will be able to keep those appointments in light of his cognitive and physical decline.  Son has been contacted to update him and to consider discussing goals of care.  Son has reported that he would like to talk to his family before making a decision.  Continue with current care plan  I agree with above assessment plan  Bela Franklin MD

## 2023-04-26 PROBLEM — F32.0 CURRENT MILD EPISODE OF MAJOR DEPRESSIVE DISORDER WITHOUT PRIOR EPISODE (H): Status: ACTIVE | Noted: 2023-01-01

## 2023-04-26 PROBLEM — D69.6 THROMBOCYTOPENIA (H): Status: ACTIVE | Noted: 2023-01-01

## 2023-04-26 NOTE — TELEPHONE ENCOUNTER
[facility] Lab Note     Provider: CODY Dill  Facility: [facility]  Facility Type:  LT    Allergies   Allergen Reactions     Orphenadrine Hives     Other reaction(s): hives       Penicillins Anaphylaxis and Nausea and Vomiting     Alfuzosin Nausea and Vomiting     Donepezil Other (See Comments)     Myolin [Orphenadrine Citrate] Unknown     Other reaction(s): hives     Sulfa Antibiotics Nausea and Vomiting     Triazolam Nausea and Vomiting     Penicillin V Rash       Labs Reviewed by provider: Heme 1     Verbal Order/Direction given by Provider: No new orders.      Provider giving Order:  CODY Dill    Verbal Order given to: Collette([phone])    Matty Evans RN
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
## 2023-05-01 NOTE — TELEPHONE ENCOUNTER
Pls call friend Matty at [phone] to schedule EEG at [facility]. See order from Dr Deng.

## 2023-05-01 NOTE — PATIENT INSTRUCTIONS
You have a vascular dementia which is slow and progressive over time.  It is what is causing your cognitive deficits.  - CPT from OT  - Vitamin B1    You have a long history of seizure, but it is possible the seizures have been due to a meningioma (brain covering tumor) over the left temporal lobe.  I would want to make sure you risk of seizure is not higher than what your medication can handle.   - EEG routine  - Keppra 500 mg BID    For your tremor, you can continue on primidone.  - Primidone 50 mg BID

## 2023-05-01 NOTE — LETTER
5/1/2023         RE: Kurtis Urban  [address]  [place]  [place] [address]        Dear Colleague,    Thank you for referring your patient, Kurtis Urban, to the [facility] NEUROLOGY [facility]. Please see a copy of my visit note below.    [facility] Neurology Consultation    Kurtis Urban MRN# [id number]   Age: 89 year old YOB: 1929     Requesting physician: Juana Kirk     Reason for Consultation: seizure      History of Presenting Symptoms:   Kurtis Urban is a 89 year old male who presents today for evaluation of seizure.    The patient was seen with Dr. Briones of general Neurology 5/19/2015 while admitted for confusion.  It was noted his wife passed in 2015 and was brought to the ED for staring events with chewing motions, then leading to him being confused and speaking in his native tongue (Czech) for a brief period of time.  There was mention of him having TIA and a dementia diagnosis in his past during that visit.  He was to tart Keppra 500 mg BID following that visit.  His imaging at the time showed chronic frontal infarctions and arachnoid cyst of the cerebellum.  His EEG at the time showed some mild background slowing.  He then continued on Keppra 100 mg BID for seizure treatment.    The patient was again seen in an hospital admission 9/3/2019 with Dr. Huerta for periods of confusion (event lasting 2 hours then return to normal).  B1 was 42 in 6/23/2019 and he was to start replacement during his hospitalization.  EEG showed no seizure activity and his Keppra level was 11.  He was to transition to assisted living.    Dementia evaluation occurred in 2006 w/Dr. Presley.  He was noted to have a MoCA of 20/30 in 2015 and a score of 13/30 in 2019.  He was tried on Aricept, Namenda, and Razadyne in the past.  He was followed with Dr. Walker for his tremor, which resulted in primidone treatment of 50 mg BID.      The  patient was seen within the movement disorder clinic 11/9/2022 for essential tremor controlled on primidone.  He was referred to general neurology for establishment of care relating to his epilepsy and continued management with keppra 500 mg BID.    Recently, the patient was admitted 3/16/2023 through 3/20/2023 for a traumatic subdural hematoma with complications of urinary tract infection (further complicated by a bladder mass as a possible etiology for UTI).  He had an unwitnessed fall at his living facility and was unable to get off the floor for 10-12 hours until someone found him in the AM.  Imaging showed b/l acute SDH and increased left anterior middle cranial fossa meningioma compared to 9/2019 imaging.  No neurosurgical intervention was recommended. The family declined monitoring and treatment for meningioma which was seen on imaging done during the visit.      A keppra level was done 4/17/2023 which was normal (22.7)    Today, the patient is here with his friend Matty, who manages most of Kurtis's care.  He tells me that Kurtis was living in independent living prior to his fall. Later in our interview, Matty tells me that Kurtis required assistance with medication management and home cares.  He fell down when bending over and then couldn't get up.  He wasn't wearing his home alert button.  He had no LOC and there was no event similar to a seizure reported.    B/l shoulder injuries led to difficulties with his right shoulder recovering, and PT isn't really helping much (cannot raise arm above his head).     Medications:   Amlodipine  Levetiracetam 500 mg BID  Primidone 50 mg BID  Thiamine 50 mg QAM     Physical Exam:   Vitals: /61 (BP Location: Left arm, Patient Position: Sitting, Cuff Size: Adult Regular)   Pulse 61    General: Seated comfortably in no acute distress.  Extremely frail looking, and often sits and smiles at questions. His friend often yells into his left ear for Kurtis to understand what  "was said, but even when he expresses understanding of a question some responses are without content.  Neurologic:     Mental Status: States 9/11 was in [facility] and was an attack on building. Can follow multiple step commands which cross the midline. Luria without an ability to follow 3 step pattern on either side.  0/3 recall at 5 minutes.       Cranial Nerves: Visual fields intact. PERRL. EOMI with normal smooth pursuit. Facial sensation intact/symmetric. Facial movements symmetric. Hearing not formally tested but intact to conversation. Palate elevation symmetric, uvula midline. No dysarthria. Shoulder shrug strong bilaterally. Tongue protrusion midline.     Motor: Extremely atrophic in hips, thighs, legs, forearms, arms. No fasciculations. Generalized weakness throughout 4-/5 with all movements b/l (SA, BF, TE, WF, WE, FF, FE, Fabd. HF, HE, KF, KE, PF, DF).     Deep Tendon Reflexes: 2+/symmetric throughout upper extremities. Patellar and achilles are absent b/l. No clonus.      Sensory: Intact/symmetric to light touch, pinprick in upper and lower extremities.     Coordination: Finger-nose-finger with no ataxia and minor dysmetria.      Gait: Wheelchair bound         Data: Pertinent prior to visit   Imaging:  MRI brain 3/16/2023:  \"IMPRESSION:  1.  Unchanged size of bilateral cerebral convexity subdural hematomas since same-day CT, which contain blood products of differing ages, suspect acute on subacute.  2.  Reduced diffusivity at the anterior component of the left subdural collection can be seen with hematoma of this age as well as in the context of infection/empyema - correlate clinically.  3.  Enhancing extra-axial mass in the left middle cranial fossa compatible with meningioma. Associated focal mass effect exerted upon left temporal pole with parenchymal edema.  4.  Mild rightward shift of midline structures by approximately 0.3 cm.\"    \"MRI of the brain 9/3/2019  1.  No evidence for acute or " "subacute infarct, acute hemorrhage, obstructive hydrocephalus or new intracranial mass.  2.  Stable small chronic cortical and subcortical infarcts along the posterior lateral right and left frontal parietal regions with a stable small chronic lacunar infarct left basal ganglia.  3.  Stable mild to moderate age-related changes.  4.  Stable prominent developmental retrocerebellar arachnoid cyst.\"  - upon review today, there is a left temporal meningioma (extra-axial mass) of the left cranial fossa present, and it is smaller than what is noted in  imaging.     MRI brain: 2015: \"IMPRESSION: CONCLUSION:  1.  No evidence for acute or subacute infarct on the diffusion acquisition. No acute hemorrhage, obstructive hydrocephalus or intracranial mass.   2.  No pathologic contrast enhancement of the brain parenchyma or meninges.   3.  Stable chronic infarcts in the posterior frontoparietal regions bilaterally.   4.  Stable generalized cerebral and cerebellar atrophy and chronic small vessel ischemic change in the cerebral white matter and to a lesser extent in the monique.   5.  Stable large retrocerebellar CSF fluid collection likely developmental and incidental.   6.  Mucosal thickening in the ethmoid sinuses.\"    Procedures:  EE/3/2019:\" Description the Recording: Background of the recording consists of well-regulated and well-modulated about 8 Hz activities.  This alpha activity is bilaterally symmetrical, has an AP gradient and attenuates with alerting procedures. Photic stimulation performed with eyes open produces minimal driving response.  Hyperventilation was not performed.  During this recording no sharp discharges, spikes or electrographic seizure activity was recorded.    Impression: This is a normal awake and drowsy EEG for age. No seizure activities were detected during study. \"    EE2015:\"  Impression: Mildly abnormal awake and drowsy EEG given the presence of slowing of the posterior dominant " "rhythm a finding that is not epileptogenic and can be seen in metabolic encephalopathies and in neurodegenerative conditions.\"         Assessment and Plan:   Assessment:  - Mixed dementia (Vascular predominant)  - Chronic subdural hygroma  - Left temporal meningioma, growing  - Undefined seizure disorder with GTC  - Essential tremor    The patient does have a mixed dementia that is most likely vascular in nature given his imaging, fluctuating symptoms, and timeline of progression (15+ years at this point).  I think this, along with his physical disabilities and difficulties with hearing are leading to a large degree of his functional ability loss.  A seizure issue could certainly contribute to his acute changes in mentation and slow recovery, and I worry that his growing left temporal meningoma is the likely culprit if there is a focal cortical irritation.  We discussed today that Kurtis was on hospice for a short time, but has made a decision to not be on this service any further for the time being.  We discussed that operating on a meningioma would require further evidence that it was symptomatic and worth the likely high risk of mortality of the procedure.  He will obtain an EEG to see if there is epileptiform activity, and decide further on watch-wait versus medication versus surgery.      His cognitive issues are longstanding and given his imaging, I think are likely vascular predominant.  I am not certain of his cognitive baseline as there seems to be conflicting reports of his level of independence.   A few tests (CPT, B1) can be done to define his deficits further and see if he is on the correct supplementation dosing structure.    Primidone can continue for his tremor, which was negligible today.    Plan:  - EEG routine  - CPT from OT  - B1 level  - Keppra 500 mg BID    Follow up in Neurology clinic in 6 months, or should new concerns arise.    PALMIRA Deng D.O.   of " Neurology    Total time  today (89 min) in this patient encounter was spent on pre-charting, counseling and/or coordination of care.The patient is in agreement with this plan and has no further questions.        Again, thank you for allowing me to participate in the care of your patient.        Sincerely,        Darrell Deng, DO

## 2023-05-08 NOTE — TELEPHONE ENCOUNTER
[facility] Call Center    Phone Message    May a detailed message be left on voicemail: no     Reason for Call: Other: Jesusita from [facility] is faxing over the results of the pt's Vitamin B levels and slo cognitive work up.      Please call Jesusita back with questions or concerns at # [phone].    Action Taken: Message routed to:  Other: [facility] Neurology     Travel Screening: Not Applicable

## 2023-05-08 NOTE — TELEPHONE ENCOUNTER
[facility] Lab Note     Provider: CODY Dill  Facility: [facility]  Facility Type:  LT    Allergies   Allergen Reactions     Orphenadrine Hives     Other reaction(s): hives       Penicillins Anaphylaxis and Nausea and Vomiting     Alfuzosin Nausea and Vomiting     Donepezil Other (See Comments)     Myolin [Orphenadrine Citrate] Unknown     Other reaction(s): hives     Sulfa Antibiotics Nausea and Vomiting     Triazolam Nausea and Vomiting     Penicillin V Rash       Labs Reviewed by provider: Vitamin B1     Verbal Order/Direction given by Provider: Discontinue Thiamine    Provider giving Order:  CODY Dill    Verbal Order given to: Jen([phone])    Matty Evans RN

## 2023-05-09 NOTE — LETTER
5/9/2023        RE: Kurtis Urban  [address] [facility]  C/o Adi Urban  [facility] [address]        [facility] [address] [place] [address]  Phone: [phone]  Fax: [phone]  Primary Provider: Garcia Martinez  Pre-op Performing Provider: GARCIA MARTINEZ      PREOPERATIVE EVALUATION:  Today's date: 5/9/2023    Kurtis Urban is a 89 year old male who presents for a preoperative evaluation.    Surgical Information:  Surgery/Procedure:TURB  Surgery Location:[facility]  Surgeon:Dr. Rasheed  Surgery Date: 5/15/23  Time of Surgery: 10 AM   Where patient plans to recover: At a TCU (Transitional Care Unit) However will stay overnight after procedure  Fax number for surgical facility: Note does not need to be faxed, will be available electronically in Epic.      Subjective     HPI related to upcoming procedure: Kurtis Urban  is 89 year old (2/23/1929) residing in the LTC at [facility].  He is with past medical history significant for dementia,   seizure disorder, CVA, TIA, dyslipidemia. He was recently in the TCU   Following  a fall and found to have an acute on chronic subdural hematoma, possible meningioma, early rhabdomyolysis, abnormal appearing urothelial tract CT.  Excerpted from records  In ED, CT head revealed mixed attenuating subdural collections concerning for acute on chronic subdural hemorrhage, rounded lesion in the anterior middle cranial fossa concerning for possible meningioma, mildly low attenuating changes in the left temporal lobe compatible with edema.  Neurosurgery recommended MRI brain, repeat CT head in 6 hours, if worsening they recommend transfer in case patient and family wishes to proceed with any further intervention.  He has follow-up with neurosurgery on 5/1/2023.  Further reports he is to continue with routine EEGs, CPT from OT for cognition continue Keppra twice daily and a B1 level is thiamine and B12 for  both discontinued due to elevated labs.  CT chest abdomen pelvis also revealed bilateral renal cysts,?  Hemorrhage, asymmetric left-sided urothelial thickening and right-sided collecting system dilation, polypoid lesion in the bladder.     Today he is seen to review vital signs, preop exam and annual review.  He denied chest pain or shortness of breath, constipation or diarrhea however he did have loose stools after his swallow study.  A C. difficile was sent due to pending urology procedure on 5/15/2023.  C. difficile was negative.  He does have a Retana with clear yellow urine.  He denied headaches dizziness.  Hearing aid left ear only.  He denies pain.    He does have lower extremity edema 2+ and wearing Tubigrip stockings. Video swallow study scheduled on 5/10/2023 recommended spoon fed liquids only with nectar thickened, no cough response with aspiration.  Labs reviewed from 5/11/2023.  B12 and thiamine recently discontinued due to elevated lab readings.    Health Care Directive:  Patient has a Health Care Directive on file  Code status changed to DNR    Preoperative Review of :   reviewed - no record of controlled substances prescribed.      Failed swallow study 5/1/23    Review of Systems  Constitutional: Negative for activity change, appetite change, fatigue and fever.   HENT: Negative for congestion.    Hearing aid left ear  Respiratory: Negative for cough, shortness of breath and wheezing.    Cardiovascular: Negative for chest pain and leg swelling.   Gastrointestinal: Negative for abdominal distention, abdominal pain, constipation, diarrhea and nausea.   Genitourinary: Negative for dysuria. Retana in place  Musculoskeletal: Negative for arthralgia. Negative for back pain.   Skin: Negative for color change and wound.   Neurological: Negative for dizziness.   Psychiatric/Behavioral: Negative for agitation, behavioral problems and  Intermittent  confusion.     Patient Active Problem List    Diagnosis  Date Noted     Current mild episode of major depressive disorder without prior episode (H) 04/26/2023     Priority: Medium     Thrombocytopenia (H) 04/26/2023     Priority: Medium     Healthcare-associated pneumonia 04/11/2023     Priority: Medium     Urinary tract infection without hematuria, site unspecified 04/11/2023     Priority: Medium     Altered mental status, unspecified altered mental status type 04/11/2023     Priority: Medium     Urinary retention 03/16/2023     Priority: Medium     Generalized muscle weakness 03/16/2023     Priority: Medium     Failure to thrive in adult 03/16/2023     Priority: Medium     Bilateral subdural hematomas (H) 03/16/2023     Priority: Medium     Fall, initial encounter 03/16/2023     Priority: Medium     Chronic pain 07/28/2021     Priority: Medium     Localized, primary osteoarthritis of shoulder region 07/28/2021     Priority: Medium     Long term current use of anticoagulant therapy 07/28/2021     Priority: Medium     Meniere's disease 07/28/2021     Priority: Medium     Altered mental status, unspecified 01/18/2020     Priority: Medium     Acute metabolic encephalopathy 01/18/2020     Priority: Medium     KRIS (acute kidney injury) (H) 01/18/2020     Priority: Medium     Chronic deep vein thrombosis (DVT) of proximal vein of lower extremity (H) 01/18/2020     Priority: Medium     DVT, lower extremity, proximal, acute, right (H) 12/16/2019     Priority: Medium     Vitamin B12 deficiency (non anemic) 12/16/2019     Priority: Medium     Transient confusion 09/03/2019     Priority: Medium     TIA (transient ischemic attack) 09/03/2019     Priority: Medium     Dementia with behavioral disturbance (H) 09/03/2019     Priority: Medium     Thiamine deficiency 09/03/2019     Priority: Medium     Seizure disorder (H) 05/19/2015     Priority: Medium     Mild cognitive impairment 06/14/2014     Priority: Medium     Hypercholesteremia 05/28/2014     Priority: Medium     Depression  05/28/2014     Priority: Medium     History of CVA (cerebrovascular accident) 05/28/2014     Priority: Medium     With several instances of TIAs therafter         Melanoma of back (H) 05/28/2014     Priority: Medium     Surgically removed          Melanoma of face (H) 05/28/2014     Priority: Medium     Surgically removed         Hard of hearing 05/28/2014     Priority: Medium     Left ear          Past Medical History:   Diagnosis Date     Dementia with behavioral disturbance (H) 9/3/2019     Depression 5/28/2014     Hypercholesteremia 5/28/2014     Melanoma of back (H) 5/28/2014    Surgically removed       Melanoma of face (H) 5/28/2014    Surgically removed      Seizure (H) 5/19/2015     Thiamine deficiency 9/3/2019     TIA (transient ischemic attack)      Past Surgical History:   Procedure Laterality Date     APPENDECTOMY       CHOLECYSTECTOMY       HERNIA REPAIR       OTHER SURGICAL HISTORY      nasal surgeries     OTHER SURGICAL HISTORY  1993    meniscal surgery     OTHER SURGICAL HISTORY Right     total knee repair     SHOULDER SURGERY Right      Current Outpatient Medications   Medication Sig Dispense Refill     amLODIPine (NORVASC) 5 MG tablet Take 1 tablet (5 mg) by mouth daily 30 tablet 1     hypromellose (ARTIFICIAL TEARS) 0.5 % SOLN ophthalmic solution 2 drops 3 times daily as needed for dry eyes       levETIRAcetam (KEPPRA) 500 MG tablet Take 1 tablet (500 mg) by mouth 2 times daily 56 tablet 12     primidone (MYSOLINE) 50 MG tablet Take 1 tablet (50 mg) by mouth 2 times daily 90 tablet 7     sodium chloride (OCEAN) 0.65 % nasal spray Spray 1 spray into both nostrils daily as needed for congestion       polyethylene glycol (MIRALAX) 17 g packet Take 1 packet by mouth daily as needed         Allergies   Allergen Reactions     Orphenadrine Hives     Other reaction(s): hives       Penicillins Anaphylaxis and Nausea and Vomiting     Alfuzosin Nausea and Vomiting     Donepezil Other (See Comments)      "Myolin [Orphenadrine Citrate] Unknown     Other reaction(s): hives     Sulfa Antibiotics Nausea and Vomiting     Triazolam Nausea and Vomiting     Penicillin V Rash        Social History     Tobacco Use     Smoking status: Former     Types: Cigarettes     Smokeless tobacco: Never   Vaping Use     Vaping status: Not on file   Substance Use Topics     Alcohol use: Never       History   Drug Use Unknown        Objective     /60   Pulse 72   Temp 97.7  F (36.5  C)   Resp 18   Ht 1.676 m (5' 6\")   Wt 58.5 kg (129 lb)   SpO2 94%   BMI 20.82 kg/m      Physical Exam     Constitutional:       Appearance: Patient is well-developed. Poor nutrition   HENT:      Head: Normocephalic.   Eyes:      Conjunctiva/sclera: Conjunctivae normal.   Neck:      Musculoskeletal: Normal range of motion.   Cardiovascular:      Rate and Rhythm: Normal rate and regular rhythm.      Heart sounds: Normal heart sounds. No murmur.   Pulmonary:      Effort: No respiratory distress.      Breath sounds: Normal breath sounds.   Abdominal:      General: Bowel sounds are normal. There is no distension.      Palpations: Abdomen is soft.      Tenderness: There is no abdominal tenderness.   Musculoskeletal:       Normal range of motion.     Skin:General:        Skin is warm.   Neurological:         Mental Status: Patient is alert and oriented to person, place, and time. Has intermittent confusion   Psychiatric:         Behavior: Behavior normal.      Hypertension continue Norvasc     Seizure disorder on Keppra and primidone Keppra Level WNL 4/17/23 at 22.7 he up with neurology on 5/1/2023     Retana in place will undergo urology procedure on 5/15/2023    Recent Labs   Lab Test 05/11/23  0827 04/26/23  0900 04/25/23  1010 04/18/23  0931 03/17/23  0545 03/16/23  1321   HGB 10.7* 9.6*   < > 8.4*   < >  --     171   < > 197   < >  --    INR  --   --   --   --   --  1.22*     --   --  137   < >  --    POTASSIUM 4.1  --   --  4.6   < >  --  "   CR 1.23*  --   --  0.99   < >  --     < > = values in this interval not displayed.        Diagnostics:  Recent Results (from the past 240 hour(s))   Vitamin B1 whole blood    Collection Time: 05/03/23  7:06 AM   Result Value Ref Range    Vitamin B1 Whole Blood Level 226 (H) 70 - 180 nmol/L   C. difficile Toxin B PCR with reflex to C. difficile Antigen and Toxins A/B EIA    Collection Time: 05/10/23  6:30 PM    Specimen: Per Rectum; Stool   Result Value Ref Range    C Difficile Toxin B by PCR Negative Negative   Comprehensive metabolic panel    Collection Time: 05/11/23  8:27 AM   Result Value Ref Range    Sodium 139 136 - 145 mmol/L    Potassium 4.1 3.4 - 5.3 mmol/L    Chloride 107 98 - 107 mmol/L    Carbon Dioxide (CO2) 18 (L) 22 - 29 mmol/L    Anion Gap 14 7 - 15 mmol/L    Urea Nitrogen 20.8 8.0 - 23.0 mg/dL    Creatinine 1.23 (H) 0.67 - 1.17 mg/dL    Calcium 9.4 8.2 - 9.6 mg/dL    Glucose 93 70 - 99 mg/dL    Alkaline Phosphatase 130 (H) 40 - 129 U/L    AST 25 10 - 50 U/L    ALT 17 10 - 50 U/L    Protein Total 6.9 6.4 - 8.3 g/dL    Albumin 3.3 (L) 3.5 - 5.2 g/dL    Bilirubin Total 0.3 <=1.2 mg/dL    GFR Estimate 54 (L) >60 mL/min/1.73m2   Vitamin B12    Collection Time: 05/11/23  8:27 AM   Result Value Ref Range    Vitamin B12 2,712 (H) 232 - 1,245 pg/mL   CBC with platelets and differential    Collection Time: 05/11/23  8:27 AM   Result Value Ref Range    WBC Count 8.8 4.0 - 11.0 10e3/uL    RBC Count 3.56 (L) 4.40 - 5.90 10e6/uL    Hemoglobin 10.7 (L) 13.3 - 17.7 g/dL    Hematocrit 35.8 (L) 40.0 - 53.0 %     (H) 78 - 100 fL    MCH 30.1 26.5 - 33.0 pg    MCHC 29.9 (L) 31.5 - 36.5 g/dL    RDW 14.0 10.0 - 15.0 %    Platelet Count 186 150 - 450 10e3/uL    % Neutrophils 61 %    % Lymphocytes 24 %    % Monocytes 10 %    % Eosinophils 3 %    % Basophils 1 %    % Immature Granulocytes 1 %    NRBCs per 100 WBC 0 <1 /100    Absolute Neutrophils 5.5 1.6 - 8.3 10e3/uL    Absolute Lymphocytes 2.1 0.8 - 5.3 10e3/uL     Absolute Monocytes 0.9 0.0 - 1.3 10e3/uL    Absolute Eosinophils 0.3 0.0 - 0.7 10e3/uL    Absolute Basophils 0.1 0.0 - 0.2 10e3/uL    Absolute Immature Granulocytes 0.0 <=0.4 10e3/uL    Absolute NRBCs 0.0 10e3/uL           Current Outpatient Medications:      amLODIPine (NORVASC) 5 MG tablet, Take 1 tablet (5 mg) by mouth daily, Disp: 30 tablet, Rfl: 1     hypromellose (ARTIFICIAL TEARS) 0.5 % SOLN ophthalmic solution, 2 drops 3 times daily as needed for dry eyes, Disp: , Rfl:      levETIRAcetam (KEPPRA) 500 MG tablet, Take 1 tablet (500 mg) by mouth 2 times daily, Disp: 56 tablet, Rfl: 12     primidone (MYSOLINE) 50 MG tablet, Take 1 tablet (50 mg) by mouth 2 times daily, Disp: 90 tablet, Rfl: 7     sodium chloride (OCEAN) 0.65 % nasal spray, Spray 1 spray into both nostrils daily as needed for congestion, Disp: , Rfl:      polyethylene glycol (MIRALAX) 17 g packet, Take 1 packet by mouth daily as needed, Disp: , Rfl:      Patient cleared for TURB on 5/15/23    Signed Electronically by: Juana Martinez CNP  Copy of this evaluation report is provided to requesting physician.            Sincerely,        Juana Martinez CNP

## 2023-05-10 NOTE — TELEPHONE ENCOUNTER
[facility] Geriatrics Triage Nurse Telephone Encounter    Provider: CODY Dill  Facility: [facility]  Facility Type:  LTC    Caller: Collette  Call Back Number: [phone]    Allergies:    Allergies   Allergen Reactions     Orphenadrine Hives     Other reaction(s): hives       Penicillins Anaphylaxis and Nausea and Vomiting     Alfuzosin Nausea and Vomiting     Donepezil Other (See Comments)     Myolin [Orphenadrine Citrate] Unknown     Other reaction(s): hives     Sulfa Antibiotics Nausea and Vomiting     Triazolam Nausea and Vomiting     Penicillin V Rash        Reason for call: Nurse is reporting that patient had 5 loose stools on the day shift and 1 loose stool so far on the PM shift.  Of note, nursing did administer his Miralax this morning.  No fever noted.      Verbal Order/Direction given by Provider: Change Miralax to 17g daily PRN.  Check a stool specimen for C-diff.  Check Heme 1, CMP, B12 on 5/11/23.      Provider giving Order:  CODY Dill    Verbal Order given to: Collette John Petersen, RN

## 2023-05-11 NOTE — TELEPHONE ENCOUNTER
[facility] Geriatrics Lab Note     Provider: CODY Dill  Facility: [facility]  Facility Type:  LTC    Allergies   Allergen Reactions     Orphenadrine Hives     Other reaction(s): hives       Penicillins Anaphylaxis and Nausea and Vomiting     Alfuzosin Nausea and Vomiting     Donepezil Other (See Comments)     Myolin [Orphenadrine Citrate] Unknown     Other reaction(s): hives     Sulfa Antibiotics Nausea and Vomiting     Triazolam Nausea and Vomiting     Penicillin V Rash       Labs Reviewed by provider: Heme 1, CMP, B12     Verbal Order/Direction given by Provider: Discontinue Vitamin B12 supplement.  Fax lab results to the surgeon.      Provider giving Order:  CODY Dill    Verbal Order given to: Andriy([phone])    Matty Evans RN

## 2023-05-12 NOTE — PROGRESS NOTES
[facility] GERIATRICS  [address] [place] [address]  Phone: [phone]  Fax: [phone]  Primary Provider: Garcia Martinez  Pre-op Performing Provider: GARCAI MARTINEZ      PREOPERATIVE EVALUATION:  Today's date: 5/9/2023    Kurtis Urban is a 89 year old male who presents for a preoperative evaluation.    Surgical Information:  Surgery/Procedure:TURB  Surgery Location:[facility]  Surgeon:Dr. Rasheed  Surgery Date: 5/15/23  Time of Surgery: 10 AM   Where patient plans to recover: At a TCU (Transitional Care Unit) However will stay overnight after procedure  Fax number for surgical facility: Note does not need to be faxed, will be available electronically in Epic.      Subjective     HPI related to upcoming procedure: Kurtis Urban  is 89 year old (2/23/1929) residing in the LTC at [facility].  He is with past medical history significant for dementia,   seizure disorder, CVA, TIA, dyslipidemia. He was recently in the TCU   Following  a fall and found to have an acute on chronic subdural hematoma, possible meningioma, early rhabdomyolysis, abnormal appearing urothelial tract CT.  Excerpted from records  In ED, CT head revealed mixed attenuating subdural collections concerning for acute on chronic subdural hemorrhage, rounded lesion in the anterior middle cranial fossa concerning for possible meningioma, mildly low attenuating changes in the left temporal lobe compatible with edema.  Neurosurgery recommended MRI brain, repeat CT head in 6 hours, if worsening they recommend transfer in case patient and family wishes to proceed with any further intervention.  He has follow-up with neurosurgery on 5/1/2023.  Further reports he is to continue with routine EEGs, CPT from OT for cognition continue Keppra twice daily and a B1 level is thiamine and B12 for both discontinued due to elevated labs.  CT chest abdomen pelvis also revealed bilateral renal  cysts,?  Hemorrhage, asymmetric left-sided urothelial thickening and right-sided collecting system dilation, polypoid lesion in the bladder.     Today he is seen to review vital signs, preop exam and annual review.  He denied chest pain or shortness of breath, constipation or diarrhea however he did have loose stools after his swallow study.  A C. difficile was sent due to pending urology procedure on 5/15/2023.  C. difficile was negative.  He does have a Retana with clear yellow urine.  He denied headaches dizziness.  Hearing aid left ear only.  He denies pain.    He does have lower extremity edema 2+ and wearing Tubigrip stockings. Video swallow study scheduled on 5/10/2023 recommended spoon fed liquids only with nectar thickened, no cough response with aspiration.  Labs reviewed from 5/11/2023.  B12 and thiamine recently discontinued due to elevated lab readings.    Health Care Directive:  Patient has a Health Care Directive on file  Code status changed to DNR    Preoperative Review of :   reviewed - no record of controlled substances prescribed.      Failed swallow study 5/1/23    Review of Systems  Constitutional: Negative for activity change, appetite change, fatigue and fever.   HENT: Negative for congestion.    Hearing aid left ear  Respiratory: Negative for cough, shortness of breath and wheezing.    Cardiovascular: Negative for chest pain and leg swelling.   Gastrointestinal: Negative for abdominal distention, abdominal pain, constipation, diarrhea and nausea.   Genitourinary: Negative for dysuria. Retana in place  Musculoskeletal: Negative for arthralgia. Negative for back pain.   Skin: Negative for color change and wound.   Neurological: Negative for dizziness.   Psychiatric/Behavioral: Negative for agitation, behavioral problems and  Intermittent  confusion.     Patient Active Problem List    Diagnosis Date Noted     Current mild episode of major depressive disorder without prior episode (H)  04/26/2023     Priority: Medium     Thrombocytopenia (H) 04/26/2023     Priority: Medium     Healthcare-associated pneumonia 04/11/2023     Priority: Medium     Urinary tract infection without hematuria, site unspecified 04/11/2023     Priority: Medium     Altered mental status, unspecified altered mental status type 04/11/2023     Priority: Medium     Urinary retention 03/16/2023     Priority: Medium     Generalized muscle weakness 03/16/2023     Priority: Medium     Failure to thrive in adult 03/16/2023     Priority: Medium     Bilateral subdural hematomas (H) 03/16/2023     Priority: Medium     Fall, initial encounter 03/16/2023     Priority: Medium     Chronic pain 07/28/2021     Priority: Medium     Localized, primary osteoarthritis of shoulder region 07/28/2021     Priority: Medium     Long term current use of anticoagulant therapy 07/28/2021     Priority: Medium     Meniere's disease 07/28/2021     Priority: Medium     Altered mental status, unspecified 01/18/2020     Priority: Medium     Acute metabolic encephalopathy 01/18/2020     Priority: Medium     KRIS (acute kidney injury) (H) 01/18/2020     Priority: Medium     Chronic deep vein thrombosis (DVT) of proximal vein of lower extremity (H) 01/18/2020     Priority: Medium     DVT, lower extremity, proximal, acute, right (H) 12/16/2019     Priority: Medium     Vitamin B12 deficiency (non anemic) 12/16/2019     Priority: Medium     Transient confusion 09/03/2019     Priority: Medium     TIA (transient ischemic attack) 09/03/2019     Priority: Medium     Dementia with behavioral disturbance (H) 09/03/2019     Priority: Medium     Thiamine deficiency 09/03/2019     Priority: Medium     Seizure disorder (H) 05/19/2015     Priority: Medium     Mild cognitive impairment 06/14/2014     Priority: Medium     Hypercholesteremia 05/28/2014     Priority: Medium     Depression 05/28/2014     Priority: Medium     History of CVA (cerebrovascular accident) 05/28/2014      Priority: Medium     With several instances of TIAs therafter         Melanoma of back (H) 05/28/2014     Priority: Medium     Surgically removed          Melanoma of face (H) 05/28/2014     Priority: Medium     Surgically removed         Hard of hearing 05/28/2014     Priority: Medium     Left ear          Past Medical History:   Diagnosis Date     Dementia with behavioral disturbance (H) 9/3/2019     Depression 5/28/2014     Hypercholesteremia 5/28/2014     Melanoma of back (H) 5/28/2014    Surgically removed       Melanoma of face (H) 5/28/2014    Surgically removed      Seizure (H) 5/19/2015     Thiamine deficiency 9/3/2019     TIA (transient ischemic attack)      Past Surgical History:   Procedure Laterality Date     APPENDECTOMY       CHOLECYSTECTOMY       HERNIA REPAIR       OTHER SURGICAL HISTORY      nasal surgeries     OTHER SURGICAL HISTORY  1993    meniscal surgery     OTHER SURGICAL HISTORY Right     total knee repair     SHOULDER SURGERY Right      Current Outpatient Medications   Medication Sig Dispense Refill     amLODIPine (NORVASC) 5 MG tablet Take 1 tablet (5 mg) by mouth daily 30 tablet 1     hypromellose (ARTIFICIAL TEARS) 0.5 % SOLN ophthalmic solution 2 drops 3 times daily as needed for dry eyes       levETIRAcetam (KEPPRA) 500 MG tablet Take 1 tablet (500 mg) by mouth 2 times daily 56 tablet 12     primidone (MYSOLINE) 50 MG tablet Take 1 tablet (50 mg) by mouth 2 times daily 90 tablet 7     sodium chloride (OCEAN) 0.65 % nasal spray Spray 1 spray into both nostrils daily as needed for congestion       polyethylene glycol (MIRALAX) 17 g packet Take 1 packet by mouth daily as needed         Allergies   Allergen Reactions     Orphenadrine Hives     Other reaction(s): hives       Penicillins Anaphylaxis and Nausea and Vomiting     Alfuzosin Nausea and Vomiting     Donepezil Other (See Comments)     Myolin [Orphenadrine Citrate] Unknown     Other reaction(s): hives     Sulfa Antibiotics Nausea  "and Vomiting     Triazolam Nausea and Vomiting     Penicillin V Rash        Social History     Tobacco Use     Smoking status: Former     Types: Cigarettes     Smokeless tobacco: Never   Vaping Use     Vaping status: Not on file   Substance Use Topics     Alcohol use: Never       History   Drug Use Unknown         Objective     /60   Pulse 72   Temp 97.7  F (36.5  C)   Resp 18   Ht 1.676 m (5' 6\")   Wt 58.5 kg (129 lb)   SpO2 94%   BMI 20.82 kg/m      Physical Exam     Constitutional:       Appearance: Patient is well-developed. Poor nutrition   HENT:      Head: Normocephalic.   Eyes:      Conjunctiva/sclera: Conjunctivae normal.   Neck:      Musculoskeletal: Normal range of motion.   Cardiovascular:      Rate and Rhythm: Normal rate and regular rhythm.      Heart sounds: Normal heart sounds. No murmur.   Pulmonary:      Effort: No respiratory distress.      Breath sounds: Normal breath sounds.   Abdominal:      General: Bowel sounds are normal. There is no distension.      Palpations: Abdomen is soft.      Tenderness: There is no abdominal tenderness.   Musculoskeletal:       Normal range of motion.     Skin:General:        Skin is warm.   Neurological:         Mental Status: Patient is alert and oriented to person, place, and time. Has intermittent confusion   Psychiatric:         Behavior: Behavior normal.      Hypertension continue Norvasc     Seizure disorder on Keppra and primidone Keppra Level WNL 4/17/23 at 22.7 he up with neurology on 5/1/2023     Retana in place will undergo urology procedure on 5/15/2023    Recent Labs   Lab Test 05/11/23  0827 04/26/23  0900 04/25/23  1010 04/18/23  0931 03/17/23  0545 03/16/23  1321   HGB 10.7* 9.6*   < > 8.4*   < >  --     171   < > 197   < >  --    INR  --   --   --   --   --  1.22*     --   --  137   < >  --    POTASSIUM 4.1  --   --  4.6   < >  --    CR 1.23*  --   --  0.99   < >  --     < > = values in this interval not displayed.    "     Diagnostics:  Recent Results (from the past 240 hour(s))   Vitamin B1 whole blood    Collection Time: 05/03/23  7:06 AM   Result Value Ref Range    Vitamin B1 Whole Blood Level 226 (H) 70 - 180 nmol/L   C. difficile Toxin B PCR with reflex to C. difficile Antigen and Toxins A/B EIA    Collection Time: 05/10/23  6:30 PM    Specimen: Per Rectum; Stool   Result Value Ref Range    C Difficile Toxin B by PCR Negative Negative   Comprehensive metabolic panel    Collection Time: 05/11/23  8:27 AM   Result Value Ref Range    Sodium 139 136 - 145 mmol/L    Potassium 4.1 3.4 - 5.3 mmol/L    Chloride 107 98 - 107 mmol/L    Carbon Dioxide (CO2) 18 (L) 22 - 29 mmol/L    Anion Gap 14 7 - 15 mmol/L    Urea Nitrogen 20.8 8.0 - 23.0 mg/dL    Creatinine 1.23 (H) 0.67 - 1.17 mg/dL    Calcium 9.4 8.2 - 9.6 mg/dL    Glucose 93 70 - 99 mg/dL    Alkaline Phosphatase 130 (H) 40 - 129 U/L    AST 25 10 - 50 U/L    ALT 17 10 - 50 U/L    Protein Total 6.9 6.4 - 8.3 g/dL    Albumin 3.3 (L) 3.5 - 5.2 g/dL    Bilirubin Total 0.3 <=1.2 mg/dL    GFR Estimate 54 (L) >60 mL/min/1.73m2   Vitamin B12    Collection Time: 05/11/23  8:27 AM   Result Value Ref Range    Vitamin B12 2,712 (H) 232 - 1,245 pg/mL   CBC with platelets and differential    Collection Time: 05/11/23  8:27 AM   Result Value Ref Range    WBC Count 8.8 4.0 - 11.0 10e3/uL    RBC Count 3.56 (L) 4.40 - 5.90 10e6/uL    Hemoglobin 10.7 (L) 13.3 - 17.7 g/dL    Hematocrit 35.8 (L) 40.0 - 53.0 %     (H) 78 - 100 fL    MCH 30.1 26.5 - 33.0 pg    MCHC 29.9 (L) 31.5 - 36.5 g/dL    RDW 14.0 10.0 - 15.0 %    Platelet Count 186 150 - 450 10e3/uL    % Neutrophils 61 %    % Lymphocytes 24 %    % Monocytes 10 %    % Eosinophils 3 %    % Basophils 1 %    % Immature Granulocytes 1 %    NRBCs per 100 WBC 0 <1 /100    Absolute Neutrophils 5.5 1.6 - 8.3 10e3/uL    Absolute Lymphocytes 2.1 0.8 - 5.3 10e3/uL    Absolute Monocytes 0.9 0.0 - 1.3 10e3/uL    Absolute Eosinophils 0.3 0.0 - 0.7 10e3/uL     Absolute Basophils 0.1 0.0 - 0.2 10e3/uL    Absolute Immature Granulocytes 0.0 <=0.4 10e3/uL    Absolute NRBCs 0.0 10e3/uL           Current Outpatient Medications:      amLODIPine (NORVASC) 5 MG tablet, Take 1 tablet (5 mg) by mouth daily, Disp: 30 tablet, Rfl: 1     hypromellose (ARTIFICIAL TEARS) 0.5 % SOLN ophthalmic solution, 2 drops 3 times daily as needed for dry eyes, Disp: , Rfl:      levETIRAcetam (KEPPRA) 500 MG tablet, Take 1 tablet (500 mg) by mouth 2 times daily, Disp: 56 tablet, Rfl: 12     primidone (MYSOLINE) 50 MG tablet, Take 1 tablet (50 mg) by mouth 2 times daily, Disp: 90 tablet, Rfl: 7     sodium chloride (OCEAN) 0.65 % nasal spray, Spray 1 spray into both nostrils daily as needed for congestion, Disp: , Rfl:      polyethylene glycol (MIRALAX) 17 g packet, Take 1 packet by mouth daily as needed, Disp: , Rfl:       Patient cleared for TURB on 5/15/23    Signed Electronically by: Juana Martinez CNP  Copy of this evaluation report is provided to requesting physician.

## 2023-05-12 NOTE — H&P (VIEW-ONLY)
[facility] GERIATRICS  [address] [place] [address]  Phone: [phone]  Fax: [phone]  Primary Provider: Garcia Martinez  Pre-op Performing Provider: GARCIA MARTINEZ      PREOPERATIVE EVALUATION:  Today's date: 5/9/2023    Kurtis Urban is a 89 year old male who presents for a preoperative evaluation.    Surgical Information:  Surgery/Procedure:TURB  Surgery Location:[facility]  Surgeon:Dr. Rasheed  Surgery Date: 5/15/23  Time of Surgery: 10 AM   Where patient plans to recover: At a TCU (Transitional Care Unit) However will stay overnight after procedure  Fax number for surgical facility: Note does not need to be faxed, will be available electronically in Epic.      Subjective     HPI related to upcoming procedure: Kurtis Urban  is 89 year old (2/23/1929) residing in the LTC at [facility].  He is with past medical history significant for dementia,   seizure disorder, CVA, TIA, dyslipidemia. He was recently in the TCU   Following  a fall and found to have an acute on chronic subdural hematoma, possible meningioma, early rhabdomyolysis, abnormal appearing urothelial tract CT.  Excerpted from records  In ED, CT head revealed mixed attenuating subdural collections concerning for acute on chronic subdural hemorrhage, rounded lesion in the anterior middle cranial fossa concerning for possible meningioma, mildly low attenuating changes in the left temporal lobe compatible with edema.  Neurosurgery recommended MRI brain, repeat CT head in 6 hours, if worsening they recommend transfer in case patient and family wishes to proceed with any further intervention.  He has follow-up with neurosurgery on 5/1/2023.  Further reports he is to continue with routine EEGs, CPT from OT for cognition continue Keppra twice daily and a B1 level is thiamine and B12 for both discontinued due to elevated labs.  CT chest abdomen pelvis also revealed bilateral renal  cysts,?  Hemorrhage, asymmetric left-sided urothelial thickening and right-sided collecting system dilation, polypoid lesion in the bladder.     Today he is seen to review vital signs, preop exam and annual review.  He denied chest pain or shortness of breath, constipation or diarrhea however he did have loose stools after his swallow study.  A C. difficile was sent due to pending urology procedure on 5/15/2023.  C. difficile was negative.  He does have a Retana with clear yellow urine.  He denied headaches dizziness.  Hearing aid left ear only.  He denies pain.    He does have lower extremity edema 2+ and wearing Tubigrip stockings. Video swallow study scheduled on 5/10/2023 recommended spoon fed liquids only with nectar thickened, no cough response with aspiration.  Labs reviewed from 5/11/2023.  B12 and thiamine recently discontinued due to elevated lab readings.    Health Care Directive:  Patient has a Health Care Directive on file  Code status changed to DNR    Preoperative Review of :   reviewed - no record of controlled substances prescribed.      Failed swallow study 5/1/23    Review of Systems  Constitutional: Negative for activity change, appetite change, fatigue and fever.   HENT: Negative for congestion.    Hearing aid left ear  Respiratory: Negative for cough, shortness of breath and wheezing.    Cardiovascular: Negative for chest pain and leg swelling.   Gastrointestinal: Negative for abdominal distention, abdominal pain, constipation, diarrhea and nausea.   Genitourinary: Negative for dysuria. Retana in place  Musculoskeletal: Negative for arthralgia. Negative for back pain.   Skin: Negative for color change and wound.   Neurological: Negative for dizziness.   Psychiatric/Behavioral: Negative for agitation, behavioral problems and  Intermittent  confusion.     Patient Active Problem List    Diagnosis Date Noted     Current mild episode of major depressive disorder without prior episode (H)  04/26/2023     Priority: Medium     Thrombocytopenia (H) 04/26/2023     Priority: Medium     Healthcare-associated pneumonia 04/11/2023     Priority: Medium     Urinary tract infection without hematuria, site unspecified 04/11/2023     Priority: Medium     Altered mental status, unspecified altered mental status type 04/11/2023     Priority: Medium     Urinary retention 03/16/2023     Priority: Medium     Generalized muscle weakness 03/16/2023     Priority: Medium     Failure to thrive in adult 03/16/2023     Priority: Medium     Bilateral subdural hematomas (H) 03/16/2023     Priority: Medium     Fall, initial encounter 03/16/2023     Priority: Medium     Chronic pain 07/28/2021     Priority: Medium     Localized, primary osteoarthritis of shoulder region 07/28/2021     Priority: Medium     Long term current use of anticoagulant therapy 07/28/2021     Priority: Medium     Meniere's disease 07/28/2021     Priority: Medium     Altered mental status, unspecified 01/18/2020     Priority: Medium     Acute metabolic encephalopathy 01/18/2020     Priority: Medium     KRIS (acute kidney injury) (H) 01/18/2020     Priority: Medium     Chronic deep vein thrombosis (DVT) of proximal vein of lower extremity (H) 01/18/2020     Priority: Medium     DVT, lower extremity, proximal, acute, right (H) 12/16/2019     Priority: Medium     Vitamin B12 deficiency (non anemic) 12/16/2019     Priority: Medium     Transient confusion 09/03/2019     Priority: Medium     TIA (transient ischemic attack) 09/03/2019     Priority: Medium     Dementia with behavioral disturbance (H) 09/03/2019     Priority: Medium     Thiamine deficiency 09/03/2019     Priority: Medium     Seizure disorder (H) 05/19/2015     Priority: Medium     Mild cognitive impairment 06/14/2014     Priority: Medium     Hypercholesteremia 05/28/2014     Priority: Medium     Depression 05/28/2014     Priority: Medium     History of CVA (cerebrovascular accident) 05/28/2014      Priority: Medium     With several instances of TIAs therafter         Melanoma of back (H) 05/28/2014     Priority: Medium     Surgically removed          Melanoma of face (H) 05/28/2014     Priority: Medium     Surgically removed         Hard of hearing 05/28/2014     Priority: Medium     Left ear          Past Medical History:   Diagnosis Date     Dementia with behavioral disturbance (H) 9/3/2019     Depression 5/28/2014     Hypercholesteremia 5/28/2014     Melanoma of back (H) 5/28/2014    Surgically removed       Melanoma of face (H) 5/28/2014    Surgically removed      Seizure (H) 5/19/2015     Thiamine deficiency 9/3/2019     TIA (transient ischemic attack)      Past Surgical History:   Procedure Laterality Date     APPENDECTOMY       CHOLECYSTECTOMY       HERNIA REPAIR       OTHER SURGICAL HISTORY      nasal surgeries     OTHER SURGICAL HISTORY  1993    meniscal surgery     OTHER SURGICAL HISTORY Right     total knee repair     SHOULDER SURGERY Right      Current Outpatient Medications   Medication Sig Dispense Refill     amLODIPine (NORVASC) 5 MG tablet Take 1 tablet (5 mg) by mouth daily 30 tablet 1     hypromellose (ARTIFICIAL TEARS) 0.5 % SOLN ophthalmic solution 2 drops 3 times daily as needed for dry eyes       levETIRAcetam (KEPPRA) 500 MG tablet Take 1 tablet (500 mg) by mouth 2 times daily 56 tablet 12     primidone (MYSOLINE) 50 MG tablet Take 1 tablet (50 mg) by mouth 2 times daily 90 tablet 7     sodium chloride (OCEAN) 0.65 % nasal spray Spray 1 spray into both nostrils daily as needed for congestion       polyethylene glycol (MIRALAX) 17 g packet Take 1 packet by mouth daily as needed         Allergies   Allergen Reactions     Orphenadrine Hives     Other reaction(s): hives       Penicillins Anaphylaxis and Nausea and Vomiting     Alfuzosin Nausea and Vomiting     Donepezil Other (See Comments)     Myolin [Orphenadrine Citrate] Unknown     Other reaction(s): hives     Sulfa Antibiotics Nausea  "and Vomiting     Triazolam Nausea and Vomiting     Penicillin V Rash        Social History     Tobacco Use     Smoking status: Former     Types: Cigarettes     Smokeless tobacco: Never   Vaping Use     Vaping status: Not on file   Substance Use Topics     Alcohol use: Never       History   Drug Use Unknown         Objective     /60   Pulse 72   Temp 97.7  F (36.5  C)   Resp 18   Ht 1.676 m (5' 6\")   Wt 58.5 kg (129 lb)   SpO2 94%   BMI 20.82 kg/m      Physical Exam     Constitutional:       Appearance: Patient is well-developed. Poor nutrition   HENT:      Head: Normocephalic.   Eyes:      Conjunctiva/sclera: Conjunctivae normal.   Neck:      Musculoskeletal: Normal range of motion.   Cardiovascular:      Rate and Rhythm: Normal rate and regular rhythm.      Heart sounds: Normal heart sounds. No murmur.   Pulmonary:      Effort: No respiratory distress.      Breath sounds: Normal breath sounds.   Abdominal:      General: Bowel sounds are normal. There is no distension.      Palpations: Abdomen is soft.      Tenderness: There is no abdominal tenderness.   Musculoskeletal:       Normal range of motion.     Skin:General:        Skin is warm.   Neurological:         Mental Status: Patient is alert and oriented to person, place, and time. Has intermittent confusion   Psychiatric:         Behavior: Behavior normal.      Hypertension continue Norvasc     Seizure disorder on Keppra and primidone Keppra Level WNL 4/17/23 at 22.7 he up with neurology on 5/1/2023     Retana in place will undergo urology procedure on 5/15/2023    Recent Labs   Lab Test 05/11/23  0827 04/26/23  0900 04/25/23  1010 04/18/23  0931 03/17/23  0545 03/16/23  1321   HGB 10.7* 9.6*   < > 8.4*   < >  --     171   < > 197   < >  --    INR  --   --   --   --   --  1.22*     --   --  137   < >  --    POTASSIUM 4.1  --   --  4.6   < >  --    CR 1.23*  --   --  0.99   < >  --     < > = values in this interval not displayed.    "     Diagnostics:  Recent Results (from the past 240 hour(s))   Vitamin B1 whole blood    Collection Time: 05/03/23  7:06 AM   Result Value Ref Range    Vitamin B1 Whole Blood Level 226 (H) 70 - 180 nmol/L   C. difficile Toxin B PCR with reflex to C. difficile Antigen and Toxins A/B EIA    Collection Time: 05/10/23  6:30 PM    Specimen: Per Rectum; Stool   Result Value Ref Range    C Difficile Toxin B by PCR Negative Negative   Comprehensive metabolic panel    Collection Time: 05/11/23  8:27 AM   Result Value Ref Range    Sodium 139 136 - 145 mmol/L    Potassium 4.1 3.4 - 5.3 mmol/L    Chloride 107 98 - 107 mmol/L    Carbon Dioxide (CO2) 18 (L) 22 - 29 mmol/L    Anion Gap 14 7 - 15 mmol/L    Urea Nitrogen 20.8 8.0 - 23.0 mg/dL    Creatinine 1.23 (H) 0.67 - 1.17 mg/dL    Calcium 9.4 8.2 - 9.6 mg/dL    Glucose 93 70 - 99 mg/dL    Alkaline Phosphatase 130 (H) 40 - 129 U/L    AST 25 10 - 50 U/L    ALT 17 10 - 50 U/L    Protein Total 6.9 6.4 - 8.3 g/dL    Albumin 3.3 (L) 3.5 - 5.2 g/dL    Bilirubin Total 0.3 <=1.2 mg/dL    GFR Estimate 54 (L) >60 mL/min/1.73m2   Vitamin B12    Collection Time: 05/11/23  8:27 AM   Result Value Ref Range    Vitamin B12 2,712 (H) 232 - 1,245 pg/mL   CBC with platelets and differential    Collection Time: 05/11/23  8:27 AM   Result Value Ref Range    WBC Count 8.8 4.0 - 11.0 10e3/uL    RBC Count 3.56 (L) 4.40 - 5.90 10e6/uL    Hemoglobin 10.7 (L) 13.3 - 17.7 g/dL    Hematocrit 35.8 (L) 40.0 - 53.0 %     (H) 78 - 100 fL    MCH 30.1 26.5 - 33.0 pg    MCHC 29.9 (L) 31.5 - 36.5 g/dL    RDW 14.0 10.0 - 15.0 %    Platelet Count 186 150 - 450 10e3/uL    % Neutrophils 61 %    % Lymphocytes 24 %    % Monocytes 10 %    % Eosinophils 3 %    % Basophils 1 %    % Immature Granulocytes 1 %    NRBCs per 100 WBC 0 <1 /100    Absolute Neutrophils 5.5 1.6 - 8.3 10e3/uL    Absolute Lymphocytes 2.1 0.8 - 5.3 10e3/uL    Absolute Monocytes 0.9 0.0 - 1.3 10e3/uL    Absolute Eosinophils 0.3 0.0 - 0.7 10e3/uL     Absolute Basophils 0.1 0.0 - 0.2 10e3/uL    Absolute Immature Granulocytes 0.0 <=0.4 10e3/uL    Absolute NRBCs 0.0 10e3/uL           Current Outpatient Medications:      amLODIPine (NORVASC) 5 MG tablet, Take 1 tablet (5 mg) by mouth daily, Disp: 30 tablet, Rfl: 1     hypromellose (ARTIFICIAL TEARS) 0.5 % SOLN ophthalmic solution, 2 drops 3 times daily as needed for dry eyes, Disp: , Rfl:      levETIRAcetam (KEPPRA) 500 MG tablet, Take 1 tablet (500 mg) by mouth 2 times daily, Disp: 56 tablet, Rfl: 12     primidone (MYSOLINE) 50 MG tablet, Take 1 tablet (50 mg) by mouth 2 times daily, Disp: 90 tablet, Rfl: 7     sodium chloride (OCEAN) 0.65 % nasal spray, Spray 1 spray into both nostrils daily as needed for congestion, Disp: , Rfl:      polyethylene glycol (MIRALAX) 17 g packet, Take 1 packet by mouth daily as needed, Disp: , Rfl:       Patient cleared for TURB on 5/15/23    Signed Electronically by: Juana Martinez CNP  Copy of this evaluation report is provided to requesting physician.

## 2023-05-14 NOTE — PROGRESS NOTES
OUTPATIENT SWALLOW  EVALUATION  PLAN OF TREATMENT FOR OUTPATIENT REHABILITATION  (COMPLETE FOR INITIAL CLAIMS ONLY)  Patient's Last Name, First Name, M.I.  YOB: 1929  Kurtis Urban     Provider's Name   DENNYS Pena   Medical Record No.  [id number]     Start of Care Date:  05/10/23   Onset Date:  04/24/23 (Order date)   Type:     ___PT   ____OT  ___X_SLP Medical Diagnosis:  Dysphagia     Treatment Diagnosis:  Oropharyngeal dysphagia Visits from SOC:  1     _________________________________________________________________________________  Plan of Treatment/Functional Goals:  Planned Therapy Interventions: Dysphagia Treatment  Dysphagia treatment: Modified diet education, Instruction of safe swallow strategies, Compensatory strategies for swallowing         Goals   1. Goal Identifier: Swallow strategies       Goal Description: Patient and/or family will verbalize understanding of video swallow study results and safe swallowing strategies to reduce aspiration risk.       Target Date: 05/10/23       Date Met: 05/10/23            Therapy Frequency: other (see comments) (Continue Speech Therapy at [facility]. Frequency/duration to be determined by primary SLP.)  Predicted Duration of Therapy Intervention (days/wks): 1 session (Today)    DENNYS Pena         I CERTIFY THE NEED FOR THESE SERVICES FURNISHED UNDER        THIS PLAN OF TREATMENT AND WHILE UNDER MY CARE     (Physician co-signature of this document indicates review and certification of the therapy plan).                Certification date from: 05/10/23 Certification date to: 05/10/23          Referring Physician: Bela Franklin MBBS    Initial Assessment        See Epic Evaluation Start Of Care Date: 05/10/23

## 2023-05-14 NOTE — PROGRESS NOTES
"Videofluoroscopic Swallow Study with Speech Pathology     05/10/23 1000   General Information   Type Of Visit Initial   Start Of Care Date 05/10/23   Referring Physician Bela Franklin MBBS   Orders Evaluate And Treat   Medical Diagnosis Aspiration into airway; Dysphagia   Onset Of Illness/injury Or Date Of Surgery 04/24/23  (Order date)   Pertinent History of Current Problem/OT: Additional Occupational Profile Info   Per physician progress note dated 4/24/23, \"Kurtis Urban  is 89 year old (2/23/1929) residing in the LTC at [facility]. He is with past medical history significant for dementia, seizure disorder, CVA, TIA, dyslipidemia with recent hospitalization after a fall and found to have an acute on chronic subdural hematoma, possible meningioma, early rhabdomyolysis, abnormal appearing urothelial tract CT.\"     Respiratory Status Room air   Living Environment [place]   General Observations   Patient was pleasant and cooperative. He was accompanied to this appointment by his son, Adi, who observed study and was present afterwards to receive results and recommendations. Patient is [place] even with his bilateral hearing aids in place. His cognition is also reduced.     Patient/family Goals To advance diet textures and liquids   VFSS Evaluation   VFSS Additional Documentation Yes   VFSS Eval: Radiology   Radiologist Felix Barahona MD   Views Taken left lateral   Physical Location of Procedure [facility]   VFSS Eval: Thin Liquid Texture Trial   Mode of Presentation, Thin Liquid spoon;fed by clinician   Order of Presentation Trial 6  (Additional trials were not given due to high aspiration risk)   Preparatory Phase Poor bolus control   Oral Phase, Thin Liquid Premature pharyngeal entry;other (see comments)  (Poor tongue base retraction)   Pharyngeal Phase, Thin Liquid Delayed swallow reflex;Residue in valleculae;Residue in pyriform sinus;other (see comments)  (Reduced " hyolaryngeal elevation & excursion; reduced pharyngeal constriction)   Rosenbek's Penetration Aspiration Scale: Thin Liquid Trial Results 6 - contrast passes glottis, no subglottic residue remains (aspiration)   Response to Aspiration absent response, silent aspiration  (Unproductive volitional cough following clinician cue)   Diagnostic Statement   Reduced bolus control resulting in premature spillage to the valleculae. Delayed swallow response resulting in pourover to the pyriform sinuses. Aspiration occurred after the swallow from residue in the pyriform sinuses. Patient had no spontaneous cough response at/below the vocal folds.     VFSS Eval: Mildly Thick Liquids    Mode of Presentation cup;self-fed;spoon;fed by clinician   Order of Presentation Trials 1, 2, 3, 4, 5, 8, 10, 12   Preparatory Phase Poor bolus control;Other (see comments)  (Piecemeal deglutition)   Oral Phase Residue in oral cavity;Premature pharyngeal entry;other (see comments)  (Poor tongue base retraction)   Pharyngeal Phase Delayed swallow reflex;Residue in valleculae;Residue in pyriform sinus;Other (see comments)  (Reduced hyolaryngeal elevation & excursion; reduced pharyngeal constriction)   Rosenbek's Penetration Aspiration Scale 6 - contrast passes glottis, no subglottic residue remains (aspiration)   Response to Aspiration absent response, silent aspiration  (Unproductive volitional cough following clinician cue)   Successful Strategies Trialed During Procedure other (see comments)  (Reduced bolus size)   Diagnostic Statement   Reduced bolus control resulting in premature spillage to the valleculae. Delayed swallow response resulting in pourover past the epiglottis and, at times, to the pyriform sinuses. Aspiration occurred with large, self-administered boluses taken by cup. No aspiration or laryngeal penetration occurred with boluses of mildly thick liquid given by teaspoon.     VFSS Eval: Puree Solid Texture Trial   Mode of  Presentation, Puree spoon;fed by clinician   Order of Presentation Trials 7, 9, 11   Preparatory Phase WFL   Oral Phase, Puree Delayed AP movement;Effortful AP movement;Residue in oral cavity;other (see comments)  (Poor tongue base retraction)   Pharyngeal Phase, Puree Residue in valleculae;other (see comments)  (Reduced hyolaryngeal elevation & excursion; reduced pharyngeal constriction)   Rosenbek's Penetration Aspiration Scale: Puree Food Trial Results 1 - no aspiration, contrast does not enter airway   Diagnostic Statement   Anterior-posterior bolus transit was slow and effortful. Patient demonstrated piecemeal deglutition. Mild-moderate stasis was noted along the tongue base after swallows. Moderate-severe vallecular stasis. This cleared only partially with a liquid wash (mildly thick consistency) given by spoon.     Educational Assessment   Barriers to Learning Cognitive;Hearing   General Therapy Interventions   Planned Therapy Interventions Dysphagia Treatment   Dysphagia treatment Modified diet education;Instruction of safe swallow strategies;Compensatory strategies for swallowing   Swallow Eval: Clinical Impressions   Skilled Criteria for Therapy Intervention Skilled criteria met.  Treatment indicated.   Dysphagia Outcome Severity Scale (FLY) Level 3 - FLY   Treatment Diagnosis Oropharyngeal dysphagia   Diet texture recommendations Minced & Moist diet (level 5);Mildly thick liquids (level 2)  (Patient appears appropriate for the free water protocol)   Recommended Feeding/Eating Techniques maintain upright posture during/after eating for 30 mins;small sips/bites;hard swallow w/ each bite or sip;alternate between small bites and sips of food/liquid;no straws;other (see comments)  (Liquids by spoon only)   Rehab Potential fair, will monitor progress closely   Therapy Frequency other (see comments)  (Continue Speech Therapy at [facility]. Frequency/duration to be determined by primary SLP.)   Predicted Duration of  Therapy Intervention (days/wks) 1 session  (Today)   Risks and Benefits of Treatment have been explained. Yes   Patient, family and/or staff in agreement with Plan of Care Yes   Clinical Impression Comments   Video swallow study completed per provider order. Patient presents with moderate oropharyngeal dysphagia. He experienced aspiration with thin liquid and large, self-administered boluses of mildly thick liquid. Patient had no reflexive cough response at/below the vocal folds (i.e., aspiration was silent).     Oral phase was notable for reduced bolus control with thin and mildly thick liquid, resulting in premature spillage to the valleculae. With boluses of puree, A-P bolus transit was moderately discoordinated and effortful and oral clearance was incomplete. Tongue base retraction was significantly impaired with all consistencies given, though most notably with puree. Pharyngeal phase was characterized by delayed timing of the swallow response with thin and mildly thick liquid, leading to pourover past the epiglottis and, at times, to the pyriform sinuses. Epiglottic inversion was complete, but slow. Overall airway closure was reduced. Hyolaryngeal excursion and hyolaryngeal elevation were also reduced. Pharyngeal constriction was mildly to moderately impaired. With all consistencies, contrast material readily passed through the PES.     Patient's swallow function is both inefficient and quite unsafe. At this time, recommend continue current diet of NDD2 textures and mildly thick liquids with strict adherence to the safe swallowing precautions listed above. If patient develops a respiratory illness or experiences a decline in pulmonary function, threshold for obtaining a repeat video swallow study should be considered low. Recommend ongoing Speech Therapy at care facility. Patient might benefit from a pharyngeal exercise program if this has not already been established. However, due to his cognitive impairment,  he may not be an ideal candidate for exercises. Recommend training patient on the strategy of very small (~1 tsp), self-administered sips of mildly thick liquid by cup to reduce aspiration risk. Also recommend training on alternating bite of food with sip(s) of liquid. Patient appears appropriate for the free water protocol provided staff/family have been well trained on the guidelines for this. Will defer any decision regarding diet texture advancement to patient's primary SLP.     Swallow Goals   SLP Swallow Goals 1   Swallow Goal 1   Goal Identifier Swallow strategies   Goal Description Patient and/or family will verbalize understanding of video swallow study results and safe swallowing strategies to reduce aspiration risk.   Goal Progress Goal met  (For this visit)   Target Date 05/10/23   Date Met 05/10/23   Total Session Time   SLP Eval: VideoFluoroscopic Swallow function Minutes (59558) 15   Total Evaluation Time 15 minutes   Therapy Certification   Certification date from 05/10/23   Certification date to 05/10/23   Medical Diagnosis Dysphagia

## 2023-05-15 PROBLEM — C67.9 BLADDER CANCER (H): Status: ACTIVE | Noted: 2023-01-01

## 2023-05-15 NOTE — OP NOTE
Date of surgery: 5/15/2023    Location: [facility]    Operating room: 2    Surgeon: Dr. Donn Daniel.     Assistant: None    Anesthesia: General    Preoperative diagnosis: Bladder tumor    Postoperative diagnosis: Same    Procedure: Cystoscopy, transurethral resection of bladder tumor (4 cm)    Operative indication: Kurtis Urban is a 89 year old male who presented with urinary retention and gross hematuria.  A CT scan showed a bladder tumor.  He presents now for resection.    Operative findings: There is an approximately 4 cm tumor involving the right lateral wall.  It approaches but does not involve the right ureteral orifice.  There are papillary changes around the tumor.  The tumor appears to be completely resected although it is unclear if it is muscle invasive or not.    Operative procedure: The patient was brought to the operating room.  General anesthesia was administered.  The patient was placed in lithotomy position and prepared and draped in sterile fashion.  I introduced a 22 Estonian cystoscope per urethra into the bladder.  There are no urethral strictures.  The prostate is enlarged but fairly short.  There is trilobar hypertrophy.  There is a moderate median lobe.  The bladder is trabeculated and there are numerous cellules.  The bladder is inspected with the 30 and 70 degree lenses.  Findings are as described above.  I inserted the 26 Estonian resectoscope with the visual obturator.  I resected the lesion until there was no visible tumor.  I then fulgurated the bed of the resection as well as the area around the resection.  This fulguration approach but did not involve the right ureteral orifice which is seen to efflux clear urine after the resection.  The bladder is filled and emptied and there is no evidence for bleeding.  The scope was removed and a 22 Estonian three-way catheter was placed per urethra.  The bladder is drained.  The catheter is irrigated.  The drainage returns completely  clear so the irrigation port is capped.  The procedure was terminated.    Drains: Catheter per urethra    Specimens: Bladder tumor    Estimated blood loss: 5 mL    Complications: None    Donn Daniel MD

## 2023-05-15 NOTE — ANESTHESIA CARE TRANSFER NOTE
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    Anesthesia Type:   General     Note:    Oropharynx: oropharynx clear of all foreign objects and spontaneously breathing  Level of Consciousness: drowsy  Oxygen Supplementation: face mask  Level of Supplemental Oxygen (L/min / FiO2): 6  Independent Airway: airway patency satisfactory and stable  Dentition: dentition unchanged  Vital Signs Stable: post-procedure vital signs reviewed and stable  Report to RN Given: handoff report given  Patient transferred to: PACU    Handoff Report: Identifed the Patient, Identified the Reponsible Provider, Reviewed the pertinent medical history, Discussed the surgical course, Reviewed Intra-OP anesthesia mangement and issues during anesthesia, Set expectations for post-procedure period and Allowed opportunity for questions and acknowledgement of understanding      Vitals:  Vitals Value Taken Time   /90 05/15/23 1237   Temp 98.9    Pulse 84 05/15/23 1239   Resp 10 05/15/23 1239   SpO2 100 % 05/15/23 1239   Vitals shown include unvalidated device data.    Electronically Signed By: EDILMA Jones CRNA  May 15, 2023  12:41 PM

## 2023-05-15 NOTE — ANESTHESIA PREPROCEDURE EVALUATION
Anesthesia Pre-Procedure Evaluation    Patient: Kurtis Marinohstaedcarlos   MRN: [id number] : 1929        Procedure : Procedure(s):  CYSTOSCOPY, TRANSURETHRAL RESECTION OF BLADDER TUMOR          Past Medical History:   Diagnosis Date     Cerebrovascular accident (H)      Chronic pain      Dementia with behavioral disturbance (H) 2019     Depression 2014     DVT (deep venous thrombosis) (H)      Failure to thrive in adult      Fall      Generalized muscle weakness      [place] (hard of hearing)      Hypercholesteremia 2014     Hypertension      Melanoma of back (H) 2014    Surgically removed       Melanoma of face (H) 2014    Surgically removed      Meniere's disease      Metabolic encephalopathy      Osteoarthritis of shoulder region      Seizure (H) 2015     Subdural hematoma (H)      Thiamine deficiency 2019     Thrombocytopenia (H)      TIA (transient ischemic attack)      Urinary retention       Past Surgical History:   Procedure Laterality Date     APPENDECTOMY       CHOLECYSTECTOMY       HERNIA REPAIR       OTHER SURGICAL HISTORY      nasal surgeries     OTHER SURGICAL HISTORY  1993    meniscal surgery     OTHER SURGICAL HISTORY Right     total knee repair     SHOULDER SURGERY Right       Allergies   Allergen Reactions     Orphenadrine Hives     Other reaction(s): hives       Penicillins Anaphylaxis and Nausea and Vomiting     Alfuzosin Nausea and Vomiting     Donepezil Other (See Comments)     Myolin [Orphenadrine Citrate] Unknown     Other reaction(s): hives     Sulfa Antibiotics Nausea and Vomiting     Triazolam Nausea and Vomiting     Penicillin V Rash      Social History     Tobacco Use     Smoking status: Former     Types: Cigarettes     Smokeless tobacco: Never   Vaping Use     Vaping status: Not on file   Substance Use Topics     Alcohol use: Never      Wt Readings from Last 1 Encounters:   05/15/23 58.5 kg (129 lb)        Anesthesia Evaluation            ROS/MED  HX  ENT/Pulmonary:  - neg pulmonary ROS     Neurologic:  - neg neurologic ROS   (+) CVA, TIA,     Cardiovascular:  - neg cardiovascular ROS   (+) hypertension-----    METS/Exercise Tolerance: >4 METS    Hematologic:  - neg hematologic  ROS     Musculoskeletal:  - neg musculoskeletal ROS     GI/Hepatic:  - neg GI/hepatic ROS     Renal/Genitourinary:  - neg Renal ROS   (+) renal disease,     Endo:  - neg endo ROS     Psychiatric/Substance Use:  - neg psychiatric ROS     Infectious Disease:  - neg infectious disease ROS     Malignancy:  - neg malignancy ROS     Other:  - neg other ROS          Physical Exam    Airway        Mallampati: II    Neck ROM: full     Respiratory Devices and Support         Dental           Cardiovascular   cardiovascular exam normal          Pulmonary   pulmonary exam normal                OUTSIDE LABS:  CBC:   Lab Results   Component Value Date    WBC 8.8 05/11/2023    WBC 8.1 04/26/2023    HGB 10.7 (L) 05/11/2023    HGB 9.6 (L) 04/26/2023    HCT 35.8 (L) 05/11/2023    HCT 31.7 (L) 04/26/2023     05/11/2023     04/26/2023     BMP:   Lab Results   Component Value Date     05/11/2023     04/18/2023    POTASSIUM 4.1 05/11/2023    POTASSIUM 4.6 04/18/2023    CHLORIDE 107 05/11/2023    CHLORIDE 110 (H) 04/18/2023    CO2 18 (L) 05/11/2023    CO2 21 (L) 04/18/2023    BUN 20.8 05/11/2023    BUN 22 04/18/2023    CR 1.23 (H) 05/11/2023    CR 0.99 04/18/2023    GLC 98 05/15/2023    GLC 93 05/11/2023     COAGS:   Lab Results   Component Value Date    PTT 29 03/16/2023    INR 1.22 (H) 03/16/2023     POC: No results found for: BGM, HCG, HCGS  HEPATIC:   Lab Results   Component Value Date    ALBUMIN 3.3 (L) 05/11/2023    PROTTOTAL 6.9 05/11/2023    ALT 17 05/11/2023    AST 25 05/11/2023    ALKPHOS 130 (H) 05/11/2023    BILITOTAL 0.3 05/11/2023    MIRANDA 17 04/11/2023     OTHER:   Lab Results   Component Value Date    LACT 1.6 04/11/2023    A1C 6.2 (H) 09/04/2019    MARICRUZ 9.4  05/11/2023    MAG 2.6 04/11/2023    LIPASE 99 (H) 04/11/2023    TSH 0.72 04/11/2023       Anesthesia Plan    ASA Status:  3      Anesthesia Type: General.     - Airway: LMA              Consents    Anesthesia Plan(s) and associated risks, benefits, and realistic alternatives discussed. Questions answered and patient/representative(s) expressed understanding.    - Discussed:     - Discussed with:  Patient         Postoperative Care       PONV prophylaxis: Ondansetron (or other 5HT-3), Dexamethasone or Solumedrol     Comments:                Zak Patricia MD

## 2023-05-15 NOTE — ANESTHESIA POSTPROCEDURE EVALUATION
Patient: Kurtis Urban    Procedure: Procedure(s):  CYSTOSCOPY, TRANSURETHRAL RESECTION OF BLADDER TUMOR, COONEY CATHETER INSERTION       Anesthesia Type:  General    Note:  Disposition: Inpatient   Postop Pain Control: Uneventful            Sign Out: Well controlled pain   PONV: No   Neuro/Psych: Uneventful            Sign Out: Acceptable/Baseline neuro status   Airway/Respiratory: Uneventful            Sign Out: Acceptable/Baseline resp. status   CV/Hemodynamics: Uneventful            Sign Out: Acceptable CV status; No obvious hypovolemia; No obvious fluid overload   Other NRE:    DID A NON-ROUTINE EVENT OCCUR?            Last vitals:  Vitals Value Taken Time   /60 05/15/23 1415   Temp 36.7  C (98.1  F) 05/15/23 1330   Pulse 81 05/15/23 1430   Resp 30 05/15/23 1427   SpO2 96 % 05/15/23 1430   Vitals shown include unvalidated device data.    Electronically Signed By: Zak Patricia MD  May 15, 2023  3:17 PM

## 2023-05-15 NOTE — PROGRESS NOTES
Patient arrived at 1440 via stretcher, from PACU. Hover to bed. Retana draining yellow urine. Patient denies pain. Lummi and hearing aid. Has own w/c here.

## 2023-05-15 NOTE — PHARMACY-ADMISSION MEDICATION HISTORY
Pharmacist Admission Medication History    Admission medication history is complete. The information provided in this note is only as accurate as the sources available at the time of the update.    Medication reconciliation/reorder completed by provider prior to medication history? No    Information Source(s): Patient, Facility (U/[facility]/) medication list/MAR and CareEverywhere/SureScripts via in-person    Pertinent Information: Completed med rec with help of U med list provided in folder upon arrival to [facility] from [facility].    Changes made to PTA medication list:    Added: None    Deleted: None    Changed: None    Medication Affordability:       Allergies reviewed with patient and updates made in EHR: yes    Medication History Completed By: CHIQUITA VILLEGAS RPH 5/15/2023 9:11 AM    Prior to Admission medications    Medication Sig Last Dose Taking? Auth Provider Long Term End Date   amLODIPine (NORVASC) 5 MG tablet Take 1 tablet (5 mg) by mouth daily 5/15/2023 at am Yes Trinidad Sharpe MD Yes    hypromellose (ARTIFICIAL TEARS) 0.5 % SOLN ophthalmic solution 2 drops 3 times daily as needed for dry eyes Past Month at prn Yes Reported, Patient     levETIRAcetam (KEPPRA) 500 MG tablet Take 1 tablet (500 mg) by mouth 2 times daily 5/15/2023 at am Yes Darrell Deng, DO Yes    polyethylene glycol (MIRALAX) 17 g packet Take 1 packet by mouth daily as needed Past Month at prn Yes Reported, Patient     primidone (MYSOLINE) 50 MG tablet Take 1 tablet (50 mg) by mouth 2 times daily 5/15/2023 at am Yes Darrell Deng, DO Yes    sodium chloride (OCEAN) 0.65 % nasal spray Spray 1 spray into both nostrils daily as needed for congestion Past Month at prn Yes Go Oviedo MD

## 2023-05-15 NOTE — INTERVAL H&P NOTE
I have reviewed the surgical (or preoperative) H&P that is linked to this encounter, and examined the patient. There are no significant changes    Clinical Conditions Present on Arrival:  Clinically Significant Risk Factors Present on Admission              # Hypoalbuminemia: Lowest albumin = 1.6 g/dL in the past 30 days , will monitor as appropriate

## 2023-05-16 NOTE — PROGRESS NOTES
"  [facility] - POSTOP PROGRESS NOTE    PLACE OF SERVICE:  [facility]     SURGERY: POD # 1 after Cystoscopy, transurethral resection of bladder tumor (4 cm)  by Dr Donn Daniel for bladder cancer     SUBJECTIVE:   Events: no acute events overnight    Pt denies abdominal and bilateral flank pain. Tolerating rai catheter. Passing flatus, +BM today.     Awaiting insurance approval for return to LTC facility.     OBJECTIVE:  PHYSICAL EXAM  Vital signs:  Temp: 97.9  F (36.6  C) Temp src: Oral BP: 121/70 Pulse: 74   Resp: 18 SpO2: 96 % O2 Device: None (Room air) Oxygen Delivery: 6 LPM Height: 167.6 cm (5' 6\") Weight: 56.5 kg (124 lb 9 oz)  Estimated body mass index is 20.1 kg/m  as calculated from the following:    Height as of this encounter: 1.676 m (5' 6\").    Weight as of this encounter: 56.5 kg (124 lb 9 oz).    Gen: nad, alert  Neuro: A&O x 3. Moving all extremities equally.   Resp: Reg resp rate/depth.   Abdomen: Soft, minimally tender over SP area with palpation only. No abdominal distention. No SP fullness. No bilateral CVA tenderness.   : Penis and scrotum without erythema or edema. Rai draining cloudy yellow urine with some tan purulence. Manually irrigated x 1 with good return of cloudy yellow urine.    LE: CWMS intact. Bilateral foot edema noted.     LABS:  INR   Date Value Ref Range Status   03/16/2023 1.22 (H) 0.85 - 1.15 Final      Lab Results   Component Value Date    WBC 8.8 05/11/2023     Lab Results   Component Value Date    HGB 10.7 05/11/2023     Lab Results   Component Value Date     05/15/2023     Creatinine   Date Value Ref Range Status   05/16/2023 1.08 0.67 - 1.17 mg/dL Final     Sodium   Date Value Ref Range Status   05/11/2023 139 136 - 145 mmol/L Final     Potassium   Date Value Ref Range Status   05/11/2023 4.1 3.4 - 5.3 mmol/L Final   04/18/2023 4.6 3.5 - 5.0 mmol/L Final     Magnesium   Date Value Ref Range Status   04/11/2023 2.6 1.8 - 2.6 mg/dL Final       Lab " Results: personally reviewed.     ASSESSMENT/PLAN:  POD #1 after Cystoscopy, transurethral resection of bladder tumor (4 cm)  by Dr Donn Daniel for bladder cancer     - Diet - Mechanical/dental soft diet, mildly thick (level 2) liquids.    - Catheter - maintain at discharge. Urine is cloudy yellow with purulent debris. UA+, UC pending. May be colonized given chronic catheter, however, since new bladder surgery, will cover with 7 day course BID PO Cipro (last UC in 4/2023 grew enterococcus). Remains afebrile.   - Creatinine 1.08 today, GFR 64.  - Activity - encourage ambulation/up to chair and incentive spirometer   - DVT prophylaxis: subcutaneous heparin  - Anticipated discharge: today or 5/17, awaiting insurance approval. Planning to return to [facility] in [place].       Discussed patient with Dr Donn Nixon, APRN, CNP  [facility]   [phone]

## 2023-05-16 NOTE — PLAN OF CARE
Problem: Plan of Care - These are the overarching goals to be used throughout the patient stay.    Goal: Plan of Care Review  Description: The Plan of Care Review/Shift note should be completed every shift.  The Outcome Evaluation is a brief statement about your assessment that the patient is improving, declining, or no change.  This information will be displayed automatically on your shift note.  Outcome: Progressing  Flowsheets (Taken 5/16/2023 1355)  Plan of Care Reviewed With: patient     Problem: Pain Acute  Goal: Optimal Pain Control and Function  Outcome: Progressing  Intervention: Develop Pain Management Plan  Recent Flowsheet Documentation  Taken 5/16/2023 0945 by Sasha Triplett RN  Pain Management Interventions: declines  Intervention: Prevent or Manage Pain  Recent Flowsheet Documentation  Taken 5/16/2023 0945 by Sasha Triplett RN  Medication Review/Management: medications reviewed   Goal Outcome Evaluation:      Plan of Care Reviewed With: patient      Patient denied pain.  Retana catheter draining nelsy, cloudy urine.  UA sent per urology order and po antibiotics started.    Patient okay to discharge to TCU when social work can setup.  Has wheelchair here and would need to be per wheelchair transport.    Patient repositioning in bed.  Did not get into wheelchair, waiting to see if patient is discharging back to facility.    Patient [place] and sometimes has easier time with Kiswahili versus english.  Speech baseline mildly garbled.

## 2023-05-16 NOTE — PROGRESS NOTES
Care Management Follow Up    Length of Stay (days): 0    Expected Discharge Date: 05/16/2023     Concerns to be Addressed: discharge planning      Patient plan of care discussed at interdisciplinary rounds: Yes    Anticipated Discharge Disposition: return to TCU      Anticipated Discharge Services: TCU   Anticipated Discharge DME: per team     Patient/family educated on Medicare website which has current facility and service quality ratings: N/A   Education Provided on the Discharge Plan:    Patient/Family in Agreement with the Plan:      Referrals Placed by CM/SW:    Private pay costs discussed: Not applicable    Additional Information:  11:30 AM  KENDALL received a phone call from Pt's son Adi who was requesting an update on Pt's discharge plan. Adi informed KENDALL that Pt came from [facility] and is planning to return after surgery. Adi asked if CM had updates on Pt's expected discharge. KENDALL reported that CM will need to follow up with the Hospitalist and [facility].     KENDALL called [facility] and spoke to Alicia who reported Pt will need insurance authorization before he can return. KENDALL told Alicia CM will submit the authorization and follow up.     [facility] Authorization submitted.   Request ID: [id number]    1:09 PM  KENDALL attempted to meet with Pt and Adi to discuss the current barrier to discharging and returning to the TCU. Adi was not in Pt's room. KENDALL updated Pt regarding the insurance authorization required prior to readmitting to TCU. Pt requested CM to update Pt's son. KENDALL reported CM will follow up with his son.     TIFFANIE Palma

## 2023-05-16 NOTE — PROGRESS NOTES
3:19pm- Writer called [facility], they received the clinicals. The full clinical review though is still pending.    Writer updated pts Son. Writer left a VM to [facility][1] of [place] admissions. Awaiting call back from [facility][1].       CM to coordinate ride for tomorrow once [facility][1] calls back, and insurance auth approved.      Kelly Hernadez RN on 5/16/2023 at 3:22 PM

## 2023-05-16 NOTE — DISCHARGE SUMMARY
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   Neuro: A&O x 3. Moving all extremities equally.   Resp: Reg resp rate/depth.   Abdomen: Soft, minimally tender over SP area with palpation only. No abdominal distention. No SP fullness. No bilateral CVA tenderness.   : Retana draining slightly cloudy yellow urine.  LE: CWMS intact. Bilateral foot/ankle edema noted.     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 Patient underwent above procedure. Patient tolerated the procedure well. Post operative course was remarkable for cloudy yellow urine with purulence, concening for UTI. Son note patient mildly confused on . UA : concerning for infection. Prelim UC  (received IV ancef in OR 5/15):<10k morganella morganii,<1k urogenital jaspreet. PO Cipro initiated . No evidence of confusion on and urine significantly clearer on . Plan to complete 7 day antibiotic course. Hospitalization prolonged d/t awaiting insurance clearance for return to SNF. PT/OT consults obtained for discharge planning. By the  day of discharge, the patient was ambulatory (transfers) with assistance, tolerating diet with thickened liquids, pain was controlled with oral analgesics, labs and vitals stable, passing flatus/BMs. Discharged to SNF with rai catheter.     Labs:  Hemoglobin   Date Value Ref Range Status   05/11/2023 10.7 (L) 13.3 - 17.7 g/dL Final     Platelet Count   Date Value Ref Range Status   05/15/2023 144 (L) 150 - 450 10e3/uL Final     WBC Count   Date Value Ref Range Status   05/11/2023 8.8 4.0 - 11.0 10e3/uL Final     Creatinine   Date Value Ref Range Status   05/11/2023 1.23 (H) 0.67 - 1.17 mg/dL Final     Potassium   Date Value Ref Range Status   05/11/2023 4.1 3.4 - 5.3 mmol/L Final   04/18/2023 4.6 3.5 - 5.0 mmol/L Final     INR   Date Value Ref Range Status   03/16/2023 1.22 (H) 0.85 - 1.15 Final        Pending Labs:  Surgical Pathology     DISPOSITION: Skilled nursing facility    DISCHARGE CONDITION: Good/Stable    DISCHARGE MEDICATIONS:      Review of your medicines      START taking      Dose / Directions   acetaminophen 325 MG tablet  Commonly known as: TYLENOL      Dose: 325-650 mg  Take 1-2 tablets (325-650 mg) by mouth every 6 hours as needed for other or mild pain (For optimal non-opioid multimodal pain management to improve pain control.)  Quantity: 28 tablet  Refills: 0     bacitracin 500 UNIT/GM Oint      Apply topically 3 times daily Apply to tip of penis while rai catheter is present. Discontinue once rai is removed.  Quantity: 28 g  Refills: 0     ciprofloxacin 500 MG/5ML (10%) suspension  Commonly known as: CIPRO  Used for: Urinary tract infection without hematuria, site unspecified      Dose: 250 mg  Take 2.5 mLs (250 mg) by mouth 2 times daily for 7 days  Quantity: 35 mL  Refills: 0        CONTINUE these medicines which have NOT CHANGED      Dose / Directions   amLODIPine 5 MG tablet  Commonly known as: NORVASC  Used for: Benign essential hypertension      Dose: 5 mg  Take 1 tablet (5 mg) by  mouth daily  Quantity: 30 tablet  Refills: 1     hypromellose 0.5 % Soln ophthalmic solution  Commonly known as: ARTIFICIAL TEARS      Dose: 2 drop  2 drops 3 times daily as needed for dry eyes  Refills: 0     levETIRAcetam 500 MG tablet  Commonly known as: KEPPRA  Used for: Seizure disorder (H)      Dose: 500 mg  Take 1 tablet (500 mg) by mouth 2 times daily  Quantity: 56 tablet  Refills: 12     polyethylene glycol 17 g packet  Commonly known as: MIRALAX      Dose: 1 packet  Take 1 packet by mouth daily as needed  Refills: 0     primidone 50 MG tablet  Commonly known as: MYSOLINE  Used for: Essential tremor      Dose: 50 mg  Take 1 tablet (50 mg) by mouth 2 times daily  Quantity: 90 tablet  Refills: 7     sodium chloride 0.65 % nasal spray  Commonly known as: OCEAN  Used for: Nasal congestion      Dose: 1 spray  Spray 1 spray into both nostrils daily as needed for congestion  Refills: 0            DISCHARGE PLAN:   - Follow up with Dr Donn Daniel as scheduled prior to your procedure   - Follow up with Juana Martinez as directed.   - Take medication as prescribed, avoid anticoagulants per surgeon and PCP recommendations.   - Wound / drain care: As directed prior to discharge  - Physical activity: As tolerated, no heavy lifting. No driving while taking narcotics.   - Diet: Regular aspiration precaution diet, thickened liquids.   - Medication: please review discharge Med req/AVS  - Warning signs discussed with patient about when to call the clinic/hospital  - All questions and concerns were answered for the patient prior to discharge  - Patient verbalized understanding.     Discussed patient with Dr Donn Daniel on day of discharge.     Bernardino Nixon, APRN, CNP  [facility]   [phone]     I saw the patient on the date of discharge  Total time spent for discharge on date of discharge: 20 minutes    Physician(s) in addition to primary physician who should receive a copy:  CC1: Juana Martinez            ?

## 2023-05-16 NOTE — PLAN OF CARE
Problem: Plan of Care - These are the overarching goals to be used throughout the patient stay.    Goal: Optimal Comfort and Wellbeing  Outcome: Progressing   Goal Outcome Evaluation:  Pt is alert and oriented, able to make needs known. Pt denies any pain or discomfort during the night. Scheduled tylenol given. Retana intact draining clear nelsy urine. VSS.  Megan Mccarthy RN

## 2023-05-16 NOTE — PLAN OF CARE
Goal Outcome Evaluation:      Outcome:    Shift  Intake: Pt tolerated nectar thick clear liquids; RN advanced as tolerated to [facility]. Soft/minced diet with nectar thick liquid. Pt ate 100% of meals.  Output: Retana with clear nelsy urine.  Pain: Pt denied pain  Incision: Bacitracin applied to penile meatus. No bleeding.  Nausea: No c/o n/v.  Activity: Pt/son refused transfer to chair. Per son, pt unable to walk. Wheelchair assist/ pivot transfer.   Incentive Spirometry: achieved 600 ml.   SCD on most shift. Pt requested it removed for sleep; keeping him awake. Education provided; Pt still refused. Pt resting quietly.     LR running at 75 ml/hr per order.      Fiorella Hernandez RN  5/15/2023  8:14 PM

## 2023-05-17 NOTE — PLAN OF CARE
"PRIMARY DIAGNOSIS: \"GENERIC\" NURSING  OUTPATIENT/OBSERVATION GOALS TO BE MET BEFORE DISCHARGE:  ADLs back to baseline: No    Activity and level of assistance: Up with maximum assistance. Consider SW and/or PT evaluation.     Pain status: Pain free.    Return to near baseline physical activity: Yes     Discharge Planner Nurse   Safe discharge environment identified: Yes  Barriers to discharge: Yes insurance       Entered by: Bowen Barriga RN 05/16/2023 10:50 PM     Please review provider order for any additional goals.   Nurse to notify provider when observation goals have been met and patient is ready for discharge.Goal Outcome Evaluation:                        "

## 2023-05-17 NOTE — PLAN OF CARE
"PRIMARY DIAGNOSIS: \"GENERIC\" NURSING  OUTPATIENT/OBSERVATION GOALS TO BE MET BEFORE DISCHARGE:  ADLs back to baseline: No    Activity and level of assistance: Up with maximum assistance. Consider SW and/or PT evaluation.     Pain status: Pain free.    Return to near baseline physical activity: No     Discharge Planner Nurse   Safe discharge environment identified: Yes  Barriers to discharge: Yes insurance/placement       Entered by: Bowen Barriga RN 05/17/2023 4:35 PM     Please review provider order for any additional goals.   Nurse to notify provider when observation goals have been met and patient is ready for discharge.Goal Outcome Evaluation:                        "

## 2023-05-17 NOTE — PLAN OF CARE
"  Problem: Plan of Care - These are the overarching goals to be used throughout the patient stay.    Goal: Absence of Hospital-Acquired Illness or Injury  Outcome: Progressing  Intervention: Identify and Manage Fall Risk  Recent Flowsheet Documentation  Taken 5/17/2023 0100 by Griffin Rodriguez RN  Safety Promotion/Fall Prevention:   assistive device/personal items within reach   activity supervised  Intervention: Prevent and Manage VTE (Venous Thromboembolism) Risk  Recent Flowsheet Documentation  Taken 5/17/2023 0100 by Griffin Rodriguez RN  VTE Prevention/Management: SCDs (sequential compression devices) off  Goal: Optimal Comfort and Wellbeing  Outcome: Progressing  Intervention: Monitor Pain and Promote Comfort  Recent Flowsheet Documentation  Taken 5/17/2023 0114 by Griffin Rodriguez RN  Pain Management Interventions: medication (see MAR)     Problem: Pain Acute  Goal: Optimal Pain Control and Function  Outcome: Progressing  Intervention: Develop Pain Management Plan  Recent Flowsheet Documentation  Taken 5/17/2023 0114 by Griffin Rodriguez RN  Pain Management Interventions: medication (see MAR)     Problem: Fall Injury Risk  Goal: Absence of Fall and Fall-Related Injury  Outcome: Progressing  Intervention: Promote Injury-Free Environment  Recent Flowsheet Documentation  Taken 5/17/2023 0100 by Griffin Rodriguez RN  Safety Promotion/Fall Prevention:   assistive device/personal items within reach   activity supervised   Goal Outcome Evaluation:  Speech incoherent. Did not show any s/s of discomfort or pain. IV flushing and patent. Catheter draining and patent. Bed alarm on to prevent falls.     PRIMARY DIAGNOSIS: \"GENERIC\" NURSING  OUTPATIENT/OBSERVATION GOALS TO BE MET BEFORE DISCHARGE:  ADLs back to baseline: No    Activity and level of assistance: Up with standby assistance.    Pain status: Pain free.    Return to near baseline physical activity: No     Discharge Planner Nurse   Safe discharge " environment identified: Yes  Barriers to discharge: Yes       Entered by: Griffin Rodriguez RN 05/17/2023 5:12 AM

## 2023-05-17 NOTE — PROGRESS NOTES
"PRIMARY DIAGNOSIS: \"GENERIC\" NURSING  OUTPATIENT/OBSERVATION GOALS TO BE MET BEFORE DISCHARGE:  1. ADLs back to baseline: No    2. Activity and level of assistance: Up with standby assistance.    3. Pain status: Pain free.    4. Return to near baseline physical activity: No     Discharge Planner Nurse   Safe discharge environment identified: Yes  Barriers to discharge: Yes       Entered by: Griffin Rodriguez RN 05/17/2023 5:05 AM      Speech incoherent. Responds to requests/prompts. Swallows crushed meds without difficulty.     "

## 2023-05-17 NOTE — PROGRESS NOTES
Care Management Follow Up    Length of Stay (days): 0    Expected Discharge Date: 05/17/2023     Concerns to be Addressed: discharge planning      Patient plan of care discussed at interdisciplinary rounds: Yes     Anticipated Discharge Disposition: return to TCU      Anticipated Discharge Services: TCU   Anticipated Discharge DME: per team      Patient/family educated on Medicare website which has current facility and service quality ratings: N/A   Education Provided on the Discharge Plan: yes   Patient/Family in Agreement with the Plan: yes      Referrals Placed by CM/SW: N/A   Private pay costs discussed: Not applicable    Additional Information:  8:28 AM  KENDALL received a VM from [facility] Representative Aurora [phone]. who reported that they are needing more clinical documentation from therapy for current mobility as compared to Pt's baseline mobility. [facility] Authorization was submitted yesterday (5/16).   [facility] Request ID: [id number]     According to the [facility] Portal, they are needing documentation of:    Therapy Notes    Consult & Progress Notes    Discharge Summary    Comments: PLOF, OT/PT/ST notes from the last 24/48 hours, mental status    Aurora's VM added that she will need the information by 11 AM this morning. KENDALL called the [facility] to attempt to contact the lead Hospitalist to request therapy orders.     KENDALL received a phone call back from the Urology PA who reported she will place the therapy orders.     KENDALL called [facility] to inquire about extending the deadline for Pt's clinical documentation. KENDALL spoke with representative Juliette who reported she will extend and note the deadline until tomorrow morning to receive the clinical documentation. KENDALL called and spoke to Alicia at [facility] with an update regarding the continued pending [employer] authorization. KENDALL called and spoke to Pt's son Adi who requested an update on Pt's plans for discharge. KENDALL explained the  situation with the required insurance authorization and that CM is working to progress the necessary things forward in a timely manner. KENDALL and Adi agreed to connect in the afternoon once CM has any updates.     11:34 AM  KENDALL checked in with PT to see if they were able to add Pt to the schedule. PT reported that their schedules are pretty full and doing what they can to fit Pt in. SW reported understanding. OT completed their evaluation and are recommending TCU as Pt is requiring Ax2 for transfers/ADLs.    2:18 PM   KENDALL met with Pt and son Adi to discuss CM hoping to have more of an update with the [employer] authorization before the end of the day. Adi reported understanding the limitations with the barriers delaying discharge.     2:37 PM  KENDALL submitted the additional clinical documentation to the [facility] Portal. New documentation and authorization now pending clinical review. KENDALL called [facility] and spoke to Zak to inquire about expediting the clinical review. Zak reported the review should be completed by the morning. KENDALL called Alicia and left a VM explaining the update with the authorization.     3:23 PM  [employer] authorization was approved from 5/16/23 to 5/22/23.   Authorization Number: [id number]    KENDALL called Alicia about approved authorization who reported she can accept Pt back to the facility at any time tomorrow morning. KENDALL called and scheduled [facility] wheelchair transport between 9:10AM-9:55AM. KENDALL left a VM with Alicia reported the discharge timing.    KENDALL met with Pt and Adi to inform them of tomorrow morning's discharge plan. Adi reported understanding and will plan to visit the hospital at 8:30 AM before Pt discharges. CM to follow up with finally planning for discharge in the morning.     TIFFANIE Palma

## 2023-05-17 NOTE — PROGRESS NOTES
"PRIMARY DIAGNOSIS: \"GENERIC\" NURSING  OUTPATIENT/OBSERVATION GOALS TO BE MET BEFORE DISCHARGE:  1. ADLs back to baseline: No    2. Activity and level of assistance: Up with maximum assistance.     3. Pain status: Pain free.    4. Return to near baseline physical activity: No     Discharge Planner Nurse   Safe discharge environment identified: No  Barriers to discharge: Yes       Entered by: Art Sosa RN 05/17/2023 1:46 PM     Please review provider order for any additional goals.   Nurse to notify provider when observation goals have been met and patient is ready for discharge.  "

## 2023-05-17 NOTE — PROGRESS NOTES
Occupational Therapy      05/17/23 5315   Appointment Info   Signing Clinician's Name / Credentials (OT) Juliette Pete OTR/L   Quick Adds   Quick Adds Certification   Living Environment   People in Home alone   Current Living Arrangements independent living facility   Home Accessibility no concerns   Transportation Anticipated family or friend will provide   Self-Care   Usual Activity Tolerance good   Current Activity Tolerance fair   Regular Exercise No   Equipment Currently Used at Home wheelchair, manual   Activity/Exercise/Self-Care Comment per chart review pt was previously Ind. prior to intial TCU stay- pt has been receiving assist w/ ADL/IADL routine at tCU   General Information   Onset of Illness/Injury or Date of Surgery 05/15/23   Referring Physician Bernardino Nixon, CNP   Additional Occupational Profile Info/Pertinent History of Current Problem 89 year old male who presented with urinary retention and gross hematuria, PMHx of  subdural hematoma, seizures, dementia, UTI, and urinary retention  A CT scan showed a bladder tumor.  He presents now for resection.POD # 1 after Cystoscopy, transurethral resection of bladder tumor (4 cm)  by Dr Donn Daniel for bladder cancer   Existing Precautions/Restrictions fall  (catheter)   Limitations/Impairments hearing  ([place] has hearing aids for L/R ears)   Cognitive Status Examination   Orientation Status person   Affect/Mental Status (Cognitive) confused   Bed Mobility   Bed Mobility supine-sit   Supine-Sit [place] (Bed Mobility) moderate assist (50% patient effort);2 person assist   Transfers   Transfers sit-stand transfer   Sit-Stand Transfer   Sit-Stand [place] (Transfers) minimum assist (75% patient effort);moderate assist (50% patient effort);2 person assist   Assistive Device (Sit-Stand Transfers) walker, front-wheeled   Balance   Balance Assessment standing balance: dynamic;standing balance: static   Activities of Daily Living   BADL  Assessment/Intervention lower body dressing   Lower Body Dressing Assessment/Training   Comment, (Lower Body Dressing) Pt will require assist for dressing, bathing, toileitng, and all transfers to ensure safety.   [place] Level (Lower Body Dressing) moderate assist (50% patient effort)   Position (Lower Body Dressing) unsupported sitting   Clinical Impression   Criteria for Skilled Therapeutic Interventions Met (OT) Yes, treatment indicated   OT Diagnosis Decreased ADL ind/safety   OT Problem List-Impairments impacting ADL problems related to;activity tolerance impaired;balance;cognition;strength;mobility   Assessment of Occupational Performance 3-5 Performance Deficits   Identified Performance Deficits toileting, dressing,bed mobilty, safety   Planned Therapy Interventions (OT) ADL retraining;bed mobility training;transfer training;home program guidelines;strengthening;cognition   Clinical Decision Making Complexity (OT) moderate complexity   Anticipated Equipment Needs Upon Discharge (OT) walker, standard   Risk & Benefits of therapy have been explained evaluation/treatment results reviewed;care plan/treatment goals reviewed;patient   OT Total Evaluation Time   OT Eval, Moderate Complexity Minutes (62563) 10   Therapy Certification   Medical Diagnosis weakness/bladder cancer   Start of Care Date 05/15/23   Certification date from 05/15/23   Certification date to 05/24/23   OT Goals   Therapy Frequency (OT) Daily   OT Predicted Duration/Target Date for Goal Attainment 05/24/23   OT: Hygiene/Grooming supervision/stand-by assist;from wheelchair   OT: Lower Body Dressing Minimal assist;from wheelchair;including set-up/clothing retrieval   OT: Toilet Transfer/Toileting Minimal assist;using adaptive equipment;toilet transfer   Self-Care/Home Management   Self-Care/Home Mgmt/ADL, Compensatory, Meal Prep Minutes (29922) 23   Symptoms Noted During/After Treatment (Meal Preparation/Planning Training) fatigue    Treatment Detail/Skilled Intervention Min.Ax2 Supine>EOB once EOB pt required Min.A to support EOB, Max.A doff/don socks EOB.  additional STSx3 Min.A w/ fww w/ cues for hand placement. once standing pt reported he was incontinent of bowel, Min.A to support in static standing while Max.ax1 for brief management/josefina hygiene, STS to pull up brief w/ Min.Ax2, pivot trnf w/ extensive time for trnf & increased effort w/ Min-Mod.Ax1 w/ fww cues for sequencing/safety no LOB. Pt also requiring tactile/visiual cues for tech/safety, pt up in recliner at end, HERNANDEZ g/h alarm on- call light w/in reach.   OT Discharge Planning   OT Plan toilet transfers, LB dressing, bed mobility   OT Discharge Recommendation (DC Rec) Transitional Care Facility   OT Rationale for DC Rec Assistx2 transfers w/ fww OT recommending 24 hour supervision/assist upon d/c to enhance safety/aDL ind. Pt may benefit from more supportive living environment pending progress as pt currently requiring assistx2  for all trnfs/ADL tasks   OT Brief overview of current status Assistx2 trnfs for safety w/ Fww   Total Session Time   Timed Code Treatment Minutes 23   Total Session Time (sum of timed and untimed services) 33

## 2023-05-17 NOTE — PROGRESS NOTES
05/17/23 1300   Appointment Info   Signing Clinician's Name / Credentials (PT) Ирина Huerta, PT   Quick Adds   Quick Adds Certification   Living Environment   People in Home alone   Current Living Arrangements independent living facility   Home Accessibility no concerns   Transportation Anticipated family or friend will provide   Self-Care   Usual Activity Tolerance good   Current Activity Tolerance fair   Equipment Currently Used at Home wheelchair, manual;walker, rolling   Fall history within last six months no   Activity/Exercise/Self-Care Comment Pt was indep with bed mobility, transfer with the fWW and indep with WC mobility.  Assisted with driving.  Indep with ADLs per the pt   General Information   Onset of Illness/Injury or Date of Surgery 05/15/23   Referring Physician Hafsa Lebron CNP   Patient/Family Therapy Goals Statement (PT) none stated.   Pertinent History of Current Problem (include personal factors and/or comorbidities that impact the POC) Per the chart, 89 year old male who presented with urinary retention and gross hematuria, PMHx of  subdural hematoma, seizures, dementia, UTI, and urinary retention  A CT scan showed a bladder tumor.  He presents now for resection.POD # 1 after Cystoscopy, transurethral resection of bladder tumor (4 cm)  by Dr Donn Daniel for bladder cancer   Existing Precautions/Restrictions fall   Weight-Bearing Status - LLE weight-bearing as tolerated   Weight-Bearing Status - RLE weight-bearing as tolerated   Cognition   Affect/Mental Status (Cognition) flat/blunted affect   Orientation Status (Cognition) verbal cues/prompts needed for orientation   Behavioral Issues other (see comments)  (cooperative.)   Pain Assessment   Patient Currently in Pain No   Integumentary/Edema   Integumentary/Edema Comments Some edema in the LEs   Range of Motion (ROM)   ROM Comment bilat LE s WFL for age.   Strength (Manual Muscle Testing)   Strength Comments grossly 4/5 bilat LEs    Bed Mobility   Comment, (Bed Mobility) Supine>sit with mod A x 1.   Transfers   Comment, (Transfers) Sit<>stand with mod A x 2 with FWW   Gait/Stairs (Locomotion)   [place] Level (Gait) minimum assist (75% patient effort);2 person assist  (once up but unsteady.)   Assistive Device (Gait) walker, front-wheeled   Distance in Feet 3'x2   Distance in Feet (Gait) 3'x2   Pattern (Gait) swing-to   Deviations/Abnormal Patterns (Gait) base of support, wide;rowena decreased;festinating/shuffling   Maintains Weight-bearing Status (Gait)   (Pt is unsteady on his feet and must have assist with transfers)   Balance   Balance Comments min A x2 once up with FWW.   Sensory Examination   Sensory Perception WNL   Sensory Perception Comments bilat LEs to light touch.   Clinical Impression   Criteria for Skilled Therapeutic Intervention Yes, treatment indicated   PT Diagnosis (PT) impaired functional mobility   Influenced by the following impairments weakness, dec bal, dec endurance.   Functional limitations due to impairments bed mobility, transfers, gait   Clinical Presentation (PT Evaluation Complexity) Stable/Uncomplicated   Clinical Presentation Rationale Pt presents as medically diagnosed   Clinical Decision Making (Complexity) moderate complexity   Planned Therapy Interventions (PT) gait training;bed mobility training;balance training;transfer training;strengthening;patient/family education;home exercise program   Anticipated Equipment Needs at Discharge (PT) walker, rolling;wheelchair;gait belt   Risk & Benefits of therapy have been explained evaluation/treatment results reviewed;care plan/treatment goals reviewed;risks/benefits reviewed;patient;participants voiced agreement with care plan   PT Total Evaluation Time   PT Eval, Low Complexity Minutes (96777) 15   Plan of Care Review   Plan of Care Reviewed With patient   Therapy Certification   Start of care date 05/17/23   Certification date from 05/17/23   Certification  date to 05/24/23   Medical Diagnosis Bladder CA   Physical Therapy Goals   PT Frequency Daily   PT Predicted Duration/Target Date for Goal Attainment 05/24/23   PT Goals Transfers;Gait;Bed Mobility;Wheelchair Mobility   PT: Bed Mobility Supervision/stand-by assist;Supine to/from sit;Rolling  (with a rail.)   PT: Transfers Bed to/from chair;Sit to/from stand;Supervision/stand-by assist;Assistive device   PT: Wheelchair Mobility 150 feet;manual wheelchair;Caregiver SBA   Interventions   Interventions Quick Adds Wheelchair Mgmt/Training   Therapeutic Activity   Therapeutic Activities: dynamic activities to improve functional performance Minutes (13626) 15   Symptoms Noted During/After Treatment Fatigue   Treatment Detail/Skilled Intervention Supine>sit with mod A x 1 with the pt using the rail.  Lots of cues for technique and safety.  Sit scoot with CGA slowly.  Sit<>stand x 2 reps with the FWW with much cues for hand placement and safety.  Pt did have a LOB backwards with PT correct with A x2.  Once up, he used the walker and needed min A x 2. Cues for posture and direction.  Bed<>WC with min A x 2 once up but again, he did have a LOB with the 2nd transfer WC>bed. Sit>supine with min A x 1.   Gait Training   Symptoms Noted During/After Treatment (Gait Training) fatigue   [place] Level (Gait Training) moderate assist (50% patient effort)   Physical Assistance Level (Gait Training) 2 person assist;verbal cues  (more assist when unsteady.)   Weight Bearing (Gait Training) weight-bearing as tolerated   Assistive Device (Gait Training) rolling walker   Pattern Analysis (Gait Training) swing-to gait   Gait Analysis Deviations decreased row[place];increased time in double stance;decreased step length;decreased toe-to-floor clearance   Impairments (Gait Analysis/Training) balance impaired;strength decreased;coordination impaired   Wheelchair Managment/Training   Wheelchair Mgmt/Training Minutes (98314) 10   Symptoms Noted  During/After Treatment Fatigue   Treatment Detail/Skilled Intervention Pt needed assist with the brakes and assist in smaller areas.  Once in the [place] he did move the WC with CGA with cues to move and use bilat UEs and LEs. He tends to not use the L UE/LE.  Pt must have assist with WC mobilitiy at this time.   PT Discharge Planning   PT Plan bed mobility, transfers, WC mobility, ex.   PT Discharge Recommendation (DC Rec) Transitional Care Facility   PT Rationale for DC Rec Pt does need assist of 2 with transfers and must have assist with bed mobility and WC mobility.  Pt has dec bal and is at risk of falling with mobility. TCU is recommended.   24 hour assist is recommended. He may need a pen[facility] with more assist for mobility and cares.   PT Brief overview of current status PT eval, mod A x 2 with transfers with FWW, Mod  A x 1 with bed mobility and CGA with WC mobility.  Much cues and assist  for safety and direction.   Total Session Time   Timed Code Treatment Minutes 25   Total Session Time (sum of timed and untimed services) 40     [facility]  OUTPATIENT PHYSICAL THERAPY EVALUATION  PLAN OF TREATMENT FOR OUTPATIENT REHABILITATION  (COMPLETE FOR INITIAL CLAIMS ONLY)  Patient's Last Name, First Name, M.I.  YOB: 1929  WilmarKurtis                        Provider's Name  [facility] Medical Record No.  [id number]                             Onset Date:  05/15/23   Start of Care Date:  (P) 05/17/23   Type:     _X_PT   ___OT   ___SLP Medical Diagnosis:  (P) Bladder CA              PT Diagnosis:  impaired functional mobility Visits from SOC:  1     See note for plan of treatment, functional goals and certification details    I CERTIFY THE NEED FOR THESE SERVICES FURNISHED UNDER        THIS PLAN OF TREATMENT AND WHILE UNDER MY CARE       Bernardino Nixon, APRN, CNP

## 2023-05-17 NOTE — UTILIZATION REVIEW
Concurrent stay review; Secondary Review Determination - [facility]        Under the authority of the Utilization Management Committee, the utilization review process indicated a secondary review on the above patient.  The review outcome is based on review of the medical records, discussions with staff, and applying clinical experience noted on the date of the review.        (x) Observation/outpatient Status Appropriate - Concurrent stay review       RATIONALE FOR DETERMINATION:   Kurtis Urban is a 89 yr old male with dementia resident of [facility] facility who presented for transurethral resection of bladder tumor.  Doing well postop and awaiting return to prior care facility.  Appears awaiting insurance authorization.    Patient delayed discharge is related to disposition, there is no medical necessity for inpatient admission at the time of this review. If there is a change in patient status, please resend for review.    The information on this document is developed by the utilization review team in order for the business office to ensure compliance.  This only denotes the appropriateness of proper admission status and does not reflect the quality of care rendered.       The definitions of Inpatient Status and Observation Status used in making the determination above are those provided in the CMS Coverage Manual, Chapter 1 and Chapter 6, section 70.4.       Sincerely,    Leticia Youngblood MD

## 2023-05-17 NOTE — PROGRESS NOTES
"  [facility] - POSTOP PROGRESS NOTE    PLACE OF SERVICE:  [facility]     SURGERY: POD #2 after Cystoscopy, transurethral resection of bladder tumor (4 cm)  by Dr Donn Daniel for bladder cancer     SUBJECTIVE:   Events: no acute events overnight    Pt denies abdominal and bilateral flank pain. Tolerating rai catheter. Passing flatus, +BM today.     Awaiting insurance approval for return to LTC facility.     OBJECTIVE:  PHYSICAL EXAM  Vital signs:  Temp: 97.6  F (36.4  C) Temp src: Axillary BP: 110/64 Pulse: 76   Resp: 16 SpO2: 95 % O2 Device: None (Room air) Oxygen Delivery: 6 LPM Height: 167.6 cm (5' 6\") Weight: 56.5 kg (124 lb 9 oz)  Estimated body mass index is 20.1 kg/m  as calculated from the following:    Height as of this encounter: 1.676 m (5' 6\").    Weight as of this encounter: 56.5 kg (124 lb 9 oz).    Gen: nad, alert, sitting up in chair.   Neuro: A&O x 3. [place]. Moving all extremities equally.   Resp: Reg resp rate/depth.   Abdomen: Soft, minimally tender over SP area with palpation only. No abdominal distention. No SP fullness. No bilateral CVA tenderness.   : Penis and scrotum without erythema or edema. Rai draining clear yellow urine.  LE: CWMS intact. Bilateral foot edema noted.     LABS:  INR   Date Value Ref Range Status   03/16/2023 1.22 (H) 0.85 - 1.15 Final      Lab Results   Component Value Date    WBC 8.8 05/11/2023     Lab Results   Component Value Date    HGB 10.7 05/11/2023     Lab Results   Component Value Date     05/15/2023     Creatinine   Date Value Ref Range Status   05/16/2023 1.08 0.67 - 1.17 mg/dL Final     Sodium   Date Value Ref Range Status   05/11/2023 139 136 - 145 mmol/L Final     Potassium   Date Value Ref Range Status   05/11/2023 4.1 3.4 - 5.3 mmol/L Final   04/18/2023 4.6 3.5 - 5.0 mmol/L Final     Magnesium   Date Value Ref Range Status   04/11/2023 2.6 1.8 - 2.6 mg/dL Final       Lab Results: personally reviewed.     ASSESSMENT/PLAN:  POD #2 " after Cystoscopy, transurethral resection of bladder tumor (4 cm)  by Dr Donn Daniel for bladder cancer     - Diet - Mechanical/dental soft diet, mildly thick (level 2) liquids.    - Catheter - maintain at discharge. Urine now clear yellow.  UA+, UC pending. May be colonized given chronic catheter, however, since new bladder surgery, covering with 7 day course BID PO Cipro (last UC in 4/2023 grew enterococcus). Remains afebrile.   - Creatinine 1.08/GFR 64 on 5/16.  - Activity - encourage ambulation/up to chair and incentive spirometer   - DVT prophylaxis: subcutaneous heparin  - Anticipated discharge: Today pending insurance approval. Planning to return to [facility] in [place].     Updated: Dr Donn Nixon, APRN, CNP  [facility]   [phone]

## 2023-05-18 NOTE — PLAN OF CARE
Physical Therapy Discharge Summary    Reason for therapy discharge:    Discharged to transitional care facility.    Progress towards therapy goal(s). See goals on Care Plan in [facility] electronic health record for goal details.  Goals not met.  Barriers to achieving goals:   Pt was working on the goals yet. .    Therapy recommendation(s):    continued PT is recommended to improve mobility and strength.

## 2023-05-18 NOTE — PLAN OF CARE
Occupational Therapy Discharge Summary    Reason for therapy discharge:    Discharged to transitional care facility.    Progress towards therapy goal(s). See goals on Care Plan in [facility] electronic health record for goal details.  Goals not met.  Barriers to achieving goals:   discharge from facility.    Therapy recommendation(s):    Continued therapy is recommended.  Rationale/Recommendations:  recommend continued OT services at TCU to promote ability to perform ADLs and functional mobility safely.

## 2023-05-18 NOTE — PLAN OF CARE
"PRIMARY DIAGNOSIS: \"GENERIC\" NURSING  OUTPATIENT/OBSERVATION GOALS TO BE MET BEFORE DISCHARGE:  ADLs back to baseline: No    Activity and level of assistance: Up with maximum assistance. Consider SW and/or PT evaluation.     Pain status: Pain free.    Return to near baseline physical activity: No     Discharge Planner Nurse   Safe discharge environment identified: Yes  Barriers to discharge: Yes insurance and placement       Entered by: Bowen Barriga RN 05/17/2023 10:56 PM     Please review provider order for any additional goals.   Nurse to notify provider when observation goals have been met and patient is ready for discharge.Goal Outcome Evaluation:                        "

## 2023-05-18 NOTE — PLAN OF CARE
Patient had no c/o pain, patient is waiting for placement in a TCU at this time.   Problem: Plan of Care - These are the overarching goals to be used throughout the patient stay.    Goal: Absence of Hospital-Acquired Illness or Injury  Intervention: Identify and Manage Fall Risk  Recent Flowsheet Documentation  Taken 5/18/2023 0530 by Sam Sosa RN  Safety Promotion/Fall Prevention: assistive device/personal items within reach  Taken 5/18/2023 0000 by Sam Sosa RN  Safety Promotion/Fall Prevention: assistive device/personal items within reach     Problem: Fall Injury Risk  Goal: Absence of Fall and Fall-Related Injury  Intervention: Promote Injury-Free Environment  Recent Flowsheet Documentation  Taken 5/18/2023 0530 by Sam Sosa RN  Safety Promotion/Fall Prevention: assistive device/personal items within reach  Taken 5/18/2023 0000 by Sam Sosa RN  Safety Promotion/Fall Prevention: assistive device/personal items within reach   Goal Outcome Evaluation:

## 2023-05-18 NOTE — PROGRESS NOTES
Care Management Discharge Note    Discharge Date: 05/18/2023       Discharge Disposition: Transitional Care    Discharge Services:  TCU      Discharge Transportation: health plan transportation    Private pay costs discussed: Not applicable    Does the patient's insurance plan have a 3 day qualifying hospital stay waiver?  No    Education Provided on the Discharge Plan:  Per Care Team   Persons Notified of Discharge Plans: Yes  Patient/Family in Agreement with the Plan:      Handoff Referral Completed: Yes    Additional Information:  Final discharge plan is for patient to go to [facility] TCU today 5/18.  Health transport set up for 9:10am-9:55am wheelchair, family agreeable to cost.      Bedside notified. Pt, and pts son Ulises notified. Orders faxed.       Kelly Hernadez RN

## 2023-05-18 NOTE — PLAN OF CARE
"  Problem: Plan of Care - These are the overarching goals to be used throughout the patient stay.    Goal: Readiness for Transition of Care  Outcome: Met   Goal Outcome Evaluation:  Retana draining clear urine.  No clots seen.  Problem: Plan of Care - These are the overarching goals to be used throughout the patient stay.    Goal: Optimal Comfort and Wellbeing  Outcome: Met  Pt denies pain.    Problem: Plan of Care - These are the overarching goals to be used throughout the patient stay.    Goal: Patient-Specific Goal (Individualized)  Description: You can add care plan individualizations to a care plan. Examples of Individualization might be:  \"Parent requests to be called daily at 9am for status\", \"I have a hard time hearing out of my right ear\", or \"Do not touch me to wake me up as it startles me\".  Outcome: Met  Pt able to communicate needs.  Had BM just prior to discharge.  Pt left via HE wheelchair to TCU.                              "

## 2023-05-24 NOTE — TELEPHONE ENCOUNTER
[facility]     Provider: CODY Dill  Facility: [facility]  Facility Type:  LTC    Allergies   Allergen Reactions     Orphenadrine Hives     Other reaction(s): hives       Penicillins Anaphylaxis and Nausea and Vomiting     Alfuzosin Nausea and Vomiting     Donepezil Other (See Comments)     Myolin [Orphenadrine Citrate] Unknown     Other reaction(s): hives     Sulfa Antibiotics Nausea and Vomiting     Triazolam Nausea and Vomiting     Penicillin V Rash       Labs Reviewed by provider: JUANA     Verbal Order/Direction given by Provider: Update with final UC and sensitivities.      Provider giving Order:  CODY Dill    Verbal Order given to: Trinidad([phone])    Matty Evans RN

## 2023-06-07 NOTE — PROGRESS NOTES
[facility] GERIATRIC SERVICES       Patient Kurtis Marinohstaedt  MRN: [id number]        Reason for Visit     Chief Complaint   Patient presents with     [facility] Regulatory       Code Status     dnr    Assessment     S/p cystoscopy ;TURP on 5/15/23  Bladder cancer  History of traumatic subdural hematoma  History of a seizure disorder  dysphagia  Dementia  hld  [place]  Chronic dvt  Generalized weakness    Plan     Pt is currently a resident of long-term care was being readmitted.  Underwent above-mentioned procedure and seems to be doing well with no concerns.  Biopsy report reviewed and shows papillary urothelial carcinoma high-grade  UTI also has been treated.  Follow-up scheduled with urology and he is scheduled to have another procedure done in a month's time  Unfortunately he did fail a voiding trial and urology   he has had Retana catheter placed  Due to chronic dysphagia he remains on a modified diet with thickened liquids.  Blood pressures remain low.  Discontinue amlodipine  Some ongoing weight loss noted which will need to be monitored.  CODE STATUS has been changed to DNR/DNI  Ongoing concerns about depression and so an ACP consult has been requested for him      History     Patient is a very pleasant 89 year old male who is admitted to LTC  Patient was initially admitted post fall with a subdural hematoma.  Subsequently he was readmitted and underwent TURP with cystoscopy on 5/15/2023.  Postoperative course complicated by UTI.  He was treated with antibiotics and now discharged back to long-term care      Past Medical & Surgical History     PAST MEDICAL HISTORY:   Past Medical History:   Diagnosis Date     Cerebrovascular accident (H)      Chronic pain      Dementia with behavioral disturbance (H) 09/03/2019     Depression 05/28/2014     DVT (deep venous thrombosis) (H)      Failure to thrive in adult      Fall      Generalized muscle weakness      [place] (hard of hearing)      Hypercholesteremia 05/28/2014      Hypertension      Melanoma of back (H) 05/28/2014    Surgically removed       Melanoma of face (H) 05/28/2014    Surgically removed      Meniere's disease      Metabolic encephalopathy      Osteoarthritis of shoulder region      Seizure (H) 05/19/2015     Subdural hematoma (H)      Thiamine deficiency 09/03/2019     Thrombocytopenia (H)      TIA (transient ischemic attack)      Urinary retention       PAST SURGICAL HISTORY:   has a past surgical history that includes Cholecystectomy; appendectomy; other surgical history; other surgical history (1993); hernia repair; shoulder surgery (Right); other surgical history (Right); and Cystoscopy, transurethral resection (TUR) tumor bladder, combined (N/A, 5/15/2023).      Past Social History     Reviewed,  reports that he has quit smoking. His smoking use included cigarettes. He has never used smokeless tobacco. He reports that he does not drink alcohol and does not use drugs.    Family History     Reviewed, and family history includes Cancer in his mother; Cerebrovascular Disease in his maternal grandfather.    Medication List     Current Outpatient Medications   Medication     acetaminophen (TYLENOL) 325 MG tablet     bacitracin 500 UNIT/GM OINT     hypromellose (ARTIFICIAL TEARS) 0.5 % SOLN ophthalmic solution     levETIRAcetam (KEPPRA) 500 MG tablet     loperamide (IMODIUM) 2 MG capsule     polyethylene glycol (MIRALAX) 17 g packet     primidone (MYSOLINE) 50 MG tablet     sodium chloride (OCEAN) 0.65 % nasal spray     No current facility-administered medications for this visit.          Allergies     Allergies   Allergen Reactions     Orphenadrine Hives     Other reaction(s): hives       Penicillins Anaphylaxis and Nausea and Vomiting     Alfuzosin Nausea and Vomiting     Donepezil Other (See Comments)     Myolin [Orphenadrine Citrate] Unknown     Other reaction(s): hives     Sulfa Antibiotics Nausea and Vomiting     Triazolam Nausea and Vomiting     Penicillin V Rash  "      Review of Systems   A comprehensive review of 14 systems was done. Pertinent findings noted here and in history of present illness. All the rest negative.  Constitutional: Negative.  Negative for fever, chills, she has  activity change, appetite change and fatigue.   Eating poorly and suspected to be aspirating per staff  Has a cough while eating  Hypotension noted  HENT: Negative for congestion and facial swelling.    Eyes: Negative for photophobia, redness and visual disturbance.   Respiratory: Negative for cough and chest tightness.    Cardiovascular: Negative for chest pain, palpitations and leg swelling.   Gastrointestinal: Negative for nausea, diarrhea, constipation, blood in stool and abdominal distention.   Genitourinary: Negative.  Has a rai  Musculoskeletal: reports falls   Skin: Negative.    Neurological: Negative for dizziness, tremors, syncope, weakness, light-headedness and headaches.   Hematological: Does not bruise/bleed easily.   Psychiatric/Behavioral: Negative.  Some recall issues noted  More confused sedated and somnolent  REPORTS more depression      Physical Exam   /65   Pulse 69   Temp 97.5  F (36.4  C)   Resp 18   Ht 1.676 m (5' 6\")   Wt 58.5 kg (129 lb)   SpO2 93%   BMI 20.82 kg/m       Constitutional: Oriented to person, place, and appears well-developed.  HEENT:  Normocephalic and atraumatic.  Eyes: Conjunctivae and EOM are normal. Pupils are equal, round, and reactive to light. No discharge.  No scleral icterus. Nose normal. Mouth/Throat: Oropharynx is clear and moist. No oropharyngeal exudate.    [place]  NECK: Normal range of motion. Neck supple. No JVD present. No tracheal deviation present. No thyromegaly present.   CARDIOVASCULAR: Normal rate, regular rhythm and intact distal pulses.  Exam reveals no gallop and no friction rub.  Systolic murmur present.  PULMONARY: Effort normal and breath sounds normal. No respiratory distress.No Wheezing or rales.  ABDOMEN: Soft. " Bowel sounds are normal. No distension and no mass.  There is no tenderness. There is no rebound and no guarding. No HSM.  MUSCULOSKELETAL: Normal range of motion. Mild kyphosis, no tenderness.  LYMPH NODES: Has no cervical, supraclavicular, axillary and groin adenopathy.   NEUROLOGICAL: Alert and oriented to person, place, No cranial nerve deficit.  Normal muscle tone. Coordination normal.   GENITOURINARY: Deferred exam.has  A rai  SKIN: Skin is warm and dry. No rash noted. No erythema. No pallor.   EXTREMITIES: No cyanosis, no clubbing, no edema. No Deformity.  PSYCHIATRIC reporting more depression      Lab Results     Recent Results (from the past 240 hour(s))   C. difficile Toxin B PCR with reflex to C. difficile Antigen and Toxins A/B EIA    Collection Time: 06/05/23  1:00 PM    Specimen: Per Rectum; Stool   Result Value Ref Range    C Difficile Toxin B by PCR Negative Negative           Electronically signed by    Bela Franklin MD

## 2023-06-07 NOTE — TELEPHONE ENCOUNTER
Called pt, spoke to Matty Garcia (listed on pt's consent form) and informed him EEG result per Dr. Deng:    Darrell Deng, DO Iqbal, Kelly SANABRIA RN  There were no specific findings to indicate a risk for seizure, or a local region of irritation injury on EEG.         He understood result and I told him to have pt follow up as scheduled with Dr. Deng in Sept. And he understood.

## 2023-06-08 NOTE — LETTER
6/8/2023        RE: Kurtis Urban  [address]  [place]  [facility] [address]        [facility] GERIATRIC SERVICES       Patient Kurtis Urban  MRN: [id number]        Reason for Visit     Chief Complaint   Patient presents with     [facility] Regulatory       Code Status     dnr    Assessment     S/p cystoscopy ;TURP on 5/15/23  Bladder cancer  History of traumatic subdural hematoma  History of a seizure disorder  dysphagia  Dementia  hld  [place]  Chronic dvt  Generalized weakness    Plan     Pt is currently a resident of long-term care was being readmitted.  Underwent above-mentioned procedure and seems to be doing well with no concerns.  Biopsy report reviewed and shows papillary urothelial carcinoma high-grade  UTI also has been treated.  Follow-up scheduled with urology and he is scheduled to have another procedure done in a month's time  Unfortunately he did fail a voiding trial and urology   he has had Retana catheter placed  Due to chronic dysphagia he remains on a modified diet with thickened liquids.  Blood pressures remain low.  Discontinue amlodipine  Some ongoing weight loss noted which will need to be monitored.  CODE STATUS has been changed to DNR/DNI  Ongoing concerns about depression and so an ACP consult has been requested for him      History     Patient is a very pleasant 89 year old male who is admitted to LTC  Patient was initially admitted post fall with a subdural hematoma.  Subsequently he was readmitted and underwent TURP with cystoscopy on 5/15/2023.  Postoperative course complicated by UTI.  He was treated with antibiotics and now discharged back to long-term care      Past Medical & Surgical History     PAST MEDICAL HISTORY:   Past Medical History:   Diagnosis Date     Cerebrovascular accident (H)      Chronic pain      Dementia with behavioral disturbance (H) 09/03/2019     Depression 05/28/2014     DVT (deep venous thrombosis) (H)      Failure to thrive in  adult      Fall      Generalized muscle weakness      [place] (hard of hearing)      Hypercholesteremia 05/28/2014     Hypertension      Melanoma of back (H) 05/28/2014    Surgically removed       Melanoma of face (H) 05/28/2014    Surgically removed      Meniere's disease      Metabolic encephalopathy      Osteoarthritis of shoulder region      Seizure (H) 05/19/2015     Subdural hematoma (H)      Thiamine deficiency 09/03/2019     Thrombocytopenia (H)      TIA (transient ischemic attack)      Urinary retention       PAST SURGICAL HISTORY:   has a past surgical history that includes Cholecystectomy; appendectomy; other surgical history; other surgical history (1993); hernia repair; shoulder surgery (Right); other surgical history (Right); and Cystoscopy, transurethral resection (TUR) tumor bladder, combined (N/A, 5/15/2023).      Past Social History     Reviewed,  reports that he has quit smoking. His smoking use included cigarettes. He has never used smokeless tobacco. He reports that he does not drink alcohol and does not use drugs.    Family History     Reviewed, and family history includes Cancer in his mother; Cerebrovascular Disease in his maternal grandfather.    Medication List     Current Outpatient Medications   Medication     acetaminophen (TYLENOL) 325 MG tablet     bacitracin 500 UNIT/GM OINT     hypromellose (ARTIFICIAL TEARS) 0.5 % SOLN ophthalmic solution     levETIRAcetam (KEPPRA) 500 MG tablet     loperamide (IMODIUM) 2 MG capsule     polyethylene glycol (MIRALAX) 17 g packet     primidone (MYSOLINE) 50 MG tablet     sodium chloride (OCEAN) 0.65 % nasal spray     No current facility-administered medications for this visit.          Allergies     Allergies   Allergen Reactions     Orphenadrine Hives     Other reaction(s): hives       Penicillins Anaphylaxis and Nausea and Vomiting     Alfuzosin Nausea and Vomiting     Donepezil Other (See Comments)     Myolin [Orphenadrine Citrate] Unknown     Other  "reaction(s): hives     Sulfa Antibiotics Nausea and Vomiting     Triazolam Nausea and Vomiting     Penicillin V Rash       Review of Systems   A comprehensive review of 14 systems was done. Pertinent findings noted here and in history of present illness. All the rest negative.  Constitutional: Negative.  Negative for fever, chills, she has  activity change, appetite change and fatigue.   Eating poorly and suspected to be aspirating per staff  Has a cough while eating  Hypotension noted  HENT: Negative for congestion and facial swelling.    Eyes: Negative for photophobia, redness and visual disturbance.   Respiratory: Negative for cough and chest tightness.    Cardiovascular: Negative for chest pain, palpitations and leg swelling.   Gastrointestinal: Negative for nausea, diarrhea, constipation, blood in stool and abdominal distention.   Genitourinary: Negative.  Has a rai  Musculoskeletal: reports falls   Skin: Negative.    Neurological: Negative for dizziness, tremors, syncope, weakness, light-headedness and headaches.   Hematological: Does not bruise/bleed easily.   Psychiatric/Behavioral: Negative.  Some recall issues noted  More confused sedated and somnolent  REPORTS more depression      Physical Exam   /65   Pulse 69   Temp 97.5  F (36.4  C)   Resp 18   Ht 1.676 m (5' 6\")   Wt 58.5 kg (129 lb)   SpO2 93%   BMI 20.82 kg/m       Constitutional: Oriented to person, place, and appears well-developed.  HEENT:  Normocephalic and atraumatic.  Eyes: Conjunctivae and EOM are normal. Pupils are equal, round, and reactive to light. No discharge.  No scleral icterus. Nose normal. Mouth/Throat: Oropharynx is clear and moist. No oropharyngeal exudate.    [place]  NECK: Normal range of motion. Neck supple. No JVD present. No tracheal deviation present. No thyromegaly present.   CARDIOVASCULAR: Normal rate, regular rhythm and intact distal pulses.  Exam reveals no gallop and no friction rub.  Systolic murmur " present.  PULMONARY: Effort normal and breath sounds normal. No respiratory distress.No Wheezing or rales.  ABDOMEN: Soft. Bowel sounds are normal. No distension and no mass.  There is no tenderness. There is no rebound and no guarding. No HSM.  MUSCULOSKELETAL: Normal range of motion. Mild kyphosis, no tenderness.  LYMPH NODES: Has no cervical, supraclavicular, axillary and groin adenopathy.   NEUROLOGICAL: Alert and oriented to person, place, No cranial nerve deficit.  Normal muscle tone. Coordination normal.   GENITOURINARY: Deferred exam.has  A rai  SKIN: Skin is warm and dry. No rash noted. No erythema. No pallor.   EXTREMITIES: No cyanosis, no clubbing, no edema. No Deformity.  PSYCHIATRIC reporting more depression      Lab Results     Recent Results (from the past 240 hour(s))   C. difficile Toxin B PCR with reflex to C. difficile Antigen and Toxins A/B EIA    Collection Time: 06/05/23  1:00 PM    Specimen: Per Rectum; Stool   Result Value Ref Range    C Difficile Toxin B by PCR Negative Negative           Electronically signed by    Bela Franklin MD                           Sincerely,        DEB [place]ola

## 2023-06-12 NOTE — TELEPHONE ENCOUNTER
[facility]    Provider: CODY Dill  Facility: [facility]  Facility Type:  LTC    Caller: Trinidad  Call Back Number: [phone]    Allergies:    Allergies   Allergen Reactions     Orphenadrine Hives     Other reaction(s): hives       Penicillins Anaphylaxis and Nausea and Vomiting     Alfuzosin Nausea and Vomiting     Donepezil Other (See Comments)     Myolin [Orphenadrine Citrate] Unknown     Other reaction(s): hives     Sulfa Antibiotics Nausea and Vomiting     Triazolam Nausea and Vomiting     Penicillin V Rash        Reason for call: Nurse is reporting that patient had on fall on 6/10/23 at 0515.  No injury noted.  VSS and neuros WNL.  Patient has been having loose stools.  PRN Imodium has not been given regularily.      Verbal Order/Direction given by Provider: Continue to monitor per protocol and update with any changes.  Loperamide 2mg Q AM and 2mg TID PRN.      Provider giving Order:  CODY Dill    Verbal Order given to: Jesusita Evans RN

## 2023-06-26 NOTE — LETTER
6/26/2023        RE: Kurtis Urban  [address]  C/o Adi Urban  [facility] [address]        [facility] [address] [place] [address]  Phone: [phone]  Fax: [phone]  Primary Provider: Garcia Martinez  Pre-op Performing Provider: GARCIA MARTINEZ      PREOPERATIVE EVALUATION:  Today's date: 6/26/2023    Kurtis Urban is a 89 year old male who presents for a preoperative evaluation.    Surgical Information:  Surgery/Procedure: Bladder surgery  Surgery Location: [facility]  Surgeon: Dr. Daniel  Surgery Date: 6/30/2023  Time of Surgery: 1430  Where patient plans to recover: At a nursing home  Fax number for surgical facility: Note does not need to be faxed, will be available electronically in Epic.      Subjective     HPI related to upcoming procedure: Kurtis Urban  is 89 year old (2/23/1929) residing in the LTC at [facility].  He is with past medical history significant for dementia,   seizure disorder, CVA, TIA, dyslipidemia.  subdural hematoma, abnormal appearing urothelial tract CT.     Today he is seen to review vital signs, preop exam.   He denied chest pain or shortness of breath, constipation or diarrhea.  He denied cough or congestion. He does have a Retana with clear yellow urine.  He denied headaches dizziness.  Hearing aid left ear only.  He denies pain.     Okay to give Keppra and primidone on day of surgery with a small sip of water vital signs are stable he is afebrile    Health Care Directive:  Patient has a Health Care Directive on file  Code status  DNR      Preoperative Review of :   reviewed - no record of controlled substances prescribed.    Review of Systems  Constitutional: Negative for activity change, positive appetite change, fatigue and fever. On NDD2 with nectar thickened however does not adhere to thickened liquids  HENT: Negative for congestion.    Hearing aid left ear  Respiratory: Negative  for cough, shortness of breath and wheezing.    Cardiovascular: Negative for chest pain and leg swelling.   Gastrointestinal: Negative for abdominal distention, abdominal pain, constipation, diarrhea and nausea.   Genitourinary: Negative for dysuria. Retana in place  Musculoskeletal: Negative for arthralgia. Negative for back pain.   Skin: Negative for color change and wound.   Neurological: Negative for dizziness.   Psychiatric/Behavioral: Negative for agitation, behavioral problems and  Intermittent  confusion.     Patient Active Problem List    Diagnosis Date Noted     Bladder cancer (H) 05/15/2023     Priority: Medium     Current mild episode of major depressive disorder without prior episode (H) 04/26/2023     Priority: Medium     Thrombocytopenia (H) 04/26/2023     Priority: Medium     Healthcare-associated pneumonia 04/11/2023     Priority: Medium     Urinary tract infection without hematuria, site unspecified 04/11/2023     Priority: Medium     Altered mental status, unspecified altered mental status type 04/11/2023     Priority: Medium     Urinary retention 03/16/2023     Priority: Medium     Generalized muscle weakness 03/16/2023     Priority: Medium     Failure to thrive in adult 03/16/2023     Priority: Medium     Bilateral subdural hematomas (H) 03/16/2023     Priority: Medium     Fall, initial encounter 03/16/2023     Priority: Medium     Chronic pain 07/28/2021     Priority: Medium     Localized, primary osteoarthritis of shoulder region 07/28/2021     Priority: Medium     Long term current use of anticoagulant therapy 07/28/2021     Priority: Medium     Meniere's disease 07/28/2021     Priority: Medium     Altered mental status, unspecified 01/18/2020     Priority: Medium     Acute metabolic encephalopathy 01/18/2020     Priority: Medium     KRIS (acute kidney injury) (H) 01/18/2020     Priority: Medium     Chronic deep vein thrombosis (DVT) of proximal vein of lower extremity (H) 01/18/2020      Priority: Medium     DVT, lower extremity, proximal, acute, right (H) 12/16/2019     Priority: Medium     Vitamin B12 deficiency (non anemic) 12/16/2019     Priority: Medium     Transient confusion 09/03/2019     Priority: Medium     TIA (transient ischemic attack) 09/03/2019     Priority: Medium     Dementia with behavioral disturbance (H) 09/03/2019     Priority: Medium     Thiamine deficiency 09/03/2019     Priority: Medium     Seizure disorder (H) 05/19/2015     Priority: Medium     Mild cognitive impairment 06/14/2014     Priority: Medium     Hypercholesteremia 05/28/2014     Priority: Medium     Depression 05/28/2014     Priority: Medium     History of CVA (cerebrovascular accident) 05/28/2014     Priority: Medium     With several instances of TIAs therafter         Melanoma of back (H) 05/28/2014     Priority: Medium     Surgically removed          Melanoma of face (H) 05/28/2014     Priority: Medium     Surgically removed         Hard of hearing 05/28/2014     Priority: Medium     Left ear          Past Medical History:   Diagnosis Date     Cerebrovascular accident (H)      Chronic pain      Dementia with behavioral disturbance (H) 09/03/2019     Depression 05/28/2014     DVT (deep venous thrombosis) (H)      Failure to thrive in adult      Fall      Generalized muscle weakness      [place] (hard of hearing)      Hypercholesteremia 05/28/2014     Hypertension      Melanoma of back (H) 05/28/2014    Surgically removed       Melanoma of face (H) 05/28/2014    Surgically removed      Meniere's disease      Metabolic encephalopathy      Osteoarthritis of shoulder region      Seizure (H) 05/19/2015     Subdural hematoma (H)      Thiamine deficiency 09/03/2019     Thrombocytopenia (H)      TIA (transient ischemic attack)      Urinary retention      Past Surgical History:   Procedure Laterality Date     APPENDECTOMY       CHOLECYSTECTOMY       CYSTOSCOPY, TRANSURETHRAL RESECTION (TUR) TUMOR BLADDER, COMBINED N/A  5/15/2023    Procedure: CYSTOSCOPY, TRANSURETHRAL RESECTION OF BLADDER TUMOR, RAI CATHETER INSERTION;  Surgeon: Donn Daniel MD;  Location: [facility] OR     HERNIA REPAIR       OTHER SURGICAL HISTORY      nasal surgeries     OTHER SURGICAL HISTORY  1993    meniscal surgery     OTHER SURGICAL HISTORY Right     total knee repair     SHOULDER SURGERY Right      Current Outpatient Medications   Medication Sig Dispense Refill     acetaminophen (TYLENOL) 325 MG tablet Take 325-650 mg by mouth every 6 hours as needed for mild pain       bacitracin 500 UNIT/GM OINT Apply topically 3 times daily Apply to tip of penis while rai catheter is present. Discontinue once rai is removed. 28 g 0     hypromellose (ARTIFICIAL TEARS) 0.5 % SOLN ophthalmic solution 2 drops 3 times daily as needed for dry eyes       levETIRAcetam (KEPPRA) 500 MG tablet Take 1 tablet (500 mg) by mouth 2 times daily 56 tablet 12     loperamide (IMODIUM) 2 MG capsule Take 2mg Q AM and 2mg TID PRN.         polyethylene glycol (MIRALAX) 17 g packet Take 1 packet by mouth daily as needed       primidone (MYSOLINE) 50 MG tablet Take 1 tablet (50 mg) by mouth 2 times daily 90 tablet 7     sodium chloride (OCEAN) 0.65 % nasal spray Spray 1 spray into both nostrils daily as needed for congestion         Allergies   Allergen Reactions     Orphenadrine Hives     Other reaction(s): hives       Penicillins Anaphylaxis and Nausea and Vomiting     Alfuzosin Nausea and Vomiting     Donepezil Other (See Comments)     Myolin [Orphenadrine Citrate] Unknown     Other reaction(s): hives     Sulfa Antibiotics Nausea and Vomiting     Triazolam Nausea and Vomiting     Penicillin V Rash        Social History     Tobacco Use     Smoking status: Former     Types: Cigarettes     Smokeless tobacco: Never   Substance Use Topics     Alcohol use: Never       History   Drug Use Unknown        Objective     /65   Pulse 69   Temp 97.3  F (36.3  C)   Resp 18    "Ht 1.676 m (5' 6\")   Wt 57.1 kg (125 lb 12.8 oz)   SpO2 95%   BMI 20.30 kg/m      Physical Exam  Constitutional:       Appearance: Patient is well-developed. Poor nutrition   HENT:      Head: Normocephalic.   Eyes:      Conjunctiva/sclera: Conjunctivae normal.   Neck:      Musculoskeletal: Normal range of motion.   Cardiovascular:      Rate and Rhythm: Normal rate and regular rhythm.      Heart sounds: Normal heart sounds. No murmur.   Pulmonary:      Effort: No respiratory distress.      Breath sounds: Normal breath sounds.   Abdominal:      General: Bowel sounds are normal. There is no distension.      Palpations: Abdomen is soft.      Tenderness: There is no abdominal tenderness.   Musculoskeletal:       Normal range of motion.     Skin:General:        Skin is warm.   Neurological:         Mental Status: Patient is alert and oriented to person, place, and time. Has intermittent confusion   Psychiatric:         Behavior: Behavior normal.     Recent Labs   Lab Test 05/18/23  0627 05/16/23  1136 05/15/23  1518 05/11/23  0827 04/26/23  0900 04/25/23  1010 04/18/23  0931 03/17/23  0545 03/16/23  1321   HGB  --   --   --  10.7* 9.6*   < > 8.4*   < >  --    *  --  144* 186 171   < > 197   < >  --    INR  --   --   --   --   --   --   --   --  1.22*   NA  --   --   --  139  --   --  137   < >  --    POTASSIUM  --   --   --  4.1  --   --  4.6   < >  --    CR  --  1.08  --  1.23*  --   --  0.99   < >  --     < > = values in this interval not displayed.        Diagnostics:  No results found for this or any previous visit (from the past 240 hour(s)).   Hypertension continue Norvasc     Seizure disorder on Keppra and primidone Keppra Level WNL 4/17/23 at 22.7      Retana in place     Okay to proceed with surgery on 6/30/2023    Signed Electronically by: Juana Martinez CNP  Copy of this evaluation report is provided to requesting physician.            Sincerely,        Juana Martinez CNP      "

## 2023-06-26 NOTE — PROGRESS NOTES
[facility]  [address] [place] [address]  Phone: [phone]  Fax: [phone]  Primary Provider: Garcia Martinez  Pre-op Performing Provider: GARCIA MARTINEZ      PREOPERATIVE EVALUATION:  Today's date: 6/26/2023    Kurtis Urban is a 89 year old male who presents for a preoperative evaluation.    Surgical Information:  Surgery/Procedure: Bladder surgery  Surgery Location: [facility]  Surgeon: Dr. Daniel  Surgery Date: 6/30/2023  Time of Surgery: 1430  Where patient plans to recover: At a nursing home  Fax number for surgical facility: Note does not need to be faxed, will be available electronically in Epic.      Subjective     HPI related to upcoming procedure: Kurtis Urban  is 89 year old (2/23/1929) residing in the LTC at [facility].  He is with past medical history significant for dementia,   seizure disorder, CVA, TIA, dyslipidemia.  subdural hematoma, abnormal appearing urothelial tract CT.     Today he is seen to review vital signs, preop exam.   He denied chest pain or shortness of breath, constipation or diarrhea.  He denied cough or congestion. He does have a Retana with clear yellow urine.  He denied headaches dizziness.  Hearing aid left ear only.  He denies pain.     Okay to give Keppra and primidone on day of surgery with a small sip of water vital signs are stable he is afebrile    Health Care Directive:  Patient has a Health Care Directive on file  Code status  DNR      Preoperative Review of :   reviewed - no record of controlled substances prescribed.    Review of Systems  Constitutional: Negative for activity change, positive appetite change, fatigue and fever. On NDD2 with nectar thickened however does not adhere to thickened liquids  HENT: Negative for congestion.    Hearing aid left ear  Respiratory: Negative for cough, shortness of breath and wheezing.    Cardiovascular: Negative for chest pain and leg swelling.    Gastrointestinal: Negative for abdominal distention, abdominal pain, constipation, diarrhea and nausea.   Genitourinary: Negative for dysuria. Retana in place  Musculoskeletal: Negative for arthralgia. Negative for back pain.   Skin: Negative for color change and wound.   Neurological: Negative for dizziness.   Psychiatric/Behavioral: Negative for agitation, behavioral problems and  Intermittent  confusion.     Patient Active Problem List    Diagnosis Date Noted     Bladder cancer (H) 05/15/2023     Priority: Medium     Current mild episode of major depressive disorder without prior episode (H) 04/26/2023     Priority: Medium     Thrombocytopenia (H) 04/26/2023     Priority: Medium     Healthcare-associated pneumonia 04/11/2023     Priority: Medium     Urinary tract infection without hematuria, site unspecified 04/11/2023     Priority: Medium     Altered mental status, unspecified altered mental status type 04/11/2023     Priority: Medium     Urinary retention 03/16/2023     Priority: Medium     Generalized muscle weakness 03/16/2023     Priority: Medium     Failure to thrive in adult 03/16/2023     Priority: Medium     Bilateral subdural hematomas (H) 03/16/2023     Priority: Medium     Fall, initial encounter 03/16/2023     Priority: Medium     Chronic pain 07/28/2021     Priority: Medium     Localized, primary osteoarthritis of shoulder region 07/28/2021     Priority: Medium     Long term current use of anticoagulant therapy 07/28/2021     Priority: Medium     Meniere's disease 07/28/2021     Priority: Medium     Altered mental status, unspecified 01/18/2020     Priority: Medium     Acute metabolic encephalopathy 01/18/2020     Priority: Medium     KRIS (acute kidney injury) (H) 01/18/2020     Priority: Medium     Chronic deep vein thrombosis (DVT) of proximal vein of lower extremity (H) 01/18/2020     Priority: Medium     DVT, lower extremity, proximal, acute, right (H) 12/16/2019     Priority: Medium      Vitamin B12 deficiency (non anemic) 12/16/2019     Priority: Medium     Transient confusion 09/03/2019     Priority: Medium     TIA (transient ischemic attack) 09/03/2019     Priority: Medium     Dementia with behavioral disturbance (H) 09/03/2019     Priority: Medium     Thiamine deficiency 09/03/2019     Priority: Medium     Seizure disorder (H) 05/19/2015     Priority: Medium     Mild cognitive impairment 06/14/2014     Priority: Medium     Hypercholesteremia 05/28/2014     Priority: Medium     Depression 05/28/2014     Priority: Medium     History of CVA (cerebrovascular accident) 05/28/2014     Priority: Medium     With several instances of TIAs therafter         Melanoma of back (H) 05/28/2014     Priority: Medium     Surgically removed          Melanoma of face (H) 05/28/2014     Priority: Medium     Surgically removed         Hard of hearing 05/28/2014     Priority: Medium     Left ear          Past Medical History:   Diagnosis Date     Cerebrovascular accident (H)      Chronic pain      Dementia with behavioral disturbance (H) 09/03/2019     Depression 05/28/2014     DVT (deep venous thrombosis) (H)      Failure to thrive in adult      Fall      Generalized muscle weakness      [place] (hard of hearing)      Hypercholesteremia 05/28/2014     Hypertension      Melanoma of back (H) 05/28/2014    Surgically removed       Melanoma of face (H) 05/28/2014    Surgically removed      Meniere's disease      Metabolic encephalopathy      Osteoarthritis of shoulder region      Seizure (H) 05/19/2015     Subdural hematoma (H)      Thiamine deficiency 09/03/2019     Thrombocytopenia (H)      TIA (transient ischemic attack)      Urinary retention      Past Surgical History:   Procedure Laterality Date     APPENDECTOMY       CHOLECYSTECTOMY       CYSTOSCOPY, TRANSURETHRAL RESECTION (TUR) TUMOR BLADDER, COMBINED N/A 5/15/2023    Procedure: CYSTOSCOPY, TRANSURETHRAL RESECTION OF BLADDER TUMOR, COONEY CATHETER INSERTION;   "Surgeon: Donn Daniel MD;  Location: [facility] OR     HERNIA REPAIR       OTHER SURGICAL HISTORY      nasal surgeries     OTHER SURGICAL HISTORY  1993    meniscal surgery     OTHER SURGICAL HISTORY Right     total knee repair     SHOULDER SURGERY Right      Current Outpatient Medications   Medication Sig Dispense Refill     acetaminophen (TYLENOL) 325 MG tablet Take 325-650 mg by mouth every 6 hours as needed for mild pain       bacitracin 500 UNIT/GM OINT Apply topically 3 times daily Apply to tip of penis while rai catheter is present. Discontinue once rai is removed. 28 g 0     hypromellose (ARTIFICIAL TEARS) 0.5 % SOLN ophthalmic solution 2 drops 3 times daily as needed for dry eyes       levETIRAcetam (KEPPRA) 500 MG tablet Take 1 tablet (500 mg) by mouth 2 times daily 56 tablet 12     loperamide (IMODIUM) 2 MG capsule Take 2mg Q AM and 2mg TID PRN.         polyethylene glycol (MIRALAX) 17 g packet Take 1 packet by mouth daily as needed       primidone (MYSOLINE) 50 MG tablet Take 1 tablet (50 mg) by mouth 2 times daily 90 tablet 7     sodium chloride (OCEAN) 0.65 % nasal spray Spray 1 spray into both nostrils daily as needed for congestion         Allergies   Allergen Reactions     Orphenadrine Hives     Other reaction(s): hives       Penicillins Anaphylaxis and Nausea and Vomiting     Alfuzosin Nausea and Vomiting     Donepezil Other (See Comments)     Myolin [Orphenadrine Citrate] Unknown     Other reaction(s): hives     Sulfa Antibiotics Nausea and Vomiting     Triazolam Nausea and Vomiting     Penicillin V Rash        Social History     Tobacco Use     Smoking status: Former     Types: Cigarettes     Smokeless tobacco: Never   Substance Use Topics     Alcohol use: Never       History   Drug Use Unknown         Objective     /65   Pulse 69   Temp 97.3  F (36.3  C)   Resp 18   Ht 1.676 m (5' 6\")   Wt 57.1 kg (125 lb 12.8 oz)   SpO2 95%   BMI 20.30 kg/m      Physical " Exam  Constitutional:       Appearance: Patient is well-developed. Poor nutrition   HENT:      Head: Normocephalic.   Eyes:      Conjunctiva/sclera: Conjunctivae normal.   Neck:      Musculoskeletal: Normal range of motion.   Cardiovascular:      Rate and Rhythm: Normal rate and regular rhythm.      Heart sounds: Normal heart sounds. No murmur.   Pulmonary:      Effort: No respiratory distress.      Breath sounds: Normal breath sounds.   Abdominal:      General: Bowel sounds are normal. There is no distension.      Palpations: Abdomen is soft.      Tenderness: There is no abdominal tenderness.   Musculoskeletal:       Normal range of motion.     Skin:General:        Skin is warm.   Neurological:         Mental Status: Patient is alert and oriented to person, place, and time. Has intermittent confusion   Psychiatric:         Behavior: Behavior normal.     Recent Labs   Lab Test 05/18/23  0627 05/16/23  1136 05/15/23  1518 05/11/23  0827 04/26/23  0900 04/25/23  1010 04/18/23  0931 03/17/23  0545 03/16/23  1321   HGB  --   --   --  10.7* 9.6*   < > 8.4*   < >  --    *  --  144* 186 171   < > 197   < >  --    INR  --   --   --   --   --   --   --   --  1.22*   NA  --   --   --  139  --   --  137   < >  --    POTASSIUM  --   --   --  4.1  --   --  4.6   < >  --    CR  --  1.08  --  1.23*  --   --  0.99   < >  --     < > = values in this interval not displayed.        Diagnostics:  No results found for this or any previous visit (from the past 240 hour(s)).   Hypertension continue Norvasc     Seizure disorder on Keppra and primidone Keppra Level WNL 4/17/23 at 22.7      Retana in place     Okay to proceed with surgery on 6/30/2023    Signed Electronically by: Juana Martinez CNP  Copy of this evaluation report is provided to requesting physician.

## 2023-07-03 NOTE — TELEPHONE ENCOUNTER
Resident had a fall and did not need a call back until tomorrow.  Did not call the facility back.    Will send not to primary NP.    Electronically signed by Michell Jackson RN, CNP

## 2023-07-03 NOTE — TELEPHONE ENCOUNTER
"[facility] Triage Nurse Telephone Encounter    Provider: CODY Dill  Facility: [facility]  Facility Type:  LTC    Caller: Trinidad  Call Back Number: [phone]    Allergies:    Allergies   Allergen Reactions     Orphenadrine Hives     Other reaction(s): hives       Penicillins Anaphylaxis and Nausea and Vomiting     Alfuzosin Nausea and Vomiting     Donepezil Other (See Comments)     Myolin [Orphenadrine Citrate] Unknown     Other reaction(s): hives     Sulfa Antibiotics Nausea and Vomiting     Triazolam Nausea and Vomiting     Penicillin V Rash        Reason for call: Nurse called to report that patient had a minor surgery on Friday at [facility] and came back to facility yesterday.  Patient had a fall last evening with no injuries but patient is now having hallucinations and paranoia.  Patient is yelling out loud that staff is going to \"kill him\".  Patient all over the place and wont sit still.  Nurse is trying to keep patient at a 1:1 but unable due to staffing.  Patient does have a diagnosis of dementia but patient has never been this way before.  Nurse is wondering if this is delirium from the hospital or possible infection process.  VS: 142/76, 74, 20, 97.0, and O2 95% on RA.  Patient did come back to facility with a Retana catheter.      Verbal Order/Direction given by Provider: Send to ER.      Provider giving Order:  CODY Dill    Verbal Order given to: Trinidad Edge RN      "

## 2023-08-02 PROBLEM — I10 HYPERTENSION: Status: ACTIVE | Noted: 2023-01-01

## 2023-08-02 PROBLEM — F01.50 VASCULAR DEMENTIA (H): Status: ACTIVE | Noted: 2023-01-01

## 2023-08-02 PROBLEM — R41.0 CONFUSION: Status: ACTIVE | Noted: 2023-01-01

## 2023-08-02 PROBLEM — N32.89 MASS OF URINARY BLADDER: Status: ACTIVE | Noted: 2023-01-01

## 2023-08-02 PROBLEM — R25.1 OCCASIONAL TREMORS: Status: ACTIVE | Noted: 2023-01-01

## 2023-08-02 NOTE — LETTER
8/2/2023        RE: Kurtis Urban  [address]  C/o Adi Urban  [place] [address]        [facility] GERIATRICS  Chief Complaint   Patient presents with     alf Regulatory     Annual [facility] Medical Record Number:  [id number]  Place of Service where encounter took place:  [facility] () [[id number]]    HPI:    Kurtis Urban  is a 89 year old  (2/23/1929), who is being seen today for a routine regulatory visit. He currently resides in the long-term care at [facility].He is with past medical history significant for dementia,   seizure disorder, CVA, TIA, dyslipidemia with recent hospitalization after a fall and found to have an acute on chronic subdural hematoma, He was recently hospitalized from 3/16 through 3/20/2023 for traumatic subdural hematoma, and bladder cancer who underwent transurethral resection of bladder tumor 4 cm by Dr. Johansen on 6/30 and discharged yesterday 7/2.  Patient currently lives at [facility].  Excerpted from records He was brought back to emergency department with a concern of increasing confusion and aggressive behavior towards staff (7/3/23) Work-up for infection, stroke and seizure has been negative. Keppra level was therapeutic. Patient had continuous EEG which did not show any evidence of active seizure. Neurology, urology and palliative service consulted. Gradually patient's symptoms resolved and he no longer confused. His symptoms likely due to recent hospitalization and surgery. Neurology cleared for patient to be discharged back to his long-term care facility without any new treatment. Continue with Keppra.  Provider met with patient's son prior to dischargeand his son agreed to be DNR/DNI. Documentation completed with palliative service.    A CT scan of his head was also done given his known history of bilateral subdurals, CT demonstrates interval improvement without  acute new findings.       Today he is seen to review vital signs, labs, and a routine regulatory visit.. He denied cough congestion or any urinary symptoms.  He does have a Retana with clear yellow urine. He denied chest pain shortness of breath cough congestion constipation or diarrhea.  He denied headaches dizziness.  Hearing aid left ear only.  He denies pain.  Labs reviewed from 7/3/2023.  TSH 2.05, hemoglobin 10.6 Keppra level 13.4 within normal limits.  Weight reviewed and he was down 4.2 pounds within a week however over the last 4 months he is down 15.2 pounds.  He was seen by the dietitian and currently on Magic cup however staff reports that he really likes Ensure and consult placed to dietary to reevaluate.  Retana in place with clear yellow urine.    ALLERGIES: Orphenadrine, Penicillins, Alfuzosin, Donepezil, Myolin [orphenadrine citrate], Sulfa antibiotics, Triazolam, and Penicillin v  PAST MEDICAL HISTORY:   Past Medical History:   Diagnosis Date     Bladder cancer (H)      Cerebrovascular accident (H)      Chronic pain      Dementia with behavioral disturbance (H) 09/03/2019     Depression 05/28/2014     DVT (deep venous thrombosis) (H)      Failure to thrive in adult      Fall      Generalized muscle weakness      [place] (hard of hearing)      Hypercholesteremia 05/28/2014     Hypertension      Melanoma of back (H) 05/28/2014    Surgically removed       Melanoma of face (H) 05/28/2014    Surgically removed      Meniere's disease      Metabolic encephalopathy      Osteoarthritis of shoulder region      Seizure (H) 05/19/2015     Subdural hematoma (H)      Thiamine deficiency 09/03/2019     Thrombocytopenia (H)      TIA (transient ischemic attack)      Urinary retention       PAST SURGICAL HISTORY:  has a past surgical history that includes Cholecystectomy; appendectomy; other surgical history; other surgical history (1993); hernia repair; shoulder surgery (Right); other surgical history (Right); and  Cystoscopy, transurethral resection (TUR) tumor bladder, combined (N/A, 5/15/2023).      Current Outpatient Medications:      acetaminophen (TYLENOL) 325 MG tablet, Take 325-650 mg by mouth every 6 hours as needed for mild pain, Disp: , Rfl:      hypromellose (ARTIFICIAL TEARS) 0.5 % SOLN ophthalmic solution, 2 drops 3 times daily as needed for dry eyes, Disp: , Rfl:      levETIRAcetam (KEPPRA) 500 MG tablet, Take 1 tablet (500 mg) by mouth 2 times daily, Disp: 56 tablet, Rfl: 12     loperamide (IMODIUM) 2 MG capsule, Take 2mg Q AM and 2mg TID PRN.  , Disp: , Rfl:      polyethylene glycol (MIRALAX) 17 g packet, Take 1 packet by mouth daily as needed, Disp: , Rfl:      primidone (MYSOLINE) 50 MG tablet, Take 1 tablet (50 mg) by mouth 2 times daily, Disp: 90 tablet, Rfl: 7     sodium chloride (OCEAN) 0.65 % nasal spray, Spray 1 spray into both nostrils daily as needed for congestion, Disp: , Rfl:        Post Medication Reconciliation Status: patient was not discharged from an inpatient facility or TCU      Case Management:  I have reviewed the facility/SNF care plan/MDS, including the falls risk, nutrition and pain screening. I also reviewed the current immunizations, and preventive care.. Future cancer screening is not clinically indicated secondary to age/goals of care. Patient's desire to return to the community is not present. Current Level of Care is appropriate.    Advance Directive Discussion:    I reviewed the current advanced directives as reflected in Epic he is DNR/DNI    Team Discussion:  I communicated with the appropriate disciplines involved with the Plan of Care: Nursing  .   Patient's goal is: pain control and comfort.  Information reviewed: Medications, vital signs, orders, and nursing notes.    ROS:  Constitutional: Negative for activity change, appetite change, fatigue and fever.   HENT: Negative for congestion.    Hearing aid left ear  Respiratory: Negative for cough, shortness of breath and  "wheezing.    Cardiovascular: Negative for chest pain and leg swelling.   Gastrointestinal: Negative for abdominal distention, abdominal pain, constipation, diarrhea and nausea.   Genitourinary: Negative for dysuria. Retana in place  Musculoskeletal: Negative for arthralgia. Negative for back pain.   Skin: Negative for color change and wound.   Neurological: Negative for dizziness.   Psychiatric/Behavioral: Negative for agitation, behavioral problems and confusion.     Vitals:  /64   Pulse 71   Temp (!) 96.4  F (35.8  C)   Resp 16   Ht 1.778 m (5' 10\")   Wt 58.1 kg (128 lb)   SpO2 96%   BMI 18.37 kg/m   Body mass index is 18.37 kg/m .  Exam:  Constitutional:       Appearance: Patient is well-developed.   HENT:      Head: Normocephalic.   Eyes:      Conjunctiva/sclera: Conjunctivae normal.   Neck:      Musculoskeletal: Normal range of motion.   Cardiovascular:      Rate and Rhythm: Normal rate and regular rhythm.      Heart sounds: Normal heart sounds. No murmur.   Pulmonary:      Effort: No respiratory distress.      Breath sounds: Normal breath sounds.   Abdominal:      General: Bowel sounds are normal. There is no distension.      Palpations: Abdomen is soft.      Tenderness: There is no abdominal tenderness.   Musculoskeletal:       Normal range of motion.     Skin:General:        Skin is warm.   Neurological:         Mental Status: Patient is alert and oriented to person, place, and time.   Psychiatric:         Behavior: Behavior normal.  Recent ER visit for encephalopathy however he is clear today    Lab/Diagnostic data:   No results found for this or any previous visit (from the past 240 hour(s)).    ASSESSMENT/PLAN       Seizure disorder/Tremor on Keppra and primidone Keppra Level WNL on 7/3/2023 was 13.4     Retana in place Follows up with Urology-recent TURB    Pain management - PRN Tylenol    Weight loss- Dietary to follow currently on magic cup, likes ensure       Electronically signed " by:  Juana Martinez, CAROLINE           Sincerely,        Juana Martinez, CNP

## 2023-08-02 NOTE — PROGRESS NOTES
[facility] GERIATRICS  Chief Complaint   Patient presents with     retirement Regulatory     Annual [facility] Medical Record Number:  [id number]  Place of Service where encounter took place:  [facility] () [[id number]]    HPI:    Kurtis Urban  is a 89 year old  (2/23/1929), who is being seen today for a routine regulatory visit. He currently resides in the long-term care at [facility].He is with past medical history significant for dementia,   seizure disorder, CVA, TIA, dyslipidemia with recent hospitalization after a fall and found to have an acute on chronic subdural hematoma, He was recently hospitalized from 3/16 through 3/20/2023 for traumatic subdural hematoma, and bladder cancer who underwent transurethral resection of bladder tumor 4 cm by Dr. Johansen on 6/30 and discharged yesterday 7/2.  Patient currently lives at [facility].  Excerpted from records He was brought back to emergency department with a concern of increasing confusion and aggressive behavior towards staff (7/3/23) Work-up for infection, stroke and seizure has been negative. Keppra level was therapeutic. Patient had continuous EEG which did not show any evidence of active seizure. Neurology, urology and palliative service consulted. Gradually patient's symptoms resolved and he no longer confused. His symptoms likely due to recent hospitalization and surgery. Neurology cleared for patient to be discharged back to his long-term care facility without any new treatment. Continue with Keppra.  Provider met with patient's son prior to dischargeand his son agreed to be DNR/DNI. Documentation completed with palliative service.    A CT scan of his head was also done given his known history of bilateral subdurals, CT demonstrates interval improvement without acute new findings.       Today he is seen to review vital signs, labs, and a routine regulatory visit.. He denied  cough congestion or any urinary symptoms.  He does have a Retana with clear yellow urine. He denied chest pain shortness of breath cough congestion constipation or diarrhea.  He denied headaches dizziness.  Hearing aid left ear only.  He denies pain.  Labs reviewed from 7/3/2023.  TSH 2.05, hemoglobin 10.6 Keppra level 13.4 within normal limits.  Weight reviewed and he was down 4.2 pounds within a week however over the last 4 months he is down 15.2 pounds.  He was seen by the dietitian and currently on Magic cup however staff reports that he really likes Ensure and consult placed to dietary to reevaluate.  Retana in place with clear yellow urine.    ALLERGIES: Orphenadrine, Penicillins, Alfuzosin, Donepezil, Myolin [orphenadrine citrate], Sulfa antibiotics, Triazolam, and Penicillin v  PAST MEDICAL HISTORY:   Past Medical History:   Diagnosis Date     Bladder cancer (H)      Cerebrovascular accident (H)      Chronic pain      Dementia with behavioral disturbance (H) 09/03/2019     Depression 05/28/2014     DVT (deep venous thrombosis) (H)      Failure to thrive in adult      Fall      Generalized muscle weakness      [place] (hard of hearing)      Hypercholesteremia 05/28/2014     Hypertension      Melanoma of back (H) 05/28/2014    Surgically removed       Melanoma of face (H) 05/28/2014    Surgically removed      Meniere's disease      Metabolic encephalopathy      Osteoarthritis of shoulder region      Seizure (H) 05/19/2015     Subdural hematoma (H)      Thiamine deficiency 09/03/2019     Thrombocytopenia (H)      TIA (transient ischemic attack)      Urinary retention       PAST SURGICAL HISTORY:  has a past surgical history that includes Cholecystectomy; appendectomy; other surgical history; other surgical history (1993); hernia repair; shoulder surgery (Right); other surgical history (Right); and Cystoscopy, transurethral resection (TUR) tumor bladder, combined (N/A, 5/15/2023).      Current Outpatient Medications:       acetaminophen (TYLENOL) 325 MG tablet, Take 325-650 mg by mouth every 6 hours as needed for mild pain, Disp: , Rfl:      hypromellose (ARTIFICIAL TEARS) 0.5 % SOLN ophthalmic solution, 2 drops 3 times daily as needed for dry eyes, Disp: , Rfl:      levETIRAcetam (KEPPRA) 500 MG tablet, Take 1 tablet (500 mg) by mouth 2 times daily, Disp: 56 tablet, Rfl: 12     loperamide (IMODIUM) 2 MG capsule, Take 2mg Q AM and 2mg TID PRN.  , Disp: , Rfl:      polyethylene glycol (MIRALAX) 17 g packet, Take 1 packet by mouth daily as needed, Disp: , Rfl:      primidone (MYSOLINE) 50 MG tablet, Take 1 tablet (50 mg) by mouth 2 times daily, Disp: 90 tablet, Rfl: 7     sodium chloride (OCEAN) 0.65 % nasal spray, Spray 1 spray into both nostrils daily as needed for congestion, Disp: , Rfl:        Post Medication Reconciliation Status: patient was not discharged from an inpatient facility or TCU      Case Management:  I have reviewed the facility/SNF care plan/MDS, including the falls risk, nutrition and pain screening. I also reviewed the current immunizations, and preventive care.. Future cancer screening is not clinically indicated secondary to age/goals of care. Patient's desire to return to the community is not present. Current Level of Care is appropriate.    Advance Directive Discussion:    I reviewed the current advanced directives as reflected in Epic he is DNR/DNI    Team Discussion:  I communicated with the appropriate disciplines involved with the Plan of Care: Nursing  .   Patient's goal is: pain control and comfort.  Information reviewed: Medications, vital signs, orders, and nursing notes.    ROS:  Constitutional: Negative for activity change, appetite change, fatigue and fever.   HENT: Negative for congestion.    Hearing aid left ear  Respiratory: Negative for cough, shortness of breath and wheezing.    Cardiovascular: Negative for chest pain and leg swelling.   Gastrointestinal: Negative for abdominal  "distention, abdominal pain, constipation, diarrhea and nausea.   Genitourinary: Negative for dysuria. Retana in place  Musculoskeletal: Negative for arthralgia. Negative for back pain.   Skin: Negative for color change and wound.   Neurological: Negative for dizziness.   Psychiatric/Behavioral: Negative for agitation, behavioral problems and confusion.     Vitals:  /64   Pulse 71   Temp (!) 96.4  F (35.8  C)   Resp 16   Ht 1.778 m (5' 10\")   Wt 58.1 kg (128 lb)   SpO2 96%   BMI 18.37 kg/m   Body mass index is 18.37 kg/m .  Exam:  Constitutional:       Appearance: Patient is well-developed.   HENT:      Head: Normocephalic.   Eyes:      Conjunctiva/sclera: Conjunctivae normal.   Neck:      Musculoskeletal: Normal range of motion.   Cardiovascular:      Rate and Rhythm: Normal rate and regular rhythm.      Heart sounds: Normal heart sounds. No murmur.   Pulmonary:      Effort: No respiratory distress.      Breath sounds: Normal breath sounds.   Abdominal:      General: Bowel sounds are normal. There is no distension.      Palpations: Abdomen is soft.      Tenderness: There is no abdominal tenderness.   Musculoskeletal:       Normal range of motion.     Skin:General:        Skin is warm.   Neurological:         Mental Status: Patient is alert and oriented to person, place, and time.   Psychiatric:         Behavior: Behavior normal.  Recent ER visit for encephalopathy however he is clear today    Lab/Diagnostic data:   No results found for this or any previous visit (from the past 240 hour(s)).    ASSESSMENT/PLAN       Seizure disorder/Tremor on Keppra and primidone Keppra Level WNL on 7/3/2023 was 13.4     Retana in place Follows up with Urology-recent TURB    Pain management - PRN Tylenol    Weight loss- Dietary to follow currently on magic cup, likes ensure       Electronically signed by:  Juana Martinez CNP     "

## 2023-09-08 NOTE — TELEPHONE ENCOUNTER
Writer made reminder call to Pt. A man named Matty answered. He said this appt was to be a follow up after a scan. He said how can you know if there have been changes without a scan? Please call Matty regarding his question. Was Pt supposed to have had imaging done?

## 2023-09-12 NOTE — TELEPHONE ENCOUNTER
"Message from Kelly today.      Kelly Jarrell38 minutes ago (2:05 PM)     MF  Clinical team please call friend/contact Matty back. Writer called him to relay the in basket message that it was an EEG that Pt was supposed to have done before seeing Dr Deng again in Sept. and that Pt had it done in May. Matty states once again that Dr Deng told them Pt was to have another scan \"to see if it had grown\" before his Sept follow up visit. He said he is getting 2 different messages. Matty wants to wait until this gets straightened out before rescheduling the appt from 9/12 when provider was out.          Note       Dr. Deng,    I called and spoke to the pt's friend named Matty (consent to speak in chart) and per him, at the pt's last visit, pt was told that he was supposed to get a scan done to see how fast the growth was growing. I looked in the pt's chart, I do not see any notes aside from what was order which is the EEG.   Per Matty refuse to make a follow up appt until he figures out if a 2nd scan is needed. Per Matty, because of arranging transportation for the pt is expensive, he wants to know from you if a scan is needed to determine the growth.   Per Matty asked if you can contact him and talk about it and will not talk about this over an appt.    Please advise.    Sangita Villa MA on 9/12/2023 at 3:09 PM          "

## 2023-09-12 NOTE — TELEPHONE ENCOUNTER
"Clinical team please call friend/contact Matty back. Writer called him to relay the in basket message that it was an EEG that Pt was supposed to have done before seeing Dr Deng again in Sept. and that Pt had it done in May. Matty states once again that Dr Deng told them Pt was to have another scan \"to see if it had grown\" before his Sept follow up visit. He said he is getting 2 different messages. Matty wants to wait until this gets straightened out before rescheduling the appt from 9/12 when provider was out.   "

## 2023-09-12 NOTE — TELEPHONE ENCOUNTER
There is a prior TE on 09/08/23 in regards to msg below please refer to previous TE.    Sangita Villa MA on 9/12/2023 at 2:46 PM

## 2023-09-13 NOTE — TELEPHONE ENCOUNTER
[facility] Call Center    Phone Message    May a detailed message be left on voicemail: yes     Reason for Call: Other: Returning call     Please follow up with Matty [phone].      Action Taken: Message routed to: [facility]    Travel Screening: Not Applicable    Darrell Pendleton on 9/13/2023 at 2:41 PM   - Neurology

## 2023-09-13 NOTE — TELEPHONE ENCOUNTER
Returned call and spoke with Matty (consent in chart). Relayed Dr. Ish palomino below and scheduled a virtual follow up.    Sangita Villa MA on 9/13/2023 at 3:04 PM

## 2023-10-04 NOTE — PROGRESS NOTES
[facility] GERIATRIC SERVICES       Patient Kurtis Marinohstaedt  MRN: [id number]        Reason for Visit     Chief Complaint   Patient presents with    group home Regulatory       Code Status     dnr    Assessment     encephalopathy  Bladder cancer  History of traumatic subdural hematoma  History of a seizure disorder  dysphagia  Vascular Dementia  H/o of temporal meningioma  hld  [place]  htn  Chronic dvt  Generalized weakness    Plan     Pt is currently a resident of long-term care who is readmitted.  Multiple hospitalizations noted  Readmitted with confusion/ aggressive behaviors  Seen by urology/ palliative care and neurology  No seizure noted  Returned back to baseline  Cont rai catheter cares  Discharged back to LTC  Has a follow-up scheduled with Urology for bladder cancer  Also has a history of meningioma for which no surgical intervention is being planned for  Family does not plan to monitor or pursue any treatment for it either  Monitor mood-back to baseline  Minimize meds due to polypharmacy concerns  Does not want a modified diet for dysphagia  On supplements for wt loss    History     Patient is a very pleasant 89 year old male who is admitted to LTC  Patient was initially admitted post fall with a subdural hematoma.  Subsequently he was readmitted and underwent TURP with cystoscopy on 5/15/2023.  Postoperative course complicated by UTI.  He was again readmitted with increasing confusion  No new seizures noted  No etiology found  Discharged back to LTC.      Past Medical & Surgical History     PAST MEDICAL HISTORY:   Past Medical History:   Diagnosis Date    Bladder cancer (H)     Cerebrovascular accident (H)     Chronic pain     Dementia with behavioral disturbance (H) 09/03/2019    Depression 05/28/2014    DVT (deep venous thrombosis) (H)     Failure to thrive in adult     Fall     Generalized muscle weakness     [place] (hard of hearing)     Hypercholesteremia 05/28/2014    Hypertension     Melanoma of back  (H) 05/28/2014    Surgically removed      Melanoma of face (H) 05/28/2014    Surgically removed     Meniere's disease     Metabolic encephalopathy     Osteoarthritis of shoulder region     Seizure (H) 05/19/2015    Subdural hematoma (H)     Thiamine deficiency 09/03/2019    Thrombocytopenia (H24)     TIA (transient ischemic attack)     Urinary retention       PAST SURGICAL HISTORY:   has a past surgical history that includes Cholecystectomy; appendectomy; other surgical history; other surgical history (1993); hernia repair; shoulder surgery (Right); other surgical history (Right); and Cystoscopy, transurethral resection (TUR) tumor bladder, combined (N/A, 5/15/2023).      Past Social History     Reviewed,  reports that he has quit smoking. His smoking use included cigarettes. He has never used smokeless tobacco. He reports that he does not drink alcohol and does not use drugs.    Family History     Reviewed, and family history includes Cancer in his mother; Cerebrovascular Disease in his maternal grandfather.    Medication List     Current Outpatient Medications   Medication    acetaminophen (TYLENOL) 325 MG tablet    hypromellose (ARTIFICIAL TEARS) 0.5 % SOLN ophthalmic solution    levETIRAcetam (KEPPRA) 500 MG tablet    loperamide (IMODIUM) 2 MG capsule    polyethylene glycol (MIRALAX) 17 g packet    primidone (MYSOLINE) 50 MG tablet    sodium chloride (OCEAN) 0.65 % nasal spray     No current facility-administered medications for this visit.          Allergies     Allergies   Allergen Reactions    Orphenadrine Hives     Other reaction(s): hives      Penicillins Anaphylaxis and Nausea and Vomiting    Alfuzosin Nausea and Vomiting    Donepezil Other (See Comments)    Myolin [Orphenadrine Citrate] Unknown     Other reaction(s): hives    Sulfa Antibiotics Nausea and Vomiting    Triazolam Nausea and Vomiting    Penicillin V Rash       Review of Systems   A comprehensive review of 14 systems was not done due to  "confusion and speech difficulty      Physical Exam   /61   Pulse 63   Temp 97.7  F (36.5  C)   Resp 18   Ht 1.778 m (5' 10\")   Wt 58.1 kg (128 lb)   SpO2 94%   BMI 18.37 kg/m       Constitutional: Oriented to person, place, and appears well-developed.  HEENT:  Normocephalic and atraumatic.  Eyes: Conjunctivae and EOM are normal. Pupils are equal, round, and reactive to light. No discharge.  No scleral icterus. Nose normal. Mouth/Throat: Oropharynx is clear and moist. No oropharyngeal exudate.    [place]  NECK: Normal range of motion. Neck supple. No JVD present. No tracheal deviation present. No thyromegaly present.   CARDIOVASCULAR: Normal rate, regular rhythm and intact distal pulses.  Exam reveals no gallop and no friction rub.  Systolic murmur present.  PULMONARY: Effort normal and breath sounds normal. No respiratory distress.No Wheezing or rales.  ABDOMEN: Soft. Bowel sounds are normal. No distension and no mass.  There is no tenderness. There is no rebound and no guarding. No HSM.  MUSCULOSKELETAL: Normal range of motion. Mild kyphosis, no tenderness.  LYMPH NODES: Has no cervical, supraclavicular, axillary and groin adenopathy.   NEUROLOGICAL: Alert and oriented to person, place, No cranial nerve deficit.  Normal muscle tone. Coordination normal.   Speech difficult to understand  GENITOURINARY: Deferred exam.has  A rai  SKIN: Skin is warm and dry. No rash noted. No erythema. No pallor.   EXTREMITIES: No cyanosis, no clubbing, no edema. No Deformity.  PSYCHIATRIC stable      Lab Results     Last Comprehensive Metabolic Panel:  Lab Results   Component Value Date     05/11/2023    POTASSIUM 4.1 05/11/2023    CHLORIDE 107 05/11/2023    CO2 18 (L) 05/11/2023    ANIONGAP 14 05/11/2023    GLC 98 05/15/2023    BUN 20.8 05/11/2023    CR 1.08 05/16/2023    GFRESTIMATED 64 05/16/2023    MARICRUZ 9.4 05/11/2023             Electronically signed by    Bela Franklin MD                       "

## 2023-10-05 NOTE — LETTER
10/5/2023        RE: Kurtis Urban  [address]  [address]  [facility] [address]        [facility] GERIATRIC SERVICES       Patient Kurtis Urban  MRN: [id number]        Reason for Visit     Chief Complaint   Patient presents with     [facility] Regulatory       Code Status     dnr    Assessment     encephalopathy  Bladder cancer  History of traumatic subdural hematoma  History of a seizure disorder  dysphagia  Vascular Dementia  H/o of temporal meningioma  hld  [place]  htn  Chronic dvt  Generalized weakness    Plan     Pt is currently a resident of long-term care who is readmitted.  Multiple hospitalizations noted  Readmitted with confusion/ aggressive behaviors  Seen by urology/ palliative care and neurology  No seizure noted  Returned back to baseline  Cont rai catheter cares  Discharged back to LTC  Has a follow-up scheduled with Urology for bladder cancer  Also has a history of meningioma for which no surgical intervention is being planned for  Family does not plan to monitor or pursue any treatment for it either  Monitor mood-back to baseline  Minimize meds due to polypharmacy concerns  Does not want a modified diet for dysphagia  On supplements for wt loss    History     Patient is a very pleasant 89 year old male who is admitted to LTC  Patient was initially admitted post fall with a subdural hematoma.  Subsequently he was readmitted and underwent TURP with cystoscopy on 5/15/2023.  Postoperative course complicated by UTI.  He was again readmitted with increasing confusion  No new seizures noted  No etiology found  Discharged back to LTC.      Past Medical & Surgical History     PAST MEDICAL HISTORY:   Past Medical History:   Diagnosis Date     Bladder cancer (H)      Cerebrovascular accident (H)      Chronic pain      Dementia with behavioral disturbance (H) 09/03/2019     Depression 05/28/2014     DVT (deep venous thrombosis) (H)      Failure to thrive in adult      Fall       Generalized muscle weakness      [place] (hard of hearing)      Hypercholesteremia 05/28/2014     Hypertension      Melanoma of back (H) 05/28/2014    Surgically removed       Melanoma of face (H) 05/28/2014    Surgically removed      Meniere's disease      Metabolic encephalopathy      Osteoarthritis of shoulder region      Seizure (H) 05/19/2015     Subdural hematoma (H)      Thiamine deficiency 09/03/2019     Thrombocytopenia (H24)      TIA (transient ischemic attack)      Urinary retention       PAST SURGICAL HISTORY:   has a past surgical history that includes Cholecystectomy; appendectomy; other surgical history; other surgical history (1993); hernia repair; shoulder surgery (Right); other surgical history (Right); and Cystoscopy, transurethral resection (TUR) tumor bladder, combined (N/A, 5/15/2023).      Past Social History     Reviewed,  reports that he has quit smoking. His smoking use included cigarettes. He has never used smokeless tobacco. He reports that he does not drink alcohol and does not use drugs.    Family History     Reviewed, and family history includes Cancer in his mother; Cerebrovascular Disease in his maternal grandfather.    Medication List     Current Outpatient Medications   Medication     acetaminophen (TYLENOL) 325 MG tablet     hypromellose (ARTIFICIAL TEARS) 0.5 % SOLN ophthalmic solution     levETIRAcetam (KEPPRA) 500 MG tablet     loperamide (IMODIUM) 2 MG capsule     polyethylene glycol (MIRALAX) 17 g packet     primidone (MYSOLINE) 50 MG tablet     sodium chloride (OCEAN) 0.65 % nasal spray     No current facility-administered medications for this visit.          Allergies     Allergies   Allergen Reactions     Orphenadrine Hives     Other reaction(s): hives       Penicillins Anaphylaxis and Nausea and Vomiting     Alfuzosin Nausea and Vomiting     Donepezil Other (See Comments)     Myolin [Orphenadrine Citrate] Unknown     Other reaction(s): hives     Sulfa Antibiotics Nausea and  "Vomiting     Triazolam Nausea and Vomiting     Penicillin V Rash       Review of Systems   A comprehensive review of 14 systems was not done due to confusion and speech difficulty      Physical Exam   /61   Pulse 63   Temp 97.7  F (36.5  C)   Resp 18   Ht 1.778 m (5' 10\")   Wt 58.1 kg (128 lb)   SpO2 94%   BMI 18.37 kg/m       Constitutional: Oriented to person, place, and appears well-developed.  HEENT:  Normocephalic and atraumatic.  Eyes: Conjunctivae and EOM are normal. Pupils are equal, round, and reactive to light. No discharge.  No scleral icterus. Nose normal. Mouth/Throat: Oropharynx is clear and moist. No oropharyngeal exudate.    [place]  NECK: Normal range of motion. Neck supple. No JVD present. No tracheal deviation present. No thyromegaly present.   CARDIOVASCULAR: Normal rate, regular rhythm and intact distal pulses.  Exam reveals no gallop and no friction rub.  Systolic murmur present.  PULMONARY: Effort normal and breath sounds normal. No respiratory distress.No Wheezing or rales.  ABDOMEN: Soft. Bowel sounds are normal. No distension and no mass.  There is no tenderness. There is no rebound and no guarding. No HSM.  MUSCULOSKELETAL: Normal range of motion. Mild kyphosis, no tenderness.  LYMPH NODES: Has no cervical, supraclavicular, axillary and groin adenopathy.   NEUROLOGICAL: Alert and oriented to person, place, No cranial nerve deficit.  Normal muscle tone. Coordination normal.   Speech difficult to understand  GENITOURINARY: Deferred exam.has  A rai  SKIN: Skin is warm and dry. No rash noted. No erythema. No pallor.   EXTREMITIES: No cyanosis, no clubbing, no edema. No Deformity.  PSYCHIATRIC stable      Lab Results     Last Comprehensive Metabolic Panel:  Lab Results   Component Value Date     05/11/2023    POTASSIUM 4.1 05/11/2023    CHLORIDE 107 05/11/2023    CO2 18 (L) 05/11/2023    ANIONGAP 14 05/11/2023    GLC 98 05/15/2023    BUN 20.8 05/11/2023    CR 1.08 05/16/2023    " GFRESTIMATED 64 05/16/2023    MARICRUZ 9.4 05/11/2023             Electronically signed by    Bela Franklin MD                           Sincerely,        DEB Mendiola

## 2023-10-17 NOTE — LETTER
10/17/2023       RE: Kurtis Urban  [address]  [address]  [facility] [address]     Dear Colleague,    Thank you for referring your patient, Kurtis Urban, to the [facility] NEUROLOGY [facility] [place] at [facility]. Please see a copy of my visit note below.    [facility] Neurology Follow Up Visit    Kurtis Urban MRN# [id number]   Age: 89 year old YOB: 1929     Brief history of symptoms: The patient was initially seen in neurologic consultation on 5/1/2023 for evaluation of seizure. Please see the comprehensive neurologic consultation notes from those dates in the Epic records for details.     The patient has had prior imaging show chronic b/l frontal infarctions as well as an arachnoid cyst while in the setting of having episodes of confusion, speaking in native tongue ([place]itanian), and having chewing motions not under his control in a transient fashion.  In the past, he was on Keppra 500 mg BID, but repeat EEG have been relatively normal.  These issues were also occurring with prior diagnosis of essential tremor (primidone 50 mg BID with Dr. Walker) and Dementia (2006, MoCA 20/30 in 2015, 13/30 in 2019. Trials of aricept/razadyne,namenda).  At our visit, the patient was being seen in regards to follow up from 3/16/2023 admission for a traumatic SDH (acute b/l cerebral convexity, as well as left middle cranial fossa meningioma exerted left temporal pole force).      Overall impression was that the patient had a mixed dementia (vascular predominant) which was progression over 15+ years and had risk for seizure given his left temporal meningioma may likely be growing.  He was to obtain EEG, CPT, continue on keppra 500 mg BID, and follow up with me shortly after EEG.    Other pertinent issues:  - He has urothelial carcinoma high-grade, not a candidate for cystectomy or chemoradiation, S/p TURBT 5/15/2023. Recurrent UTIs now  occurring.  - Seen in ED 7/3/2023 at [facility] relating to confusion and aggressive behaviors of staff.  DNR/DNI confirmed. Issues resolved over a period of days.  Keppra level was normal, EEG was without pertinent findings (see 7/5/2023 consultat progress note), MRI brain 7/4/2023 was without new infarction, signs of growing meningioma compared to 3/16/2023 testing, and SDH seemed stable (residual hematomas over convexities were chronic in nature (pachymeningeal thickening/enhancement).    Today, the patient lets Matty speak for him and update me on his current state.  He is living in assisted living at [facility] and is doing well following a fall 7/3/2023.  He isn't using his legs at this time following his fall, but there isn't a real etiology noted yet.  Matty tells me he can use his legs, but only due to slow incremental improvements over time. If he isn't concentrating, it seems difficult to understand him.        Physical Exam:   I can see Kurtis's neck and head. He speaks in short word phrases and mumbles but often defers to Matty to speak for him. Kurtis does laugh to jokes with his friend, and respond to questions asked of him.  No facial droop. Mouth agape throughout exam. Face has sunken cheeks.         Assessment and Plan:   Assessment:  Mixed dementia (vascular predominant)  Chronic subdural hygroma  Left temporal meningioma, stable over imaging from 3/2023 to 7/2023  Undefined seizure disorder with GTC  Essential tremor    The patient's repeat imaging in 07/2023 is reassuring in regards to his meningioma and subdural hygromas, and at this point, new imaging can be done at our next visit or soon after. I suspect his behavioral changes in 07/2023 were more related to infection than to seizure, SDH, or progression of his vascular predominant dementia.  At this time, he can continue with his current medications for seizure and tremor management.  I discussed in detail with Matty and Kurtis what a seizure from his  meningioma may present like, and that should an event occur he should present to an ED for further evaluation.  Otherwise, his medications can remain at the same dosing for now.     Plan:  - Follow up in 6 months, will repeat head CT or MRI brain at that time  - Social service referral for help with setting communication for Matty through [facility]    Follow up in Neurology clinic in 6 months, or earlier as needed should new concerns arise.    Total time today (45 min) in this patient encounter was spent on pre-charting, counseling and/or coordination of care.         Again, thank you for allowing me to participate in the care of your patient.      Sincerely,    Darrell Deng, DO

## 2023-10-17 NOTE — NURSING NOTE
Is the patient currently in the state of [place]? YES    Visit mode:VIDEO    If the visit is dropped, the patient can be reconnected by: VIDEO VISIT: Text to cell phone:   Telephone Information:   Mobile [phone]     Matty Maddie is helping with tech    Will anyone else be joining the visit? NO  (If patient encounters technical issues they should call [phone] :[phone])    How would you like to obtain your AVS? Mail a copy    Are changes needed to the allergy or medication list? No    Reason for visit: CHALINO MCCLOUD

## 2023-10-17 NOTE — PROGRESS NOTES
[facility] Neurology Follow Up Visit    Kurtis Urban MRN# [id number]   Age: 89 year old YOB: 1929     Brief history of symptoms: The patient was initially seen in neurologic consultation on 5/1/2023 for evaluation of seizure. Please see the comprehensive neurologic consultation notes from those dates in the Epic records for details.     The patient has had prior imaging show chronic b/l frontal infarctions as well as an arachnoid cyst while in the setting of having episodes of confusion, speaking in native tongue ([place]), and having chewing motions not under his control in a transient fashion.  In the past, he was on Keppra 500 mg BID, but repeat EEG have been relatively normal.  These issues were also occurring with prior diagnosis of essential tremor (primidone 50 mg BID with Dr. Walker) and Dementia (2006, MoCA 20/30 in 2015, 13/30 in 2019. Trials of aricept/razadyne,namenda).  At our visit, the patient was being seen in regards to follow up from 3/16/2023 admission for a traumatic SDH (acute b/l cerebral convexity, as well as left middle cranial fossa meningioma exerted left temporal pole force).      Overall impression was that the patient had a mixed dementia (vascular predominant) which was progression over 15+ years and had risk for seizure given his left temporal meningioma may likely be growing.  He was to obtain EEG, CPT, continue on keppra 500 mg BID, and follow up with me shortly after EEG.    Other pertinent issues:  - He has urothelial carcinoma high-grade, not a candidate for cystectomy or chemoradiation, S/p TURBT 5/15/2023. Recurrent UTIs now occurring.  - Seen in ED 7/3/2023 at [facility] relating to confusion and aggressive behaviors of staff.  DNR/DNI confirmed. Issues resolved over a period of days.  Keppra level was normal, EEG was without pertinent findings (see 7/5/2023 consultat progress note), MRI brain 7/4/2023 was without new infarction, signs of growing meningioma compared  to 3/16/2023 testing, and SDH seemed stable (residual hematomas over convexities were chronic in nature (pachymeningeal thickening/enhancement).    Today, the patient lets Matty speak for him and update me on his current state.  He is living in assisted living at [facility] and is doing well following a fall 7/3/2023.  He isn't using his legs at this time following his fall, but there isn't a real etiology noted yet.  Matty tells me he can use his legs, but only due to slow incremental improvements over time. If he isn't concentrating, it seems difficult to understand him.        Physical Exam:   I can see Kurtis's neck and head. He speaks in short word phrases and mumbles but often defers to Matty to speak for him. Kurtis does laugh to jokes with his friend, and respond to questions asked of him.  No facial droop. Mouth agape throughout exam. Face has sunken cheeks.         Assessment and Plan:   Assessment:  Mixed dementia (vascular predominant)  Chronic subdural hygroma  Left temporal meningioma, stable over imaging from 3/2023 to 7/2023  Undefined seizure disorder with GTC  Essential tremor    The patient's repeat imaging in 07/2023 is reassuring in regards to his meningioma and subdural hygromas, and at this point, new imaging can be done at our next visit or soon after. I suspect his behavioral changes in 07/2023 were more related to infection than to seizure, SDH, or progression of his vascular predominant dementia.  At this time, he can continue with his current medications for seizure and tremor management.  I discussed in detail with Matty and Kurtis what a seizure from his meningioma may present like, and that should an event occur he should present to an ED for further evaluation.  Otherwise, his medications can remain at the same dosing for now.     Plan:  - Follow up in 6 months, will repeat head CT or MRI brain at that time  - Social service referral for help with setting communication for Matty through  [facility]    Follow up in Neurology clinic in 6 months, or earlier as needed should new concerns arise.    PALMIRA Deng D.O.   of Neurology    Total time today (45 min) in this patient encounter was spent on pre-charting, counseling and/or coordination of care.           Virtual Visit Details    Type of service:  Video Visit   Video Start Time: 0930  Video End Time:10:00 AM    Originating Location (pt. Location): Assisted Living    Distant Location (provider location):  On-site  Platform used for Video Visit: Marcos

## 2023-10-20 NOTE — TELEPHONE ENCOUNTER
Left Voicemail (1st Attempt) for the patient to call back and schedule the following:    Appointment type: Video Visit Return  Provider: Ish  Return date: April 2024  Specialty phone number: [phone]  Additional appointment(s) needed: N/A  Additional Notes: N/A    Called Matty (c2c on file) per note on appt desk.    Uriah Herzog on 10/20/2023 at 5:37 PM

## 2023-11-01 NOTE — LETTER
11/1/2023        RE: Kurtis Urban  [address] [facility]  C/o Adi Urban  [facility] [id number]        [facility] GERIATRICS  Chief Complaint   Patient presents with     longterm Acute     [place] Medical Record Number:  [id number]  Place of Service where encounter took place:  [facility] () [[id number]]    HPI:    Kurtis Urban  is a 89 year old  (2/23/1929), who is being seen today for a follow-up positive COVID. He currently resides in the long-term care at [facility].He is with past medical history significant for dementia,   seizure disorder, CVA, TIA, dyslipidemia.  He underwent transurethral resection of bladder tumor 4 cm by Dr. Johansen on 6/30/23      Today he is seen to review vital signs, labs, and a follow-up to positive COVID.  He  denied cough congestion or shortness of breath.  He does have a Retana with clear yellow urine. He denied chest pain..  Shortness of breath.  He denies pain.    Retana in place with clear yellow urine.  He will completed molnupiravir in 11/3/2023    ALLERGIES: Orphenadrine, Penicillins, Alfuzosin, Donepezil, Myolin [orphenadrine citrate], Sulfa antibiotics, Triazolam, and Penicillin v  PAST MEDICAL HISTORY:   Past Medical History:   Diagnosis Date     Bladder cancer (H)      Cerebrovascular accident (H)      Chronic pain      Dementia with behavioral disturbance (H) 09/03/2019     Depression 05/28/2014     DVT (deep venous thrombosis) (H)      Failure to thrive in adult      Fall      Generalized muscle weakness      [place] (hard of hearing)      Hypercholesteremia 05/28/2014     Hypertension      Melanoma of back (H) 05/28/2014    Surgically removed       Melanoma of face (H) 05/28/2014    Surgically removed      Meniere's disease      Metabolic encephalopathy      Osteoarthritis of shoulder region      Seizure (H) 05/19/2015     Subdural hematoma (H)      Thiamine deficiency 09/03/2019     Thrombocytopenia (H24)      TIA  (transient ischemic attack)      Urinary retention       PAST SURGICAL HISTORY:  has a past surgical history that includes Cholecystectomy; appendectomy; other surgical history; other surgical history (1993); hernia repair; shoulder surgery (Right); other surgical history (Right); and Cystoscopy, transurethral resection (TUR) tumor bladder, combined (N/A, 5/15/2023).      Current Outpatient Medications:      acetaminophen (TYLENOL) 325 MG tablet, Take 650 mg by mouth every 6 hours as needed for mild pain, Disp: , Rfl:      hypromellose (ARTIFICIAL TEARS) 0.5 % SOLN ophthalmic solution, 2 drops 3 times daily as needed for dry eyes, Disp: , Rfl:      levETIRAcetam (KEPPRA) 500 MG tablet, Take 1 tablet (500 mg) by mouth 2 times daily, Disp: 56 tablet, Rfl: 12     loperamide (IMODIUM) 2 MG capsule, Take 2mg Q AM and 2mg TID PRN.  , Disp: , Rfl:      molnupiravir (LAGEVRIO) 200 MG capsule, Take 800 mg by mouth every 12 hours, Disp: , Rfl:      polyethylene glycol (MIRALAX) 17 g packet, Take 1 packet by mouth daily as needed, Disp: , Rfl:      primidone (MYSOLINE) 50 MG tablet, Take 1 tablet (50 mg) by mouth 2 times daily, Disp: 90 tablet, Rfl: 7     sodium chloride (OCEAN) 0.65 % nasal spray, Spray 1 spray into both nostrils daily as needed for congestion, Disp: , Rfl:      ROS:  Constitutional: Negative for activity change, appetite change, fatigue and fever.   HENT: Negative for congestion.    Hearing aid left ear  Respiratory: Negative for cough, shortness of breath and wheezing.    Cardiovascular: Negative for chest pain and leg swelling.   Gastrointestinal: Negative for abdominal distention, abdominal pain, constipation, diarrhea and nausea.   Genitourinary: Negative for dysuria. Retana in place  Musculoskeletal: Negative for arthralgia. Negative for back pain.   Skin: Negative for color change and wound.   Neurological: Negative for dizziness.   Psychiatric/Behavioral: Negative for agitation, behavioral problems and  "confusion.     Vitals:  /64   Pulse 70   Temp 97.6  F (36.4  C)   Resp 18   Ht 1.778 m (5' 10\")   Wt 60.3 kg (133 lb)   SpO2 96%   BMI 19.08 kg/m   Body mass index is 19.08 kg/m .  Exam:  Constitutional:       Appearance: Patient is well-developed.   HENT:      Head: Normocephalic.   Eyes:      Conjunctiva/sclera: Conjunctivae normal.   Neck:      Musculoskeletal: Normal range of motion.   Cardiovascular:      Rate and Rhythm: Normal rate and regular rhythm.      Heart sounds: Normal heart sounds. No murmur.   Pulmonary:      Effort: No respiratory distress.      Breath sounds: Normal breath sounds.   Abdominal:      General: Bowel sounds are normal. There is no distension.      Palpations: Abdomen is soft.      Tenderness: There is no abdominal tenderness.   Musculoskeletal:       Normal range of motion.     Skin:General:        Skin is warm.   Neurological:         Mental Status: Patient is alert and oriented to person, place, and time.   Psychiatric:         Behavior: Behavior normal.  Recent ER visit for encephalopathy however he is clear today    Lab/Diagnostic data:   No results found for this or any previous visit (from the past 240 hour(s)).    ASSESSMENT/PLAN    Positive COVID asymptomatic treat the symptoms he will complete molnupiravir on 11/3/2023    Seizure disorder/Tremor on Keppra and primidone Keppra Level WNL on 7/3/2023 was 13.4     Retana in place Follows up with Urology-recent TURB 6/30/2023    Pain management - PRN Tylenol    Poor p.o. intake on supplements-      Electronically signed by:  Juana Martinez CNP       Sincerely,        Juana Martinez CNP      "

## 2023-11-01 NOTE — PROGRESS NOTES
[facility] GERIATRICS  Chief Complaint   Patient presents with    shelter Acute     [place] Medical Record Number:  [id number]  Place of Service where encounter took place:  [facility] () [[id number]]    HPI:    Kurtis Urban  is a 89 year old  (2/23/1929), who is being seen today for a follow-up positive COVID. He currently resides in the long-term care at [facility].He is with past medical history significant for dementia,   seizure disorder, CVA, TIA, dyslipidemia.  He underwent transurethral resection of bladder tumor 4 cm by Dr. Johansen on 6/30/23      Today he is seen to review vital signs, labs, and a follow-up to positive COVID.  He  denied cough congestion or shortness of breath.  He does have a Retana with clear yellow urine. He denied chest pain..  Shortness of breath.  He denies pain.    Retana in place with clear yellow urine.  He will completed molnupiravir in 11/3/2023    ALLERGIES: Orphenadrine, Penicillins, Alfuzosin, Donepezil, Myolin [orphenadrine citrate], Sulfa antibiotics, Triazolam, and Penicillin v  PAST MEDICAL HISTORY:   Past Medical History:   Diagnosis Date    Bladder cancer (H)     Cerebrovascular accident (H)     Chronic pain     Dementia with behavioral disturbance (H) 09/03/2019    Depression 05/28/2014    DVT (deep venous thrombosis) (H)     Failure to thrive in adult     Fall     Generalized muscle weakness     [place] (hard of hearing)     Hypercholesteremia 05/28/2014    Hypertension     Melanoma of back (H) 05/28/2014    Surgically removed      Melanoma of face (H) 05/28/2014    Surgically removed     Meniere's disease     Metabolic encephalopathy     Osteoarthritis of shoulder region     Seizure (H) 05/19/2015    Subdural hematoma (H)     Thiamine deficiency 09/03/2019    Thrombocytopenia (H24)     TIA (transient ischemic attack)     Urinary retention       PAST SURGICAL HISTORY:  has a past surgical history that includes Cholecystectomy;  "appendectomy; other surgical history; other surgical history (1993); hernia repair; shoulder surgery (Right); other surgical history (Right); and Cystoscopy, transurethral resection (TUR) tumor bladder, combined (N/A, 5/15/2023).      Current Outpatient Medications:     acetaminophen (TYLENOL) 325 MG tablet, Take 650 mg by mouth every 6 hours as needed for mild pain, Disp: , Rfl:     hypromellose (ARTIFICIAL TEARS) 0.5 % SOLN ophthalmic solution, 2 drops 3 times daily as needed for dry eyes, Disp: , Rfl:     levETIRAcetam (KEPPRA) 500 MG tablet, Take 1 tablet (500 mg) by mouth 2 times daily, Disp: 56 tablet, Rfl: 12    loperamide (IMODIUM) 2 MG capsule, Take 2mg Q AM and 2mg TID PRN.  , Disp: , Rfl:     molnupiravir (LAGEVRIO) 200 MG capsule, Take 800 mg by mouth every 12 hours, Disp: , Rfl:     polyethylene glycol (MIRALAX) 17 g packet, Take 1 packet by mouth daily as needed, Disp: , Rfl:     primidone (MYSOLINE) 50 MG tablet, Take 1 tablet (50 mg) by mouth 2 times daily, Disp: 90 tablet, Rfl: 7    sodium chloride (OCEAN) 0.65 % nasal spray, Spray 1 spray into both nostrils daily as needed for congestion, Disp: , Rfl:      ROS:  Constitutional: Negative for activity change, appetite change, fatigue and fever.   HENT: Negative for congestion.    Hearing aid left ear  Respiratory: Negative for cough, shortness of breath and wheezing.    Cardiovascular: Negative for chest pain and leg swelling.   Gastrointestinal: Negative for abdominal distention, abdominal pain, constipation, diarrhea and nausea.   Genitourinary: Negative for dysuria. Retana in place  Musculoskeletal: Negative for arthralgia. Negative for back pain.   Skin: Negative for color change and wound.   Neurological: Negative for dizziness.   Psychiatric/Behavioral: Negative for agitation, behavioral problems and confusion.     Vitals:  /64   Pulse 70   Temp 97.6  F (36.4  C)   Resp 18   Ht 1.778 m (5' 10\")   Wt 60.3 kg (133 lb)   SpO2 96%   BMI " 19.08 kg/m   Body mass index is 19.08 kg/m .  Exam:  Constitutional:       Appearance: Patient is well-developed.   HENT:      Head: Normocephalic.   Eyes:      Conjunctiva/sclera: Conjunctivae normal.   Neck:      Musculoskeletal: Normal range of motion.   Cardiovascular:      Rate and Rhythm: Normal rate and regular rhythm.      Heart sounds: Normal heart sounds. No murmur.   Pulmonary:      Effort: No respiratory distress.      Breath sounds: Normal breath sounds.   Abdominal:      General: Bowel sounds are normal. There is no distension.      Palpations: Abdomen is soft.      Tenderness: There is no abdominal tenderness.   Musculoskeletal:       Normal range of motion.     Skin:General:        Skin is warm.   Neurological:         Mental Status: Patient is alert and oriented to person, place, and time.   Psychiatric:         Behavior: Behavior normal.  Recent ER visit for encephalopathy however he is clear today    Lab/Diagnostic data:   No results found for this or any previous visit (from the past 240 hour(s)).    ASSESSMENT/PLAN    Positive COVID asymptomatic treat the symptoms he will complete molnupiravir on 11/3/2023    Seizure disorder/Tremor on Keppra and primidone Keppra Level WNL on 7/3/2023 was 13.4     Retana in place Follows up with Urology-recent TURB 6/30/2023    Pain management - PRN Tylenol    Poor p.o. intake on supplements-      Electronically signed by:  Juana Martinez CNP

## 2023-12-20 NOTE — PROGRESS NOTES
"[facility] GERIATRICS  Chief Complaint   Patient presents with    Annual Comprehensive Nursing Home     [place] Medical Record Number:  [id number]  Place of Service where encounter took place:  [facility] () [[id number]]    HPI:    Kurtis Urban  is a 89 year old  (2/23/1929), who is being seen today for an annual comprehensive visit.  He currently resides in the long-term care at [facility].He is with past medical history significant for dementia,   seizure disorder, CVA, TIA, dyslipidemia. He underwent transurethral resection of bladder tumor 4 cm by Dr. Johansen on 6/30.    Today he is seen to review vital signs, labs, and a routine annual visit.. He denied cough congestion or any urinary symptoms.  He does have a Retana with clear yellow urine. He denied chest pain shortness of breath cough congestion constipation or diarrhea.  He denied headaches dizziness.  Hearing aid left ear only.  He denies pain.  Labs reviewed from 7/3/2023.  TSH 2.05, hemoglobin 10.6 Keppra level 13.4 within normal limits.  He thought today was the anniversary of the death of his wife however it is tomorrow.  Decreased range of motion bilateral upper extremities.  Today he reports, \"right now nothing hurts \".  Weights reviewed and he was up 7.2 pounds over the last month he is currently on supplements due to a history of weight loss.    ALLERGIES: Orphenadrine, Penicillins, Alfuzosin, Donepezil, Myolin [orphenadrine citrate], Sulfa antibiotics, Triazolam, and Penicillin v  PAST MEDICAL HISTORY:   Past Medical History:   Diagnosis Date    Bladder cancer (H)     Cerebrovascular accident (H)     Chronic pain     Dementia with behavioral disturbance (H) 09/03/2019    Depression 05/28/2014    DVT (deep venous thrombosis) (H)     Failure to thrive in adult     Fall     Generalized muscle weakness     [place] (hard of hearing)     Hypercholesteremia 05/28/2014    Hypertension     Melanoma of back (H) 05/28/2014    " Surgically removed      Melanoma of face (H) 05/28/2014    Surgically removed     Meniere's disease     Metabolic encephalopathy     Osteoarthritis of shoulder region     Seizure (H) 05/19/2015    Subdural hematoma (H)     Thiamine deficiency 09/03/2019    Thrombocytopenia (H24)     TIA (transient ischemic attack)     Urinary retention       PAST SURGICAL HISTORY:  has a past surgical history that includes Cholecystectomy; appendectomy; other surgical history; other surgical history (1993); hernia repair; shoulder surgery (Right); other surgical history (Right); and Cystoscopy, transurethral resection (TUR) tumor bladder, combined (N/A, 5/15/2023).      Current Outpatient Medications:     acetaminophen (TYLENOL) 325 MG tablet, Take 650 mg by mouth every 6 hours as needed for mild pain, Disp: , Rfl:     hypromellose (ARTIFICIAL TEARS) 0.5 % SOLN ophthalmic solution, 2 drops 3 times daily as needed for dry eyes, Disp: , Rfl:     levETIRAcetam (KEPPRA) 500 MG tablet, Take 1 tablet (500 mg) by mouth 2 times daily, Disp: 56 tablet, Rfl: 12    loperamide (IMODIUM) 2 MG capsule, Take 2mg Q AM and 2mg TID PRN.  , Disp: , Rfl:     polyethylene glycol (MIRALAX) 17 g packet, Take 1 packet by mouth daily as needed, Disp: , Rfl:     primidone (MYSOLINE) 50 MG tablet, Take 1 tablet (50 mg) by mouth 2 times daily, Disp: 90 tablet, Rfl: 7    sodium chloride (OCEAN) 0.65 % nasal spray, Spray 1 spray into both nostrils daily as needed for congestion, Disp: , Rfl:        Post Medication Reconciliation Status:   Current Outpatient Medications   Medication    acetaminophen (TYLENOL) 325 MG tablet    hypromellose (ARTIFICIAL TEARS) 0.5 % SOLN ophthalmic solution    levETIRAcetam (KEPPRA) 500 MG tablet    loperamide (IMODIUM) 2 MG capsule    polyethylene glycol (MIRALAX) 17 g packet    primidone (MYSOLINE) 50 MG tablet    sodium chloride (OCEAN) 0.65 % nasal spray     No current facility-administered medications for this visit.      Case  "Management:  I have reviewed the facility/SNF care plan/MDS, including the falls risk, nutrition and pain screening. I also reviewed the current immunizations, and preventive care.. Future cancer screening is not clinically indicated secondary to age/goals of care. Patient's desire to return to the community is not present. Current Level of Care is appropriate.mhgeroimmunization: Annual Influenza per facility protocol    Advance Directive Discussion:    I reviewed the current advanced directives as reflected in EPIC, the POLST and the facility chart, and verified the congruency of orders CODE STATUS DNR    Team Discussion:  I communicated with the appropriate disciplines involved with the Plan of Care: Nursing  .   Patient's goal is: pain control and comfort.  Information reviewed: Medications, vital signs, orders, and nursing notes.    ROS:  Constitutional: Negative for activity change, appetite change, fatigue and fever.   HENT: Negative for congestion.    Hearing aid left ear  Respiratory: Negative for cough, shortness of breath and wheezing.    Cardiovascular: Negative for chest pain and leg swelling.   Gastrointestinal: Negative for abdominal distention, abdominal pain, constipation, diarrhea and nausea.   Genitourinary: Negative for dysuria. Retana in place  Musculoskeletal: Negative for arthralgia. Negative for back pain.   Skin: Negative for color change and wound.   Neurological: Negative for dizziness.   Psychiatric/Behavioral: Negative for agitation, behavioral problems and confusion  Vitals:  /58   Pulse 67   Temp (!) 96.4  F (35.8  C)   Resp 16   Ht 1.778 m (5' 10\")   Wt 62.2 kg (137 lb 3.2 oz)   SpO2 98%   BMI 19.69 kg/m   Body mass index is 19.69 kg/m .  Exam:  Constitutional:       Appearance: Patient is well-developed.   HENT:      Head: Normocephalic.   Eyes:      Conjunctiva/sclera: Conjunctivae normal.   Neck:      Musculoskeletal: Normal range of motion.   Cardiovascular:      Rate " and Rhythm: Normal rate and regular rhythm.      Heart sounds: Normal heart sounds. No murmur.   Pulmonary:      Effort: No respiratory distress.      Breath sounds: Normal breath sounds.   Abdominal:      General: Bowel sounds are normal. There is no distension.      Palpations: Abdomen is soft.      Tenderness: There is no abdominal tenderness.   Musculoskeletal:       Normal range of motion.     Skin:General:        Skin is warm.   Neurological:         Mental Status: Patient is alert and oriented to person, place, and time.   Psychiatric:         Behavior: Behavior normal.  Recent ER visit for encephalopathy however he is clear today    Lab/Diagnostic data:   No results found for this or any previous visit (from the past 240 hour(s)).     ASSESSMENT/PLAN    Seizure disorder/Tremor on Keppra and primidone Keppra Level WNL on 7/3/2023 was 13.4     Retana in place Follows up with Urology-TURB 6/2023     Pain management - PRN Tylenol       Electronically signed by:  Juana Martinez CNP

## 2023-12-21 NOTE — LETTER
"    12/21/2023        RE: Kurtis Urban  [address] [facility]  C/o Adi Urban  [facility] [address]        [facility] GERIATRICS  Chief Complaint   Patient presents with     Annual Comprehensive Nursing Home     [place] Medical Record Number:  [id number]  Place of Service where encounter took place:  [facility] () [[id number]]    HPI:    Kurtis Urban  is a 89 year old  (2/23/1929), who is being seen today for an annual comprehensive visit.  He currently resides in the long-term care at [facility].He is with past medical history significant for dementia,   seizure disorder, CVA, TIA, dyslipidemia. He underwent transurethral resection of bladder tumor 4 cm by Dr. Johansen on 6/30.    Today he is seen to review vital signs, labs, and a routine annual visit.. He denied cough congestion or any urinary symptoms.  He does have a Retana with clear yellow urine. He denied chest pain shortness of breath cough congestion constipation or diarrhea.  He denied headaches dizziness.  Hearing aid left ear only.  He denies pain.  Labs reviewed from 7/3/2023.  TSH 2.05, hemoglobin 10.6 Keppra level 13.4 within normal limits.  He thought today was the anniversary of the death of his wife however it is tomorrow.  Decreased range of motion bilateral upper extremities.  Today he reports, \"right now nothing hurts \".  Weights reviewed and he was up 7.2 pounds over the last month he is currently on supplements due to a history of weight loss.    ALLERGIES: Orphenadrine, Penicillins, Alfuzosin, Donepezil, Myolin [orphenadrine citrate], Sulfa antibiotics, Triazolam, and Penicillin v  PAST MEDICAL HISTORY:   Past Medical History:   Diagnosis Date     Bladder cancer (H)      Cerebrovascular accident (H)      Chronic pain      Dementia with behavioral disturbance (H) 09/03/2019     Depression 05/28/2014     DVT (deep venous thrombosis) (H)      Failure to thrive in adult      Fall      Generalized muscle " weakness      [place] (hard of hearing)      Hypercholesteremia 05/28/2014     Hypertension      Melanoma of back (H) 05/28/2014    Surgically removed       Melanoma of face (H) 05/28/2014    Surgically removed      Meniere's disease      Metabolic encephalopathy      Osteoarthritis of shoulder region      Seizure (H) 05/19/2015     Subdural hematoma (H)      Thiamine deficiency 09/03/2019     Thrombocytopenia (H24)      TIA (transient ischemic attack)      Urinary retention       PAST SURGICAL HISTORY:  has a past surgical history that includes Cholecystectomy; appendectomy; other surgical history; other surgical history (1993); hernia repair; shoulder surgery (Right); other surgical history (Right); and Cystoscopy, transurethral resection (TUR) tumor bladder, combined (N/A, 5/15/2023).      Current Outpatient Medications:      acetaminophen (TYLENOL) 325 MG tablet, Take 650 mg by mouth every 6 hours as needed for mild pain, Disp: , Rfl:      hypromellose (ARTIFICIAL TEARS) 0.5 % SOLN ophthalmic solution, 2 drops 3 times daily as needed for dry eyes, Disp: , Rfl:      levETIRAcetam (KEPPRA) 500 MG tablet, Take 1 tablet (500 mg) by mouth 2 times daily, Disp: 56 tablet, Rfl: 12     loperamide (IMODIUM) 2 MG capsule, Take 2mg Q AM and 2mg TID PRN.  , Disp: , Rfl:      polyethylene glycol (MIRALAX) 17 g packet, Take 1 packet by mouth daily as needed, Disp: , Rfl:      primidone (MYSOLINE) 50 MG tablet, Take 1 tablet (50 mg) by mouth 2 times daily, Disp: 90 tablet, Rfl: 7     sodium chloride (OCEAN) 0.65 % nasal spray, Spray 1 spray into both nostrils daily as needed for congestion, Disp: , Rfl:        Post Medication Reconciliation Status:   Current Outpatient Medications   Medication     acetaminophen (TYLENOL) 325 MG tablet     hypromellose (ARTIFICIAL TEARS) 0.5 % SOLN ophthalmic solution     levETIRAcetam (KEPPRA) 500 MG tablet     loperamide (IMODIUM) 2 MG capsule     polyethylene glycol (MIRALAX) 17 g packet      "primidone (MYSOLINE) 50 MG tablet     sodium chloride (OCEAN) 0.65 % nasal spray     No current facility-administered medications for this visit.      Case Management:  I have reviewed the facility/SNF care plan/MDS, including the falls risk, nutrition and pain screening. I also reviewed the current immunizations, and preventive care.. Future cancer screening is not clinically indicated secondary to age/goals of care. Patient's desire to return to the community is not present. Current Level of Care is appropriate.mhgeroimmunization: Annual Influenza per facility protocol    Advance Directive Discussion:    I reviewed the current advanced directives as reflected in EPIC, the POLST and the facility chart, and verified the congruency of orders CODE STATUS DNR    Team Discussion:  I communicated with the appropriate disciplines involved with the Plan of Care: Nursing  .   Patient's goal is: pain control and comfort.  Information reviewed: Medications, vital signs, orders, and nursing notes.    ROS:  Constitutional: Negative for activity change, appetite change, fatigue and fever.   HENT: Negative for congestion.    Hearing aid left ear  Respiratory: Negative for cough, shortness of breath and wheezing.    Cardiovascular: Negative for chest pain and leg swelling.   Gastrointestinal: Negative for abdominal distention, abdominal pain, constipation, diarrhea and nausea.   Genitourinary: Negative for dysuria. Retana in place  Musculoskeletal: Negative for arthralgia. Negative for back pain.   Skin: Negative for color change and wound.   Neurological: Negative for dizziness.   Psychiatric/Behavioral: Negative for agitation, behavioral problems and confusion  Vitals:  /58   Pulse 67   Temp (!) 96.4  F (35.8  C)   Resp 16   Ht 1.778 m (5' 10\")   Wt 62.2 kg (137 lb 3.2 oz)   SpO2 98%   BMI 19.69 kg/m   Body mass index is 19.69 kg/m .  Exam:  Constitutional:       Appearance: Patient is well-developed.   HENT:      " Head: Normocephalic.   Eyes:      Conjunctiva/sclera: Conjunctivae normal.   Neck:      Musculoskeletal: Normal range of motion.   Cardiovascular:      Rate and Rhythm: Normal rate and regular rhythm.      Heart sounds: Normal heart sounds. No murmur.   Pulmonary:      Effort: No respiratory distress.      Breath sounds: Normal breath sounds.   Abdominal:      General: Bowel sounds are normal. There is no distension.      Palpations: Abdomen is soft.      Tenderness: There is no abdominal tenderness.   Musculoskeletal:       Normal range of motion.     Skin:General:        Skin is warm.   Neurological:         Mental Status: Patient is alert and oriented to person, place, and time.   Psychiatric:         Behavior: Behavior normal.  Recent ER visit for encephalopathy however he is clear today    Lab/Diagnostic data:   No results found for this or any previous visit (from the past 240 hour(s)).     ASSESSMENT/PLAN    Seizure disorder/Tremor on Keppra and primidone Keppra Level WNL on 7/3/2023 was 13.4     Retana in place Follows up with [facility] 6/2023     Pain management - PRN Tylenol       Electronically signed by:  Juana Martinez CNP          Sincerely,        Juana Martinez CNP

## 2023-12-28 NOTE — TELEPHONE ENCOUNTER
[facility] Geriatrics Triage Nurse Telephone Encounter    Provider: Bela Franklin MD  Facility: [facility]  Facility Type:  LTC    Caller: Zabrina  Call Back Number: [phone]    Allergies:    Allergies   Allergen Reactions    Orphenadrine Hives     Other reaction(s): hives      Penicillins Anaphylaxis and Nausea and Vomiting    Alfuzosin Nausea and Vomiting    Donepezil Other (See Comments)    Myolin [Orphenadrine Citrate] Unknown     Other reaction(s): hives    Sulfa Antibiotics Nausea and Vomiting    Triazolam Nausea and Vomiting    Penicillin V Rash        Reason for call: Nurse is calling to report that patient had 3 large loose incontinent stools today.  VS are stable.  Patient refused breakfast and has been very fatigued and has been sleeping all morning.  No nausea/emesis/abdominal pain.      Verbal Order/Direction given by Provider: Clear liquid diet x 24 hours and advance diet as tolerated.  Check a stool specimen for Norovirus.  Encourage gentle hydration.      Provider giving Order:  Bela Franklin MD    Verbal Order given to: Zabrina Evans RN

## 2024-01-01 ENCOUNTER — LAB REQUISITION (OUTPATIENT)
Dept: LAB | Facility: CLINIC | Age: 89
End: 2024-01-01
Payer: COMMERCIAL

## 2024-01-01 DIAGNOSIS — Z51.5 END OF LIFE CARE: Primary | ICD-10-CM

## 2024-01-01 DIAGNOSIS — R19.7 DIARRHEA, UNSPECIFIED: ICD-10-CM

## 2024-01-01 LAB
ADV 40+41 DNA STL QL NAA+NON-PROBE: NEGATIVE
ASTRO TYP 1-8 RNA STL QL NAA+NON-PROBE: NEGATIVE
C CAYETANENSIS DNA STL QL NAA+NON-PROBE: NEGATIVE
CAMPYLOBACTER DNA SPEC NAA+PROBE: NEGATIVE
CRYPTOSP DNA STL QL NAA+NON-PROBE: NEGATIVE
E COLI O157 DNA STL QL NAA+NON-PROBE: NORMAL
E HISTOLYT DNA STL QL NAA+NON-PROBE: NEGATIVE
EAEC ASTA GENE ISLT QL NAA+PROBE: NEGATIVE
EC STX1+STX2 GENES STL QL NAA+NON-PROBE: NEGATIVE
EPEC EAE GENE STL QL NAA+NON-PROBE: NEGATIVE
ETEC LTA+ST1A+ST1B TOX ST NAA+NON-PROBE: NEGATIVE
G LAMBLIA DNA STL QL NAA+NON-PROBE: NEGATIVE
NOROVIRUS GI+II RNA STL QL NAA+NON-PROBE: NEGATIVE
P SHIGELLOIDES DNA STL QL NAA+NON-PROBE: NEGATIVE
RVA RNA STL QL NAA+NON-PROBE: NEGATIVE
SALMONELLA SP RPOD STL QL NAA+PROBE: NEGATIVE
SAPO I+II+IV+V RNA STL QL NAA+NON-PROBE: NEGATIVE
SHIGELLA SP+EIEC IPAH ST NAA+NON-PROBE: NEGATIVE
V CHOLERAE DNA SPEC QL NAA+PROBE: NEGATIVE
VIBRIO DNA SPEC NAA+PROBE: NEGATIVE
Y ENTEROCOL DNA STL QL NAA+PROBE: NEGATIVE

## 2024-01-01 PROCEDURE — 87507 IADNA-DNA/RNA PROBE TQ 12-25: CPT | Mod: ORL | Performed by: FAMILY MEDICINE

## 2024-01-01 RX ORDER — LORAZEPAM 2 MG/ML
0.5 CONCENTRATE ORAL EVERY 4 HOURS PRN
COMMUNITY
End: 2024-01-01

## 2024-01-01 RX ORDER — LORAZEPAM 2 MG/ML
0.5 CONCENTRATE ORAL EVERY 4 HOURS PRN
Qty: 30 ML | Refills: 0 | Status: SHIPPED | OUTPATIENT
Start: 2024-01-01

## 2024-01-01 RX ORDER — MORPHINE SULFATE 100 MG/5ML
5 SOLUTION ORAL EVERY 4 HOURS PRN
COMMUNITY
End: 2024-01-01

## 2024-01-01 RX ORDER — MORPHINE SULFATE 100 MG/5ML
5 SOLUTION ORAL EVERY 4 HOURS PRN
Qty: 15 ML | Refills: 0 | Status: SHIPPED | OUTPATIENT
Start: 2024-01-01

## 2024-01-18 ENCOUNTER — DOCUMENTATION ONLY (OUTPATIENT)
Dept: GERIATRICS | Facility: CLINIC | Age: 89
End: 2024-01-18
Payer: COMMERCIAL

## 2024-01-18 NOTE — PROGRESS NOTES
Patient  on 24 @ 0500 at [facility].  Patient was on [facility].

## 2025-04-23 NOTE — PROGRESS NOTES
Care Management Follow Up    Length of Stay (days): 2    Expected Discharge Date: 03/18/2023     Concerns to be Addressed:     Assessments are in progress for disposition recommendation; ongoing medical care - patient leaving now for MRI  Patient plan of care discussed at interdisciplinary rounds: Yes    Anticipated Discharge Disposition:  ?Home with increased services from facility, ?home with home care, ?TCU     Anticipated Discharge Services:  Awaiting team recommendation and further conversation with staff at patient's care facility. Possibly home with increased services vs. Home with home care vs. TCU  Anticipated Discharge DME:  As per therapy recommendations. Uses a walker at baseline.    Patient/family educated on Medicare website which has current facility and service quality ratings:  yes  Education Provided on the Discharge Plan:    Patient/Family in Agreement with the Plan:      Referrals Placed by CM/SW:  None yet - awaiting care team recommendations and input from patient's care facility  Private pay costs discussed: Not applicable    Additional Information:  Patient was admitted following a fall at his independent living apartment. This writer met with pt. And his son Adi. Adi flew here from [place] and will be here until Wed/Thur of this coming week. Patient is going to MRI momentarily. OT has seen patient and recommends TCU. PT will be seeing patient. Son Adi shares that he has left a message with [facility] where patient resides to inquire about adding additional supportive services to patient. This writer also called [facility], spoke with Tonie and she indicates no one is at the facility this weekend who would be able to make a determination as to additional help patient may qualify for or to assess if he needs a different level of care at the facility. Patient has been at the [facility] TCU in the past. He prefers not to go there again, if at all possible.     CM will await PT  recommendation. Will need to consult with [facility] on Monday to determine appropriate level of care for patient.    Juana Caraballo, MARLENESW       [facility]

## (undated) DEVICE — GLOVE SURG PI ULTRA TOUCH M SZ 7 LF 42670

## (undated) DEVICE — MAT FLOOR SURGICAL 40X38 0702140238

## (undated) DEVICE — CUSTOM PACK CYSTO PREFERRED SOT5BCYHEA

## (undated) DEVICE — TUBING SUCTION MEDI-VAC 1/4"X20' N620A - HE

## (undated) DEVICE — SOL WATER IRRIG 1000ML BOTTLE 2F7114

## (undated) DEVICE — SUCTION MANIFOLD NEPTUNE 2 SYS 1 PORT 702-025-000

## (undated) DEVICE — TUBING SET THERMEDX UROLOGY SGL USE LL0006

## (undated) DEVICE — SPONGE RAY-TEC 4X8" 7318

## (undated) DEVICE — SOL WATER IRRIG 3000ML BAG 2B7117

## (undated) DEVICE — LOOP CUTTING 24FR 30 DEG SCOP A22205C

## (undated) DEVICE — PLATE GROUNDING ADULT W/CORD 9165L

## (undated) DEVICE — SYR 30ML LL W/O NDL 302832

## (undated) DEVICE — CATH FOLEY 3WAY 22FR 30ML LUBRICATH LATEX 0167L22

## (undated) DEVICE — BAG URINARY DRAIN 2000ML LF 154002

## (undated) DEVICE — PREP DYNA-HEX 4% CHG SCRUB 4OZ BOTTLE MDS098710

## (undated) DEVICE — STRAP CATH LEG ADJUSTABLE 0814-8200

## (undated) RX ORDER — FENTANYL CITRATE 50 UG/ML
INJECTION, SOLUTION INTRAMUSCULAR; INTRAVENOUS
Status: DISPENSED
Start: 2023-01-01